# Patient Record
Sex: MALE | Race: WHITE | NOT HISPANIC OR LATINO
[De-identification: names, ages, dates, MRNs, and addresses within clinical notes are randomized per-mention and may not be internally consistent; named-entity substitution may affect disease eponyms.]

---

## 2017-01-23 ENCOUNTER — NON-APPOINTMENT (OUTPATIENT)
Age: 72
End: 2017-01-23

## 2017-01-23 ENCOUNTER — APPOINTMENT (OUTPATIENT)
Dept: ELECTROPHYSIOLOGY | Facility: CLINIC | Age: 72
End: 2017-01-23

## 2017-01-23 VITALS
SYSTOLIC BLOOD PRESSURE: 133 MMHG | WEIGHT: 172 LBS | HEART RATE: 63 BPM | BODY MASS INDEX: 27 KG/M2 | HEIGHT: 67 IN | DIASTOLIC BLOOD PRESSURE: 78 MMHG

## 2017-02-09 ENCOUNTER — RX RENEWAL (OUTPATIENT)
Age: 72
End: 2017-02-09

## 2017-02-27 ENCOUNTER — APPOINTMENT (OUTPATIENT)
Dept: ELECTROPHYSIOLOGY | Facility: CLINIC | Age: 72
End: 2017-02-27

## 2017-03-09 ENCOUNTER — APPOINTMENT (OUTPATIENT)
Dept: NEUROLOGY | Facility: CLINIC | Age: 72
End: 2017-03-09

## 2017-03-09 VITALS
OXYGEN SATURATION: 96 % | WEIGHT: 204 LBS | SYSTOLIC BLOOD PRESSURE: 132 MMHG | HEART RATE: 67 BPM | BODY MASS INDEX: 32.02 KG/M2 | DIASTOLIC BLOOD PRESSURE: 80 MMHG | HEIGHT: 67 IN

## 2017-03-22 ENCOUNTER — MEDICATION RENEWAL (OUTPATIENT)
Age: 72
End: 2017-03-22

## 2017-03-30 ENCOUNTER — APPOINTMENT (OUTPATIENT)
Dept: ELECTROPHYSIOLOGY | Facility: CLINIC | Age: 72
End: 2017-03-30

## 2017-04-04 ENCOUNTER — MEDICATION RENEWAL (OUTPATIENT)
Age: 72
End: 2017-04-04

## 2017-04-04 ENCOUNTER — APPOINTMENT (OUTPATIENT)
Dept: NEUROLOGY | Facility: CLINIC | Age: 72
End: 2017-04-04

## 2017-04-05 ENCOUNTER — RESULT CHARGE (OUTPATIENT)
Age: 72
End: 2017-04-05

## 2017-04-06 ENCOUNTER — APPOINTMENT (OUTPATIENT)
Dept: NEUROLOGY | Facility: CLINIC | Age: 72
End: 2017-04-06

## 2017-04-07 ENCOUNTER — APPOINTMENT (OUTPATIENT)
Dept: CARDIOLOGY | Facility: CLINIC | Age: 72
End: 2017-04-07

## 2017-04-07 ENCOUNTER — NON-APPOINTMENT (OUTPATIENT)
Age: 72
End: 2017-04-07

## 2017-04-07 VITALS
BODY MASS INDEX: 32.33 KG/M2 | DIASTOLIC BLOOD PRESSURE: 85 MMHG | OXYGEN SATURATION: 98 % | SYSTOLIC BLOOD PRESSURE: 165 MMHG | HEIGHT: 67 IN | HEART RATE: 53 BPM | WEIGHT: 206 LBS

## 2017-04-07 VITALS — SYSTOLIC BLOOD PRESSURE: 142 MMHG | DIASTOLIC BLOOD PRESSURE: 86 MMHG

## 2017-04-11 RX ORDER — ASPIRIN 325 MG/1
325 TABLET, FILM COATED ORAL DAILY
Qty: 90 | Refills: 3 | Status: DISCONTINUED | COMMUNITY
Start: 2017-04-10 | End: 2017-04-11

## 2017-04-24 ENCOUNTER — APPOINTMENT (OUTPATIENT)
Dept: CARDIOLOGY | Facility: CLINIC | Age: 72
End: 2017-04-24

## 2017-04-27 ENCOUNTER — APPOINTMENT (OUTPATIENT)
Dept: ELECTROPHYSIOLOGY | Facility: CLINIC | Age: 72
End: 2017-04-27

## 2017-05-31 ENCOUNTER — APPOINTMENT (OUTPATIENT)
Dept: CARDIOLOGY | Facility: CLINIC | Age: 72
End: 2017-05-31

## 2017-06-05 ENCOUNTER — APPOINTMENT (OUTPATIENT)
Dept: CARDIOLOGY | Facility: CLINIC | Age: 72
End: 2017-06-05

## 2017-06-14 ENCOUNTER — APPOINTMENT (OUTPATIENT)
Dept: CARDIOLOGY | Facility: CLINIC | Age: 72
End: 2017-06-14

## 2017-06-14 ENCOUNTER — NON-APPOINTMENT (OUTPATIENT)
Age: 72
End: 2017-06-14

## 2017-06-14 VITALS
HEART RATE: 58 BPM | HEIGHT: 67 IN | WEIGHT: 200 LBS | OXYGEN SATURATION: 94 % | DIASTOLIC BLOOD PRESSURE: 78 MMHG | SYSTOLIC BLOOD PRESSURE: 143 MMHG | BODY MASS INDEX: 31.39 KG/M2

## 2017-06-15 ENCOUNTER — APPOINTMENT (OUTPATIENT)
Dept: RADIOLOGY | Facility: CLINIC | Age: 72
End: 2017-06-15

## 2017-06-15 ENCOUNTER — OUTPATIENT (OUTPATIENT)
Dept: OUTPATIENT SERVICES | Facility: HOSPITAL | Age: 72
LOS: 1 days | End: 2017-06-15
Payer: MEDICARE

## 2017-06-15 DIAGNOSIS — Z98.89 OTHER SPECIFIED POSTPROCEDURAL STATES: Chronic | ICD-10-CM

## 2017-06-15 DIAGNOSIS — Z00.8 ENCOUNTER FOR OTHER GENERAL EXAMINATION: ICD-10-CM

## 2017-06-15 PROCEDURE — 71020: CPT | Mod: 26

## 2017-06-15 PROCEDURE — 71046 X-RAY EXAM CHEST 2 VIEWS: CPT

## 2017-07-06 DIAGNOSIS — Z00.00 ENCOUNTER FOR GENERAL ADULT MEDICAL EXAMINATION W/OUT ABNORMAL FINDINGS: ICD-10-CM

## 2017-07-20 ENCOUNTER — APPOINTMENT (OUTPATIENT)
Dept: NEUROLOGY | Facility: CLINIC | Age: 72
End: 2017-07-20

## 2017-07-20 VITALS
HEART RATE: 57 BPM | WEIGHT: 200 LBS | SYSTOLIC BLOOD PRESSURE: 158 MMHG | BODY MASS INDEX: 31.39 KG/M2 | HEIGHT: 67 IN | OXYGEN SATURATION: 96 % | DIASTOLIC BLOOD PRESSURE: 91 MMHG

## 2017-07-20 DIAGNOSIS — R25.1 TREMOR, UNSPECIFIED: ICD-10-CM

## 2017-07-20 DIAGNOSIS — R68.89 OTHER GENERAL SYMPTOMS AND SIGNS: ICD-10-CM

## 2017-08-29 ENCOUNTER — RX RENEWAL (OUTPATIENT)
Age: 72
End: 2017-08-29

## 2017-11-09 ENCOUNTER — APPOINTMENT (OUTPATIENT)
Dept: CARDIOLOGY | Facility: CLINIC | Age: 72
End: 2017-11-09
Payer: MEDICARE

## 2017-11-09 ENCOUNTER — NON-APPOINTMENT (OUTPATIENT)
Age: 72
End: 2017-11-09

## 2017-11-09 VITALS
WEIGHT: 200 LBS | SYSTOLIC BLOOD PRESSURE: 162 MMHG | OXYGEN SATURATION: 92 % | BODY MASS INDEX: 31.32 KG/M2 | DIASTOLIC BLOOD PRESSURE: 86 MMHG | HEART RATE: 60 BPM

## 2017-11-09 VITALS — DIASTOLIC BLOOD PRESSURE: 90 MMHG | SYSTOLIC BLOOD PRESSURE: 148 MMHG

## 2017-11-09 VITALS — HEIGHT: 67 IN

## 2017-11-09 DIAGNOSIS — I63.9 CEREBRAL INFARCTION, UNSPECIFIED: ICD-10-CM

## 2017-11-09 PROCEDURE — 99215 OFFICE O/P EST HI 40 MIN: CPT

## 2017-11-09 PROCEDURE — 93000 ELECTROCARDIOGRAM COMPLETE: CPT

## 2017-11-10 ENCOUNTER — LABORATORY RESULT (OUTPATIENT)
Age: 72
End: 2017-11-10

## 2017-11-13 LAB
25(OH)D3 SERPL-MCNC: 27.9 NG/ML
ALBUMIN SERPL ELPH-MCNC: 4.2 G/DL
ALP BLD-CCNC: 52 U/L
ALT SERPL-CCNC: 24 U/L
ANION GAP SERPL CALC-SCNC: 11 MMOL/L
AST SERPL-CCNC: 15 U/L
BASOPHILS # BLD AUTO: 0.03 K/UL
BASOPHILS NFR BLD AUTO: 0.5 %
BILIRUB SERPL-MCNC: 0.2 MG/DL
BUN SERPL-MCNC: 29 MG/DL
CALCIUM SERPL-MCNC: 10.1 MG/DL
CHLORIDE SERPL-SCNC: 96 MMOL/L
CHOLEST SERPL-MCNC: 161 MG/DL
CHOLEST/HDLC SERPL: 2.6 RATIO
CO2 SERPL-SCNC: 25 MMOL/L
CREAT SERPL-MCNC: 1.6 MG/DL
EOSINOPHIL # BLD AUTO: 0.19 K/UL
EOSINOPHIL NFR BLD AUTO: 3.4 %
FOLATE SERPL-MCNC: 10.1 NG/ML
GLUCOSE SERPL-MCNC: 222 MG/DL
HBA1C MFR BLD HPLC: 8.7 %
HCT VFR BLD CALC: 39.5 %
HDLC SERPL-MCNC: 62 MG/DL
HGB BLD-MCNC: 12.8 G/DL
IMM GRANULOCYTES NFR BLD AUTO: 0 %
LDLC SERPL CALC-MCNC: 69 MG/DL
LYMPHOCYTES # BLD AUTO: 1.54 K/UL
LYMPHOCYTES NFR BLD AUTO: 27.5 %
MAN DIFF?: NORMAL
MCHC RBC-ENTMCNC: 30.3 PG
MCHC RBC-ENTMCNC: 32.4 GM/DL
MCV RBC AUTO: 93.4 FL
MONOCYTES # BLD AUTO: 0.51 K/UL
MONOCYTES NFR BLD AUTO: 9.1 %
NEUTROPHILS # BLD AUTO: 3.33 K/UL
NEUTROPHILS NFR BLD AUTO: 59.5 %
PLATELET # BLD AUTO: 152 K/UL
POTASSIUM SERPL-SCNC: 4.3 MMOL/L
PROT SERPL-MCNC: 7.4 G/DL
RBC # BLD: 4.23 M/UL
RBC # FLD: 13.2 %
SODIUM SERPL-SCNC: 132 MMOL/L
TRIGL SERPL-MCNC: 150 MG/DL
TSH SERPL-ACNC: 5.63 UIU/ML
VIT B12 SERPL-MCNC: 270 PG/ML
WBC # FLD AUTO: 5.6 K/UL

## 2017-11-27 ENCOUNTER — APPOINTMENT (OUTPATIENT)
Dept: CARDIOLOGY | Facility: CLINIC | Age: 72
End: 2017-11-27
Payer: MEDICARE

## 2017-11-27 PROCEDURE — 78452 HT MUSCLE IMAGE SPECT MULT: CPT

## 2017-11-27 PROCEDURE — 93015 CV STRESS TEST SUPVJ I&R: CPT

## 2017-11-27 PROCEDURE — A9500: CPT

## 2018-01-16 ENCOUNTER — RX RENEWAL (OUTPATIENT)
Age: 73
End: 2018-01-16

## 2018-03-09 ENCOUNTER — APPOINTMENT (OUTPATIENT)
Dept: CARDIOLOGY | Facility: CLINIC | Age: 73
End: 2018-03-09
Payer: MEDICARE

## 2018-03-09 ENCOUNTER — NON-APPOINTMENT (OUTPATIENT)
Age: 73
End: 2018-03-09

## 2018-03-09 VITALS — DIASTOLIC BLOOD PRESSURE: 70 MMHG | SYSTOLIC BLOOD PRESSURE: 130 MMHG

## 2018-03-09 VITALS
HEART RATE: 53 BPM | BODY MASS INDEX: 30.13 KG/M2 | OXYGEN SATURATION: 96 % | SYSTOLIC BLOOD PRESSURE: 150 MMHG | HEIGHT: 67 IN | WEIGHT: 192 LBS | DIASTOLIC BLOOD PRESSURE: 80 MMHG

## 2018-03-09 PROCEDURE — 93000 ELECTROCARDIOGRAM COMPLETE: CPT

## 2018-03-09 PROCEDURE — 99214 OFFICE O/P EST MOD 30 MIN: CPT

## 2018-06-21 ENCOUNTER — APPOINTMENT (OUTPATIENT)
Dept: CARDIOLOGY | Facility: CLINIC | Age: 73
End: 2018-06-21
Payer: MEDICARE

## 2018-06-21 ENCOUNTER — NON-APPOINTMENT (OUTPATIENT)
Age: 73
End: 2018-06-21

## 2018-06-21 VITALS
DIASTOLIC BLOOD PRESSURE: 83 MMHG | HEIGHT: 67 IN | WEIGHT: 192 LBS | HEART RATE: 54 BPM | OXYGEN SATURATION: 95 % | SYSTOLIC BLOOD PRESSURE: 144 MMHG | BODY MASS INDEX: 30.13 KG/M2

## 2018-06-21 DIAGNOSIS — R00.1 BRADYCARDIA, UNSPECIFIED: ICD-10-CM

## 2018-06-21 PROCEDURE — 93000 ELECTROCARDIOGRAM COMPLETE: CPT

## 2018-06-21 PROCEDURE — 99214 OFFICE O/P EST MOD 30 MIN: CPT

## 2018-06-21 RX ORDER — ALBUTEROL SULFATE 2.5 MG/3ML
(2.5 MG/3ML) SOLUTION RESPIRATORY (INHALATION)
Qty: 180 | Refills: 0 | Status: COMPLETED | COMMUNITY
Start: 2017-06-14

## 2018-06-21 RX ORDER — PREDNISONE 10 MG/1
10 TABLET ORAL
Qty: 30 | Refills: 0 | Status: COMPLETED | COMMUNITY
Start: 2017-06-14

## 2018-06-21 RX ORDER — AMOXICILLIN AND CLAVULANATE POTASSIUM 875; 125 MG/1; MG/1
875-125 TABLET, COATED ORAL
Qty: 20 | Refills: 0 | Status: COMPLETED | COMMUNITY
Start: 2018-03-18

## 2018-07-12 ENCOUNTER — RECORD ABSTRACTING (OUTPATIENT)
Age: 73
End: 2018-07-12

## 2018-07-12 DIAGNOSIS — E11.9 TYPE 2 DIABETES MELLITUS W/OUT COMPLICATIONS: ICD-10-CM

## 2018-07-25 ENCOUNTER — APPOINTMENT (OUTPATIENT)
Dept: PULMONOLOGY | Facility: CLINIC | Age: 73
End: 2018-07-25
Payer: MEDICARE

## 2018-07-25 VITALS
BODY MASS INDEX: 30.45 KG/M2 | SYSTOLIC BLOOD PRESSURE: 140 MMHG | OXYGEN SATURATION: 94 % | HEART RATE: 55 BPM | DIASTOLIC BLOOD PRESSURE: 80 MMHG | WEIGHT: 194 LBS | HEIGHT: 67 IN

## 2018-07-25 PROCEDURE — 71046 X-RAY EXAM CHEST 2 VIEWS: CPT

## 2018-07-25 PROCEDURE — 99213 OFFICE O/P EST LOW 20 MIN: CPT | Mod: 25

## 2018-07-25 PROCEDURE — 94060 EVALUATION OF WHEEZING: CPT

## 2018-09-06 ENCOUNTER — RX RENEWAL (OUTPATIENT)
Age: 73
End: 2018-09-06

## 2018-10-01 ENCOUNTER — APPOINTMENT (OUTPATIENT)
Dept: CARDIOLOGY | Facility: CLINIC | Age: 73
End: 2018-10-01
Payer: MEDICARE

## 2018-10-01 ENCOUNTER — NON-APPOINTMENT (OUTPATIENT)
Age: 73
End: 2018-10-01

## 2018-10-01 VITALS
WEIGHT: 196 LBS | DIASTOLIC BLOOD PRESSURE: 79 MMHG | OXYGEN SATURATION: 95 % | HEIGHT: 67 IN | SYSTOLIC BLOOD PRESSURE: 149 MMHG | HEART RATE: 60 BPM | BODY MASS INDEX: 30.76 KG/M2

## 2018-10-01 VITALS — SYSTOLIC BLOOD PRESSURE: 136 MMHG | DIASTOLIC BLOOD PRESSURE: 78 MMHG

## 2018-10-01 PROCEDURE — 93000 ELECTROCARDIOGRAM COMPLETE: CPT

## 2018-10-01 PROCEDURE — 99214 OFFICE O/P EST MOD 30 MIN: CPT

## 2018-10-24 ENCOUNTER — APPOINTMENT (OUTPATIENT)
Dept: PULMONOLOGY | Facility: CLINIC | Age: 73
End: 2018-10-24
Payer: MEDICARE

## 2018-10-24 VITALS
RESPIRATION RATE: 14 BRPM | HEART RATE: 73 BPM | SYSTOLIC BLOOD PRESSURE: 147 MMHG | DIASTOLIC BLOOD PRESSURE: 81 MMHG | OXYGEN SATURATION: 92 % | HEIGHT: 67 IN | WEIGHT: 195 LBS | BODY MASS INDEX: 30.61 KG/M2

## 2018-10-24 PROCEDURE — G0009: CPT

## 2018-10-24 PROCEDURE — 99213 OFFICE O/P EST LOW 20 MIN: CPT | Mod: 25

## 2018-10-24 PROCEDURE — 94060 EVALUATION OF WHEEZING: CPT

## 2018-10-24 PROCEDURE — 90670 PCV13 VACCINE IM: CPT

## 2018-12-11 ENCOUNTER — APPOINTMENT (OUTPATIENT)
Dept: CARDIOLOGY | Facility: CLINIC | Age: 73
End: 2018-12-11
Payer: MEDICARE

## 2018-12-11 ENCOUNTER — NON-APPOINTMENT (OUTPATIENT)
Age: 73
End: 2018-12-11

## 2018-12-11 VITALS
DIASTOLIC BLOOD PRESSURE: 74 MMHG | OXYGEN SATURATION: 95 % | HEART RATE: 51 BPM | SYSTOLIC BLOOD PRESSURE: 137 MMHG | HEIGHT: 67 IN | BODY MASS INDEX: 31.71 KG/M2 | WEIGHT: 202 LBS

## 2018-12-11 VITALS — SYSTOLIC BLOOD PRESSURE: 118 MMHG | DIASTOLIC BLOOD PRESSURE: 70 MMHG

## 2018-12-11 PROCEDURE — 93000 ELECTROCARDIOGRAM COMPLETE: CPT

## 2018-12-11 PROCEDURE — 99214 OFFICE O/P EST MOD 30 MIN: CPT

## 2018-12-11 NOTE — HISTORY OF PRESENT ILLNESS
[FreeTextEntry1] : 73-year-old man with a history of COPD, forgetfulness/dementia, hypertension who  presented to [facility] with a subacute  stroke. He was given TPA with resolution of his symptoms. He did not suffer any hemorrhagic conversion. He was noted during his hospital stay that he had a baseline sinus bradycardia.\par He had an ILR placed that showed PAF. Started on Eliquis. [facility] \par He has underlying sleep apnea on CPAP. \par  \par He is now here for  followup visit.  \par Since his last followup,  he has been feeling relatively well. He states that randomly he felt  a  chest tightness yesterday was mild and lasted for a minutes and was self limiting. He did put up lots of Mark lights. \par \par  his dyspnea on exertion is stable if not marginally better. He is sedentary as baseline. His cough has essentially resolved. \par He denies any  chest pain, PND, orthopnea, lower extremity edema, palpitations, near syncope, syncope.  He denies any blurry vision, headaches.\par No reported melena, hematochezia or hematemesis. He is compliant with his medications.

## 2018-12-11 NOTE — DISCUSSION/SUMMARY
[FreeTextEntry1] : 73 year old man with a history as listed presents for a followup. \par Kike is doing well.  Clinically he is euvolemic on exam. His chest pain is nonanginal and likely muscular. His EKG did not reveal any significant ischemic changes. His nuclear stress in 11/2017 was not revealing for ischemia. He will get a 2d echo to assess for any  new structural heart disease, changes in valvular and ventricular function. \par His blood pressure is   better controlled. He will continue  Norvasc 10mg Qday. He will continue Olmesartan 40mg Qday. \par I will continue his ILR interrogations to every 3 months. They do not want it explanted and want to have it monitored. \par He will continue with Zocor 40mg HS. \par Given his PAF and CHADS-VaSc= 4+, he will continue with Eliquis 5mg q12. \par  exercise and diet counseling was performed in order to reduce his future cardiovascular risk. I  He will followup with me in 3 months  time.

## 2018-12-11 NOTE — PHYSICAL EXAM
[Well Groomed] : well groomed [General Appearance - In No Acute Distress] : no acute distress [Normal Conjunctiva] : the conjunctiva exhibited no abnormalities [Eyelids - No Xanthelasma] : the eyelids demonstrated no xanthelasmas [Normal Oral Mucosa] : normal oral mucosa [No Oral Pallor] : no oral pallor [No Oral Cyanosis] : no oral cyanosis [Normal Jugular Venous A Waves Present] : normal jugular venous A waves present [Normal Jugular Venous V Waves Present] : normal jugular venous V waves present [No Jugular Venous Hernandez A Waves] : no jugular venous hernandez A waves [Abdomen Soft] : soft [Abdomen Tenderness] : non-tender [Abdomen Mass (___ Cm)] : no abdominal mass palpated [Abnormal Walk] : normal gait [Gait - Sufficient For Exercise Testing] : the gait was sufficient for exercise testing [Nail Clubbing] : no clubbing of the fingernails [Cyanosis, Localized] : no localized cyanosis [Petechial Hemorrhages (___cm)] : no petechial hemorrhages [Skin Color & Pigmentation] : normal skin color and pigmentation [] : no rash [No Venous Stasis] : no venous stasis [Skin Lesions] : no skin lesions [No Skin Ulcers] : no skin ulcer [No Xanthoma] : no  xanthoma was observed [Oriented To Time, Place, And Person] : oriented to person, place, and time [Affect] : the affect was normal [Mood] : the mood was normal [No Anxiety] : not feeling anxious [Normal Rhythm/Effort] : normal respiratory rhythm and effort [Diffuse Wheezing] : diffuse wheezing was heard [Normal Rate] : normal [Rhythm Regular] : regular [Normal S1] : normal S1 [Normal S2] : normal S2 [No Gallop] : no gallop heard [I] : a grade 1 [2+] : left 2+ [No Pitting Edema] : no pitting edema present [Right Carotid Bruit] : no bruit heard over the right carotid [Left Carotid Bruit] : no bruit heard over the left carotid [Bruit] : no bruit heard

## 2018-12-11 NOTE — REVIEW OF SYSTEMS
[see HPI] : see HPI [Dyspnea on exertion] : dyspnea during exertion [Negative] : Heme/Lymph [Shortness Of Breath] : no shortness of breath [Chest  Pressure] : no chest pressure [Chest Pain] : no chest pain [Lower Ext Edema] : no extremity edema [Palpitations] : no palpitations

## 2019-01-17 ENCOUNTER — MEDICATION RENEWAL (OUTPATIENT)
Age: 74
End: 2019-01-17

## 2019-01-30 ENCOUNTER — APPOINTMENT (OUTPATIENT)
Dept: PULMONOLOGY | Facility: CLINIC | Age: 74
End: 2019-01-30
Payer: MEDICARE

## 2019-01-30 VITALS — DIASTOLIC BLOOD PRESSURE: 80 MMHG | OXYGEN SATURATION: 94 % | HEART RATE: 65 BPM | SYSTOLIC BLOOD PRESSURE: 151 MMHG

## 2019-01-30 DIAGNOSIS — J44.1 CHRONIC OBSTRUCTIVE PULMONARY DISEASE WITH (ACUTE) EXACERBATION: ICD-10-CM

## 2019-01-30 PROCEDURE — 94640 AIRWAY INHALATION TREATMENT: CPT | Mod: 59

## 2019-01-30 PROCEDURE — 99214 OFFICE O/P EST MOD 30 MIN: CPT | Mod: 25

## 2019-01-30 PROCEDURE — 94060 EVALUATION OF WHEEZING: CPT

## 2019-01-30 RX ORDER — IPRATROPIUM BROMIDE AND ALBUTEROL SULFATE 2.5; .5 MG/3ML; MG/3ML
0.5-2.5 (3) SOLUTION RESPIRATORY (INHALATION) 4 TIMES DAILY
Qty: 2 | Refills: 0 | Status: ACTIVE | COMMUNITY
Start: 2019-01-30 | End: 1900-01-01

## 2019-01-30 RX ORDER — NEBULIZER ACCESSORIES
KIT MISCELLANEOUS
Qty: 1 | Refills: 0 | Status: ACTIVE | COMMUNITY
Start: 2019-01-30 | End: 1900-01-01

## 2019-01-30 NOTE — ASSESSMENT
[FreeTextEntry1] : Stable from sleep apnea perspective.\par Appears to have exacerbation of COPD. Will treat with steroids nebulizer therapy and antibiotics followup in 2 weeks

## 2019-01-30 NOTE — PROCEDURE
[FreeTextEntry1] : Spirometry demonstrates severe obstruction with interval decline positive bronchodilator response\par \par Compliance Summary\par 12/31/2018 - 1/29/2019 (30 days)\par Days with Device Usage 30 days\par Days without Device Usage 0 days\par Percent Days with Device Usage 100.0%\par Cumulative Usage 10 days 15 hrs. 53 mins. 36 secs.\par Maximum Usage (1 Day) 12 hrs. 10 mins. 5 secs.\par Average Usage (All Days) 8 hrs. 31 mins. 47 secs.\par Average Usage (Days Used) 8 hrs. 31 mins. 47 secs.\par Minimum Usage (1 Day) 3 hrs. 3 mins. 5 secs.\par Percent of Days with Usage >= 4 Hours 93.3%\par Percent of Days with Usage < 4 Hours 6.7%\par Date Range\par Total Blower Time 10 days 17 hrs. 11 mins. 42 secs.\par CPAP Summary\par Average Time in Large Leak Per Day 14 secs.\par Average AHI 4.8\par CPAP 7.0 cmH2O\par Printed By:

## 2019-01-30 NOTE — HISTORY OF PRESENT ILLNESS
[Worsened] : have worsened [Difficulty Breathing During Exertion] : worsened dyspnea on exertion [Feelings Of Weakness On Exertion] : worsened exercise intolerance [Cough] : worsened coughing [Wheezing] : worsened wheezing [FreeTextEntry1] : Notes worsening shortness of breath and respiratory symptoms. Was exposed to ill family members. Has been using inhalers regularly. Reports ongoing CPAP compliance.

## 2019-01-30 NOTE — PHYSICAL EXAM
[General Appearance - Well Developed] : well developed [Normal Appearance] : normal appearance [Well Groomed] : well groomed [General Appearance - Well Nourished] : well nourished [No Deformities] : no deformities [General Appearance - In No Acute Distress] : no acute distress [Normal Conjunctiva] : the conjunctiva exhibited no abnormalities [Eyelids - No Xanthelasma] : the eyelids demonstrated no xanthelasmas [Normal Oropharynx] : normal oropharynx [Neck Appearance] : the appearance of the neck was normal [Neck Cervical Mass (___cm)] : no neck mass was observed [Jugular Venous Distention Increased] : there was no jugular-venous distention [Thyroid Diffuse Enlargement] : the thyroid was not enlarged [Thyroid Nodule] : there were no palpable thyroid nodules [Heart Rate And Rhythm] : heart rate and rhythm were normal [Heart Sounds] : normal S1 and S2 [Murmurs] : no murmurs present [Auscultation Breath Sounds / Voice Sounds] : lungs were clear to auscultation bilaterally [FreeTextEntry1] : Reduced air entry bilaterally. Marked expiratory wheezing [Abdomen Soft] : soft [Abdomen Tenderness] : non-tender [Abdomen Mass (___ Cm)] : no abdominal mass palpated [Abnormal Walk] : normal gait [Gait - Sufficient For Exercise Testing] : the gait was sufficient for exercise testing [Nail Clubbing] : no clubbing of the fingernails [Cyanosis, Localized] : no localized cyanosis [Petechial Hemorrhages (___cm)] : no petechial hemorrhages [Skin Color & Pigmentation] : normal skin color and pigmentation [] : no rash [No Venous Stasis] : no venous stasis [Skin Lesions] : no skin lesions [No Skin Ulcers] : no skin ulcer [No Xanthoma] : no  xanthoma was observed [Deep Tendon Reflexes (DTR)] : deep tendon reflexes were 2+ and symmetric [Sensation] : the sensory exam was normal to light touch and pinprick [No Focal Deficits] : no focal deficits

## 2019-01-30 NOTE — REVIEW OF SYSTEMS
[As Noted in HPI] : as noted in HPI [Difficulty Initiating Sleep] : no difficulty falling asleep [Difficulty Maintaining Sleep] : no difficulty maintaining sleep [Dysesthesias] : no dysesthesias [Paresthesias] : no paresthesias [Negative] : Pulmonary Hypertension

## 2019-02-13 ENCOUNTER — APPOINTMENT (OUTPATIENT)
Dept: PULMONOLOGY | Facility: CLINIC | Age: 74
End: 2019-02-13
Payer: MEDICARE

## 2019-02-13 VITALS
OXYGEN SATURATION: 95 % | HEIGHT: 67 IN | WEIGHT: 203 LBS | HEART RATE: 60 BPM | DIASTOLIC BLOOD PRESSURE: 75 MMHG | BODY MASS INDEX: 31.86 KG/M2 | SYSTOLIC BLOOD PRESSURE: 160 MMHG

## 2019-02-13 PROCEDURE — 99213 OFFICE O/P EST LOW 20 MIN: CPT | Mod: 25

## 2019-02-13 PROCEDURE — 94010 BREATHING CAPACITY TEST: CPT

## 2019-02-13 RX ORDER — CEFUROXIME AXETIL 500 MG/1
500 TABLET ORAL
Qty: 14 | Refills: 0 | Status: DISCONTINUED | COMMUNITY
Start: 2019-01-30 | End: 2019-02-13

## 2019-02-13 NOTE — HISTORY OF PRESENT ILLNESS
[FreeTextEntry1] :  History of COPD. 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 Chronic dyspnea. No recent change. History of sleep apnea Reports ongoing CPAP compliance. \par \par Had issues with hyperglycemia during treatment for last COPD exacerbation.

## 2019-02-13 NOTE — ASSESSMENT
[FreeTextEntry1] : Patient is currently on treatment with PAP. Data download confirms excellent compliance. Patient continues to use treatment and is benefiting from it.\par COPD stable\par Suggested endocrinology evaluation so patient can learn how to use insulin in the case of exacerbations requiring steroids which will undoubtedly occur again

## 2019-02-13 NOTE — PROCEDURE
[FreeTextEntry1] : Spirometry demonstrates severe obstruction modest interval improvement from prior\par Compliance with CPAP verified by download

## 2019-02-13 NOTE — PHYSICAL EXAM
[General Appearance - Well Developed] : well developed [Normal Appearance] : normal appearance [Well Groomed] : well groomed [General Appearance - Well Nourished] : well nourished [No Deformities] : no deformities [General Appearance - In No Acute Distress] : no acute distress [Normal Conjunctiva] : the conjunctiva exhibited no abnormalities [Eyelids - No Xanthelasma] : the eyelids demonstrated no xanthelasmas [Normal Oropharynx] : normal oropharynx [Neck Appearance] : the appearance of the neck was normal [Neck Cervical Mass (___cm)] : no neck mass was observed [Jugular Venous Distention Increased] : there was no jugular-venous distention [Thyroid Diffuse Enlargement] : the thyroid was not enlarged [Thyroid Nodule] : there were no palpable thyroid nodules [Heart Rate And Rhythm] : heart rate and rhythm were normal [Heart Sounds] : normal S1 and S2 [Murmurs] : no murmurs present [Auscultation Breath Sounds / Voice Sounds] : lungs were clear to auscultation bilaterally [Abdomen Soft] : soft [Abdomen Tenderness] : non-tender [Abdomen Mass (___ Cm)] : no abdominal mass palpated [Abnormal Walk] : normal gait [Gait - Sufficient For Exercise Testing] : the gait was sufficient for exercise testing [Nail Clubbing] : no clubbing of the fingernails [Cyanosis, Localized] : no localized cyanosis [Petechial Hemorrhages (___cm)] : no petechial hemorrhages [Skin Color & Pigmentation] : normal skin color and pigmentation [] : no rash [No Venous Stasis] : no venous stasis [Skin Lesions] : no skin lesions [No Skin Ulcers] : no skin ulcer [No Xanthoma] : no  xanthoma was observed [Deep Tendon Reflexes (DTR)] : deep tendon reflexes were 2+ and symmetric [Sensation] : the sensory exam was normal to light touch and pinprick [No Focal Deficits] : no focal deficits [FreeTextEntry1] : Reduced air entry bilaterally.

## 2019-02-13 NOTE — REVIEW OF SYSTEMS
[As Noted in HPI] : as noted in HPI [Negative] : Pulmonary Hypertension [Difficulty Initiating Sleep] : no difficulty falling asleep [Difficulty Maintaining Sleep] : no difficulty maintaining sleep [Dysesthesias] : no dysesthesias [Paresthesias] : no paresthesias

## 2019-03-07 ENCOUNTER — APPOINTMENT (OUTPATIENT)
Dept: CARDIOLOGY | Facility: CLINIC | Age: 74
End: 2019-03-07
Payer: MEDICARE

## 2019-03-07 ENCOUNTER — NON-APPOINTMENT (OUTPATIENT)
Age: 74
End: 2019-03-07

## 2019-03-07 VITALS — SYSTOLIC BLOOD PRESSURE: 130 MMHG | DIASTOLIC BLOOD PRESSURE: 70 MMHG

## 2019-03-07 VITALS
BODY MASS INDEX: 32.42 KG/M2 | OXYGEN SATURATION: 97 % | SYSTOLIC BLOOD PRESSURE: 135 MMHG | DIASTOLIC BLOOD PRESSURE: 77 MMHG | WEIGHT: 207 LBS | HEART RATE: 57 BPM

## 2019-03-07 PROCEDURE — 99214 OFFICE O/P EST MOD 30 MIN: CPT

## 2019-03-07 PROCEDURE — 93000 ELECTROCARDIOGRAM COMPLETE: CPT

## 2019-03-07 NOTE — HISTORY OF PRESENT ILLNESS
[FreeTextEntry1] : 73-year-old man with a history of COPD, forgetfulness/dementia, hypertension who  presented to  Hospital with a subacute  stroke. He was given TPA with resolution of his symptoms. He did not suffer any hemorrhagic conversion. He was noted during his hospital stay that he had a baseline sinus bradycardia.\par He had an ILR placed that showed PAF. Started on Eliquis. [facility] \par He has underlying sleep apnea on CPAP. \par  \par He is now here for  followup visit.  \par Since his last followup,  he has been feeling relatively well. He never got his echo.   his dyspnea on exertion is stable if not marginally better. He is sedentary as baseline. His cough has essentially resolved. \par He denies any  chest pain, PND, orthopnea, lower extremity edema, palpitations, near syncope, syncope.  He denies any blurry vision, headaches. His memory is still impaired. \par No reported melena, hematochezia or hematemesis. He is compliant with his medications.

## 2019-03-07 NOTE — DISCUSSION/SUMMARY
[FreeTextEntry1] : 73 year old man with a history as listed presents for a followup. \par Kike is doing well.  Clinically he is euvolemic on exam. He is euvolemic on exam. His EKG did not reveal any significant ischemic changes. His nuclear stress in 11/2017 was not revealing for ischemia. \par He will get a 2d echo to assess for any  new structural heart disease, changes in valvular and ventricular function. \par His blood pressure is   better controlled. He will continue  Norvasc 10mg Qday. He will continue Olmesartan 40mg Qday. \par His ILR has ran out of battery. He does not want it explanted. \par He will continue with Zocor 40mg HS. \par Given his PAF and CHADS-VaSc= 4+, he will continue with Eliquis 5mg q12. \par HE will continue to see pulmonary for his asthma. He is much better controlled. \par  exercise and diet counseling was performed in order to reduce his future cardiovascular risk. I  He will followup with me in 3 months  time.

## 2019-03-07 NOTE — PHYSICAL EXAM
[Well Groomed] : well groomed [General Appearance - In No Acute Distress] : no acute distress [Normal Conjunctiva] : the conjunctiva exhibited no abnormalities [Eyelids - No Xanthelasma] : the eyelids demonstrated no xanthelasmas [Normal Oral Mucosa] : normal oral mucosa [No Oral Pallor] : no oral pallor [No Oral Cyanosis] : no oral cyanosis [Normal Jugular Venous A Waves Present] : normal jugular venous A waves present [Normal Jugular Venous V Waves Present] : normal jugular venous V waves present [No Jugular Venous Hernandez A Waves] : no jugular venous hernandez A waves [Abdomen Soft] : soft [Abdomen Tenderness] : non-tender [Abdomen Mass (___ Cm)] : no abdominal mass palpated [Abnormal Walk] : normal gait [Gait - Sufficient For Exercise Testing] : the gait was sufficient for exercise testing [Nail Clubbing] : no clubbing of the fingernails [Cyanosis, Localized] : no localized cyanosis [Petechial Hemorrhages (___cm)] : no petechial hemorrhages [Skin Color & Pigmentation] : normal skin color and pigmentation [] : no rash [No Venous Stasis] : no venous stasis [Skin Lesions] : no skin lesions [No Skin Ulcers] : no skin ulcer [No Xanthoma] : no  xanthoma was observed [Oriented To Time, Place, And Person] : oriented to person, place, and time [Affect] : the affect was normal [Mood] : the mood was normal [No Anxiety] : not feeling anxious [Normal Rhythm/Effort] : normal respiratory rhythm and effort [Normal Rate] : normal [Rhythm Regular] : regular [Normal S1] : normal S1 [Normal S2] : normal S2 [No Gallop] : no gallop heard [I] : a grade 1 [2+] : left 2+ [No Pitting Edema] : no pitting edema present [Decreased Breath Sounds] : breath sounds were decreased diffusely [Right Carotid Bruit] : no bruit heard over the right carotid [Left Carotid Bruit] : no bruit heard over the left carotid [Bruit] : no bruit heard

## 2019-03-19 ENCOUNTER — APPOINTMENT (OUTPATIENT)
Dept: CARDIOLOGY | Facility: CLINIC | Age: 74
End: 2019-03-19
Payer: MEDICARE

## 2019-03-19 PROCEDURE — 93306 TTE W/DOPPLER COMPLETE: CPT

## 2019-04-07 ENCOUNTER — INPATIENT (INPATIENT)
Facility: HOSPITAL | Age: 74
LOS: 4 days | Discharge: HOME HEALTH | End: 2019-04-12
Attending: INTERNAL MEDICINE | Admitting: INTERNAL MEDICINE
Payer: MEDICARE

## 2019-04-07 VITALS
RESPIRATION RATE: 16 BRPM | WEIGHT: 199.96 LBS | TEMPERATURE: 101 F | HEIGHT: 69 IN | SYSTOLIC BLOOD PRESSURE: 144 MMHG | DIASTOLIC BLOOD PRESSURE: 75 MMHG | OXYGEN SATURATION: 100 % | HEART RATE: 86 BPM

## 2019-04-07 DIAGNOSIS — Z98.89 OTHER SPECIFIED POSTPROCEDURAL STATES: Chronic | ICD-10-CM

## 2019-04-07 LAB
ALBUMIN SERPL ELPH-MCNC: 3.7 G/DL — SIGNIFICANT CHANGE UP (ref 3.3–5)
ALP SERPL-CCNC: 50 U/L — SIGNIFICANT CHANGE UP (ref 40–120)
ALT FLD-CCNC: 22 U/L — SIGNIFICANT CHANGE UP (ref 12–78)
ANION GAP SERPL CALC-SCNC: 8 MMOL/L — SIGNIFICANT CHANGE UP (ref 5–17)
APTT BLD: 32.9 SEC — SIGNIFICANT CHANGE UP (ref 27.5–36.3)
AST SERPL-CCNC: 9 U/L — LOW (ref 15–37)
BASE EXCESS BLDA CALC-SCNC: -5.7 MMOL/L — LOW (ref -2–2)
BASE EXCESS BLDV CALC-SCNC: -0.8 MMOL/L — SIGNIFICANT CHANGE UP (ref -2–2)
BASOPHILS # BLD AUTO: 0 K/UL — SIGNIFICANT CHANGE UP (ref 0–0.2)
BASOPHILS NFR BLD AUTO: 0 % — SIGNIFICANT CHANGE UP (ref 0–2)
BILIRUB SERPL-MCNC: 0.6 MG/DL — SIGNIFICANT CHANGE UP (ref 0.2–1.2)
BUN SERPL-MCNC: 17 MG/DL — SIGNIFICANT CHANGE UP (ref 7–23)
CALCIUM SERPL-MCNC: 8.4 MG/DL — LOW (ref 8.5–10.1)
CHLORIDE SERPL-SCNC: 108 MMOL/L — SIGNIFICANT CHANGE UP (ref 96–108)
CO2 SERPL-SCNC: 24 MMOL/L — SIGNIFICANT CHANGE UP (ref 22–31)
CREAT SERPL-MCNC: 1.51 MG/DL — HIGH (ref 0.5–1.3)
EOSINOPHIL # BLD AUTO: 0 K/UL — SIGNIFICANT CHANGE UP (ref 0–0.5)
EOSINOPHIL NFR BLD AUTO: 0 % — SIGNIFICANT CHANGE UP (ref 0–6)
GAS PNL BLDA: SIGNIFICANT CHANGE UP
GLUCOSE BLDC GLUCOMTR-MCNC: 389 MG/DL — HIGH (ref 70–99)
GLUCOSE SERPL-MCNC: 275 MG/DL — HIGH (ref 70–99)
HCO3 BLDA-SCNC: 18 MMOL/L — LOW (ref 21–29)
HCO3 BLDV-SCNC: 26 MMOL/L — SIGNIFICANT CHANGE UP (ref 21–29)
HCT VFR BLD CALC: 37 % — LOW (ref 39–50)
HGB BLD-MCNC: 12.1 G/DL — LOW (ref 13–17)
INR BLD: 1.57 RATIO — HIGH (ref 0.88–1.16)
LACTATE SERPL-SCNC: 1.8 MMOL/L — SIGNIFICANT CHANGE UP (ref 0.7–2)
LYMPHOCYTES # BLD AUTO: 0.38 K/UL — LOW (ref 1–3.3)
LYMPHOCYTES # BLD AUTO: 6 % — LOW (ref 13–44)
MAGNESIUM SERPL-MCNC: 1.1 MG/DL — LOW (ref 1.6–2.6)
MANUAL SMEAR VERIFICATION: SIGNIFICANT CHANGE UP
MCHC RBC-ENTMCNC: 30.6 PG — SIGNIFICANT CHANGE UP (ref 27–34)
MCHC RBC-ENTMCNC: 32.7 GM/DL — SIGNIFICANT CHANGE UP (ref 32–36)
MCV RBC AUTO: 93.4 FL — SIGNIFICANT CHANGE UP (ref 80–100)
MONOCYTES # BLD AUTO: 0.25 K/UL — SIGNIFICANT CHANGE UP (ref 0–0.9)
MONOCYTES NFR BLD AUTO: 4 % — SIGNIFICANT CHANGE UP (ref 2–14)
NEUTROPHILS # BLD AUTO: 5.57 K/UL — SIGNIFICANT CHANGE UP (ref 1.8–7.4)
NEUTROPHILS NFR BLD AUTO: 79 % — HIGH (ref 43–77)
NEUTS BAND # BLD: 10 % — HIGH (ref 0–8)
NRBC # BLD: 0 /100 — SIGNIFICANT CHANGE UP (ref 0–0)
NRBC # BLD: SIGNIFICANT CHANGE UP /100 WBCS (ref 0–0)
PCO2 BLDA: 33 MMHG — SIGNIFICANT CHANGE UP (ref 32–46)
PCO2 BLDV: 53 MMHG — HIGH (ref 35–50)
PH BLDA: 7.36 — SIGNIFICANT CHANGE UP (ref 7.35–7.45)
PH BLDV: 7.3 — LOW (ref 7.35–7.45)
PLAT MORPH BLD: NORMAL — SIGNIFICANT CHANGE UP
PLATELET # BLD AUTO: 148 K/UL — LOW (ref 150–400)
PO2 BLDA: 80 MMHG — SIGNIFICANT CHANGE UP (ref 74–108)
PO2 BLDV: 54 MMHG — HIGH (ref 25–45)
POTASSIUM SERPL-MCNC: 4 MMOL/L — SIGNIFICANT CHANGE UP (ref 3.5–5.3)
POTASSIUM SERPL-SCNC: 4 MMOL/L — SIGNIFICANT CHANGE UP (ref 3.5–5.3)
PROT SERPL-MCNC: 6.9 GM/DL — SIGNIFICANT CHANGE UP (ref 6–8.3)
PROTHROM AB SERPL-ACNC: 17.7 SEC — HIGH (ref 10–12.9)
RAPID RVP RESULT: SIGNIFICANT CHANGE UP
RBC # BLD: 3.96 M/UL — LOW (ref 4.2–5.8)
RBC # FLD: 13.3 % — SIGNIFICANT CHANGE UP (ref 10.3–14.5)
RBC BLD AUTO: NORMAL — SIGNIFICANT CHANGE UP
SAO2 % BLDA: 95 % — SIGNIFICANT CHANGE UP (ref 92–96)
SAO2 % BLDV: 83 % — SIGNIFICANT CHANGE UP (ref 67–88)
SODIUM SERPL-SCNC: 140 MMOL/L — SIGNIFICANT CHANGE UP (ref 135–145)
TROPONIN I SERPL-MCNC: <0.015 NG/ML — SIGNIFICANT CHANGE UP (ref 0.01–0.04)
VARIANT LYMPHS # BLD: 1 % — SIGNIFICANT CHANGE UP (ref 0–6)
WBC # BLD: 6.26 K/UL — SIGNIFICANT CHANGE UP (ref 3.8–10.5)
WBC # FLD AUTO: 6.26 K/UL — SIGNIFICANT CHANGE UP (ref 3.8–10.5)

## 2019-04-07 PROCEDURE — 99285 EMERGENCY DEPT VISIT HI MDM: CPT

## 2019-04-07 PROCEDURE — 71045 X-RAY EXAM CHEST 1 VIEW: CPT | Mod: 26

## 2019-04-07 PROCEDURE — 93010 ELECTROCARDIOGRAM REPORT: CPT

## 2019-04-07 RX ORDER — INSULIN LISPRO 100/ML
VIAL (ML) SUBCUTANEOUS AT BEDTIME
Qty: 0 | Refills: 0 | Status: DISCONTINUED | OUTPATIENT
Start: 2019-04-07 | End: 2019-04-12

## 2019-04-07 RX ORDER — AMLODIPINE BESYLATE 2.5 MG/1
10 TABLET ORAL DAILY
Qty: 0 | Refills: 0 | Status: DISCONTINUED | OUTPATIENT
Start: 2019-04-07 | End: 2019-04-12

## 2019-04-07 RX ORDER — IPRATROPIUM/ALBUTEROL SULFATE 18-103MCG
3 AEROSOL WITH ADAPTER (GRAM) INHALATION EVERY 6 HOURS
Qty: 0 | Refills: 0 | Status: DISCONTINUED | OUTPATIENT
Start: 2019-04-07 | End: 2019-04-12

## 2019-04-07 RX ORDER — GLUCAGON INJECTION, SOLUTION 0.5 MG/.1ML
1 INJECTION, SOLUTION SUBCUTANEOUS ONCE
Qty: 0 | Refills: 0 | Status: DISCONTINUED | OUTPATIENT
Start: 2019-04-07 | End: 2019-04-12

## 2019-04-07 RX ORDER — CEFTRIAXONE 500 MG/1
1000 INJECTION, POWDER, FOR SOLUTION INTRAMUSCULAR; INTRAVENOUS ONCE
Qty: 0 | Refills: 0 | Status: COMPLETED | OUTPATIENT
Start: 2019-04-07 | End: 2019-04-07

## 2019-04-07 RX ORDER — SODIUM CHLORIDE 9 MG/ML
3 INJECTION INTRAMUSCULAR; INTRAVENOUS; SUBCUTANEOUS ONCE
Qty: 0 | Refills: 0 | Status: COMPLETED | OUTPATIENT
Start: 2019-04-07 | End: 2019-04-07

## 2019-04-07 RX ORDER — ASPIRIN/CALCIUM CARB/MAGNESIUM 324 MG
81 TABLET ORAL DAILY
Qty: 0 | Refills: 0 | Status: DISCONTINUED | OUTPATIENT
Start: 2019-04-07 | End: 2019-04-12

## 2019-04-07 RX ORDER — AZITHROMYCIN 500 MG/1
500 TABLET, FILM COATED ORAL ONCE
Qty: 0 | Refills: 0 | Status: COMPLETED | OUTPATIENT
Start: 2019-04-07 | End: 2019-04-07

## 2019-04-07 RX ORDER — ESCITALOPRAM OXALATE 10 MG/1
1 TABLET, FILM COATED ORAL
Qty: 0 | Refills: 0 | COMMUNITY

## 2019-04-07 RX ORDER — DEXTROSE 50 % IN WATER 50 %
12.5 SYRINGE (ML) INTRAVENOUS ONCE
Qty: 0 | Refills: 0 | Status: DISCONTINUED | OUTPATIENT
Start: 2019-04-07 | End: 2019-04-12

## 2019-04-07 RX ORDER — ALBUTEROL 90 UG/1
2.5 AEROSOL, METERED ORAL ONCE
Qty: 0 | Refills: 0 | Status: COMPLETED | OUTPATIENT
Start: 2019-04-07 | End: 2019-04-07

## 2019-04-07 RX ORDER — DEXTROSE 50 % IN WATER 50 %
25 SYRINGE (ML) INTRAVENOUS ONCE
Qty: 0 | Refills: 0 | Status: DISCONTINUED | OUTPATIENT
Start: 2019-04-07 | End: 2019-04-12

## 2019-04-07 RX ORDER — SODIUM CHLORIDE 9 MG/ML
1000 INJECTION, SOLUTION INTRAVENOUS
Qty: 0 | Refills: 0 | Status: DISCONTINUED | OUTPATIENT
Start: 2019-04-07 | End: 2019-04-12

## 2019-04-07 RX ORDER — ACETAMINOPHEN 500 MG
1000 TABLET ORAL ONCE
Qty: 0 | Refills: 0 | Status: COMPLETED | OUTPATIENT
Start: 2019-04-07 | End: 2019-04-07

## 2019-04-07 RX ORDER — SODIUM CHLORIDE 9 MG/ML
2250 INJECTION INTRAMUSCULAR; INTRAVENOUS; SUBCUTANEOUS ONCE
Qty: 0 | Refills: 0 | Status: COMPLETED | OUTPATIENT
Start: 2019-04-07 | End: 2019-04-07

## 2019-04-07 RX ORDER — DONEPEZIL HYDROCHLORIDE 10 MG/1
10 TABLET, FILM COATED ORAL AT BEDTIME
Qty: 0 | Refills: 0 | Status: DISCONTINUED | OUTPATIENT
Start: 2019-04-07 | End: 2019-04-12

## 2019-04-07 RX ORDER — AZITHROMYCIN 500 MG/1
500 TABLET, FILM COATED ORAL EVERY 24 HOURS
Qty: 0 | Refills: 0 | Status: DISCONTINUED | OUTPATIENT
Start: 2019-04-07 | End: 2019-04-09

## 2019-04-07 RX ORDER — CEFTRIAXONE 500 MG/1
1000 INJECTION, POWDER, FOR SOLUTION INTRAMUSCULAR; INTRAVENOUS EVERY 24 HOURS
Qty: 0 | Refills: 0 | Status: DISCONTINUED | OUTPATIENT
Start: 2019-04-07 | End: 2019-04-09

## 2019-04-07 RX ORDER — APIXABAN 2.5 MG/1
5 TABLET, FILM COATED ORAL EVERY 12 HOURS
Qty: 0 | Refills: 0 | Status: DISCONTINUED | OUTPATIENT
Start: 2019-04-07 | End: 2019-04-12

## 2019-04-07 RX ORDER — DEXTROSE 50 % IN WATER 50 %
15 SYRINGE (ML) INTRAVENOUS ONCE
Qty: 0 | Refills: 0 | Status: DISCONTINUED | OUTPATIENT
Start: 2019-04-07 | End: 2019-04-12

## 2019-04-07 RX ORDER — IPRATROPIUM/ALBUTEROL SULFATE 18-103MCG
3 AEROSOL WITH ADAPTER (GRAM) INHALATION
Qty: 0 | Refills: 0 | Status: COMPLETED | OUTPATIENT
Start: 2019-04-07 | End: 2019-04-07

## 2019-04-07 RX ORDER — SODIUM CHLORIDE 9 MG/ML
1000 INJECTION INTRAMUSCULAR; INTRAVENOUS; SUBCUTANEOUS
Qty: 0 | Refills: 0 | Status: COMPLETED | OUTPATIENT
Start: 2019-04-07 | End: 2019-04-08

## 2019-04-07 RX ORDER — BUDESONIDE AND FORMOTEROL FUMARATE DIHYDRATE 160; 4.5 UG/1; UG/1
2 AEROSOL RESPIRATORY (INHALATION)
Qty: 0 | Refills: 0 | Status: DISCONTINUED | OUTPATIENT
Start: 2019-04-07 | End: 2019-04-12

## 2019-04-07 RX ORDER — ESCITALOPRAM OXALATE 10 MG/1
10 TABLET, FILM COATED ORAL DAILY
Qty: 0 | Refills: 0 | Status: DISCONTINUED | OUTPATIENT
Start: 2019-04-07 | End: 2019-04-08

## 2019-04-07 RX ORDER — INSULIN LISPRO 100/ML
VIAL (ML) SUBCUTANEOUS
Qty: 0 | Refills: 0 | Status: DISCONTINUED | OUTPATIENT
Start: 2019-04-07 | End: 2019-04-12

## 2019-04-07 RX ADMIN — Medication 125 MILLIGRAM(S): at 15:50

## 2019-04-07 RX ADMIN — Medication 1000 MILLIGRAM(S): at 15:51

## 2019-04-07 RX ADMIN — Medication 3 MILLILITER(S): at 15:52

## 2019-04-07 RX ADMIN — SODIUM CHLORIDE 100 MILLILITER(S): 9 INJECTION INTRAMUSCULAR; INTRAVENOUS; SUBCUTANEOUS at 23:55

## 2019-04-07 RX ADMIN — Medication 3 MILLILITER(S): at 16:03

## 2019-04-07 RX ADMIN — DONEPEZIL HYDROCHLORIDE 10 MILLIGRAM(S): 10 TABLET, FILM COATED ORAL at 23:55

## 2019-04-07 RX ADMIN — AZITHROMYCIN 255 MILLIGRAM(S): 500 TABLET, FILM COATED ORAL at 15:51

## 2019-04-07 RX ADMIN — SODIUM CHLORIDE 1125 MILLILITER(S): 9 INJECTION INTRAMUSCULAR; INTRAVENOUS; SUBCUTANEOUS at 15:30

## 2019-04-07 RX ADMIN — AZITHROMYCIN 500 MILLIGRAM(S): 500 TABLET, FILM COATED ORAL at 17:00

## 2019-04-07 RX ADMIN — APIXABAN 5 MILLIGRAM(S): 2.5 TABLET, FILM COATED ORAL at 23:55

## 2019-04-07 RX ADMIN — Medication 81 MILLIGRAM(S): at 23:55

## 2019-04-07 RX ADMIN — ALBUTEROL 2.5 MILLIGRAM(S): 90 AEROSOL, METERED ORAL at 18:09

## 2019-04-07 RX ADMIN — Medication 3: at 22:43

## 2019-04-07 RX ADMIN — SODIUM CHLORIDE 3 MILLILITER(S): 9 INJECTION INTRAMUSCULAR; INTRAVENOUS; SUBCUTANEOUS at 18:05

## 2019-04-07 RX ADMIN — CEFTRIAXONE 1000 MILLIGRAM(S): 500 INJECTION, POWDER, FOR SOLUTION INTRAMUSCULAR; INTRAVENOUS at 15:50

## 2019-04-07 RX ADMIN — Medication 3 MILLILITER(S): at 16:18

## 2019-04-07 NOTE — H&P ADULT - ASSESSMENT
73 y.o. male with PMH COPD, HTN, HLD, DM2, CVA 4/2015, dementia, on CPAP presents with CP/SOB. Pt in usual state of health until today. Pt is poor historian and history obtained from spouse at bedside. Pt woke up with HA and back pain, improved with Tylenol. Around 2PM, pt c/o CP and SOB. CP is sharp, nonradiating, constant, left sided. Reports reproducible. Currently, still on going and reproducible. No rash. No sore throat, abd pain, dysuria. Reports chronic cough. Reports sick contact with father-in-law (hospitalized with PNA) and grand children with cold symptoms.    #fever and bandemia concerning for viral infection +/-PNA  -admit to tele  -pulse ox monitorinig and supplemental oxygen  -cont ceftriaxone and azithro  -check RVP  -f/u cultures  -iv fluids    #SOB due to COPD exacerbation  -received nebs, steroids, antibiotics in ED  -cont solumedrol BID  -prn nebs  -pt uses CPAP at night    #HTN, HLD  -hold ARBs due to SARAY    #SARAY  -iv hydration and monitor renal function  -baseline Cr ~1.1    #DM2  -fingerstick monitoring and ISS  -check A1C    #hx CVA, dementia  -no residual  -supportive care  -cont ASA. On Eliquis (CrCl 50s)    #DVT ppx  -on Eliquis    #advanced care planning  -discussed with pt's wife at bedside who reports she is the decision maker  -reports they haven't talked about code status and pt is forgetful with dementia. Reports that if anything happens, the do everything  -pt is FULL code  -time: 16 min    IMPROVE VTE Individual Risk Assessment    RISK                                                                Points    [  ] Previous VTE                                                  3    [  ] Thrombophilia                                               2    [  ] Lower limb paralysis                                      2        (unable to hold up >15 seconds)      [  ] Current Cancer                                              2         (within 6 months)    [  ] Immobilization > 24 hrs                                1    [  ] ICU/CCU stay > 24 hours                              1    [ x ] Age > 60                                                      1    IMPROVE VTE Score ____1_____    IMPROVE Score 0-1: Low Risk, No VTE prophylaxis required for most patients, encourage ambulation.   IMPROVE Score 2-3: At risk, pharmacologic VTE prophylaxis is indicated for most patients (in the absence of a contraindication)  IMPROVE Score > or = 4: High Risk, pharmacologic VTE prophylaxis is indicated for most patients (in the absence of a contraindication) 73 y.o. male with PMH COPD, HTN, HLD, DM2, CVA 4/2015, dementia, on CPAP presents with CP/SOB. Pt in usual state of health until today. Pt is poor historian and history obtained from spouse at bedside. Pt woke up with HA and back pain, improved with Tylenol. Around 2PM, pt c/o CP and SOB. CP is sharp, nonradiating, constant, left sided. Reports reproducible. Currently, still on going and reproducible. No rash. No sore throat, abd pain, dysuria. Reports chronic cough. Reports sick contact with father-in-law (hospitalized with PNA) and grand children with cold symptoms.    #fever and bandemia concerning for viral infection +/-PNA  -admit to tele  -pulse ox monitorinig and supplemental oxygen  -cont ceftriaxone and azithro  -check RVP  -f/u cultures  -iv fluids    #SOB due to COPD exacerbation  -received nebs, steroids, antibiotics in ED  -cont solumedrol BID  -prn nebs  -pt uses CPAP at night    #HTN, HLD  -hold ARBs due to SARAY    #SARAY  -iv hydration and monitor renal function  -baseline Cr ~1.1    #DM2  -fingerstick monitoring and ISS  -check A1C    #hx CVA, dementia  -no residual  -supportive care  -cont ASA. On Eliquis (CrCl 50s)    #DVT ppx  -on Eliquis    #advanced care planning  -discussed with pt's wife at bedside who reports she is the decision maker  -reports they haven't talked about code status and pt is forgetful with dementia. Reports that if anything happens, the do everything  -pt is FULL code  -time: 16 min    #med rec not complete. Discussed with wife who provided only what she remembers. States that she will bring in the list in AM  -med rec to be completed in AM once family brings in list    IMPROVE VTE Individual Risk Assessment    RISK                                                                Points    [  ] Previous VTE                                                  3    [  ] Thrombophilia                                               2    [  ] Lower limb paralysis                                      2        (unable to hold up >15 seconds)      [  ] Current Cancer                                              2         (within 6 months)    [  ] Immobilization > 24 hrs                                1    [  ] ICU/CCU stay > 24 hours                              1    [ x ] Age > 60                                                      1    IMPROVE VTE Score ____1_____    IMPROVE Score 0-1: Low Risk, No VTE prophylaxis required for most patients, encourage ambulation.   IMPROVE Score 2-3: At risk, pharmacologic VTE prophylaxis is indicated for most patients (in the absence of a contraindication)  IMPROVE Score > or = 4: High Risk, pharmacologic VTE prophylaxis is indicated for most patients (in the absence of a contraindication)

## 2019-04-07 NOTE — ED PROVIDER NOTE - CLINICAL SUMMARY MEDICAL DECISION MAKING FREE TEXT BOX
Pt with COPD exacerbation likely PNA.  RVP negative.  Blood gas improved following duonebs.  Admit to medicine service for further evaluation and management.

## 2019-04-07 NOTE — H&P ADULT - HISTORY OF PRESENT ILLNESS
73 y.o. male with PMH COPD, HTN, HLD, DM2, CVA 2015, dementia, on CPAP presents with CP/SOB. Pt in usual state of health until today. Pt is poor historian and history obtained from spouse at bedside. Pt woke up with HA and back pain, improved with Tylenol. Around 2PM, pt c/o CP and SOB. CP is sharp, nonradiating, constant, left sided. Reports reproducible. Currently, still on going and reproducible. No rash. No sore throat, abd pain, dysuria. Reports chronic cough. Reports sick contact with father-in-law (hospitalized with PNA) and grand children with cold symptoms.    PMH: as above  PSH: hand surgery, loop recorder (reports battery , but they opted not to remove it)  Social Hx: former smoker  Family Hx: Father-CABG, heart disease; Mother-DM, lived to age 93  ROS: poor historian due to dementia 73 y.o. male with PMH COPD, HTN, HLD, DM2, CVA 2015, dementia, on CPAP presents with CP/SOB. Pt in usual state of health until today. Pt is poor historian and history obtained from spouse at bedside. Pt woke up with HA and back pain, improved with Tylenol. Around 2PM, pt c/o CP and SOB. CP is sharp, nonradiating, constant, left sided. Reports reproducible. Currently, still on going and reproducible. No rash. No sore throat, abd pain, dysuria. Reports chronic cough. Reports sick contact with father-in-law (hospitalized with PNA) and grand children with cold symptoms.    PMH: as above  PSH: hand surgery, loop recorder (reports battery , but they opted not to remove it)  Social Hx: former smoker quit 11 yr ago, hx EtOH use, quit 11 yr ago  Family Hx: Father-CABG, heart disease; Mother-DM, lived to age 93  ROS: poor historian due to dementia

## 2019-04-07 NOTE — ED PROVIDER NOTE - OBJECTIVE STATEMENT
72 y/o m with PMHx of CVA, dementia, HLD, COPD, HTN, DM presenting to the ED BIBA c/o CP, SOB today. +fever/chills. Pt's wife states pt woke up with HA this morning, CP came on suddenly later in the day. +sick contacts of father-in-law with PNA, and grandchildren with colds. [facility] cardio Dr. Caro, pulmo: Dr. Arango.

## 2019-04-07 NOTE — ED ADULT TRIAGE NOTE - CHIEF COMPLAINT QUOTE
Pt BIBEMS for c/o chest pain, per EMS patient is in a 1st degree AV Block, administered NTG and ASA PTA. PT found to have temp of 101.5 on arrival

## 2019-04-07 NOTE — ED PROVIDER NOTE - PMH
COPD (chronic obstructive pulmonary disease)    CVA (cerebral vascular accident)    Dementia    Diabetes mellitus    HLD (hyperlipidemia)    Hypertension

## 2019-04-07 NOTE — H&P ADULT - NSHPPHYSICALEXAM_GEN_ALL_CORE
Vital Signs Last 24 Hrs  T(C): 36.6 (07 Apr 2019 18:31), Max: 38.6 (07 Apr 2019 15:17)  T(F): 97.8 (07 Apr 2019 18:31), Max: 101.5 (07 Apr 2019 15:17)  HR: 88 (07 Apr 2019 18:31) (78 - 88)  BP: 127/72 (07 Apr 2019 18:31) (120/72 - 144/75)  BP(mean): --  RR: 20 (07 Apr 2019 18:31) (16 - 20)  SpO2: 98% (07 Apr 2019 18:31) (98% - 100%)    GEN: appears comfortable  Neuro: AAOx3, nonfocal  HEENT: NC/AT, EOMI  Neck: no thyroidmegaly, no JVD  Cardiovascular: S1S2 present, regular rhythm, no murmur  Respiratory: breath sounds normal bilaterally, no wheezing, no rales, no rhonchi  Gastrointestinal: bowel sounds normal, soft, no abdominal tenderness  Musculoskeletal: no muscle tenderness  Extremities: No edema  Skin: No rash Vital Signs Last 24 Hrs  T(C): 36.6 (07 Apr 2019 18:31), Max: 38.6 (07 Apr 2019 15:17)  T(F): 97.8 (07 Apr 2019 18:31), Max: 101.5 (07 Apr 2019 15:17)  HR: 88 (07 Apr 2019 18:31) (78 - 88)  BP: 127/72 (07 Apr 2019 18:31) (120/72 - 144/75)  BP(mean): --  RR: 20 (07 Apr 2019 18:31) (16 - 20)  SpO2: 98% (07 Apr 2019 18:31) (98% - 100%)    GEN: appears comfortable  Neuro: alert, conversant, forgetful, grossly nonfocal  HEENT: NC/AT, EOMI  Neck: no thyroidmegaly, no JVD  Cardiovascular: S1S2 present, regular rhythm, no murmur, left chest wall tender on palpation, no rash, loop recorder device present  Respiratory: breath sounds normal bilaterally, no wheezing, no rales, +b/l rhonchi  Gastrointestinal: bowel sounds normal, soft, no abdominal tenderness  Musculoskeletal: no muscle tenderness  Extremities: No edema  Skin: No rash

## 2019-04-07 NOTE — ED ADULT NURSE NOTE - OBJECTIVE STATEMENT
Pt is a 73y male, A &o x 3, VSS, presents to ED w/ chest pain, shortness of breath today, states father-in-law has PNA, + fever, denies nausea, vomiting or diarrhea. Pt placed on cardiac monitor, EKG done, pt in no apparent distress, bed rails up, will monitor.

## 2019-04-07 NOTE — ED PROVIDER NOTE - PHYSICAL EXAMINATION
GEN: AOX3, NAD, +Febrile. HEENT: Throat clear. Head NC/AT. NECK: Supple, No JVD. FROM. C-spine non-tender. CV:S1S2, RRR, LUNGS: +Congestion, +Scattered wheezing throughout. Mild tachypnea. ABD: Soft, NT/ND, no rebound, no guarding. No CVAT. EXT: No e/c/c. 2+ distal pulses. SKIN: No rashes. NEURO: No focal deficits. CN II-XII intact. FROM. 5/5 motor and sensory. ORI Moffett

## 2019-04-08 LAB
ANION GAP SERPL CALC-SCNC: 11 MMOL/L — SIGNIFICANT CHANGE UP (ref 5–17)
BASOPHILS # BLD AUTO: 0 K/UL — SIGNIFICANT CHANGE UP (ref 0–0.2)
BASOPHILS NFR BLD AUTO: 0 % — SIGNIFICANT CHANGE UP (ref 0–2)
BUN SERPL-MCNC: 19 MG/DL — SIGNIFICANT CHANGE UP (ref 7–23)
CALCIUM SERPL-MCNC: 8 MG/DL — LOW (ref 8.5–10.1)
CHLORIDE SERPL-SCNC: 107 MMOL/L — SIGNIFICANT CHANGE UP (ref 96–108)
CO2 SERPL-SCNC: 21 MMOL/L — LOW (ref 22–31)
CREAT SERPL-MCNC: 1.45 MG/DL — HIGH (ref 0.5–1.3)
EOSINOPHIL # BLD AUTO: 0 K/UL — SIGNIFICANT CHANGE UP (ref 0–0.5)
EOSINOPHIL NFR BLD AUTO: 0 % — SIGNIFICANT CHANGE UP (ref 0–6)
GLUCOSE BLDC GLUCOMTR-MCNC: 257 MG/DL — HIGH (ref 70–99)
GLUCOSE BLDC GLUCOMTR-MCNC: 310 MG/DL — HIGH (ref 70–99)
GLUCOSE BLDC GLUCOMTR-MCNC: 312 MG/DL — HIGH (ref 70–99)
GLUCOSE BLDC GLUCOMTR-MCNC: 319 MG/DL — HIGH (ref 70–99)
GLUCOSE BLDC GLUCOMTR-MCNC: 369 MG/DL — HIGH (ref 70–99)
GLUCOSE SERPL-MCNC: 360 MG/DL — HIGH (ref 70–99)
HBA1C BLD-MCNC: 9.1 % — HIGH (ref 4–5.6)
HCT VFR BLD CALC: 33 % — LOW (ref 39–50)
HCV AB S/CO SERPL IA: 0.33 S/CO — SIGNIFICANT CHANGE UP (ref 0–0.99)
HCV AB SERPL-IMP: SIGNIFICANT CHANGE UP
HGB BLD-MCNC: 10.7 G/DL — LOW (ref 13–17)
LYMPHOCYTES # BLD AUTO: 0.62 K/UL — LOW (ref 1–3.3)
LYMPHOCYTES # BLD AUTO: 10 % — LOW (ref 13–44)
MANUAL SMEAR VERIFICATION: SIGNIFICANT CHANGE UP
MCHC RBC-ENTMCNC: 30.1 PG — SIGNIFICANT CHANGE UP (ref 27–34)
MCHC RBC-ENTMCNC: 32.4 GM/DL — SIGNIFICANT CHANGE UP (ref 32–36)
MCV RBC AUTO: 93 FL — SIGNIFICANT CHANGE UP (ref 80–100)
MONOCYTES # BLD AUTO: 0.18 K/UL — SIGNIFICANT CHANGE UP (ref 0–0.9)
MONOCYTES NFR BLD AUTO: 3 % — SIGNIFICANT CHANGE UP (ref 2–14)
MYELOCYTES NFR BLD: 3 % — HIGH (ref 0–0)
NEUTROPHILS # BLD AUTO: 5.17 K/UL — SIGNIFICANT CHANGE UP (ref 1.8–7.4)
NEUTROPHILS NFR BLD AUTO: 79 % — HIGH (ref 43–77)
NEUTS BAND # BLD: 5 % — SIGNIFICANT CHANGE UP (ref 0–8)
NRBC # BLD: 0 /100 — SIGNIFICANT CHANGE UP (ref 0–0)
NRBC # BLD: SIGNIFICANT CHANGE UP /100 WBCS (ref 0–0)
PLAT MORPH BLD: NORMAL — SIGNIFICANT CHANGE UP
PLATELET # BLD AUTO: 128 K/UL — LOW (ref 150–400)
POTASSIUM SERPL-MCNC: 4 MMOL/L — SIGNIFICANT CHANGE UP (ref 3.5–5.3)
POTASSIUM SERPL-SCNC: 4 MMOL/L — SIGNIFICANT CHANGE UP (ref 3.5–5.3)
RBC # BLD: 3.55 M/UL — LOW (ref 4.2–5.8)
RBC # FLD: 13.4 % — SIGNIFICANT CHANGE UP (ref 10.3–14.5)
RBC BLD AUTO: SIGNIFICANT CHANGE UP
SODIUM SERPL-SCNC: 139 MMOL/L — SIGNIFICANT CHANGE UP (ref 135–145)
WBC # BLD: 6.15 K/UL — SIGNIFICANT CHANGE UP (ref 3.8–10.5)
WBC # FLD AUTO: 6.15 K/UL — SIGNIFICANT CHANGE UP (ref 3.8–10.5)

## 2019-04-08 RX ORDER — TIOTROPIUM BROMIDE 18 UG/1
1 CAPSULE ORAL; RESPIRATORY (INHALATION) DAILY
Qty: 0 | Refills: 0 | Status: DISCONTINUED | OUTPATIENT
Start: 2019-04-08 | End: 2019-04-12

## 2019-04-08 RX ORDER — GLIMEPIRIDE 1 MG
0 TABLET ORAL
Qty: 0 | Refills: 0 | COMMUNITY

## 2019-04-08 RX ORDER — INSULIN GLARGINE 100 [IU]/ML
10 INJECTION, SOLUTION SUBCUTANEOUS AT BEDTIME
Qty: 0 | Refills: 0 | Status: DISCONTINUED | OUTPATIENT
Start: 2019-04-08 | End: 2019-04-10

## 2019-04-08 RX ORDER — ESCITALOPRAM OXALATE 10 MG/1
0 TABLET, FILM COATED ORAL
Qty: 0 | Refills: 0 | COMMUNITY

## 2019-04-08 RX ORDER — SIMVASTATIN 20 MG/1
40 TABLET, FILM COATED ORAL AT BEDTIME
Qty: 0 | Refills: 0 | Status: DISCONTINUED | OUTPATIENT
Start: 2019-04-08 | End: 2019-04-12

## 2019-04-08 RX ORDER — ESCITALOPRAM OXALATE 10 MG/1
10 TABLET, FILM COATED ORAL DAILY
Qty: 0 | Refills: 0 | Status: DISCONTINUED | OUTPATIENT
Start: 2019-04-08 | End: 2019-04-12

## 2019-04-08 RX ADMIN — AMLODIPINE BESYLATE 10 MILLIGRAM(S): 2.5 TABLET ORAL at 05:32

## 2019-04-08 RX ADMIN — Medication 4: at 08:52

## 2019-04-08 RX ADMIN — Medication 40 MILLIGRAM(S): at 05:32

## 2019-04-08 RX ADMIN — DONEPEZIL HYDROCHLORIDE 10 MILLIGRAM(S): 10 TABLET, FILM COATED ORAL at 21:13

## 2019-04-08 RX ADMIN — Medication 1: at 21:14

## 2019-04-08 RX ADMIN — ESCITALOPRAM OXALATE 10 MILLIGRAM(S): 10 TABLET, FILM COATED ORAL at 12:14

## 2019-04-08 RX ADMIN — APIXABAN 5 MILLIGRAM(S): 2.5 TABLET, FILM COATED ORAL at 09:04

## 2019-04-08 RX ADMIN — CEFTRIAXONE 1000 MILLIGRAM(S): 500 INJECTION, POWDER, FOR SOLUTION INTRAMUSCULAR; INTRAVENOUS at 12:14

## 2019-04-08 RX ADMIN — Medication 81 MILLIGRAM(S): at 12:14

## 2019-04-08 RX ADMIN — SODIUM CHLORIDE 100 MILLILITER(S): 9 INJECTION INTRAMUSCULAR; INTRAVENOUS; SUBCUTANEOUS at 12:13

## 2019-04-08 RX ADMIN — BUDESONIDE AND FORMOTEROL FUMARATE DIHYDRATE 2 PUFF(S): 160; 4.5 AEROSOL RESPIRATORY (INHALATION) at 20:41

## 2019-04-08 RX ADMIN — AZITHROMYCIN 255 MILLIGRAM(S): 500 TABLET, FILM COATED ORAL at 12:27

## 2019-04-08 RX ADMIN — APIXABAN 5 MILLIGRAM(S): 2.5 TABLET, FILM COATED ORAL at 21:13

## 2019-04-08 RX ADMIN — Medication 40 MILLIGRAM(S): at 17:41

## 2019-04-08 RX ADMIN — Medication 4: at 12:14

## 2019-04-08 RX ADMIN — SIMVASTATIN 40 MILLIGRAM(S): 20 TABLET, FILM COATED ORAL at 21:13

## 2019-04-08 RX ADMIN — BUDESONIDE AND FORMOTEROL FUMARATE DIHYDRATE 2 PUFF(S): 160; 4.5 AEROSOL RESPIRATORY (INHALATION) at 09:19

## 2019-04-08 RX ADMIN — Medication 4: at 17:44

## 2019-04-08 RX ADMIN — Medication 3 MILLILITER(S): at 20:40

## 2019-04-08 RX ADMIN — INSULIN GLARGINE 10 UNIT(S): 100 INJECTION, SOLUTION SUBCUTANEOUS at 21:13

## 2019-04-08 NOTE — PHYSICAL THERAPY INITIAL EVALUATION ADULT - DIAGNOSIS, PT EVAL
chest pain,possible PNA RLL.acute exacerbation COPD chest pain ,possible PNA RLL.acute exacerbation COPD

## 2019-04-08 NOTE — PHYSICAL THERAPY INITIAL EVALUATION ADULT - LEVEL OF INDEPENDENCE: SIT/SUPINE, REHAB EVAL
out of bed to chair at bedside with nasal O2 3L/min,IV L AC and tray table in place about to eat lunch meal,wife in attendance

## 2019-04-08 NOTE — PHYSICAL THERAPY INITIAL EVALUATION ADULT - GAIT DEVIATIONS NOTED, PT EVAL
pt is curious about other patients peering into rooms as he walks ,states he wants to spread his good cheer to everyone he meets

## 2019-04-08 NOTE — CHART NOTE - NSCHARTNOTEFT_GEN_A_CORE
Pt c/o SOB after walking to the bathroom.      Will see patient Pt c/o SOB after walking to the bathroom, as per RN. Pt was seen and examined. Denied shortness of breath when seen but states he was short of breath previously.  Pt saturating at 94% on NC.       Vital Signs Last 24 Hrs  T(C): 36.7 (08 Apr 2019 10:33), Max: 37 (07 Apr 2019 22:00)  T(F): 98.1 (08 Apr 2019 10:33), Max: 98.6 (07 Apr 2019 22:00)  HR: 65 (08 Apr 2019 20:53) (62 - 66)  BP: 123/66 (08 Apr 2019 10:33) (117/55 - 135/56)  BP(mean): --  RR: 18 (08 Apr 2019 10:33) (17 - 18)  SpO2: 95% (08 Apr 2019 10:33) (95% - 98%)      Physical Exam  General- NAD  CV-+S1/S2  Lungs- inspiratory wheezing b/l, no rales  Abdomen- ND, NT  Pulses- 2+ b/l    A/P-73 y.o. male with PMH COPD, HTN, HLD, DM2, CVA 4/2015, dementia, on CPAP presents with CP/SOB    #SOB due to COPD exacerbation with chronic hypercapnic, hypoxic respiratory failure    -s/p duoneb and symbicort treatment  -Continue Solumedrol  -Monitor on tele/pulse ox overnight  -[facility] PRN     Case d/w RN Pt c/o SOB after walking to the bathroom, as per RN. Pt was seen and examined. Denied shortness of breath when seen but states he was short of breath previously.  Pt saturating at 94% on NC.   Pt seen and evaluated at bedside, sleeping comfortably.     Vital Signs Last 24 Hrs  T(C): 36.7 (08 Apr 2019 10:33), Max: 37 (07 Apr 2019 22:00)  T(F): 98.1 (08 Apr 2019 10:33), Max: 98.6 (07 Apr 2019 22:00)  HR: 65 (08 Apr 2019 20:53) (62 - 66)  BP: 123/66 (08 Apr 2019 10:33) (117/55 - 135/56)  BP(mean): --  RR: 18 (08 Apr 2019 10:33) (17 - 18)  SpO2: 95% (08 Apr 2019 10:33) (95% - 98%)      Physical Exam  General- NAD, well appearing  CV-+S1/S2  Lungs- inspiratory wheezing b/l, no rales  Abdomen- ND, NT  Pulses- 2+ b/l    A/P-73 y.o. male with PMH COPD, HTN, HLD, DM2, CVA 4/2015, dementia, on CPAP presents with CP/SOB    #SOB due to COPD exacerbation with chronic hypercapnic, hypoxic respiratory failure  - not in distress, not short of breath, comfortable  -s/p duoneb and symbicort treatment  -Continue Solumedrol  -Monitor on tele/pulse ox overnight  -Duonebs PRN     Case d/w JULIANA

## 2019-04-08 NOTE — PROGRESS NOTE ADULT - SUBJECTIVE AND OBJECTIVE BOX
HOSPITAL COURSE AND ASSESSMENT  73 y.o. male with PMH COPD, HTN, HLD, DM2, CVA 4/2015, dementia, on CPAP presents with CP/SOB. Pt in usual state of health until today. Pt is poor historian and history obtained from spouse at bedside. Pt woke up with HA and back pain, improved with Tylenol. Around 2PM, pt c/o CP and SOB. CP is sharp, nonradiating, constant, left sided. Reports reproducible. Currently, still on going and reproducible. No rash. No sore throat, abd pain, dysuria. Reports chronic cough. Reports sick contact with father-in-law (hospitalized with PNA) and grand children with cold symptoms.      #fever and bandemia concerning for viral infection +/-PNA  -tele without event, stable for downgrade  -supplemental oxygen, possibly has at home. will need to investigate  -ceftriaxone and azithro  - RVP negative  -f/u cultures  -iv fluids  -CT chest    #SOB due to COPD exacerbation with chronic hypercapnic, hypoxic respiratory failure  -received nebs, steroids, antibiotics in ED  -solumedrol to taper  -prn nebs  -pt uses CPAP at night    #HTN, HLD  -hold ARBs due to SARAY    #SARAY  -iv hydration and monitor renal function  -baseline Cr ~1.1    #DM2  -fingerstick monitoring and ISS  -check A1C    #CVA, dementia  -no residual  -supportive care  -cont ASA. On Eliquis (CrCl 50s)    #DVT ppx  -on Eliquis    #advanced care planning  -discussed with pt's wife at bedside who reports she is the decision maker  -reports they haven't talked about code status and pt is forgetful with dementia. Reports that if anything happens, the do everything  -pt is FULL code      -med rec to be completed in AM once family brings in list      ----------------------------------------------------------------------------------------------  CHIEF COMPLAINT:  dyspnea    SUBJECTIVE:     tolerating PO. comfortable in bed. confused. asked what time he has to be back here tomorrow for his test.    REVIEW OF SYSTEMS:  ltd due to mental status    Vital Signs Last 24 Hrs  T(C): 36.7 (08 Apr 2019 10:33), Max: 37 (07 Apr 2019 21:17)  T(F): 98.1 (08 Apr 2019 10:33), Max: 98.6 (07 Apr 2019 21:17)  HR: 62 (08 Apr 2019 10:33) (62 - 88)  BP: 123/66 (08 Apr 2019 10:33) (117/55 - 135/56)  BP(mean): --  RR: 18 (08 Apr 2019 10:33) (15 - 20)  SpO2: 95% (08 Apr 2019 10:33) (94% - 98%)  CAPILLARY BLOOD GLUCOSE      POCT Blood Glucose.: 369 mg/dL (08 Apr 2019 16:42)  POCT Blood Glucose.: 319 mg/dL (08 Apr 2019 12:07)  POCT Blood Glucose.: 310 mg/dL (08 Apr 2019 07:57)  POCT Blood Glucose.: 389 mg/dL (07 Apr 2019 22:09)      PHYSICAL EXAM:  Constitutional: NAD, NCAT  HEENT: EOMI, Normal Hearing, MMM, fair dentition  Neck: trachea midline, No JVD  Respiratory: Breath sounds are clear, unlabored respiration  Cardiovascular: S1 and S2, regular rate   Gastrointestinal: Bowel Sounds present, soft, nontender, nondistended  Extremities: No peripheral edema  Vascular: 2+ radial pulse  Neurological: awake, alert, follows commands, sensation grossly intact  Psych: appropriate affect, poor  insight  Musculoskeletal: moves all extremities  Skin: No rashes    ALLERGIES  No Known Allergies      MEDICATIONS  (STANDING):  amLODIPine   Tablet 10 milliGRAM(s) Oral daily  apixaban 5 milliGRAM(s) Oral every 12 hours  aspirin  chewable 81 milliGRAM(s) Oral daily  azithromycin  IVPB 500 milliGRAM(s) IV Intermittent every 24 hours  buDESOnide  80 MICROgram(s)/formoterol 4.5 MICROgram(s) Inhaler 2 Puff(s) Inhalation two times a day  cefTRIAXone Injectable. 1000 milliGRAM(s) IV Push every 24 hours  dextrose 5%. 1000 milliLiter(s) (50 mL/Hr) IV Continuous <Continuous>  dextrose 50% Injectable 12.5 Gram(s) IV Push once  dextrose 50% Injectable 25 Gram(s) IV Push once  dextrose 50% Injectable 25 Gram(s) IV Push once  donepezil 10 milliGRAM(s) Oral at bedtime  escitalopram 10 milliGRAM(s) Oral daily  insulin glargine Injectable (LANTUS) 10 Unit(s) SubCutaneous at bedtime  insulin lispro (HumaLOG) corrective regimen sliding scale   SubCutaneous three times a day before meals  insulin lispro (HumaLOG) corrective regimen sliding scale   SubCutaneous at bedtime  methylPREDNISolone sodium succinate Injectable 40 milliGRAM(s) IV Push two times a day  simvastatin 40 milliGRAM(s) Oral at bedtime  tiotropium 18 MICROgram(s) Capsule 1 Capsule(s) Inhalation daily      LABS: All Labs Reviewed:                        10.7   6.15  )-----------( 128      ( 08 Apr 2019 05:23 )             33.0     04-08    139  |  107  |  19  ----------------------------<  360<H>  4.0   |  21<L>  |  1.45<H>    Ca    8.0<L>      08 Apr 2019 05:23  Mg     1.1     04-07    TPro  6.9  /  Alb  3.7  /  TBili  0.6  /  DBili  x   /  AST  9<L>  /  ALT  22  /  AlkPhos  50  04-07    PT/INR - ( 07 Apr 2019 15:29 )   PT: 17.7 sec;   INR: 1.57 ratio         PTT - ( 07 Apr 2019 15:29 )  PTT:32.9 sec  CARDIAC MARKERS ( 07 Apr 2019 15:29 )  <0.015 ng/mL / x     / x     / x     / x          Blood Culture:

## 2019-04-08 NOTE — PHYSICAL THERAPY INITIAL EVALUATION ADULT - CRITERIA FOR SKILLED THERAPEUTIC INTERVENTIONS
impairments found/risk reduction/prevention/anticipated discharge recommendation/therapy frequency/rehab potential/functional limitations in following categories/predicted duration of therapy intervention/anticipated equipment needs at discharge

## 2019-04-09 LAB
GLUCOSE BLDC GLUCOMTR-MCNC: 281 MG/DL — HIGH (ref 70–99)
GLUCOSE BLDC GLUCOMTR-MCNC: 317 MG/DL — HIGH (ref 70–99)
GLUCOSE BLDC GLUCOMTR-MCNC: 320 MG/DL — HIGH (ref 70–99)
GLUCOSE BLDC GLUCOMTR-MCNC: 395 MG/DL — HIGH (ref 70–99)
GLUCOSE BLDC GLUCOMTR-MCNC: 432 MG/DL — HIGH (ref 70–99)

## 2019-04-09 PROCEDURE — 71250 CT THORAX DX C-: CPT | Mod: 26

## 2019-04-09 RX ORDER — INSULIN GLARGINE 100 [IU]/ML
10 INJECTION, SOLUTION SUBCUTANEOUS AT BEDTIME
Qty: 0 | Refills: 0 | Status: DISCONTINUED | OUTPATIENT
Start: 2019-04-09 | End: 2019-04-10

## 2019-04-09 RX ORDER — CEFUROXIME AXETIL 250 MG
500 TABLET ORAL EVERY 12 HOURS
Qty: 0 | Refills: 0 | Status: DISCONTINUED | OUTPATIENT
Start: 2019-04-09 | End: 2019-04-12

## 2019-04-09 RX ORDER — AZITHROMYCIN 500 MG/1
250 TABLET, FILM COATED ORAL ONCE
Qty: 0 | Refills: 0 | Status: COMPLETED | OUTPATIENT
Start: 2019-04-09 | End: 2019-04-09

## 2019-04-09 RX ORDER — INSULIN LISPRO 100/ML
10 VIAL (ML) SUBCUTANEOUS ONCE
Qty: 0 | Refills: 0 | Status: COMPLETED | OUTPATIENT
Start: 2019-04-09 | End: 2019-04-09

## 2019-04-09 RX ADMIN — ESCITALOPRAM OXALATE 10 MILLIGRAM(S): 10 TABLET, FILM COATED ORAL at 11:12

## 2019-04-09 RX ADMIN — AMLODIPINE BESYLATE 10 MILLIGRAM(S): 2.5 TABLET ORAL at 05:44

## 2019-04-09 RX ADMIN — Medication 10 UNIT(S): at 17:17

## 2019-04-09 RX ADMIN — Medication 3 MILLILITER(S): at 17:56

## 2019-04-09 RX ADMIN — SIMVASTATIN 40 MILLIGRAM(S): 20 TABLET, FILM COATED ORAL at 22:28

## 2019-04-09 RX ADMIN — Medication 5: at 11:12

## 2019-04-09 RX ADMIN — TIOTROPIUM BROMIDE 1 CAPSULE(S): 18 CAPSULE ORAL; RESPIRATORY (INHALATION) at 08:40

## 2019-04-09 RX ADMIN — Medication 81 MILLIGRAM(S): at 11:12

## 2019-04-09 RX ADMIN — Medication 3: at 07:43

## 2019-04-09 RX ADMIN — BUDESONIDE AND FORMOTEROL FUMARATE DIHYDRATE 2 PUFF(S): 160; 4.5 AEROSOL RESPIRATORY (INHALATION) at 08:34

## 2019-04-09 RX ADMIN — Medication 500 MILLIGRAM(S): at 17:18

## 2019-04-09 RX ADMIN — Medication 40 MILLIGRAM(S): at 05:44

## 2019-04-09 RX ADMIN — APIXABAN 5 MILLIGRAM(S): 2.5 TABLET, FILM COATED ORAL at 17:17

## 2019-04-09 RX ADMIN — INSULIN GLARGINE 10 UNIT(S): 100 INJECTION, SOLUTION SUBCUTANEOUS at 23:14

## 2019-04-09 RX ADMIN — INSULIN GLARGINE 10 UNIT(S): 100 INJECTION, SOLUTION SUBCUTANEOUS at 22:27

## 2019-04-09 RX ADMIN — Medication 2: at 22:50

## 2019-04-09 RX ADMIN — DONEPEZIL HYDROCHLORIDE 10 MILLIGRAM(S): 10 TABLET, FILM COATED ORAL at 22:27

## 2019-04-09 RX ADMIN — APIXABAN 5 MILLIGRAM(S): 2.5 TABLET, FILM COATED ORAL at 07:44

## 2019-04-09 RX ADMIN — BUDESONIDE AND FORMOTEROL FUMARATE DIHYDRATE 2 PUFF(S): 160; 4.5 AEROSOL RESPIRATORY (INHALATION) at 21:04

## 2019-04-09 RX ADMIN — AZITHROMYCIN 250 MILLIGRAM(S): 500 TABLET, FILM COATED ORAL at 17:17

## 2019-04-09 NOTE — PROGRESS NOTE ADULT - SUBJECTIVE AND OBJECTIVE BOX
HOSPITAL COURSE AND ASSESSMENT  73 y.o. male with PMH COPD, HTN, HLD, DM2, CVA 4/2015, dementia, on CPAP presents with CP/SOB. Pt in usual state of health until today. Pt is poor historian and history obtained from spouse at bedside. Pt woke up with HA and back pain, improved with Tylenol. Around 2PM, pt c/o CP and SOB. CP is sharp, non radiating, constant, left sided. Reports reproducible. Currently, still on going and reproducible. No rash. No sore throat, abd pain, dysuria. Reports chronic cough. Reports sick contact with father-in-law (hospitalized with PNA) and grand children with cold symptoms.    Pt was admitted to telemetry for pulse ox monitoring. He was noted to have RLL pneumonia on imaging. He was transferred to the medical floor and was tested for home oxygen. Course also complicated by delusions and delirium.    ANticipate discharge in 1-2 days after home oxygen evaluation.       #CAP with sepsis (fever, bandemia) POA   - RVP negative  -CT chest with RLL pneumonia  -ceftriaxone and azithro, deescalated to oral medication  -supplemental oxygen, wean as able    *Metabolic Encephalopathy superimposed on baseline dementia  -delusions common  -possible vascular dementia from CVA, mother with Alzheimer's  -followed by Dr Fuentes as outpatient  -may benefit from day program on discharge    #AECOPD exacerbation with acute on chronic hypercapnic, hypoxic respiratory failure  -received nebs, steroids, antibiotics in ED  -solumedrol to taper  -prn nebs  -pt uses CPAP at night    #DM2 with vascular complication  -A1C 9  -SSI, additional lantus given hyperglycemia      #CVA  -no residual  -supportive care  -cont ASA. On Eliquis (CrCl 50s)    #DVT ppx  -on Eliquis    #advanced care planning  -discussed with pt's wife at bedside who reports she is the decision maker  -reports they haven't talked about code status and pt is forgetful with dementia. Reports that if anything happens, then do everything  -pt is FULL code      ----------------------------------------------------------------------------------------------  CHIEF COMPLAINT:  dyspnea    SUBJECTIVE:     repeated story of cousin going to the [place] today  son at bedside and reports mother is busy with her father's healthcare. patient forgetful and getting worse    REVIEW OF SYSTEMS:  ltd due to mental status    Vital Signs Last 24 Hrs  T(C): 36.4 (09 Apr 2019 11:05), Max: 37.4 (08 Apr 2019 22:46)  T(F): 97.6 (09 Apr 2019 11:05), Max: 99.4 (08 Apr 2019 22:46)  HR: 74 (09 Apr 2019 11:05) (64 - 90)  BP: 131/63 (09 Apr 2019 11:05) (126/63 - 151/65)  BP(mean): --  RR: 18 (09 Apr 2019 11:05) (18 - 18)  SpO2: 90% (09 Apr 2019 11:05) (87% - 97%)  CAPILLARY BLOOD GLUCOSE      POCT Blood Glucose.: 395 mg/dL (09 Apr 2019 11:10)  POCT Blood Glucose.: 281 mg/dL (09 Apr 2019 07:39)  POCT Blood Glucose.: 257 mg/dL (08 Apr 2019 21:10)  POCT Blood Glucose.: 312 mg/dL (08 Apr 2019 17:44)      PHYSICAL EXAM:  Constitutional: NAD, NCAT  HEENT: EOMI, Normal Hearing, MMM, fair dentition  Neck: trachea midline, No JVD  Respiratory: Breath sounds are clear, unlabored respiration  Cardiovascular: S1 and S2, regular rate   Gastrointestinal: Bowel Sounds present, soft, nontender, nondistended  Extremities: No peripheral edema  Vascular: 2+ radial pulse  Neurological: awake, alert, follows commands, sensation grossly intact  Psych: appropriate affect, decreased insight  Musculoskeletal: moves all extremities  Skin: No rashes    ALLERGIES  No Known Allergies      MEDICATIONS  (STANDING):  amLODIPine   Tablet 10 milliGRAM(s) Oral daily  apixaban 5 milliGRAM(s) Oral every 12 hours  aspirin  chewable 81 milliGRAM(s) Oral daily  azithromycin   Tablet 250 milliGRAM(s) Oral once  buDESOnide  80 MICROgram(s)/formoterol 4.5 MICROgram(s) Inhaler 2 Puff(s) Inhalation two times a day  cefuroxime   Tablet 500 milliGRAM(s) Oral every 12 hours  dextrose 5%. 1000 milliLiter(s) (50 mL/Hr) IV Continuous <Continuous>  dextrose 50% Injectable 12.5 Gram(s) IV Push once  dextrose 50% Injectable 25 Gram(s) IV Push once  dextrose 50% Injectable 25 Gram(s) IV Push once  donepezil 10 milliGRAM(s) Oral at bedtime  escitalopram 10 milliGRAM(s) Oral daily  insulin glargine Injectable (LANTUS) 10 Unit(s) SubCutaneous at bedtime  insulin lispro (HumaLOG) corrective regimen sliding scale   SubCutaneous three times a day before meals  insulin lispro (HumaLOG) corrective regimen sliding scale   SubCutaneous at bedtime  methylPREDNISolone sodium succinate Injectable 40 milliGRAM(s) IV Push daily  simvastatin 40 milliGRAM(s) Oral at bedtime  tiotropium 18 MICROgram(s) Capsule 1 Capsule(s) Inhalation daily      LABS: All Labs Reviewed:                        10.7   6.15  )-----------( 128      ( 08 Apr 2019 05:23 )             33.0     04-08    139  |  107  |  19  ----------------------------<  360<H>  4.0   |  21<L>  |  1.45<H>    Ca    8.0<L>      08 Apr 2019 05:23            Blood Culture: 04-07 @ 15:29  Organism --  Gram Stain Blood -- Gram Stain --  Specimen Source .Blood None  Culture-Blood --

## 2019-04-09 NOTE — PHARMACOTHERAPY INTERVENTION NOTE - COMMENTS
Obtained medication history for admitted patient
Recommended restarting home regimen
Recommended dc-ing ceftraixone and azithromycin

## 2019-04-10 ENCOUNTER — APPOINTMENT (OUTPATIENT)
Dept: PULMONOLOGY | Facility: CLINIC | Age: 74
End: 2019-04-10

## 2019-04-10 LAB
ANION GAP SERPL CALC-SCNC: 8 MMOL/L — SIGNIFICANT CHANGE UP (ref 5–17)
BUN SERPL-MCNC: 28 MG/DL — HIGH (ref 7–23)
CALCIUM SERPL-MCNC: 7.9 MG/DL — LOW (ref 8.5–10.1)
CHLORIDE SERPL-SCNC: 107 MMOL/L — SIGNIFICANT CHANGE UP (ref 96–108)
CO2 SERPL-SCNC: 26 MMOL/L — SIGNIFICANT CHANGE UP (ref 22–31)
CREAT SERPL-MCNC: 1.29 MG/DL — SIGNIFICANT CHANGE UP (ref 0.5–1.3)
FOLATE SERPL-MCNC: 6.4 NG/ML — SIGNIFICANT CHANGE UP
GLUCOSE BLDC GLUCOMTR-MCNC: 234 MG/DL — HIGH (ref 70–99)
GLUCOSE BLDC GLUCOMTR-MCNC: 302 MG/DL — HIGH (ref 70–99)
GLUCOSE BLDC GLUCOMTR-MCNC: 401 MG/DL — HIGH (ref 70–99)
GLUCOSE BLDC GLUCOMTR-MCNC: 443 MG/DL — HIGH (ref 70–99)
GLUCOSE SERPL-MCNC: 237 MG/DL — HIGH (ref 70–99)
HCT VFR BLD CALC: 29.9 % — LOW (ref 39–50)
HGB BLD-MCNC: 10 G/DL — LOW (ref 13–17)
MCHC RBC-ENTMCNC: 30.9 PG — SIGNIFICANT CHANGE UP (ref 27–34)
MCHC RBC-ENTMCNC: 33.4 GM/DL — SIGNIFICANT CHANGE UP (ref 32–36)
MCV RBC AUTO: 92.3 FL — SIGNIFICANT CHANGE UP (ref 80–100)
NRBC # BLD: 0 /100 WBCS — SIGNIFICANT CHANGE UP (ref 0–0)
PLATELET # BLD AUTO: 164 K/UL — SIGNIFICANT CHANGE UP (ref 150–400)
POTASSIUM SERPL-MCNC: 4 MMOL/L — SIGNIFICANT CHANGE UP (ref 3.5–5.3)
POTASSIUM SERPL-SCNC: 4 MMOL/L — SIGNIFICANT CHANGE UP (ref 3.5–5.3)
RBC # BLD: 3.24 M/UL — LOW (ref 4.2–5.8)
RBC # FLD: 13.7 % — SIGNIFICANT CHANGE UP (ref 10.3–14.5)
SODIUM SERPL-SCNC: 141 MMOL/L — SIGNIFICANT CHANGE UP (ref 135–145)
VIT B12 SERPL-MCNC: 604 PG/ML — SIGNIFICANT CHANGE UP (ref 232–1245)
WBC # BLD: 10.69 K/UL — HIGH (ref 3.8–10.5)
WBC # FLD AUTO: 10.69 K/UL — HIGH (ref 3.8–10.5)

## 2019-04-10 RX ORDER — INSULIN GLARGINE 100 [IU]/ML
25 INJECTION, SOLUTION SUBCUTANEOUS AT BEDTIME
Qty: 0 | Refills: 0 | Status: DISCONTINUED | OUTPATIENT
Start: 2019-04-10 | End: 2019-04-12

## 2019-04-10 RX ORDER — HUMAN INSULIN 100 [IU]/ML
14 INJECTION, SUSPENSION SUBCUTANEOUS ONCE
Qty: 0 | Refills: 0 | Status: COMPLETED | OUTPATIENT
Start: 2019-04-10 | End: 2019-04-10

## 2019-04-10 RX ADMIN — BUDESONIDE AND FORMOTEROL FUMARATE DIHYDRATE 2 PUFF(S): 160; 4.5 AEROSOL RESPIRATORY (INHALATION) at 21:23

## 2019-04-10 RX ADMIN — Medication 2: at 22:11

## 2019-04-10 RX ADMIN — DONEPEZIL HYDROCHLORIDE 10 MILLIGRAM(S): 10 TABLET, FILM COATED ORAL at 22:10

## 2019-04-10 RX ADMIN — Medication 81 MILLIGRAM(S): at 11:32

## 2019-04-10 RX ADMIN — ESCITALOPRAM OXALATE 10 MILLIGRAM(S): 10 TABLET, FILM COATED ORAL at 11:32

## 2019-04-10 RX ADMIN — Medication 40 MILLIGRAM(S): at 05:23

## 2019-04-10 RX ADMIN — Medication 6: at 11:33

## 2019-04-10 RX ADMIN — HUMAN INSULIN 14 UNIT(S): 100 INJECTION, SUSPENSION SUBCUTANEOUS at 13:17

## 2019-04-10 RX ADMIN — Medication 2: at 08:01

## 2019-04-10 RX ADMIN — TIOTROPIUM BROMIDE 1 CAPSULE(S): 18 CAPSULE ORAL; RESPIRATORY (INHALATION) at 07:41

## 2019-04-10 RX ADMIN — Medication 500 MILLIGRAM(S): at 17:00

## 2019-04-10 RX ADMIN — Medication 500 MILLIGRAM(S): at 05:22

## 2019-04-10 RX ADMIN — APIXABAN 5 MILLIGRAM(S): 2.5 TABLET, FILM COATED ORAL at 05:22

## 2019-04-10 RX ADMIN — BUDESONIDE AND FORMOTEROL FUMARATE DIHYDRATE 2 PUFF(S): 160; 4.5 AEROSOL RESPIRATORY (INHALATION) at 07:41

## 2019-04-10 RX ADMIN — Medication 6: at 17:01

## 2019-04-10 RX ADMIN — SIMVASTATIN 40 MILLIGRAM(S): 20 TABLET, FILM COATED ORAL at 22:10

## 2019-04-10 RX ADMIN — INSULIN GLARGINE 25 UNIT(S): 100 INJECTION, SOLUTION SUBCUTANEOUS at 22:10

## 2019-04-10 RX ADMIN — Medication 3 MILLILITER(S): at 11:41

## 2019-04-10 RX ADMIN — APIXABAN 5 MILLIGRAM(S): 2.5 TABLET, FILM COATED ORAL at 17:01

## 2019-04-10 RX ADMIN — AMLODIPINE BESYLATE 10 MILLIGRAM(S): 2.5 TABLET ORAL at 05:22

## 2019-04-10 NOTE — PROGRESS NOTE ADULT - SUBJECTIVE AND OBJECTIVE BOX
HOSPITAL COURSE AND ASSESSMENT  73 y.o. male with PMH COPD, HTN, HLD, DM2, CVA 4/2015, dementia, on CPAP presents with CP/SOB. Pt in usual state of health until today. Pt is poor historian and history obtained from spouse at bedside. Pt woke up with HA and back pain, improved with Tylenol. Around 2PM, pt c/o CP and SOB. CP is sharp, non radiating, constant, left sided. Reports reproducible. Currently, still on going and reproducible. No rash. No sore throat, abd pain, dysuria. Reports chronic cough. Reports sick contact with father-in-law (hospitalized with PNA) and grand children with cold symptoms.    Pt was admitted to telemetry for pulse ox monitoring. He was noted to have RLL pneumonia on imaging. He was transferred to the medical floor and was tested for home oxygen. Course also complicated by delusions and delirium.    Anticipate discharge in 1-2 days, would benefit from increased supervision at home  O2 at rest 88%      #CAP with sepsis (fever, bandemia) POA   - RVP negative  -CT chest with RLL pneumonia  -ceftriaxone and azithro, deescalated to oral medication day 4/5  -supplemental oxygen, unable to wean    *Metabolic Encephalopathy superimposed on baseline dementia  -delusions common  -possible vascular dementia from CVA, mother with Alzheimer's  -followed by Dr Fuentes as outpatient  -may benefit from day program on discharge vs PAUL    #AECOPD exacerbation with acute on chronic hypercapnic, hypoxic respiratory failure  -received nebs, steroids, antibiotics in ED  -solumedrol to taper  -prn nebs  -pt uses CPAP at night    #DM2 with vascular complication  -A1C 9  -SSI, additional lantus/NPH given hyperglycemia    *SARAY on CKD III  -Cr 1.6 on arrival, now 1.29      #CVA  -no residual  -supportive care  -cont ASA. On Eliquis (CrCl 50s)    #DVT ppx  -on Eliquis    #advanced care planning  -per pt's wife who reports she is the decision maker  -reports they haven't talked about code status and pt is forgetful with dementia. Reports that if anything happens, then do everything  -pt is FULL code        ----------------------------------------------------------------------------------------------  CHIEF COMPLAINT:    SUBJECTIVE:     tolerating PO. OOB.    REVIEW OF SYSTEMS:  All other review of systems is negative unless indicated above    Vital Signs Last 24 Hrs  T(C): 36.8 (10 Apr 2019 10:36), Max: 37.3 (09 Apr 2019 23:17)  T(F): 98.2 (10 Apr 2019 10:36), Max: 99.2 (09 Apr 2019 23:17)  HR: 73 (10 Apr 2019 10:36) (60 - 73)  BP: 128/61 (10 Apr 2019 10:36) (118/52 - 134/66)  BP(mean): --  RR: 18 (10 Apr 2019 10:36) (17 - 18)  SpO2: 93% (10 Apr 2019 10:53) (87% - 95%)  CAPILLARY BLOOD GLUCOSE      POCT Blood Glucose.: 443 mg/dL (10 Apr 2019 11:24)  POCT Blood Glucose.: 234 mg/dL (10 Apr 2019 07:32)  POCT Blood Glucose.: 320 mg/dL (09 Apr 2019 22:48)  POCT Blood Glucose.: 317 mg/dL (09 Apr 2019 21:40)  POCT Blood Glucose.: 432 mg/dL (09 Apr 2019 17:07)      PHYSICAL EXAM:  Constitutional: NAD, NCAT  HEENT: EOMI, Normal Hearing, MMM, fair dentition  Neck: trachea midline, No JVD  Respiratory: Breath sounds are clear, unlabored respiration  Cardiovascular: S1 and S2, regular rate   Gastrointestinal: Bowel Sounds present, soft, nontender, nondistended  Extremities: No peripheral edema  Vascular: 2+ radial pulse  Neurological: awake, alert, follows commands, sensation grossly intact  Psych: appropriate affect,  insight  Musculoskeletal: moves all extremities  Skin: No rashes    ALLERGIES  No Known Allergies      MEDICATIONS  (STANDING):  amLODIPine   Tablet 10 milliGRAM(s) Oral daily  apixaban 5 milliGRAM(s) Oral every 12 hours  aspirin  chewable 81 milliGRAM(s) Oral daily  buDESOnide  80 MICROgram(s)/formoterol 4.5 MICROgram(s) Inhaler 2 Puff(s) Inhalation two times a day  cefuroxime   Tablet 500 milliGRAM(s) Oral every 12 hours  dextrose 5%. 1000 milliLiter(s) (50 mL/Hr) IV Continuous <Continuous>  dextrose 50% Injectable 12.5 Gram(s) IV Push once  dextrose 50% Injectable 25 Gram(s) IV Push once  dextrose 50% Injectable 25 Gram(s) IV Push once  donepezil 10 milliGRAM(s) Oral at bedtime  escitalopram 10 milliGRAM(s) Oral daily  insulin glargine Injectable (LANTUS) 25 Unit(s) SubCutaneous at bedtime  insulin lispro (HumaLOG) corrective regimen sliding scale   SubCutaneous three times a day before meals  insulin lispro (HumaLOG) corrective regimen sliding scale   SubCutaneous at bedtime  methylPREDNISolone sodium succinate Injectable 40 milliGRAM(s) IV Push daily  simvastatin 40 milliGRAM(s) Oral at bedtime  tiotropium 18 MICROgram(s) Capsule 1 Capsule(s) Inhalation daily      LABS: All Labs Reviewed:                        10.0   10.69 )-----------( 164      ( 10 Apr 2019 07:33 )             29.9     04-10    141  |  107  |  28<H>  ----------------------------<  237<H>  4.0   |  26  |  1.29    Ca    7.9<L>      10 Apr 2019 07:33            Blood Culture: 04-07 @ 15:29  Organism --  Gram Stain Blood -- Gram Stain --  Specimen Source .Blood None  Culture-Blood --

## 2019-04-11 ENCOUNTER — TRANSCRIPTION ENCOUNTER (OUTPATIENT)
Age: 74
End: 2019-04-11

## 2019-04-11 LAB
ANION GAP SERPL CALC-SCNC: 5 MMOL/L — SIGNIFICANT CHANGE UP (ref 5–17)
BUN SERPL-MCNC: 27 MG/DL — HIGH (ref 7–23)
CALCIUM SERPL-MCNC: 7.9 MG/DL — LOW (ref 8.5–10.1)
CHLORIDE SERPL-SCNC: 106 MMOL/L — SIGNIFICANT CHANGE UP (ref 96–108)
CO2 SERPL-SCNC: 29 MMOL/L — SIGNIFICANT CHANGE UP (ref 22–31)
CREAT SERPL-MCNC: 1.22 MG/DL — SIGNIFICANT CHANGE UP (ref 0.5–1.3)
GLUCOSE BLDC GLUCOMTR-MCNC: 180 MG/DL — HIGH (ref 70–99)
GLUCOSE BLDC GLUCOMTR-MCNC: 229 MG/DL — HIGH (ref 70–99)
GLUCOSE BLDC GLUCOMTR-MCNC: 272 MG/DL — HIGH (ref 70–99)
GLUCOSE BLDC GLUCOMTR-MCNC: 309 MG/DL — HIGH (ref 70–99)
GLUCOSE SERPL-MCNC: 165 MG/DL — HIGH (ref 70–99)
POTASSIUM SERPL-MCNC: 3.7 MMOL/L — SIGNIFICANT CHANGE UP (ref 3.5–5.3)
POTASSIUM SERPL-SCNC: 3.7 MMOL/L — SIGNIFICANT CHANGE UP (ref 3.5–5.3)
SODIUM SERPL-SCNC: 140 MMOL/L — SIGNIFICANT CHANGE UP (ref 135–145)

## 2019-04-11 RX ORDER — CEFUROXIME AXETIL 250 MG
1 TABLET ORAL
Qty: 4 | Refills: 0
Start: 2019-04-11 | End: 2019-04-12

## 2019-04-11 RX ADMIN — Medication 1: at 23:07

## 2019-04-11 RX ADMIN — SIMVASTATIN 40 MILLIGRAM(S): 20 TABLET, FILM COATED ORAL at 23:03

## 2019-04-11 RX ADMIN — AMLODIPINE BESYLATE 10 MILLIGRAM(S): 2.5 TABLET ORAL at 06:28

## 2019-04-11 RX ADMIN — Medication 500 MILLIGRAM(S): at 16:56

## 2019-04-11 RX ADMIN — ESCITALOPRAM OXALATE 10 MILLIGRAM(S): 10 TABLET, FILM COATED ORAL at 11:23

## 2019-04-11 RX ADMIN — DONEPEZIL HYDROCHLORIDE 10 MILLIGRAM(S): 10 TABLET, FILM COATED ORAL at 23:03

## 2019-04-11 RX ADMIN — Medication 1: at 08:43

## 2019-04-11 RX ADMIN — Medication 4: at 16:56

## 2019-04-11 RX ADMIN — Medication 500 MILLIGRAM(S): at 06:28

## 2019-04-11 RX ADMIN — Medication 2: at 11:23

## 2019-04-11 RX ADMIN — APIXABAN 5 MILLIGRAM(S): 2.5 TABLET, FILM COATED ORAL at 16:56

## 2019-04-11 RX ADMIN — BUDESONIDE AND FORMOTEROL FUMARATE DIHYDRATE 2 PUFF(S): 160; 4.5 AEROSOL RESPIRATORY (INHALATION) at 20:20

## 2019-04-11 RX ADMIN — BUDESONIDE AND FORMOTEROL FUMARATE DIHYDRATE 2 PUFF(S): 160; 4.5 AEROSOL RESPIRATORY (INHALATION) at 08:21

## 2019-04-11 RX ADMIN — TIOTROPIUM BROMIDE 1 CAPSULE(S): 18 CAPSULE ORAL; RESPIRATORY (INHALATION) at 08:27

## 2019-04-11 RX ADMIN — INSULIN GLARGINE 25 UNIT(S): 100 INJECTION, SOLUTION SUBCUTANEOUS at 23:07

## 2019-04-11 RX ADMIN — Medication 81 MILLIGRAM(S): at 11:24

## 2019-04-11 RX ADMIN — Medication 40 MILLIGRAM(S): at 06:28

## 2019-04-11 RX ADMIN — APIXABAN 5 MILLIGRAM(S): 2.5 TABLET, FILM COATED ORAL at 06:28

## 2019-04-11 NOTE — DISCHARGE NOTE PROVIDER - NSDCCPCAREPLAN_GEN_ALL_CORE_FT
PRINCIPAL DISCHARGE DIAGNOSIS  Diagnosis: Pneumonia  Assessment and Plan of Treatment:       SECONDARY DISCHARGE DIAGNOSES  Diagnosis: COPD exacerbation  Assessment and Plan of Treatment:

## 2019-04-11 NOTE — DISCHARGE NOTE NURSING/CASE MANAGEMENT/SOCIAL WORK - NSDCDPATPORTLINK_GEN_ALL_CORE
You can access the [facility] Patient Portal, offered by [facility], by registering with the following website: [email or web address]

## 2019-04-11 NOTE — DISCHARGE NOTE PROVIDER - NSDCFUADDINST_GEN_ALL_CORE_FT
PCP- follow up in 1 week. Bring these papers with you to that appointment so your PCP can request records from your hospital stay.    Pulmonary follow up 1-2 weeks.

## 2019-04-11 NOTE — DISCHARGE NOTE NURSING/CASE MANAGEMENT/SOCIAL WORK - NSDCFUADDAPPT_GEN_ALL_CORE_FT
Pt and wife were given an adaptor for his CPAP machine and shown how to use it by Jing KHANNA, they were able to do a return demo and a teach back.  They were reminded to call [facility] when they get home to schedule the delivery of the concentrator and to have home instruction. Pt and wife were given an adaptor for his CPAP machine and shown how to use it by Jing KHANNA, they were able to do a return demo and a teach back.  They were reminded to call [facility] when they get home to schedule the delivery of the concentrator and to have home instruction.  Wife will schedule follow up appiontment with PCP

## 2019-04-11 NOTE — DISCHARGE NOTE PROVIDER - NSDCPNSUBOBJ_GEN_ALL_CORE
total time, including coordination of care: 38 minutes, including coordination with case management and patient education    GEN: NAD    EYES: eomi    CV: no edema    RESP: unlabored

## 2019-04-11 NOTE — CHART NOTE - NSCHARTNOTEFT_GEN_A_CORE
Due to diagnosis of COPD, TAMMY, OHS, patient requires home oxygen. The hypoxic symptoms are deriving from COPD. Acute respiratory illness is now resolved with medical therapy to optimize respiratory status. The patient's COPD is now in a chronic stable state. O2 at rest on room air  is   88%. O2  on  2LNC O2 is   94%. The oxygen plan has been discussed with the patient and the patient/wife is agreeable to home oxygen use.

## 2019-04-11 NOTE — DISCHARGE NOTE PROVIDER - HOSPITAL COURSE
73 y.o. male with PMH COPD, HTN, HLD, DM2, CVA 4/2015, dementia, on CPAP presents with CP/SOB. Pt in usual state of health until today. Pt is poor historian and history obtained from spouse at bedside. Pt woke up with HA and back pain, improved with Tylenol. Around 2PM, pt c/o CP and SOB. CP is sharp, non radiating, constant, left sided. Reports reproducible. Currently, still on going and reproducible. No rash. No sore throat, abd pain, dysuria. Reports chronic cough. Reports sick contact with father-in-law (hospitalized with PNA) and grand children with cold symptoms.        Pt was admitted to telemetry for pulse ox monitoring. He was noted to have RLL pneumonia on imaging. He was transferred to the medical floor and was tested for home oxygen. Course also complicated by delusions and delirium.        would benefit from increased supervision at home    O2 at rest 88%            #CAP with sepsis (fever, bandemia) POA     - RVP negative    -CT chest with RLL pneumonia    -ceftriaxone and azithro, deescalated to oral medication day 5/7 complete in house    -supplemental oxygen, unable to wean        *Metabolic Encephalopathy superimposed on baseline dementia    -delusions common    -possible vascular dementia from CVA, mother with Alzheimer's    -followed by Dr Fuentes as outpatient    -may benefit from day program on discharge         #AECOPD exacerbation with acute on chronic hypercapnic, hypoxic respiratory failure    -received nebs, steroids, antibiotics in ED    -solumedrol to taper    -prn nebs    -pt uses CPAP at night        #DM2 with vascular complication    -A1C 9    -SSI, additional lantus/NPH given hyperglycemia        *SARAY on CKD III    -Cr 1.6 on arrival, now 1.29            #CVA    -no residual    -supportive care    -cont ASA. On Eliquis (CrCl 50s)        #DVT ppx    -on Eliquis        #advanced care planning    -per pt's wife who reports she is the decision maker    -reports they haven't talked about code status and pt is forgetful with dementia. Reports that if anything happens, then do everything    -pt is FULL code

## 2019-04-12 VITALS
TEMPERATURE: 98 F | DIASTOLIC BLOOD PRESSURE: 73 MMHG | OXYGEN SATURATION: 95 % | RESPIRATION RATE: 19 BRPM | HEART RATE: 55 BPM | SYSTOLIC BLOOD PRESSURE: 131 MMHG

## 2019-04-12 LAB
CULTURE RESULTS: SIGNIFICANT CHANGE UP
CULTURE RESULTS: SIGNIFICANT CHANGE UP
GLUCOSE BLDC GLUCOMTR-MCNC: 143 MG/DL — HIGH (ref 70–99)
GLUCOSE BLDC GLUCOMTR-MCNC: 263 MG/DL — HIGH (ref 70–99)
SPECIMEN SOURCE: SIGNIFICANT CHANGE UP
SPECIMEN SOURCE: SIGNIFICANT CHANGE UP

## 2019-04-12 RX ADMIN — AMLODIPINE BESYLATE 10 MILLIGRAM(S): 2.5 TABLET ORAL at 06:58

## 2019-04-12 RX ADMIN — Medication 81 MILLIGRAM(S): at 12:05

## 2019-04-12 RX ADMIN — Medication 500 MILLIGRAM(S): at 06:58

## 2019-04-12 RX ADMIN — BUDESONIDE AND FORMOTEROL FUMARATE DIHYDRATE 2 PUFF(S): 160; 4.5 AEROSOL RESPIRATORY (INHALATION) at 07:40

## 2019-04-12 RX ADMIN — TIOTROPIUM BROMIDE 1 CAPSULE(S): 18 CAPSULE ORAL; RESPIRATORY (INHALATION) at 07:40

## 2019-04-12 RX ADMIN — Medication 40 MILLIGRAM(S): at 06:58

## 2019-04-12 RX ADMIN — APIXABAN 5 MILLIGRAM(S): 2.5 TABLET, FILM COATED ORAL at 06:58

## 2019-04-12 RX ADMIN — ESCITALOPRAM OXALATE 10 MILLIGRAM(S): 10 TABLET, FILM COATED ORAL at 12:02

## 2019-04-12 RX ADMIN — Medication 3: at 12:02

## 2019-04-12 NOTE — PROGRESS NOTE ADULT - SUBJECTIVE AND OBJECTIVE BOX
discharge held yesterday for O2 bleed in with CPAP (mask needed to be arranged and [facility])    total time, including coordination of care: 38 minutes, including coordination with case management and patient education  GEN: NAD  EYES: eomi  CV: no edema  RESP: unlabored

## 2019-04-15 RX ORDER — ALBUTEROL 90 MCG
90 AEROSOL (GRAM) INHALATION
Refills: 0 | Status: DISCONTINUED | COMMUNITY
End: 2019-04-15

## 2019-04-15 RX ORDER — PREDNISONE 10 MG/1
10 TABLET ORAL
Qty: 30 | Refills: 0 | Status: DISCONTINUED | COMMUNITY
Start: 2019-01-30 | End: 2019-04-15

## 2019-04-15 RX ORDER — PIOGLITAZONE HYDROCHLORIDE 30 MG/1
30 TABLET ORAL
Refills: 0 | Status: ACTIVE | COMMUNITY

## 2019-04-15 RX ORDER — APIXABAN 5 MG/1
5 TABLET, FILM COATED ORAL
Qty: 180 | Refills: 3 | Status: ACTIVE | COMMUNITY
Start: 2019-04-15

## 2019-04-15 RX ORDER — ASPIRIN 81 MG/1
81 TABLET ORAL DAILY
Qty: 90 | Refills: 3 | Status: ACTIVE | COMMUNITY
Start: 2017-04-11

## 2019-04-15 RX ORDER — ESCITALOPRAM OXALATE 10 MG/1
10 TABLET, FILM COATED ORAL DAILY
Refills: 0 | Status: ACTIVE | COMMUNITY
Start: 2019-04-15

## 2019-04-15 RX ORDER — TIOTROPIUM BROMIDE INHALATION SPRAY 3.12 UG/1
2.5 SPRAY, METERED RESPIRATORY (INHALATION)
Refills: 0 | Status: ACTIVE | COMMUNITY

## 2019-04-15 RX ORDER — IPRATROPIUM BROMIDE 21 UG/1
0.03 SPRAY NASAL 3 TIMES DAILY
Qty: 3 | Refills: 1 | Status: DISCONTINUED | COMMUNITY
Start: 2018-03-09 | End: 2019-04-15

## 2019-04-16 DIAGNOSIS — N18.3 CHRONIC KIDNEY DISEASE, STAGE 3 (MODERATE): ICD-10-CM

## 2019-04-16 DIAGNOSIS — A41.9 SEPSIS, UNSPECIFIED ORGANISM: ICD-10-CM

## 2019-04-16 DIAGNOSIS — E11.22 TYPE 2 DIABETES MELLITUS WITH DIABETIC CHRONIC KIDNEY DISEASE: ICD-10-CM

## 2019-04-16 DIAGNOSIS — E78.5 HYPERLIPIDEMIA, UNSPECIFIED: ICD-10-CM

## 2019-04-16 DIAGNOSIS — J18.9 PNEUMONIA, UNSPECIFIED ORGANISM: ICD-10-CM

## 2019-04-16 DIAGNOSIS — J96.22 ACUTE AND CHRONIC RESPIRATORY FAILURE WITH HYPERCAPNIA: ICD-10-CM

## 2019-04-16 DIAGNOSIS — J44.0 CHRONIC OBSTRUCTIVE PULMONARY DISEASE WITH (ACUTE) LOWER RESPIRATORY INFECTION: ICD-10-CM

## 2019-04-16 DIAGNOSIS — Z86.73 PERSONAL HISTORY OF TRANSIENT ISCHEMIC ATTACK (TIA), AND CEREBRAL INFARCTION WITHOUT RESIDUAL DEFICITS: ICD-10-CM

## 2019-04-16 DIAGNOSIS — G47.33 OBSTRUCTIVE SLEEP APNEA (ADULT) (PEDIATRIC): ICD-10-CM

## 2019-04-16 DIAGNOSIS — Z79.01 LONG TERM (CURRENT) USE OF ANTICOAGULANTS: ICD-10-CM

## 2019-04-16 DIAGNOSIS — E11.65 TYPE 2 DIABETES MELLITUS WITH HYPERGLYCEMIA: ICD-10-CM

## 2019-04-16 DIAGNOSIS — Z87.891 PERSONAL HISTORY OF NICOTINE DEPENDENCE: ICD-10-CM

## 2019-04-16 DIAGNOSIS — I12.9 HYPERTENSIVE CHRONIC KIDNEY DISEASE WITH STAGE 1 THROUGH STAGE 4 CHRONIC KIDNEY DISEASE, OR UNSPECIFIED CHRONIC KIDNEY DISEASE: ICD-10-CM

## 2019-04-16 DIAGNOSIS — F01.50 VASCULAR DEMENTIA WITHOUT BEHAVIORAL DISTURBANCE: ICD-10-CM

## 2019-04-16 DIAGNOSIS — Z79.84 LONG TERM (CURRENT) USE OF ORAL HYPOGLYCEMIC DRUGS: ICD-10-CM

## 2019-04-16 DIAGNOSIS — J96.21 ACUTE AND CHRONIC RESPIRATORY FAILURE WITH HYPOXIA: ICD-10-CM

## 2019-04-16 DIAGNOSIS — E11.51 TYPE 2 DIABETES MELLITUS WITH DIABETIC PERIPHERAL ANGIOPATHY WITHOUT GANGRENE: ICD-10-CM

## 2019-04-16 DIAGNOSIS — N17.9 ACUTE KIDNEY FAILURE, UNSPECIFIED: ICD-10-CM

## 2019-04-16 DIAGNOSIS — G93.41 METABOLIC ENCEPHALOPATHY: ICD-10-CM

## 2019-04-16 DIAGNOSIS — J44.1 CHRONIC OBSTRUCTIVE PULMONARY DISEASE WITH (ACUTE) EXACERBATION: ICD-10-CM

## 2019-04-17 ENCOUNTER — APPOINTMENT (OUTPATIENT)
Dept: PULMONOLOGY | Facility: CLINIC | Age: 74
End: 2019-04-17
Payer: MEDICARE

## 2019-04-17 VITALS
DIASTOLIC BLOOD PRESSURE: 67 MMHG | OXYGEN SATURATION: 95 % | BODY MASS INDEX: 31.55 KG/M2 | HEIGHT: 67 IN | HEART RATE: 75 BPM | SYSTOLIC BLOOD PRESSURE: 131 MMHG | WEIGHT: 201 LBS

## 2019-04-17 PROCEDURE — 71046 X-RAY EXAM CHEST 2 VIEWS: CPT

## 2019-04-17 PROCEDURE — 99214 OFFICE O/P EST MOD 30 MIN: CPT | Mod: 25

## 2019-04-17 RX ORDER — CEFUROXIME AXETIL 500 MG/1
500 TABLET ORAL
Refills: 0 | Status: DISCONTINUED | COMMUNITY
Start: 2019-04-15 | End: 2019-04-17

## 2019-04-17 NOTE — PROCEDURE
[FreeTextEntry1] : Chest x-ray reveals right lower lobe infiltrate. In comparison the hospital chest x-ray the infiltrate has regressed medially and progressed laterally.

## 2019-04-17 NOTE — PHYSICAL EXAM
[General Appearance - Well Developed] : well developed [Normal Appearance] : normal appearance [Well Groomed] : well groomed [No Deformities] : no deformities [General Appearance - Well Nourished] : well nourished [Normal Conjunctiva] : the conjunctiva exhibited no abnormalities [General Appearance - In No Acute Distress] : no acute distress [Normal Oropharynx] : normal oropharynx [Eyelids - No Xanthelasma] : the eyelids demonstrated no xanthelasmas [Neck Appearance] : the appearance of the neck was normal [Neck Cervical Mass (___cm)] : no neck mass was observed [Jugular Venous Distention Increased] : there was no jugular-venous distention [Thyroid Nodule] : there were no palpable thyroid nodules [Thyroid Diffuse Enlargement] : the thyroid was not enlarged [Heart Sounds] : normal S1 and S2 [Heart Rate And Rhythm] : heart rate and rhythm were normal [Murmurs] : no murmurs present [Auscultation Breath Sounds / Voice Sounds] : lungs were clear to auscultation bilaterally [FreeTextEntry1] : Reduced air entry bilaterally.  [Abdomen Tenderness] : non-tender [Abdomen Soft] : soft [Abdomen Mass (___ Cm)] : no abdominal mass palpated [Abnormal Walk] : normal gait [Cyanosis, Localized] : no localized cyanosis [Gait - Sufficient For Exercise Testing] : the gait was sufficient for exercise testing [Nail Clubbing] : no clubbing of the fingernails [Skin Color & Pigmentation] : normal skin color and pigmentation [Petechial Hemorrhages (___cm)] : no petechial hemorrhages [No Venous Stasis] : no venous stasis [] : no rash [Skin Lesions] : no skin lesions [No Skin Ulcers] : no skin ulcer [No Xanthoma] : no  xanthoma was observed [Deep Tendon Reflexes (DTR)] : deep tendon reflexes were 2+ and symmetric [Sensation] : the sensory exam was normal to light touch and pinprick [No Focal Deficits] : no focal deficits

## 2019-04-17 NOTE — REASON FOR VISIT
[Follow-Up - From Hospitalization] : a hospitalization follow-up [COPD] : COPD [FreeTextEntry2] : Pneumonia

## 2019-04-17 NOTE — ASSESSMENT
[FreeTextEntry1] : COPD. Right lower lobe pneumonia in process of resolution. May discontinue oxygen. Followup chest x-ray one month. Reviewed immunization history, Prevnar already administered, will be due for Pneumovax later this year 12 months after Prevnar

## 2019-04-17 NOTE — HISTORY OF PRESENT ILLNESS
[FreeTextEntry1] : s/p hospitalization for pneumonia\par in Hosp 4 days\par discharged on oxygen\par Developed illness rather abruptly. Clinically improved. Was discharged on oxygen. Was treated with steroids in hospital. Has completed course of antibiotics\par \par

## 2019-05-06 ENCOUNTER — OTHER (OUTPATIENT)
Age: 74
End: 2019-05-06

## 2019-05-07 ENCOUNTER — OTHER (OUTPATIENT)
Age: 74
End: 2019-05-07

## 2019-05-22 ENCOUNTER — APPOINTMENT (OUTPATIENT)
Dept: PULMONOLOGY | Facility: CLINIC | Age: 74
End: 2019-05-22
Payer: MEDICARE

## 2019-05-22 VITALS
SYSTOLIC BLOOD PRESSURE: 145 MMHG | DIASTOLIC BLOOD PRESSURE: 74 MMHG | HEART RATE: 74 BPM | OXYGEN SATURATION: 95 % | HEIGHT: 67 IN | WEIGHT: 202 LBS | BODY MASS INDEX: 31.71 KG/M2

## 2019-05-22 DIAGNOSIS — J30.9 ALLERGIC RHINITIS, UNSPECIFIED: ICD-10-CM

## 2019-05-22 DIAGNOSIS — J18.9 PNEUMONIA, UNSPECIFIED ORGANISM: ICD-10-CM

## 2019-05-22 PROCEDURE — 99214 OFFICE O/P EST MOD 30 MIN: CPT | Mod: 25

## 2019-05-22 PROCEDURE — 94010 BREATHING CAPACITY TEST: CPT

## 2019-05-22 PROCEDURE — 71046 X-RAY EXAM CHEST 2 VIEWS: CPT

## 2019-05-22 RX ORDER — APIXABAN 5 MG/1
5 TABLET, FILM COATED ORAL
Qty: 180 | Refills: 3 | Status: DISCONTINUED | COMMUNITY
Start: 2017-03-15 | End: 2019-05-22

## 2019-05-22 NOTE — PROCEDURE
[FreeTextEntry1] : Compliance Summary\par 4/22/2019 - 5/21/2019 (30 days)\par Days with Device Usage 30 days\par Days without Device Usage 0 days\par Percent Days with Device Usage 100.0%\par Cumulative Usage 12 days 19 hrs. 47 mins. 19 secs.\par Maximum Usage (1 Day) 14 hrs. 40 mins. 23 secs.\par Average Usage (All Days) 10 hrs. 15 mins. 34 secs.\par Average Usage (Days Used) 10 hrs. 15 mins. 34 secs.\par Minimum Usage (1 Day) 8 hrs. 12 mins. 36 secs.\par Percent of Days with Usage >= 4 Hours 100.0%\par Percent of Days with Usage < 4 Hours 0.0%\par Date Range\par Total Blower Time 12 days 23 hrs. 55 mins. 26 secs.\par CPAP Summary\par Average Time in Large Leak Per Day 8 mins. 6 secs.\par \par \par Average AHI 6.5\par CPAP 7.0 cmH2O\par \par \par Chest x-ray demonstrates resolution of prior pneumonia.\par \par Spirometry demonstrates severe airway obstruction with interval improvement.\par

## 2019-05-22 NOTE — PHYSICAL EXAM
[General Appearance - Well Developed] : well developed [Normal Appearance] : normal appearance [Well Groomed] : well groomed [General Appearance - Well Nourished] : well nourished [No Deformities] : no deformities [General Appearance - In No Acute Distress] : no acute distress [Normal Conjunctiva] : the conjunctiva exhibited no abnormalities [Eyelids - No Xanthelasma] : the eyelids demonstrated no xanthelasmas [Normal Oropharynx] : normal oropharynx [Neck Appearance] : the appearance of the neck was normal [Neck Cervical Mass (___cm)] : no neck mass was observed [Jugular Venous Distention Increased] : there was no jugular-venous distention [Thyroid Diffuse Enlargement] : the thyroid was not enlarged [Thyroid Nodule] : there were no palpable thyroid nodules [Heart Rate And Rhythm] : heart rate and rhythm were normal [Heart Sounds] : normal S1 and S2 [Murmurs] : no murmurs present [Auscultation Breath Sounds / Voice Sounds] : lungs were clear to auscultation bilaterally [FreeTextEntry1] : Reduced air entry bilaterally.  [Abdomen Soft] : soft [Abdomen Tenderness] : non-tender [Abdomen Mass (___ Cm)] : no abdominal mass palpated [Abnormal Walk] : normal gait [Gait - Sufficient For Exercise Testing] : the gait was sufficient for exercise testing [Nail Clubbing] : no clubbing of the fingernails [Cyanosis, Localized] : no localized cyanosis [Petechial Hemorrhages (___cm)] : no petechial hemorrhages [Skin Color & Pigmentation] : normal skin color and pigmentation [] : no rash [No Venous Stasis] : no venous stasis [Skin Lesions] : no skin lesions [No Skin Ulcers] : no skin ulcer [No Xanthoma] : no  xanthoma was observed [Deep Tendon Reflexes (DTR)] : deep tendon reflexes were 2+ and symmetric [Sensation] : the sensory exam was normal to light touch and pinprick [No Focal Deficits] : no focal deficits

## 2019-05-22 NOTE — HISTORY OF PRESENT ILLNESS
[FreeTextEntry1] : Followup for COPD recent pneumonia and sleep apnea.\par Recovering from pneumonia and COPD. Reports nasal congestion which he attributes to allergies. Needs followup chest x-ray.\par Using CPAP nightly. Currently on CPAP 7. AHI 6.5 ( AHI on sleep 2016 was 66.4)

## 2019-05-22 NOTE — REASON FOR VISIT
[Follow-Up] : a follow-up visit [COPD] : COPD [Sleep Apnea] : sleep apnea [FreeTextEntry2] : pneumonia

## 2019-05-22 NOTE — ASSESSMENT
[FreeTextEntry1] : Pneumonia has resolved.\par Add nasal steroids for allergy symptoms\par Continue CPAP

## 2019-06-10 ENCOUNTER — NON-APPOINTMENT (OUTPATIENT)
Age: 74
End: 2019-06-10

## 2019-06-10 ENCOUNTER — APPOINTMENT (OUTPATIENT)
Dept: CARDIOLOGY | Facility: CLINIC | Age: 74
End: 2019-06-10
Payer: MEDICARE

## 2019-06-10 VITALS
HEIGHT: 67 IN | HEART RATE: 52 BPM | OXYGEN SATURATION: 97 % | BODY MASS INDEX: 31.39 KG/M2 | SYSTOLIC BLOOD PRESSURE: 162 MMHG | DIASTOLIC BLOOD PRESSURE: 74 MMHG | WEIGHT: 200 LBS

## 2019-06-10 VITALS — DIASTOLIC BLOOD PRESSURE: 76 MMHG | SYSTOLIC BLOOD PRESSURE: 142 MMHG

## 2019-06-10 PROCEDURE — 93000 ELECTROCARDIOGRAM COMPLETE: CPT

## 2019-06-10 PROCEDURE — 99214 OFFICE O/P EST MOD 30 MIN: CPT

## 2019-06-10 RX ORDER — OLMESARTAN MEDOXOMIL 40 MG/1
40 TABLET, FILM COATED ORAL DAILY
Qty: 90 | Refills: 3 | Status: DISCONTINUED | COMMUNITY
Start: 2018-10-01 | End: 2019-06-10

## 2019-06-10 NOTE — PHYSICAL EXAM
[General Appearance - In No Acute Distress] : no acute distress [Well Groomed] : well groomed [Normal Conjunctiva] : the conjunctiva exhibited no abnormalities [Eyelids - No Xanthelasma] : the eyelids demonstrated no xanthelasmas [Normal Oral Mucosa] : normal oral mucosa [No Oral Cyanosis] : no oral cyanosis [No Oral Pallor] : no oral pallor [Normal Jugular Venous A Waves Present] : normal jugular venous A waves present [Normal Jugular Venous V Waves Present] : normal jugular venous V waves present [No Jugular Venous Hernandez A Waves] : no jugular venous hernandez A waves [Abdomen Soft] : soft [Abdomen Tenderness] : non-tender [Abdomen Mass (___ Cm)] : no abdominal mass palpated [Abnormal Walk] : normal gait [Gait - Sufficient For Exercise Testing] : the gait was sufficient for exercise testing [Nail Clubbing] : no clubbing of the fingernails [Cyanosis, Localized] : no localized cyanosis [Petechial Hemorrhages (___cm)] : no petechial hemorrhages [Skin Color & Pigmentation] : normal skin color and pigmentation [] : no rash [No Venous Stasis] : no venous stasis [Skin Lesions] : no skin lesions [No Skin Ulcers] : no skin ulcer [No Xanthoma] : no  xanthoma was observed [Oriented To Time, Place, And Person] : oriented to person, place, and time [Affect] : the affect was normal [Mood] : the mood was normal [No Anxiety] : not feeling anxious [Normal Rhythm/Effort] : normal respiratory rhythm and effort [Decreased Breath Sounds] : breath sounds were decreased diffusely [Normal Rate] : normal [Rhythm Regular] : regular [Normal S1] : normal S1 [Normal S2] : normal S2 [No Gallop] : no gallop heard [I] : a grade 1 [2+] : left 2+ [No Pitting Edema] : no pitting edema present [Right Carotid Bruit] : no bruit heard over the right carotid [Left Carotid Bruit] : no bruit heard over the left carotid [Bruit] : no bruit heard

## 2019-06-10 NOTE — DISCUSSION/SUMMARY
[FreeTextEntry1] : 73 year old man with a history as listed presents for a followup. \par Kike is doing well.  Clinically he is euvolemic on exam. He is euvolemic on exam. His EKG did not reveal any significant ischemic changes. His nuclear stress in 11/2017 was not revealing for ischemia. \par He had an echo in 3/2019 that showed normal systolic LV function without any significant other findings, including no significant valvular disease. \par His blood pressure is  uncontrolled. He will continue  Norvasc 10mg Qday. He will continue Losartan 100mg Qday. I will add chlorthalidone 25mg Qday. CMP check in 2 weeks.  I will defer AVN agents as his HR is usually in the adolph range. \par His ILR has ran out of battery. He does not want it explanted. \par He will continue with Zocor 40mg HS. Lipid check in 2 weeks. \par Given his PAF and CHADS-VaSc= 4+, he will continue with Eliquis 5mg q12. \par HE will continue to see pulmonary for his asthma. He is much better controlled. \par  exercise and diet counseling was performed in order to reduce his future cardiovascular risk. I  He will followup with me in 3 months  time.

## 2019-06-10 NOTE — REVIEW OF SYSTEMS
[see HPI] : see HPI [Negative] : Heme/Lymph [Shortness Of Breath] : no shortness of breath [Chest  Pressure] : no chest pressure [Dyspnea on exertion] : dyspnea during exertion [Chest Pain] : no chest pain [Palpitations] : no palpitations [Lower Ext Edema] : no extremity edema

## 2019-06-26 LAB
ALBUMIN SERPL ELPH-MCNC: 4.7 G/DL
ALP BLD-CCNC: 48 U/L
ALT SERPL-CCNC: 16 U/L
ANION GAP SERPL CALC-SCNC: 12 MMOL/L
AST SERPL-CCNC: 13 U/L
BILIRUB SERPL-MCNC: 0.2 MG/DL
BUN SERPL-MCNC: 29 MG/DL
CALCIUM SERPL-MCNC: 9.7 MG/DL
CHLORIDE SERPL-SCNC: 103 MMOL/L
CHOLEST SERPL-MCNC: 107 MG/DL
CHOLEST/HDLC SERPL: 2.2 RATIO
CO2 SERPL-SCNC: 24 MMOL/L
CREAT SERPL-MCNC: 1.57 MG/DL
GLUCOSE SERPL-MCNC: 197 MG/DL
HDLC SERPL-MCNC: 48 MG/DL
LDLC SERPL CALC-MCNC: 36 MG/DL
POTASSIUM SERPL-SCNC: 4.5 MMOL/L
PROT SERPL-MCNC: 6.9 G/DL
SODIUM SERPL-SCNC: 139 MMOL/L
TRIGL SERPL-MCNC: 115 MG/DL

## 2019-07-03 ENCOUNTER — APPOINTMENT (OUTPATIENT)
Dept: PULMONOLOGY | Facility: CLINIC | Age: 74
End: 2019-07-03
Payer: MEDICARE

## 2019-07-03 VITALS
HEART RATE: 58 BPM | BODY MASS INDEX: 30.77 KG/M2 | OXYGEN SATURATION: 93 % | HEIGHT: 68 IN | SYSTOLIC BLOOD PRESSURE: 148 MMHG | DIASTOLIC BLOOD PRESSURE: 80 MMHG | WEIGHT: 203 LBS

## 2019-07-03 PROCEDURE — 94010 BREATHING CAPACITY TEST: CPT

## 2019-07-03 PROCEDURE — 99214 OFFICE O/P EST MOD 30 MIN: CPT | Mod: 25

## 2019-07-03 PROCEDURE — 71046 X-RAY EXAM CHEST 2 VIEWS: CPT

## 2019-07-03 NOTE — PHYSICAL EXAM
[General Appearance - Well Developed] : well developed [Normal Appearance] : normal appearance [Well Groomed] : well groomed [General Appearance - Well Nourished] : well nourished [No Deformities] : no deformities [General Appearance - In No Acute Distress] : no acute distress [Normal Conjunctiva] : the conjunctiva exhibited no abnormalities [Eyelids - No Xanthelasma] : the eyelids demonstrated no xanthelasmas [Normal Oropharynx] : normal oropharynx [Neck Appearance] : the appearance of the neck was normal [Neck Cervical Mass (___cm)] : no neck mass was observed [Jugular Venous Distention Increased] : there was no jugular-venous distention [Thyroid Diffuse Enlargement] : the thyroid was not enlarged [Thyroid Nodule] : there were no palpable thyroid nodules [Heart Rate And Rhythm] : heart rate and rhythm were normal [Heart Sounds] : normal S1 and S2 [Murmurs] : no murmurs present [Auscultation Breath Sounds / Voice Sounds] : lungs were clear to auscultation bilaterally [FreeTextEntry1] : Reduced air entry bilaterally.  [Abdomen Soft] : soft [Abdomen Tenderness] : non-tender [Abdomen Mass (___ Cm)] : no abdominal mass palpated [Abnormal Walk] : normal gait [Gait - Sufficient For Exercise Testing] : the gait was sufficient for exercise testing [Nail Clubbing] : no clubbing of the fingernails [Cyanosis, Localized] : no localized cyanosis [Petechial Hemorrhages (___cm)] : no petechial hemorrhages [] : no rash [Skin Color & Pigmentation] : normal skin color and pigmentation [No Venous Stasis] : no venous stasis [Skin Lesions] : no skin lesions [No Skin Ulcers] : no skin ulcer [No Xanthoma] : no  xanthoma was observed [Sensation] : the sensory exam was normal to light touch and pinprick [Deep Tendon Reflexes (DTR)] : deep tendon reflexes were 2+ and symmetric [No Focal Deficits] : no focal deficits

## 2019-07-03 NOTE — ASSESSMENT
[FreeTextEntry1] : Pneumonia has resolved.\par Continue CPAP and inhalers.\par Overnight oximetry on CPAP without oxygen and discontinue oxygen if normal.

## 2019-07-03 NOTE — PROCEDURE
[FreeTextEntry1] : CPAP data download demonstrates excellent compliance and nocturnal usage\par \par Spirometry demonstrates severe obstruction without interval change\par \par \par Chest x-ray reveals resolution of right lower lobe findings. There is stable blunting of the left costophrenic angle which is visible retrospectively to 2017 without change and likely represents epicardial fat pad. Chest CT done in 2019 reveals no significant abnormality in this area.

## 2019-07-03 NOTE — HISTORY OF PRESENT ILLNESS
[FreeTextEntry1] : f/u for COPD, TAMMY\par Using CPAP/02 at night\par Pneumonia earlier this year. Still on oxygen since that time only using at night\par \par

## 2019-08-01 ENCOUNTER — RX RENEWAL (OUTPATIENT)
Age: 74
End: 2019-08-01

## 2019-08-01 RX ORDER — AMLODIPINE BESYLATE 10 MG/1
10 TABLET ORAL DAILY
Qty: 90 | Refills: 1 | Status: ACTIVE | COMMUNITY
Start: 2017-11-09 | End: 1900-01-01

## 2019-09-24 ENCOUNTER — NON-APPOINTMENT (OUTPATIENT)
Age: 74
End: 2019-09-24

## 2019-09-24 ENCOUNTER — APPOINTMENT (OUTPATIENT)
Dept: CARDIOLOGY | Facility: CLINIC | Age: 74
End: 2019-09-24
Payer: MEDICARE

## 2019-09-24 VITALS
HEIGHT: 68 IN | OXYGEN SATURATION: 92 % | WEIGHT: 206 LBS | HEART RATE: 64 BPM | SYSTOLIC BLOOD PRESSURE: 127 MMHG | BODY MASS INDEX: 31.22 KG/M2 | DIASTOLIC BLOOD PRESSURE: 71 MMHG

## 2019-09-24 PROCEDURE — 93000 ELECTROCARDIOGRAM COMPLETE: CPT

## 2019-09-24 PROCEDURE — 99214 OFFICE O/P EST MOD 30 MIN: CPT

## 2019-09-24 NOTE — HISTORY OF PRESENT ILLNESS
[FreeTextEntry1] : 73-year-old man with a history of COPD, forgetfulness/dementia, hypertension who  presented to [facility] with a subacute  stroke. He was given TPA with resolution of his symptoms. He did not suffer any hemorrhagic conversion. He was noted during his hospital stay that he had a baseline sinus bradycardia.\par He had an ILR placed that showed PAF. Started on Eliquis. [facility] \par He has underlying sleep apnea on CPAP. \par He had an echo in 3/2019 that showed normal systolic LV function without any significant other findings, including no significant valvular disease. \par \par He is now here for  followup visit.  \par Since his last followup, he was admitted to [facility] for PNA in 4/2019. He was treated with Abx and now is feeling better. \par \par He has been feeling relatively well.   his dyspnea on exertion is stable if not marginally better. He is sedentary as baseline.  \par He denies any  chest pain, PND, orthopnea, lower extremity edema, palpitations, near syncope, syncope.  He denies any blurry vision, headaches. His memory is still impaired. Per his wife he has been having more leg cramps at night. \par No reported melena, hematochezia or hematemesis. He is compliant with his medications. \par He is maintaining a higher salt diet.

## 2019-09-24 NOTE — DISCUSSION/SUMMARY
[FreeTextEntry1] : 74 year old man with a history as listed presents for a followup. \par \par Kike is doing well.  Clinically he is euvolemic on exam. He is euvolemic on exam. His EKG did not reveal any significant ischemic changes. His nuclear stress in 11/2017 was not revealing for ischemia. \par He had an echo in 3/2019 that showed normal systolic LV function without any significant other findings, including no significant valvular disease. \par \par His blood pressure is  controlled. He will continue  Norvasc 10mg Qday. He will continue Losartan 100mg Qday.  He will continue chlorthalidone 25mg Qday.  I will defer AVN agents as his HR is usually in the adolph range. \par His leg cramping may be from hypokalemia from the chlorthalidone. he will stay well hydrated. He will have his CMP checked. Also I would stop his Zocor given the cramps and that he is on Norvasc. I will try to given him lipitor after the next visit. \par \par His ILR has ran out of battery. He does not want it explanted. \par  \par Given his PAF and CHADS-VaSc= 4+, he will continue with Eliquis 5mg q12. \par \par HE will continue to see pulmonary for his COPD. He is much better controlled. \par  exercise and diet counseling was performed in order to reduce his future cardiovascular risk. I  He will followup with me in 3 months  time.

## 2019-09-24 NOTE — PHYSICAL EXAM
[General Appearance - In No Acute Distress] : no acute distress [Well Groomed] : well groomed [Normal Conjunctiva] : the conjunctiva exhibited no abnormalities [Eyelids - No Xanthelasma] : the eyelids demonstrated no xanthelasmas [Normal Oral Mucosa] : normal oral mucosa [No Oral Pallor] : no oral pallor [No Oral Cyanosis] : no oral cyanosis [Normal Jugular Venous A Waves Present] : normal jugular venous A waves present [Normal Jugular Venous V Waves Present] : normal jugular venous V waves present [Abdomen Soft] : soft [No Jugular Venous Hernandez A Waves] : no jugular venous hernandez A waves [Abdomen Tenderness] : non-tender [Abdomen Mass (___ Cm)] : no abdominal mass palpated [Abnormal Walk] : normal gait [Gait - Sufficient For Exercise Testing] : the gait was sufficient for exercise testing [Nail Clubbing] : no clubbing of the fingernails [Cyanosis, Localized] : no localized cyanosis [Petechial Hemorrhages (___cm)] : no petechial hemorrhages [Skin Color & Pigmentation] : normal skin color and pigmentation [] : no rash [No Venous Stasis] : no venous stasis [Skin Lesions] : no skin lesions [No Skin Ulcers] : no skin ulcer [No Xanthoma] : no  xanthoma was observed [Oriented To Time, Place, And Person] : oriented to person, place, and time [Affect] : the affect was normal [Mood] : the mood was normal [No Anxiety] : not feeling anxious [Normal Rhythm/Effort] : normal respiratory rhythm and effort [Decreased Breath Sounds] : breath sounds were decreased diffusely [Normal Rate] : normal [Rhythm Regular] : regular [Normal S1] : normal S1 [Normal S2] : normal S2 [No Gallop] : no gallop heard [I] : a grade 1 [2+] : left 2+ [No Pitting Edema] : no pitting edema present [Right Carotid Bruit] : no bruit heard over the right carotid [Left Carotid Bruit] : no bruit heard over the left carotid [Bruit] : no bruit heard

## 2019-09-25 ENCOUNTER — MEDICATION RENEWAL (OUTPATIENT)
Age: 74
End: 2019-09-25

## 2019-09-25 LAB
ALBUMIN SERPL ELPH-MCNC: 4.5 G/DL
ALP BLD-CCNC: 55 U/L
ALT SERPL-CCNC: 17 U/L
ANION GAP SERPL CALC-SCNC: 11 MMOL/L
AST SERPL-CCNC: 13 U/L
BILIRUB SERPL-MCNC: 0.2 MG/DL
BUN SERPL-MCNC: 33 MG/DL
CALCIUM SERPL-MCNC: 9.6 MG/DL
CHLORIDE SERPL-SCNC: 102 MMOL/L
CO2 SERPL-SCNC: 25 MMOL/L
CREAT SERPL-MCNC: 1.53 MG/DL
GLUCOSE SERPL-MCNC: 283 MG/DL
MAGNESIUM SERPL-MCNC: 1.7 MG/DL
POTASSIUM SERPL-SCNC: 4.8 MMOL/L
PROT SERPL-MCNC: 7 G/DL
SODIUM SERPL-SCNC: 138 MMOL/L

## 2019-10-02 ENCOUNTER — APPOINTMENT (OUTPATIENT)
Dept: PULMONOLOGY | Facility: CLINIC | Age: 74
End: 2019-10-02
Payer: MEDICARE

## 2019-10-02 VITALS
DIASTOLIC BLOOD PRESSURE: 80 MMHG | SYSTOLIC BLOOD PRESSURE: 138 MMHG | WEIGHT: 203 LBS | OXYGEN SATURATION: 96 % | HEIGHT: 67 IN | BODY MASS INDEX: 31.86 KG/M2 | HEART RATE: 51 BPM

## 2019-10-02 PROCEDURE — 94010 BREATHING CAPACITY TEST: CPT

## 2019-10-02 PROCEDURE — 99213 OFFICE O/P EST LOW 20 MIN: CPT | Mod: 25

## 2019-10-02 NOTE — ASSESSMENT
[FreeTextEntry1] : Patient is currently on treatment with PAP. Data download confirms excellent compliance. Patient continues to use treatment and is benefiting from it.\par COPD no change\par pneumovax next OV\par

## 2019-10-02 NOTE — HISTORY OF PRESENT ILLNESS
[FreeTextEntry1] : f/u for COPD, TAMMY\par Using CPAP/02 at night\par Replaced CPAP machine\par \par

## 2019-12-05 ENCOUNTER — APPOINTMENT (OUTPATIENT)
Dept: CARDIOLOGY | Facility: CLINIC | Age: 74
End: 2019-12-05
Payer: MEDICARE

## 2019-12-05 ENCOUNTER — NON-APPOINTMENT (OUTPATIENT)
Age: 74
End: 2019-12-05

## 2019-12-05 VITALS
WEIGHT: 200 LBS | SYSTOLIC BLOOD PRESSURE: 159 MMHG | BODY MASS INDEX: 31.39 KG/M2 | OXYGEN SATURATION: 92 % | DIASTOLIC BLOOD PRESSURE: 76 MMHG | HEART RATE: 67 BPM | HEIGHT: 67 IN

## 2019-12-05 VITALS — DIASTOLIC BLOOD PRESSURE: 64 MMHG | SYSTOLIC BLOOD PRESSURE: 120 MMHG

## 2019-12-05 PROCEDURE — 99214 OFFICE O/P EST MOD 30 MIN: CPT

## 2019-12-05 PROCEDURE — 93000 ELECTROCARDIOGRAM COMPLETE: CPT

## 2019-12-08 NOTE — DISCUSSION/SUMMARY
[FreeTextEntry1] : 74 year old man with a history as listed presents for a followup. \par \par Kike is doing well.  Clinically he is euvolemic on exam. He is euvolemic on exam. His EKG did not reveal any significant ischemic changes. His nuclear stress in 11/2017 was not revealing for ischemia. \par He had an echo in 3/2019 that showed normal systolic LV function without any significant other findings, including no significant valvular disease. \par \par His blood pressure is  controlled. He will continue  Norvasc 10mg Qday. He will continue Losartan 100mg Qday.  He will continue chlorthalidone 25mg Qday.  I will defer AVN agents as his HR is usually in the adolph range. \par  \par His leg cramping has stopped off of the Zocor. I will try to restart a statin after the next visit. \par \par Given his PAF and CHADS-VaSc= 4+, he will continue with Eliquis 5mg q12. His ILR has ran out of battery. He does not want it explanted. \par \par HE will continue to see pulmonary for his COPD. He is much better controlled. \par  exercise and diet counseling was performed in order to reduce his future cardiovascular risk. I  He will followup with me in 3 months  time.

## 2019-12-08 NOTE — PHYSICAL EXAM
[Well Groomed] : well groomed [General Appearance - In No Acute Distress] : no acute distress [Normal Conjunctiva] : the conjunctiva exhibited no abnormalities [Eyelids - No Xanthelasma] : the eyelids demonstrated no xanthelasmas [Normal Oral Mucosa] : normal oral mucosa [No Oral Pallor] : no oral pallor [No Oral Cyanosis] : no oral cyanosis [Normal Jugular Venous A Waves Present] : normal jugular venous A waves present [Normal Jugular Venous V Waves Present] : normal jugular venous V waves present [No Jugular Venous Hernandez A Waves] : no jugular venous hernandez A waves [Abdomen Soft] : soft [Abdomen Tenderness] : non-tender [Abdomen Mass (___ Cm)] : no abdominal mass palpated [Abnormal Walk] : normal gait [Gait - Sufficient For Exercise Testing] : the gait was sufficient for exercise testing [Nail Clubbing] : no clubbing of the fingernails [Cyanosis, Localized] : no localized cyanosis [Petechial Hemorrhages (___cm)] : no petechial hemorrhages [Skin Color & Pigmentation] : normal skin color and pigmentation [] : no rash [No Venous Stasis] : no venous stasis [Skin Lesions] : no skin lesions [No Skin Ulcers] : no skin ulcer [No Xanthoma] : no  xanthoma was observed [Oriented To Time, Place, And Person] : oriented to person, place, and time [Mood] : the mood was normal [Affect] : the affect was normal [No Anxiety] : not feeling anxious [Normal Rhythm/Effort] : normal respiratory rhythm and effort [Decreased Breath Sounds] : breath sounds were decreased diffusely [Normal Rate] : normal [Rhythm Regular] : regular [Normal S2] : normal S2 [Normal S1] : normal S1 [No Gallop] : no gallop heard [I] : a grade 1 [2+] : Carotid: right 2+ [No Pitting Edema] : no pitting edema present [Right Carotid Bruit] : no bruit heard over the right carotid [Left Carotid Bruit] : no bruit heard over the left carotid [Bruit] : no bruit heard

## 2019-12-08 NOTE — HISTORY OF PRESENT ILLNESS
[FreeTextEntry1] : 74-year-old man with a history of COPD, forgetfulness/dementia, hypertension who  presented to  Hospital with a subacute  stroke. He was given TPA with resolution of his symptoms. He did not suffer any hemorrhagic conversion. He was noted during his hospital stay that he had a baseline sinus bradycardia.\par He had an ILR placed that showed PAF. Started on Eliquis. [facility] \par He has underlying sleep apnea on CPAP. \par He had an echo in 3/2019 that showed normal systolic LV function without any significant other findings, including no significant valvular disease. \par \par He is now here for  followup visit.  \par Since his last followup. he has  been relatively doing well. He has been recently complaining of URI. He had gone to a [facility] and got an Abx. \par \par Otherwise prior to this his symptoms have not changed. He is sedentary as baseline.  \par He denies any  chest pain, PND, orthopnea, lower extremity edema, palpitations, near syncope, syncope.  He denies any blurry vision, headaches. His memory is still impaired. \par No reported melena, hematochezia or hematemesis. He is compliant with his medications. \par

## 2019-12-10 ENCOUNTER — RX RENEWAL (OUTPATIENT)
Age: 74
End: 2019-12-10

## 2020-01-08 ENCOUNTER — APPOINTMENT (OUTPATIENT)
Dept: PULMONOLOGY | Facility: CLINIC | Age: 75
End: 2020-01-08
Payer: MEDICARE

## 2020-01-08 VITALS
HEIGHT: 67 IN | DIASTOLIC BLOOD PRESSURE: 78 MMHG | BODY MASS INDEX: 31.39 KG/M2 | OXYGEN SATURATION: 94 % | SYSTOLIC BLOOD PRESSURE: 131 MMHG | WEIGHT: 200 LBS | HEART RATE: 69 BPM

## 2020-01-08 DIAGNOSIS — G47.33 OBSTRUCTIVE SLEEP APNEA (ADULT) (PEDIATRIC): ICD-10-CM

## 2020-01-08 DIAGNOSIS — Z23 ENCOUNTER FOR IMMUNIZATION: ICD-10-CM

## 2020-01-08 PROCEDURE — 94060 EVALUATION OF WHEEZING: CPT

## 2020-01-08 PROCEDURE — 99214 OFFICE O/P EST MOD 30 MIN: CPT | Mod: 25

## 2020-01-09 NOTE — ASSESSMENT
[FreeTextEntry1] : Patient is currently on treatment with PAP. Data download confirms excellent compliance. Patient continues to use treatment and is benefiting from it.\par COPD no change\par  \par Obtain overnight oximetry on CPAP

## 2020-01-09 NOTE — PHYSICAL EXAM
[General Appearance - Well Developed] : well developed [Well Groomed] : well groomed [Normal Appearance] : normal appearance [No Deformities] : no deformities [General Appearance - Well Nourished] : well nourished [General Appearance - In No Acute Distress] : no acute distress [Eyelids - No Xanthelasma] : the eyelids demonstrated no xanthelasmas [Normal Conjunctiva] : the conjunctiva exhibited no abnormalities [Normal Oropharynx] : normal oropharynx [Neck Appearance] : the appearance of the neck was normal [Neck Cervical Mass (___cm)] : no neck mass was observed [Jugular Venous Distention Increased] : there was no jugular-venous distention [Thyroid Diffuse Enlargement] : the thyroid was not enlarged [Heart Rate And Rhythm] : heart rate and rhythm were normal [Thyroid Nodule] : there were no palpable thyroid nodules [Murmurs] : no murmurs present [Heart Sounds] : normal S1 and S2 [Auscultation Breath Sounds / Voice Sounds] : lungs were clear to auscultation bilaterally [Abdomen Soft] : soft [Abdomen Tenderness] : non-tender [Abdomen Mass (___ Cm)] : no abdominal mass palpated [Abnormal Walk] : normal gait [Gait - Sufficient For Exercise Testing] : the gait was sufficient for exercise testing [Cyanosis, Localized] : no localized cyanosis [Nail Clubbing] : no clubbing of the fingernails [Skin Color & Pigmentation] : normal skin color and pigmentation [Petechial Hemorrhages (___cm)] : no petechial hemorrhages [] : no rash [No Venous Stasis] : no venous stasis [No Xanthoma] : no  xanthoma was observed [No Skin Ulcers] : no skin ulcer [Skin Lesions] : no skin lesions [Sensation] : the sensory exam was normal to light touch and pinprick [Deep Tendon Reflexes (DTR)] : deep tendon reflexes were 2+ and symmetric [No Focal Deficits] : no focal deficits [FreeTextEntry1] : Reduced air entry bilaterally.

## 2020-03-09 ENCOUNTER — NON-APPOINTMENT (OUTPATIENT)
Age: 75
End: 2020-03-09

## 2020-03-09 ENCOUNTER — APPOINTMENT (OUTPATIENT)
Dept: CARDIOLOGY | Facility: CLINIC | Age: 75
End: 2020-03-09
Payer: MEDICARE

## 2020-03-09 VITALS
HEIGHT: 67 IN | WEIGHT: 202 LBS | BODY MASS INDEX: 31.71 KG/M2 | OXYGEN SATURATION: 96 % | HEART RATE: 54 BPM | SYSTOLIC BLOOD PRESSURE: 151 MMHG | DIASTOLIC BLOOD PRESSURE: 82 MMHG

## 2020-03-09 VITALS — SYSTOLIC BLOOD PRESSURE: 132 MMHG | DIASTOLIC BLOOD PRESSURE: 80 MMHG

## 2020-03-09 PROCEDURE — 99214 OFFICE O/P EST MOD 30 MIN: CPT

## 2020-03-09 PROCEDURE — 93000 ELECTROCARDIOGRAM COMPLETE: CPT

## 2020-03-09 RX ORDER — ROSUVASTATIN CALCIUM 10 MG/1
10 TABLET, FILM COATED ORAL DAILY
Qty: 30 | Refills: 5 | Status: ACTIVE | COMMUNITY
Start: 2020-03-09

## 2020-03-09 NOTE — DISCUSSION/SUMMARY
[FreeTextEntry1] : 74 year old man with a history as listed presents for a followup. \par \par Kike is doing well.  Clinically he is euvolemic on exam. He is euvolemic on exam. His EKG did not reveal any significant ischemic changes. His nuclear stress in 11/2017 was not revealing for ischemia. \par He had an echo in 3/2019 that showed normal systolic LV function without any significant other findings, including no significant valvular disease. \par \par His blood pressure is  controlled. He will continue  Norvasc 10mg Qday. He will continue Losartan 100mg Qday.  He will continue chlorthalidone 25mg Qday.  I will defer AVN agents as his HR is usually in the adolph range. \par  \par he is on Crestor 10mg HS but is having more muscular spasms. He will stop the Crestor and start Coq10 x3 weeks and then resume. \par \par Given his PAF and CHADS-VaSc= 4+, he will continue with Eliquis 5mg q12. His ILR has ran out of battery. He does not want it explanted. \par \par HE will continue to see pulmonary for his COPD. He is much better controlled. \par  exercise and diet counseling was performed in order to reduce his future cardiovascular risk. I  He will followup with me in 3 months  time.

## 2020-03-09 NOTE — PHYSICAL EXAM
[Well Groomed] : well groomed [General Appearance - In No Acute Distress] : no acute distress [Normal Conjunctiva] : the conjunctiva exhibited no abnormalities [Eyelids - No Xanthelasma] : the eyelids demonstrated no xanthelasmas [Normal Oral Mucosa] : normal oral mucosa [No Oral Pallor] : no oral pallor [No Oral Cyanosis] : no oral cyanosis [Normal Jugular Venous A Waves Present] : normal jugular venous A waves present [Normal Jugular Venous V Waves Present] : normal jugular venous V waves present [No Jugular Venous Hernandez A Waves] : no jugular venous hernandez A waves [Abdomen Soft] : soft [Abdomen Tenderness] : non-tender [Abdomen Mass (___ Cm)] : no abdominal mass palpated [Abnormal Walk] : normal gait [Gait - Sufficient For Exercise Testing] : the gait was sufficient for exercise testing [Nail Clubbing] : no clubbing of the fingernails [Cyanosis, Localized] : no localized cyanosis [Petechial Hemorrhages (___cm)] : no petechial hemorrhages [Skin Color & Pigmentation] : normal skin color and pigmentation [] : no rash [No Venous Stasis] : no venous stasis [Skin Lesions] : no skin lesions [No Skin Ulcers] : no skin ulcer [No Xanthoma] : no  xanthoma was observed [Oriented To Time, Place, And Person] : oriented to person, place, and time [Affect] : the affect was normal [Mood] : the mood was normal [No Anxiety] : not feeling anxious [Normal Rhythm/Effort] : normal respiratory rhythm and effort [Scattered Wheezes] : scattered wheezing was heard [Decreased Breath Sounds] : breath sounds were decreased diffusely [Normal Rate] : normal [Rhythm Regular] : regular [Normal S1] : normal S1 [Normal S2] : normal S2 [No Gallop] : no gallop heard [I] : a grade 1 [2+] : left 2+ [Right Carotid Bruit] : no bruit heard over the right carotid [Left Carotid Bruit] : no bruit heard over the left carotid [Bruit] : no bruit heard [No Pitting Edema] : no pitting edema present

## 2020-03-09 NOTE — HISTORY OF PRESENT ILLNESS
[FreeTextEntry1] : 74-year-old man with a history of COPD, forgetfulness/dementia, hypertension who  presented to  Hospital with a subacute  stroke. He was given TPA with resolution of his symptoms. He did not suffer any hemorrhagic conversion. He was noted during his hospital stay that he had a baseline sinus bradycardia.\par He had an ILR placed that showed PAF. Started on Eliquis. [facility] \par He has underlying sleep apnea on CPAP. \par He had an echo in 3/2019 that showed normal systolic LV function without any significant other findings, including no significant valvular disease. \par \par He is now here for  followup visit.  \par Since his last followup, his spasm had stopped off the Zocor. He was started on Crestor by his internist and now is getting more intermittent back spasm. \par \par Otherwise prior to this his symptoms have not changed. He is sedentary as baseline.  \par He denies any  chest pain, PND, orthopnea, lower extremity edema, palpitations, near syncope, syncope.  He denies any blurry vision, headaches. His memory is still impaired. \par No reported melena, hematochezia or hematemesis. He is compliant with his medications. \par

## 2020-04-22 ENCOUNTER — APPOINTMENT (OUTPATIENT)
Dept: PULMONOLOGY | Facility: CLINIC | Age: 75
End: 2020-04-22

## 2020-06-15 ENCOUNTER — APPOINTMENT (OUTPATIENT)
Dept: CARDIOLOGY | Facility: CLINIC | Age: 75
End: 2020-06-15
Payer: MEDICARE

## 2020-06-15 ENCOUNTER — NON-APPOINTMENT (OUTPATIENT)
Age: 75
End: 2020-06-15

## 2020-06-15 VITALS — SYSTOLIC BLOOD PRESSURE: 128 MMHG | DIASTOLIC BLOOD PRESSURE: 68 MMHG

## 2020-06-15 VITALS
SYSTOLIC BLOOD PRESSURE: 142 MMHG | HEART RATE: 62 BPM | BODY MASS INDEX: 31.71 KG/M2 | HEIGHT: 67 IN | OXYGEN SATURATION: 96 % | DIASTOLIC BLOOD PRESSURE: 72 MMHG | WEIGHT: 202 LBS

## 2020-06-15 PROCEDURE — 93000 ELECTROCARDIOGRAM COMPLETE: CPT

## 2020-06-15 PROCEDURE — 99214 OFFICE O/P EST MOD 30 MIN: CPT

## 2020-06-15 NOTE — HISTORY OF PRESENT ILLNESS
[FreeTextEntry1] : 74-year-old man with a history of COPD, forgetfulness/dementia, hypertension who  presented to  Hospital with a subacute  stroke. He was given TPA with resolution of his symptoms. He did not suffer any hemorrhagic conversion. He was noted during his hospital stay that he had a baseline sinus bradycardia.\par He had an ILR placed that showed PAF. Started on Eliquis. [facility] \par He has underlying sleep apnea on CPAP. \par He had an echo in 3/2019 that showed normal systolic LV function without any significant other findings, including no significant valvular disease. \par \par He is now here for  followup visit.  \par Since his last followup,  he has been quarantined at home because of the COVID pandemic.\par He is trying to for walks daily with his dog. His dyspnea is at baseline. \par He denies any  chest pain, PND, orthopnea, lower extremity edema, palpitations, near syncope, syncope.  He denies any blurry vision, headaches. His memory is still impaired. \par No reported melena, hematochezia or hematemesis. He is compliant with his medications. \par

## 2020-06-15 NOTE — DISCUSSION/SUMMARY
[FreeTextEntry1] : 74 year old man with a history as listed presents for a followup. \par \par Kike is doing well.  Clinically he is euvolemic on exam. He is euvolemic on exam. His EKG did not reveal any significant ischemic changes. \par His nuclear stress in 11/2017 was not revealing for ischemia. He had an echo in 3/2019 that showed normal systolic LV function without any significant other findings, including no significant valvular disease. \par \par His blood pressure is controlled. He will continue  Norvasc 10mg Qday. He will continue Losartan 100mg Qday.  He will continue chlorthalidone 25mg Qday.  I will defer AVN agents as his HR is usually in the adolph range. \par  \par he is tolerating Crestor 5mg HS and Coq10 x3.\par \par Given his PAF and CHADS-VaSc= 4+, he will continue with Eliquis 5mg q12. His ILR has ran out of battery. He does not want it explanted. \par \par He will continue to see pulmonary for his COPD. He is much better controlled. \par \par Exercise and diet counseling was performed in order to reduce his future cardiovascular risk. I  He will followup with me in 3 months  time.

## 2020-06-15 NOTE — PHYSICAL EXAM
[Well Groomed] : well groomed [General Appearance - In No Acute Distress] : no acute distress [Normal Conjunctiva] : the conjunctiva exhibited no abnormalities [Eyelids - No Xanthelasma] : the eyelids demonstrated no xanthelasmas [Normal Oral Mucosa] : normal oral mucosa [No Oral Pallor] : no oral pallor [No Oral Cyanosis] : no oral cyanosis [Normal Jugular Venous A Waves Present] : normal jugular venous A waves present [Normal Jugular Venous V Waves Present] : normal jugular venous V waves present [No Jugular Venous Hernandez A Waves] : no jugular venous hernandez A waves [Abdomen Soft] : soft [Abdomen Tenderness] : non-tender [Abnormal Walk] : normal gait [Abdomen Mass (___ Cm)] : no abdominal mass palpated [Gait - Sufficient For Exercise Testing] : the gait was sufficient for exercise testing [Nail Clubbing] : no clubbing of the fingernails [Cyanosis, Localized] : no localized cyanosis [Petechial Hemorrhages (___cm)] : no petechial hemorrhages [No Venous Stasis] : no venous stasis [] : no rash [Skin Color & Pigmentation] : normal skin color and pigmentation [Skin Lesions] : no skin lesions [No Skin Ulcers] : no skin ulcer [No Xanthoma] : no  xanthoma was observed [Oriented To Time, Place, And Person] : oriented to person, place, and time [Mood] : the mood was normal [Affect] : the affect was normal [No Anxiety] : not feeling anxious [Normal Rhythm/Effort] : normal respiratory rhythm and effort [Scattered Wheezes] : scattered wheezing was heard [Decreased Breath Sounds] : breath sounds were decreased diffusely [Rhythm Regular] : regular [Normal Rate] : normal [Normal S2] : normal S2 [Normal S1] : normal S1 [No Gallop] : no gallop heard [I] : a grade 1 [2+] : left 2+ [No Pitting Edema] : no pitting edema present [Right Carotid Bruit] : no bruit heard over the right carotid [Left Carotid Bruit] : no bruit heard over the left carotid [Bruit] : no bruit heard

## 2020-08-05 NOTE — CHART NOTE - NSCHARTNOTESELECT_GEN_ALL_CORE
Attempted to call patient. No voice mail setup at this time.
Left message for patient to return call
Left message for patient to return call
Event Note
Event Note

## 2020-09-17 ENCOUNTER — NON-APPOINTMENT (OUTPATIENT)
Age: 75
End: 2020-09-17

## 2020-09-17 ENCOUNTER — APPOINTMENT (OUTPATIENT)
Dept: CARDIOLOGY | Facility: CLINIC | Age: 75
End: 2020-09-17
Payer: MEDICARE

## 2020-09-17 VITALS
SYSTOLIC BLOOD PRESSURE: 153 MMHG | OXYGEN SATURATION: 94 % | DIASTOLIC BLOOD PRESSURE: 75 MMHG | WEIGHT: 203 LBS | HEART RATE: 59 BPM | BODY MASS INDEX: 31.86 KG/M2 | HEIGHT: 67 IN

## 2020-09-17 VITALS — DIASTOLIC BLOOD PRESSURE: 60 MMHG | SYSTOLIC BLOOD PRESSURE: 138 MMHG

## 2020-09-17 PROCEDURE — 93000 ELECTROCARDIOGRAM COMPLETE: CPT

## 2020-09-17 PROCEDURE — 99214 OFFICE O/P EST MOD 30 MIN: CPT

## 2020-09-17 NOTE — DISCUSSION/SUMMARY
[FreeTextEntry1] : 74 year old man with a history as listed presents for a followup. \par \par Kike is doing well.  Clinically he is euvolemic on exam. He is euvolemic on exam. His EKG did not reveal any significant ischemic changes. \par His nuclear stress in 11/2017 was not revealing for ischemia. He had an echo in 3/2019 that showed normal systolic LV function without any significant other findings, including no significant valvular disease. \par \par His blood pressure is controlled. He will continue  Norvasc 10mg Qday. He will continue Losartan 100mg Qday.  He will continue chlorthalidone 25mg Qday.  I will defer AVN agents as his HR is usually in the adolph range. \par  \par he is tolerating Crestor 5mg HS and Coq10. \par \par Given his PAF and CHADS-VaSc= 4+, he will continue with Eliquis 5mg q12. His ILR has ran out of battery. He does not want it explanted. \par \par He will continue to see pulmonary for his COPD. He is much better controlled. \par \par Exercise and diet counseling was performed in order to reduce his future cardiovascular risk. I  He will followup with me in 3 months  time.

## 2020-09-17 NOTE — REVIEW OF SYSTEMS
[see HPI] : see HPI [Shortness Of Breath] : no shortness of breath [Dyspnea on exertion] : dyspnea during exertion [Chest  Pressure] : no chest pressure [Chest Pain] : no chest pain [Lower Ext Edema] : no extremity edema [Palpitations] : no palpitations [Negative] : Heme/Lymph

## 2020-09-25 LAB
25(OH)D3 SERPL-MCNC: 37.9 NG/ML
ALBUMIN SERPL ELPH-MCNC: 4.5 G/DL
ALP BLD-CCNC: 73 U/L
ALT SERPL-CCNC: 16 U/L
ANION GAP SERPL CALC-SCNC: 13 MMOL/L
AST SERPL-CCNC: 16 U/L
BASOPHILS # BLD AUTO: 0.05 K/UL
BASOPHILS NFR BLD AUTO: 0.8 %
BILIRUB SERPL-MCNC: 0.2 MG/DL
BUN SERPL-MCNC: 32 MG/DL
CALCIUM SERPL-MCNC: 9.6 MG/DL
CHLORIDE SERPL-SCNC: 100 MMOL/L
CHOLEST SERPL-MCNC: 101 MG/DL
CHOLEST/HDLC SERPL: 1.9 RATIO
CO2 SERPL-SCNC: 24 MMOL/L
CREAT SERPL-MCNC: 1.71 MG/DL
EOSINOPHIL # BLD AUTO: 0.33 K/UL
EOSINOPHIL NFR BLD AUTO: 5.4 %
ESTIMATED AVERAGE GLUCOSE: 160 MG/DL
FOLATE SERPL-MCNC: 11.6 NG/ML
GLUCOSE SERPL-MCNC: 201 MG/DL
HBA1C MFR BLD HPLC: 7.2 %
HCT VFR BLD CALC: 35.4 %
HDLC SERPL-MCNC: 54 MG/DL
HGB BLD-MCNC: 11.3 G/DL
IMM GRANULOCYTES NFR BLD AUTO: 0.3 %
LDLC SERPL CALC-MCNC: 32 MG/DL
LYMPHOCYTES # BLD AUTO: 1.02 K/UL
LYMPHOCYTES NFR BLD AUTO: 16.6 %
MAGNESIUM SERPL-MCNC: 2 MG/DL
MAN DIFF?: NORMAL
MCHC RBC-ENTMCNC: 30.9 PG
MCHC RBC-ENTMCNC: 31.9 GM/DL
MCV RBC AUTO: 96.7 FL
MONOCYTES # BLD AUTO: 0.64 K/UL
MONOCYTES NFR BLD AUTO: 10.4 %
NEUTROPHILS # BLD AUTO: 4.08 K/UL
NEUTROPHILS NFR BLD AUTO: 66.5 %
PLATELET # BLD AUTO: 196 K/UL
POTASSIUM SERPL-SCNC: 4.8 MMOL/L
PROT SERPL-MCNC: 6.8 G/DL
PSA SERPL-MCNC: 0.6 NG/ML
RBC # BLD: 3.66 M/UL
RBC # FLD: 13.2 %
SODIUM SERPL-SCNC: 138 MMOL/L
TRIGL SERPL-MCNC: 75 MG/DL
TSH SERPL-ACNC: 3.16 UIU/ML
VIT B12 SERPL-MCNC: 1369 PG/ML
WBC # FLD AUTO: 6.14 K/UL

## 2020-09-29 DIAGNOSIS — R89.9 UNSPECIFIED ABNORMAL FINDING IN SPECIMENS FROM OTHER ORGANS, SYSTEMS AND TISSUES: ICD-10-CM

## 2020-11-04 ENCOUNTER — APPOINTMENT (OUTPATIENT)
Dept: PULMONOLOGY | Facility: CLINIC | Age: 75
End: 2020-11-04
Payer: MEDICARE

## 2020-11-04 VITALS
HEART RATE: 77 BPM | HEIGHT: 67 IN | DIASTOLIC BLOOD PRESSURE: 88 MMHG | OXYGEN SATURATION: 99 % | RESPIRATION RATE: 14 BRPM | SYSTOLIC BLOOD PRESSURE: 137 MMHG | BODY MASS INDEX: 31.86 KG/M2 | WEIGHT: 203 LBS

## 2020-11-04 DIAGNOSIS — Z14.8 GENETIC CARRIER OF OTHER DISEASE: ICD-10-CM

## 2020-11-04 DIAGNOSIS — J44.9 CHRONIC OBSTRUCTIVE PULMONARY DISEASE, UNSPECIFIED: ICD-10-CM

## 2020-11-04 PROCEDURE — 99214 OFFICE O/P EST MOD 30 MIN: CPT

## 2020-11-04 NOTE — HISTORY OF PRESENT ILLNESS
[Former] : former [Never] : never [YearQuit] : 2005 [FreeTextEntry1] : f/u for COPD, TAMMY\par Using CPAP/02 at night\par \par \par

## 2020-11-04 NOTE — REVIEW OF SYSTEMS
[Dyspnea] : dyspnea [As Noted in HPI] : as noted in HPI [Difficulty Initiating Sleep] : no difficulty falling asleep [Difficulty Maintaining Sleep] : no difficulty maintaining sleep [Dysesthesias] : no dysesthesias [Paresthesias] : no paresthesias [Negative] : Pulmonary Hypertension

## 2020-11-04 NOTE — PHYSICAL EXAM
[No Acute Distress] : no acute distress [No Neck Mass] : no neck mass [Normal Rate/Rhythm] : normal rate/rhythm [Normal S1, S2] : normal s1, s2 [No Murmurs] : no murmurs [No Resp Distress] : no resp distress [Clear to Auscultation Bilaterally] : clear to auscultation bilaterally [No Abnormalities] : no abnormalities [Benign] : benign [Normal Gait] : normal gait [No Clubbing] : no clubbing [No Cyanosis] : no cyanosis [No Edema] : no edema [FROM] : FROM [Normal Color/ Pigmentation] : normal color/ pigmentation [Oriented x3] : oriented x3 [Normal Affect] : normal affect [General Appearance - Well Developed] : well developed [Normal Appearance] : normal appearance [Well Groomed] : well groomed [General Appearance - Well Nourished] : well nourished [No Deformities] : no deformities [General Appearance - In No Acute Distress] : no acute distress [Normal Conjunctiva] : the conjunctiva exhibited no abnormalities [Eyelids - No Xanthelasma] : the eyelids demonstrated no xanthelasmas [Normal Oropharynx] : normal oropharynx [Neck Appearance] : the appearance of the neck was normal [Neck Cervical Mass (___cm)] : no neck mass was observed [Jugular Venous Distention Increased] : there was no jugular-venous distention [Thyroid Diffuse Enlargement] : the thyroid was not enlarged [Thyroid Nodule] : there were no palpable thyroid nodules [Heart Rate And Rhythm] : heart rate and rhythm were normal [Heart Sounds] : normal S1 and S2 [Murmurs] : no murmurs present [Auscultation Breath Sounds / Voice Sounds] : lungs were clear to auscultation bilaterally [FreeTextEntry1] : Reduced air entry bilaterally.  [Abdomen Soft] : soft [Abdomen Tenderness] : non-tender [Abdomen Mass (___ Cm)] : no abdominal mass palpated [Abnormal Walk] : normal gait [Gait - Sufficient For Exercise Testing] : the gait was sufficient for exercise testing [Nail Clubbing] : no clubbing of the fingernails [Cyanosis, Localized] : no localized cyanosis [Petechial Hemorrhages (___cm)] : no petechial hemorrhages [Skin Color & Pigmentation] : normal skin color and pigmentation [] : no rash [No Venous Stasis] : no venous stasis [Skin Lesions] : no skin lesions [No Skin Ulcers] : no skin ulcer [No Xanthoma] : no  xanthoma was observed [Deep Tendon Reflexes (DTR)] : deep tendon reflexes were 2+ and symmetric [Sensation] : the sensory exam was normal to light touch and pinprick [No Focal Deficits] : no focal deficits

## 2020-11-04 NOTE — ASSESSMENT
[FreeTextEntry1] : Patient is currently on treatment with PAP. Data download confirms excellent compliance. Patient continues to use treatment and is benefiting from it.\par COPD no change renew medication\par  \par

## 2020-11-05 LAB
ANION GAP SERPL CALC-SCNC: 12 MMOL/L
BUN SERPL-MCNC: 33 MG/DL
CALCIUM SERPL-MCNC: 9.8 MG/DL
CHLORIDE SERPL-SCNC: 102 MMOL/L
CO2 SERPL-SCNC: 26 MMOL/L
CREAT SERPL-MCNC: 1.53 MG/DL
GLUCOSE SERPL-MCNC: 273 MG/DL
POTASSIUM SERPL-SCNC: 4.5 MMOL/L
SODIUM SERPL-SCNC: 140 MMOL/L

## 2020-12-30 ENCOUNTER — NON-APPOINTMENT (OUTPATIENT)
Age: 75
End: 2020-12-30

## 2020-12-30 ENCOUNTER — APPOINTMENT (OUTPATIENT)
Dept: CARDIOLOGY | Facility: CLINIC | Age: 75
End: 2020-12-30
Payer: MEDICARE

## 2020-12-30 VITALS
SYSTOLIC BLOOD PRESSURE: 139 MMHG | BODY MASS INDEX: 31.71 KG/M2 | HEIGHT: 67 IN | DIASTOLIC BLOOD PRESSURE: 74 MMHG | WEIGHT: 202 LBS | HEART RATE: 62 BPM | OXYGEN SATURATION: 95 %

## 2020-12-30 PROCEDURE — 93000 ELECTROCARDIOGRAM COMPLETE: CPT

## 2020-12-30 PROCEDURE — 99214 OFFICE O/P EST MOD 30 MIN: CPT

## 2020-12-30 NOTE — PHYSICAL EXAM
[Well Groomed] : well groomed [General Appearance - In No Acute Distress] : no acute distress [Normal Conjunctiva] : the conjunctiva exhibited no abnormalities [Eyelids - No Xanthelasma] : the eyelids demonstrated no xanthelasmas [Normal Oral Mucosa] : normal oral mucosa [No Oral Pallor] : no oral pallor [No Oral Cyanosis] : no oral cyanosis [Normal Jugular Venous A Waves Present] : normal jugular venous A waves present [Normal Jugular Venous V Waves Present] : normal jugular venous V waves present [No Jugular Venous Hernandez A Waves] : no jugular venous hernandez A waves [Abdomen Soft] : soft [Abdomen Tenderness] : non-tender [Abdomen Mass (___ Cm)] : no abdominal mass palpated [Abnormal Walk] : normal gait [Gait - Sufficient For Exercise Testing] : the gait was sufficient for exercise testing [Nail Clubbing] : no clubbing of the fingernails [Cyanosis, Localized] : no localized cyanosis [Petechial Hemorrhages (___cm)] : no petechial hemorrhages [Skin Color & Pigmentation] : normal skin color and pigmentation [] : no rash [No Venous Stasis] : no venous stasis [Skin Lesions] : no skin lesions [No Skin Ulcers] : no skin ulcer [No Xanthoma] : no  xanthoma was observed [Oriented To Time, Place, And Person] : oriented to person, place, and time [Affect] : the affect was normal [Mood] : the mood was normal [No Anxiety] : not feeling anxious [Normal Rhythm/Effort] : normal respiratory rhythm and effort [Scattered Wheezes] : scattered wheezing was heard [Decreased Breath Sounds] : breath sounds were decreased diffusely [Normal Rate] : normal [Rhythm Regular] : regular [Normal S1] : normal S1 [Normal S2] : normal S2 [No Gallop] : no gallop heard [I] : a grade 1 [2+] : left 2+ [No Pitting Edema] : no pitting edema present [Right Carotid Bruit] : no bruit heard over the right carotid [Left Carotid Bruit] : no bruit heard over the left carotid [Bruit] : no bruit heard

## 2020-12-30 NOTE — DISCUSSION/SUMMARY
[FreeTextEntry1] : 74 year old man with a history as listed presents for a followup. \par \par Kike is doing well.  Clinically he is euvolemic on exam. He is euvolemic on exam. His EKG did not reveal any significant ischemic changes. \par \par His nuclear stress in 11/2017 was not revealing for ischemia. He had an echo in 3/2019 that showed normal systolic LV function without any significant other findings, including no significant valvular disease. \par \par His blood pressure is controlled. He will continue  Norvasc 10mg Qday. He will continue Losartan 100mg Qday.  He will continue chlorthalidone 25mg Qday.  I will defer AVN agents as his HR is usually in the adolph range. \par  \par he is tolerating Crestor 5mg HS and Coq10. \par \par Given his PAF and CHADS-VaSc= 4+, he will continue with Eliquis 5mg q12. His ILR has ran out of battery. He does not want it explanted. \par \par He will continue to see pulmonary for his COPD. He is much better controlled. \par \par Exercise and diet counseling was performed in order to reduce his future cardiovascular risk. I  He will followup with me in 3-4 months  time.

## 2020-12-30 NOTE — HISTORY OF PRESENT ILLNESS
[FreeTextEntry1] : 75-year-old man with a history of COPD, forgetfulness/dementia, hypertension who  presented to  Hospital with a subacute  stroke. He was given TPA with resolution of his symptoms. He did not suffer any hemorrhagic conversion. He was noted during his hospital stay that he had a baseline sinus bradycardia.\par He had an ILR placed that showed PAF. Started on Eliquis. [facility] \par He has underlying sleep apnea on CPAP. \par He had an echo in 3/2019 that showed normal systolic LV function without any significant other findings, including no significant valvular disease. \par \par He is now here for  followup visit.  \par Since his last followup,  he has been quarantined at home because of the COVID pandemic.Per his wife his dementia has been worsening. \par He is trying to for walks daily with his dog. His dyspnea is at baseline. \par He denies any  chest pain, PND, orthopnea, lower extremity edema, palpitations, near syncope, syncope.  He denies any blurry vision, headaches. His memory is still impaired. \par No reported melena, hematochezia or hematemesis. He is compliant with his medications. \par

## 2021-02-23 ENCOUNTER — EMERGENCY (EMERGENCY)
Facility: HOSPITAL | Age: 76
LOS: 1 days | Discharge: HOME | End: 2021-02-23
Attending: EMERGENCY MEDICINE | Admitting: EMERGENCY MEDICINE
Payer: MEDICARE

## 2021-02-23 VITALS
HEART RATE: 47 BPM | DIASTOLIC BLOOD PRESSURE: 55 MMHG | WEIGHT: 203.05 LBS | SYSTOLIC BLOOD PRESSURE: 115 MMHG | HEIGHT: 67 IN | TEMPERATURE: 97 F | OXYGEN SATURATION: 97 % | RESPIRATION RATE: 18 BRPM

## 2021-02-23 VITALS
RESPIRATION RATE: 18 BRPM | HEART RATE: 56 BPM | OXYGEN SATURATION: 96 % | DIASTOLIC BLOOD PRESSURE: 64 MMHG | TEMPERATURE: 98 F | SYSTOLIC BLOOD PRESSURE: 126 MMHG

## 2021-02-23 DIAGNOSIS — Z98.89 OTHER SPECIFIED POSTPROCEDURAL STATES: Chronic | ICD-10-CM

## 2021-02-23 LAB
ALBUMIN SERPL ELPH-MCNC: 3.7 G/DL — SIGNIFICANT CHANGE UP (ref 3.3–5)
ALP SERPL-CCNC: 58 U/L — SIGNIFICANT CHANGE UP (ref 40–120)
ALT FLD-CCNC: 16 U/L — SIGNIFICANT CHANGE UP (ref 12–78)
ANION GAP SERPL CALC-SCNC: 7 MMOL/L — SIGNIFICANT CHANGE UP (ref 5–17)
APTT BLD: 38.3 SEC — HIGH (ref 27.5–35.5)
AST SERPL-CCNC: 3 U/L — LOW (ref 15–37)
BASOPHILS # BLD AUTO: 0.04 K/UL — SIGNIFICANT CHANGE UP (ref 0–0.2)
BASOPHILS NFR BLD AUTO: 0.5 % — SIGNIFICANT CHANGE UP (ref 0–2)
BILIRUB SERPL-MCNC: 0.3 MG/DL — SIGNIFICANT CHANGE UP (ref 0.2–1.2)
BUN SERPL-MCNC: 40 MG/DL — HIGH (ref 7–23)
CALCIUM SERPL-MCNC: 8.7 MG/DL — SIGNIFICANT CHANGE UP (ref 8.5–10.1)
CHLORIDE SERPL-SCNC: 105 MMOL/L — SIGNIFICANT CHANGE UP (ref 96–108)
CO2 SERPL-SCNC: 27 MMOL/L — SIGNIFICANT CHANGE UP (ref 22–31)
CREAT SERPL-MCNC: 1.8 MG/DL — HIGH (ref 0.5–1.3)
EOSINOPHIL # BLD AUTO: 0.19 K/UL — SIGNIFICANT CHANGE UP (ref 0–0.5)
EOSINOPHIL NFR BLD AUTO: 2.6 % — SIGNIFICANT CHANGE UP (ref 0–6)
GLUCOSE SERPL-MCNC: 73 MG/DL — SIGNIFICANT CHANGE UP (ref 70–99)
HCT VFR BLD CALC: 33.7 % — LOW (ref 39–50)
HGB BLD-MCNC: 11 G/DL — LOW (ref 13–17)
IMM GRANULOCYTES NFR BLD AUTO: 0.3 % — SIGNIFICANT CHANGE UP (ref 0–1.5)
INR BLD: 1.23 RATIO — HIGH (ref 0.88–1.16)
LIDOCAIN IGE QN: 137 U/L — SIGNIFICANT CHANGE UP (ref 73–393)
LYMPHOCYTES # BLD AUTO: 1.2 K/UL — SIGNIFICANT CHANGE UP (ref 1–3.3)
LYMPHOCYTES # BLD AUTO: 16.2 % — SIGNIFICANT CHANGE UP (ref 13–44)
MCHC RBC-ENTMCNC: 29.9 PG — SIGNIFICANT CHANGE UP (ref 27–34)
MCHC RBC-ENTMCNC: 32.6 GM/DL — SIGNIFICANT CHANGE UP (ref 32–36)
MCV RBC AUTO: 91.6 FL — SIGNIFICANT CHANGE UP (ref 80–100)
MONOCYTES # BLD AUTO: 0.69 K/UL — SIGNIFICANT CHANGE UP (ref 0–0.9)
MONOCYTES NFR BLD AUTO: 9.3 % — SIGNIFICANT CHANGE UP (ref 2–14)
NEUTROPHILS # BLD AUTO: 5.29 K/UL — SIGNIFICANT CHANGE UP (ref 1.8–7.4)
NEUTROPHILS NFR BLD AUTO: 71.1 % — SIGNIFICANT CHANGE UP (ref 43–77)
NRBC # BLD: 0 /100 WBCS — SIGNIFICANT CHANGE UP (ref 0–0)
NT-PROBNP SERPL-SCNC: 54 PG/ML — SIGNIFICANT CHANGE UP (ref 0–125)
PLATELET # BLD AUTO: 177 K/UL — SIGNIFICANT CHANGE UP (ref 150–400)
POTASSIUM SERPL-MCNC: 4.1 MMOL/L — SIGNIFICANT CHANGE UP (ref 3.5–5.3)
POTASSIUM SERPL-SCNC: 4.1 MMOL/L — SIGNIFICANT CHANGE UP (ref 3.5–5.3)
PROT SERPL-MCNC: 6.8 G/DL — SIGNIFICANT CHANGE UP (ref 6–8.3)
PROTHROM AB SERPL-ACNC: 14.2 SEC — HIGH (ref 10.6–13.6)
RBC # BLD: 3.68 M/UL — LOW (ref 4.2–5.8)
RBC # FLD: 13.4 % — SIGNIFICANT CHANGE UP (ref 10.3–14.5)
SARS-COV-2 RNA SPEC QL NAA+PROBE: SIGNIFICANT CHANGE UP
SODIUM SERPL-SCNC: 139 MMOL/L — SIGNIFICANT CHANGE UP (ref 135–145)
TROPONIN I SERPL-MCNC: <.015 NG/ML — SIGNIFICANT CHANGE UP (ref 0.01–0.04)
TROPONIN I SERPL-MCNC: <.015 NG/ML — SIGNIFICANT CHANGE UP (ref 0.01–0.04)
WBC # BLD: 7.43 K/UL — SIGNIFICANT CHANGE UP (ref 3.8–10.5)
WBC # FLD AUTO: 7.43 K/UL — SIGNIFICANT CHANGE UP (ref 3.8–10.5)

## 2021-02-23 PROCEDURE — 84484 ASSAY OF TROPONIN QUANT: CPT

## 2021-02-23 PROCEDURE — U0005: CPT

## 2021-02-23 PROCEDURE — 85610 PROTHROMBIN TIME: CPT

## 2021-02-23 PROCEDURE — 99285 EMERGENCY DEPT VISIT HI MDM: CPT

## 2021-02-23 PROCEDURE — 85025 COMPLETE CBC W/AUTO DIFF WBC: CPT

## 2021-02-23 PROCEDURE — 83690 ASSAY OF LIPASE: CPT

## 2021-02-23 PROCEDURE — 83880 ASSAY OF NATRIURETIC PEPTIDE: CPT

## 2021-02-23 PROCEDURE — 80053 COMPREHEN METABOLIC PANEL: CPT

## 2021-02-23 PROCEDURE — 93005 ELECTROCARDIOGRAM TRACING: CPT

## 2021-02-23 PROCEDURE — 85730 THROMBOPLASTIN TIME PARTIAL: CPT

## 2021-02-23 PROCEDURE — 71045 X-RAY EXAM CHEST 1 VIEW: CPT | Mod: 26

## 2021-02-23 PROCEDURE — 71045 X-RAY EXAM CHEST 1 VIEW: CPT

## 2021-02-23 PROCEDURE — 93010 ELECTROCARDIOGRAM REPORT: CPT

## 2021-02-23 PROCEDURE — 99285 EMERGENCY DEPT VISIT HI MDM: CPT | Mod: 25

## 2021-02-23 PROCEDURE — U0003: CPT

## 2021-02-23 PROCEDURE — 94660 CPAP INITIATION&MGMT: CPT

## 2021-02-23 PROCEDURE — 36415 COLL VENOUS BLD VENIPUNCTURE: CPT

## 2021-02-23 RX ORDER — ASPIRIN/CALCIUM CARB/MAGNESIUM 324 MG
325 TABLET ORAL ONCE
Refills: 0 | Status: COMPLETED | OUTPATIENT
Start: 2021-02-23 | End: 2021-02-23

## 2021-02-23 RX ADMIN — Medication 325 MILLIGRAM(S): at 05:09

## 2021-02-23 NOTE — ED PROVIDER NOTE - PHYSICAL EXAMINATION
Gen: Alert, NAD  Head/eyes: NC/AT, PERRL  ENT: airway patent  Neck: supple, no tenderness/meningismus/JVD, Trachea midline  Pulm/lung: Bilateral clear BS, normal resp effort, no wheeze/stridor/retractions  CV/heart: RRR, no M/R/G, +2 dist pulses (radial, pedal DP/PT, popliteal)  GI/Abd: soft, NT/ND, +BS, no guarding/rebound tenderness  Musculoskeletal: no edema/erythema/cyanosis, FROM in all extremities, no C/T/L spine ttp  Skin: no rash, no vesicles, no petechaie, no ecchymosis, no swelling  Neuro: AAOx2, CN 2-12 intact, normal sensation, 5/5 motor strength in all extremities, normal gait, no dysmetria

## 2021-02-23 NOTE — ED PROVIDER NOTE - OBJECTIVE STATEMENT
74 yo male hx of COPD, CVA, dementia, DM, HLD, HTN BIB wife (Saundra) c/o right arm pain x 1 week, evaluated at urgent care last week, was told he was ok, but now here worried about having a heart attack, admits to some mild chest pain and shortness of breath, nonradiating, no medications taken for this.  No fever/chills.      cards [facility] Gordo [facility]

## 2021-02-23 NOTE — ED PROVIDER NOTE - CARE PROVIDER_API CALL
Brian Caro)  Internal Medicine  [address] [place] [id number]  Phone: [phone]  Fax: [phone]  Established Patient  Follow Up Time: 1-3 Days

## 2021-02-23 NOTE — ED ADULT NURSE NOTE - CHPI ED NUR SYMPTOMS POS
PATIENT REVIEW OF SYSTEMS       General ROS:  1.   Constitutional No   2.   Eyes No   3.   Cardiac No   4.   Respiratory No   5.   Gastrointestinal No   6.   Genitourinary No   7.   Musculoskeletal No   8.   Endocrine No   9.   Hematologic/Lymphatic No   10. Integument No   11. Neurological No   12. Allergy/Immunologic No   13. Psychiatric No          PAIN

## 2021-02-23 NOTE — CONSULT NOTE ADULT - SUBJECTIVE AND OBJECTIVE BOX
Patient is a 75y old  Male who presents with a chief complaint of right arm pain/chest pain    HPI:  74 yo male with PMH of COPD, TAMMY on CPAP, CVA in 2015, paroxysmal AFib on Eliquis dementia, DM, HTN, HLD presents to the ED with right hand pain for the past week. Patient is a poor historian at baseline. Spoke to wife Saundra ([phone]) who states that he had a swollen right hand for the past week and was seen at Urgent Care and diagnosed with arthritis. Over the past few days patient had been complaining of more pain and last night he told his wife the pain was excruciating so she brought him to the ED. Wife also notes that his blood glucose has been elevated over the past week, the highest being in the 360s. Patient took extras of all his medications two days ago and noted that he felt lightheaded. On interview of the patient, he states he has also been having left side chest pain with some shortness of breath. Per wife, he had an ILR placed that bothers him from time to, especially when he is feeling anxious. Patient also has a history of TAMMY on CPAP and wife states he can feel short of breath if he has not used his CPAP.     Patient follows with Dr. Caro for cardiology. He was last seen in the office on 12/2020 which was a normal visit. His last stress test was 11/2017 and was negative for ischemia. His last ECHO was in 3/2019 and showed normal systolic function with EF of 70% and no significant valvular abnormalities. He had an ILR placed in 2016 that showed paroxysmal AFib.    PAST MEDICAL & SURGICAL HISTORY:  CVA (cerebral vascular accident)  Dementia  HLD (hyperlipidemia)  COPD (chronic obstructive pulmonary disease)  Hypertension  Diabetes mellitus  H/O hand surgery      ECHO FINDINGS:  Normal systolic function, EF 70%  Calcified trileaflet aortic valve    MEDICATIONS  (STANDING):    MEDICATIONS  (PRN):      FAMILY HISTORY:  Family history of CABG (Father)        ROS:  Constitutional: denies fever, chills  HEENT: denies blurry vision, difficulty hearing  Respiratory: admits SOB. Denies WOO, cough  Cardiovascular: admits CP and palpitations. Denies orthopnea, PND, LE edema  Gastrointestinal: denies nausea, vomiting, abdominal pain  Genitourinary: denies urinary changes  Skin: Denies rashes, itching  Neurologic: denies headache, weakness, dizziness  Hematology/Oncology: denies bleeding, easy bruising  Musculoskeletal: admits right hand pain  ROS negative except as noted above      SOCIAL HISTORY:  No tobacco, Alcohol or Drug use    Vital Signs Last 24 Hrs  T(C): 36.6 (23 Feb 2021 06:18), Max: 36.6 (23 Feb 2021 06:18)  T(F): 97.9 (23 Feb 2021 06:18), Max: 97.9 (23 Feb 2021 06:18)  HR: 51 (23 Feb 2021 07:18) (45 - 51)  BP: 168/75 (23 Feb 2021 07:18) (115/55 - 168/75)  BP(mean): --  RR: 18 (23 Feb 2021 06:18) (18 - 18)  SpO2: 100% (23 Feb 2021 07:18) (97% - 100%)    Physical Exam:  General: Well developed, well nourished, NAD  HEENT: NCAT, PERRLA, EOMI bl, moist mucous membranes   Neck: Supple, nontender, no mass  Neurology: A&Ox2 person and place, nonfocal, sensation intact   Respiratory: CTA B/L, No W/R/R  CV: RRR, +S1/S2, soft systolic murmur at RUSB  Abdominal: Soft, NT, ND +BSx4, no palpable masses  Extremities: No C/C/E, + peripheral pulses  MSK: Normal ROM, no joint erythema or warmth, no joint swelling   Heme: No obvious ecchymosis or petechiae   Skin: warm, dry, normal color      ECG: Sinus bradycardia with 1st degree AV block, rate 45    I&O's Detail      LABS:                        11.0   7.43  )-----------( 177      ( 23 Feb 2021 04:57 )             33.7     02-23    139  |  105  |  40<H>  ----------------------------<  73  4.1   |  27  |  1.80<H>    Ca    8.7      23 Feb 2021 04:57    TPro  6.8  /  Alb  3.7  /  TBili  0.3  /  DBili  x   /  AST  3<L>  /  ALT  16  /  AlkPhos  58  02-23    CARDIAC MARKERS ( 23 Feb 2021 04:57 )  <.015 ng/mL / x     / x     / x     / x          PT/INR - ( 23 Feb 2021 04:57 )   PT: 14.2 sec;   INR: 1.23 ratio         PTT - ( 23 Feb 2021 04:57 )  PTT:38.3 sec    I&O's Summary    BNPSerum Pro-Brain Natriuretic Peptide: 54 pg/mL (02-23 @ 04:57)      RADIOLOGY & ADDITIONAL STUDIES:

## 2021-02-23 NOTE — CONSULT NOTE ADULT - ASSESSMENT
76 yo male with PMH of COPD, TAMMY on CPAP, CVA in 2015, paroxysmal AFib on Eliquis dementia, DM, HTN, HLD presents to the ED with right hand pain for the past week. Cardiology consulted to evaluate chest pain.     - Doubt ACS at this time pain likely musculoskeletal 2/2 to long-standing discomfort from ILR placement  - No clear evidence of acute ischemia, trops negative x1. F/u second set  - EKG shows sinus bradycardia rate 45 with 1st degree AV block, similar when compared to EKG from Dr. Caro's office in 12/2020  - Previous ECHO in 3/2019 showed normal systolic function (EF 70%)   - BP elevated, likely in the setting of missed morning doses of hypertension medications and pain from right hand  - Patient with SARAY on known CKD (baseline Cr 1.5), likely in the setting of taking duplicates of his losartan and chlorthalidone     74 yo male with PMH of COPD, TAMMY on CPAP, CVA in 2015, paroxysmal AFib on Eliquis dementia, DM, HTN, HLD presents to the ED with right hand pain for the past week. Cardiology consulted to evaluate chest pain.     - Doubt ACS at this time pain likely musculoskeletal 2/2 to long-standing discomfort from ILR placement  - No clear evidence of acute ischemia, trops negative x1. F/u second set  - EKG shows sinus bradycardia rate 45 with 1st degree AV block, similar when compared to EKG from Dr. Caro's office in 12/2020  - Previous ECHO in 3/2019 showed normal systolic function (EF 70%)   - BP elevated, likely in the setting of missed morning doses of hypertension medications and pain from right hand  - Patient with SARAY on known CKD (baseline Cr 1.5), likely in the setting of taking duplicates of his losartan and chlorthalidone  - Patient can be discharged home if second troponin negative. Recommend to f/u with Dr. Caro outpatient      74 yo male with PMH of COPD, TAMMY on CPAP, CVA in 2015, paroxysmal AFib on Eliquis dementia, DM, HTN, HLD presents to the ED with right hand pain for the past week. Cardiology consulted to evaluate chest pain.     - Doubt ACS at this time pain likely musculoskeletal  - No clear evidence of acute ischemia, trops negative x1. F/u second set  - EKG shows sinus bradycardia rate 45 with 1st degree AV block, similar when compared to EKG from Dr. Caro's office in 12/2020  - Previous ECHO in 3/2019 showed normal systolic function (EF 70%)   - BP elevated, likely in the setting of missed morning doses of hypertension medications and pain from right hand  - Patient with SARAY on known CKD (baseline Cr 1.5), likely in the setting of taking duplicates of his losartan and chlorthalidone  - Patient can be discharged home if second troponin negative. Recommend to f/u with Dr. Caro outpatient

## 2021-02-23 NOTE — ED ADULT NURSE NOTE - OBJECTIVE STATEMENT
Pt comes from triage. Pt reports right hand pain radiating up to elbow since last week. Pt breathing fast, placed on telemetry monitor. Pt able to move right arm and hand.

## 2021-02-23 NOTE — ED ADULT NURSE REASSESSMENT NOTE - NS ED NURSE REASSESS COMMENT FT1
Pt reporting leg cramps, MD Talavera aware. Pt provided with hot packs.
Pt is resting comfortably Cardiology is at bedside with pt.

## 2021-02-23 NOTE — ED PROVIDER NOTE - CARE PLAN
Principal Discharge DX:	Atypical chest pain  Secondary Diagnosis:	Diabetes mellitus  Secondary Diagnosis:	Hypertension  Secondary Diagnosis:	COPD (chronic obstructive pulmonary disease)

## 2021-02-23 NOTE — ED ADULT NURSE NOTE - NSIMPLEMENTINTERV_GEN_ALL_ED
Implemented All Universal Safety Interventions:  [place] to call system. Call bell, personal items and telephone within reach. Instruct patient to call for assistance. Room bathroom lighting operational. Non-slip footwear when patient is off stretcher. Physically safe environment: no spills, clutter or unnecessary equipment. Stretcher in lowest position, wheels locked, appropriate side rails in place.

## 2021-02-23 NOTE — ED PROVIDER NOTE - NSFOLLOWUPINSTRUCTIONS_ED_ALL_ED_FT
Chest Pain    Chest pain can be caused by many different conditions which may or may not be dangerous. Causes include heartburn, lung infections, heart attack, blood clot in lungs, skin infections, strain or damage to muscle, cartilage, or bones, etc. In addition to a history and physical examination, an electrocardiogram (ECG) or other lab tests may have been performed to determine the cause of your chest pain. Follow up with your primary care provider or with a cardiologist as instructed.     SEEK IMMEDIATE MEDICAL CARE IF YOU HAVE ANY OF THE FOLLOWING SYMPTOMS: worsening chest pain, coughing up blood, unexplained back/neck/jaw pain, severe abdominal pain, dizziness or lightheadedness, fainting, shortness of breath, sweaty or clammy skin, vomiting, or racing heart beat. These symptoms may represent a serious problem that is an emergency. Do not wait to see if the symptoms will go away. Get medical help right away. Call 911 and do not drive yourself to the hospital.     Please return to the Emergency Department immediately for any problems or concerns.   Follow up with Dr Caro this week.

## 2021-02-25 ENCOUNTER — NON-APPOINTMENT (OUTPATIENT)
Age: 76
End: 2021-02-25

## 2021-02-25 ENCOUNTER — APPOINTMENT (OUTPATIENT)
Dept: CARDIOLOGY | Facility: CLINIC | Age: 76
End: 2021-02-25
Payer: MEDICARE

## 2021-02-25 VITALS
WEIGHT: 198 LBS | SYSTOLIC BLOOD PRESSURE: 146 MMHG | BODY MASS INDEX: 31.08 KG/M2 | HEART RATE: 69 BPM | OXYGEN SATURATION: 93 % | DIASTOLIC BLOOD PRESSURE: 76 MMHG | HEIGHT: 67 IN

## 2021-02-25 PROCEDURE — 93000 ELECTROCARDIOGRAM COMPLETE: CPT

## 2021-02-25 PROCEDURE — 99214 OFFICE O/P EST MOD 30 MIN: CPT

## 2021-02-25 NOTE — DISCUSSION/SUMMARY
[FreeTextEntry1] : 75 year old man with a history as listed presents for a followup. \par \par Kike recently had been taking double his medications. This likely caused his SARAY and is resulting in him being lightheaded. He will hold the Chlorthalidone for 3 days and drink more water.  He will continue  Norvasc 10mg Qday. He will continue Losartan 100mg Qday.    I will defer AVN agents as his HR is usually in the adolph range. \par \par His EKG did not reveal any significant ischemic changes. His nuclear stress in 11/2017 was not revealing for ischemia. He had an echo in 3/2019 that showed normal systolic LV function without any significant other findings, including no significant valvular disease. His right arm pain is likely related to cervical neck disease. \par  \par he is tolerating Crestor 5mg HS and Coq10. \par \par Given his PAF and CHADS-VaSc= 4+, he will continue with Eliquis 5mg q12. His ILR has ran out of battery. He does not want it explanted. \par \par He will continue to see pulmonary for his COPD. He is much better controlled. \par \par Exercise and diet counseling was performed in order to reduce his future cardiovascular risk. I  He will followup with me in 3-4 months  time.

## 2021-02-25 NOTE — HISTORY OF PRESENT ILLNESS
[FreeTextEntry1] : 75-year-old man with a history of COPD, forgetfulness/dementia, hypertension who  presented to [facility] with a subacute  stroke. He was given TPA with resolution of his symptoms. He did not suffer any hemorrhagic conversion. He was noted during his hospital stay that he had a baseline sinus bradycardia.\par He had an ILR placed that showed PAF. Started on Eliquis. [facility] \par He has underlying sleep apnea on CPAP. \par He had an echo in 3/2019 that showed normal systolic LV function without any significant other findings, including no significant valvular disease. \par \par He is now here for  followup visit.  \par Since his last visit he was complaining of right arm pain that was severe pain. He went to the ED. He also was taking double ll of his medications. he was found to be in [place]. My partner Dr. Casas saw him in ED and states that there was urgent cardiac issue. \par \par Sicne his ED visit, he is still complaining of the arm pain. He has had bouts of lightheadedness when standing per his wife. \par He denies any  chest pain, PND, orthopnea, lower extremity edema, palpitations, near syncope, syncope.  He denies any blurry vision, headaches. His memory is still impaired. \par No reported melena, hematochezia or hematemesis.  \par

## 2021-04-12 ENCOUNTER — APPOINTMENT (OUTPATIENT)
Dept: CARDIOLOGY | Facility: CLINIC | Age: 76
End: 2021-04-12
Payer: MEDICARE

## 2021-04-12 ENCOUNTER — NON-APPOINTMENT (OUTPATIENT)
Age: 76
End: 2021-04-12

## 2021-04-12 VITALS
DIASTOLIC BLOOD PRESSURE: 69 MMHG | HEIGHT: 67 IN | WEIGHT: 202 LBS | SYSTOLIC BLOOD PRESSURE: 134 MMHG | BODY MASS INDEX: 31.71 KG/M2 | HEART RATE: 59 BPM | OXYGEN SATURATION: 93 %

## 2021-04-12 PROCEDURE — 93000 ELECTROCARDIOGRAM COMPLETE: CPT

## 2021-04-12 PROCEDURE — 99215 OFFICE O/P EST HI 40 MIN: CPT

## 2021-04-12 NOTE — DISCUSSION/SUMMARY
[FreeTextEntry1] : 75 year old man with a history as listed presents for a followup. \par \par Kike is now getting more anxious. He is concerned about feeling that he is having a heart attack. He will undergo a pharmacological nuclear stress test to assess for underlying obstructive CAD. He will get a 2d echo to assess for any  new structural heart disease, changes in valvular and ventricular function. \par \par His blood pressure and heart rate are controlled. He will continue  Norvasc 10mg Qday. He will continue Losartan 100mg Qday.    I will defer AVN agents as his HR is usually in the adolph range. \par  \par he is tolerating Crestor 5mg HS and Coq10. \par \par Given his PAF and CHADS-VaSc= 4+, he will continue with Eliquis 5mg q12. His ILR has ran out of battery. He does not want it explanted. \par \par He will continue to see pulmonary for his COPD. He is much better controlled. \par \par Exercise and diet counseling was performed in order to reduce his future cardiovascular risk. I  He will followup with me in 3-4 months  time.

## 2021-04-12 NOTE — HISTORY OF PRESENT ILLNESS
[FreeTextEntry1] : 75-year-old man with a history of COPD, forgetfulness/dementia, hypertension who  presented to  Hospital with a subacute  stroke. He was given TPA with resolution of his symptoms. He did not suffer any hemorrhagic conversion. He was noted during his hospital stay that he had a baseline sinus bradycardia.\par He had an ILR placed that showed PAF. Started on Eliquis. [facility] \par He has underlying sleep apnea on CPAP. \par He had an echo in 3/2019 that showed normal systolic LV function without any significant other findings, including no significant valvular disease. \par \par He is now here for  followup visit.  \par Since his last visit he has been having more panic attacks.  His wife states that he feels the ILR and thinks that he is having a heart attack. He was started on Buspar.\par His dyspnea has been stable. He denies any  chest pain, PND, orthopnea, lower extremity edema, palpitations, near syncope, syncope.  He denies any blurry vision, headaches. His memory is still impaired. \par No reported melena, hematochezia or hematemesis.  \par

## 2021-04-27 ENCOUNTER — APPOINTMENT (OUTPATIENT)
Dept: CARDIOLOGY | Facility: CLINIC | Age: 76
End: 2021-04-27
Payer: MEDICARE

## 2021-04-27 PROCEDURE — 93015 CV STRESS TEST SUPVJ I&R: CPT

## 2021-04-27 PROCEDURE — A9500: CPT

## 2021-04-27 PROCEDURE — 78452 HT MUSCLE IMAGE SPECT MULT: CPT

## 2021-04-27 PROCEDURE — 93306 TTE W/DOPPLER COMPLETE: CPT

## 2021-07-07 ENCOUNTER — APPOINTMENT (OUTPATIENT)
Dept: CARDIOLOGY | Facility: CLINIC | Age: 76
End: 2021-07-07
Payer: MEDICARE

## 2021-07-07 ENCOUNTER — NON-APPOINTMENT (OUTPATIENT)
Age: 76
End: 2021-07-07

## 2021-07-07 VITALS
OXYGEN SATURATION: 94 % | HEIGHT: 67 IN | WEIGHT: 198 LBS | HEART RATE: 62 BPM | SYSTOLIC BLOOD PRESSURE: 147 MMHG | BODY MASS INDEX: 31.08 KG/M2 | DIASTOLIC BLOOD PRESSURE: 76 MMHG

## 2021-07-07 PROCEDURE — 93000 ELECTROCARDIOGRAM COMPLETE: CPT

## 2021-07-07 PROCEDURE — 99214 OFFICE O/P EST MOD 30 MIN: CPT

## 2021-07-07 RX ORDER — LOSARTAN POTASSIUM 100 MG/1
100 TABLET, FILM COATED ORAL DAILY
Qty: 90 | Refills: 1 | Status: ACTIVE | COMMUNITY
Start: 2019-05-06 | End: 1900-01-01

## 2021-07-07 NOTE — DISCUSSION/SUMMARY
[FreeTextEntry1] : 75 year old man with a history as listed presents for a followup. \par \par Kike is doing well overall. His EKG did not reveal any significant ischemic changes. Nuclear and echo were essentially unremarkable. His blood pressure is mildly elevated given that he has been without Losartan.  He will continue  Norvasc 10mg Qday. He will continue Losartan 100mg Qday.    I will defer AVN agents as his HR is usually in the adolph range. \par  \par he is tolerating Crestor 5mg HS and Coq10. \par \par Given his PAF and CHADS-VaSc= 4+, he will continue with Eliquis 5mg q12. His ILR has ran out of battery. He does not want it explanted. \par \par He will continue to see pulmonary for his COPD. He is much better controlled. \par \par Exercise and diet counseling was performed in order to reduce his future cardiovascular risk. I  He will followup with me in 3-4 months  time.

## 2021-07-07 NOTE — CARDIOLOGY SUMMARY
[de-identified] : 7/7/21 Sinus  Bradycardia  -First degree A-V block \par   [de-identified] : 4/2021 pharm SPET normal  [de-identified] : 4/2021   normal systolic LV function without any significant other findings, including no significant valvular disease.  [___] : [unfilled] [No Ischemia] : no Ischemia [None] : normal LV function

## 2021-07-07 NOTE — HISTORY OF PRESENT ILLNESS
[FreeTextEntry1] : 75-year-old man with a history of COPD, forgetfulness/dementia, hypertension who  presented to [facility] with a subacute  stroke. He was given TPA with resolution of his symptoms. He did not suffer any hemorrhagic conversion. He was noted during his hospital stay that he had a baseline sinus bradycardia.\par He had an ILR placed that showed PAF. Started on Eliquis. [facility] \par He has underlying sleep apnea on CPAP. \par He had an echo in 3/2019 that showed normal systolic LV function without any significant other findings, including no significant valvular disease. \par \par He is now here for  followup visit.  \par Since his last visit he has been feeling well. He has been having more right ankle swelling without any pain. Lessens with elevation. \par His dyspnea has been stable. He denies any  chest pain, PND, orthopnea, lower extremity edema, palpitations, near syncope, syncope.  He denies any blurry vision, headaches. His memory is still impaired. \par No reported melena, hematochezia or hematemesis.  \par

## 2021-09-09 NOTE — CONSULT NOTE ADULT - ATTENDING COMMENTS
Seen/examined. agree with above.  atypical chest pain, with normal evaluation  mild rick likely from doubling medications  outpt follow up with Dr. Caro
I will START or STAY ON the medications listed below when I get home from the hospital:    FLUoxetine 10 mg oral capsule  -- 1 cap(s) by mouth once a day  -- Indication: For Depression    QUEtiapine 50 mg oral tablet  -- 1 tab(s) by mouth once a day (at bedtime)  -- Indication: For Depression    senna oral tablet  -- 2 tab(s) by mouth once a day (at bedtime)  -- Indication: For COnstipation    melatonin 3 mg oral tablet  -- 1 tab(s) by mouth once a day (at bedtime)  -- Indication: For insomnia

## 2021-10-04 ENCOUNTER — NON-APPOINTMENT (OUTPATIENT)
Age: 76
End: 2021-10-04

## 2021-10-04 ENCOUNTER — APPOINTMENT (OUTPATIENT)
Dept: CARDIOLOGY | Facility: CLINIC | Age: 76
End: 2021-10-04
Payer: MEDICARE

## 2021-10-04 VITALS
HEIGHT: 67 IN | RESPIRATION RATE: 14 BRPM | HEART RATE: 65 BPM | SYSTOLIC BLOOD PRESSURE: 131 MMHG | OXYGEN SATURATION: 95 % | DIASTOLIC BLOOD PRESSURE: 72 MMHG | BODY MASS INDEX: 31.39 KG/M2 | WEIGHT: 200 LBS

## 2021-10-04 VITALS — SYSTOLIC BLOOD PRESSURE: 116 MMHG | DIASTOLIC BLOOD PRESSURE: 60 MMHG

## 2021-10-04 PROCEDURE — 93000 ELECTROCARDIOGRAM COMPLETE: CPT

## 2021-10-04 PROCEDURE — 99214 OFFICE O/P EST MOD 30 MIN: CPT

## 2021-10-04 RX ORDER — FLUTICASONE PROPIONATE 50 UG/1
50 SPRAY, METERED NASAL
Qty: 1 | Refills: 3 | Status: ACTIVE | COMMUNITY
Start: 2019-05-22 | End: 1900-01-01

## 2021-10-04 NOTE — DISCUSSION/SUMMARY
[FreeTextEntry1] : 76 year old man with a history as listed presents for a followup. \par \par Kike is doing well overall. His EKG did not reveal any significant ischemic changes. Nuclear and echo were essentially unremarkable. His blood pressure is controlled.  He will continue  Norvasc 10mg Qday. He will continue Losartan 100mg Qday.    I will defer AVN agents as his HR is usually in the adolph range. \par  \par he is tolerating Crestor 5mg HS and Coq10. \par \par Given his PAF and CHADS-VaSc= 4+, he will continue with Eliquis 5mg q12. His ILR has ran out of battery. He does not want it explanted. \par \par He will continue to see pulmonary for his COPD. He is much better controlled. he will take albuterol PRN wheeze\par \par Exercise and diet counseling was performed in order to reduce his future cardiovascular risk. I  He will followup with me in 6 months  time.

## 2021-10-04 NOTE — CARDIOLOGY SUMMARY
[de-identified] : 10/4/21 Sinus  Bradycardia  -First degree A-V block \par  -Nonspecific ST depression   +   Negative T-waves. \par   [de-identified] : 4/2021 pharm SPET normal  [de-identified] : 4/2021   normal systolic LV function without any significant other findings, including no significant valvular disease.

## 2021-10-04 NOTE — HISTORY OF PRESENT ILLNESS
[FreeTextEntry1] : 75-year-old man with a history of COPD, forgetfulness/dementia, hypertension who  presented to  Hospital with a subacute  stroke. He was given TPA with resolution of his symptoms. He did not suffer any hemorrhagic conversion. He was noted during his hospital stay that he had a baseline sinus bradycardia.\par He had an ILR placed that showed PAF. Started on Eliquis. [facility] \par He has underlying sleep apnea on CPAP. \par He had an echo in 3/2019 that showed normal systolic LV function without any significant other findings, including no significant valvular disease. \par \par He is now here for  followup visit.  \par Since his last visit he has been feeling well. He is spending time outside in the yard with his dog. His dyspnea has been stable. He denies any  chest pain, PND, orthopnea, lower extremity edema, palpitations, near syncope, syncope.  He denies any blurry vision, headaches. His memory is still impaired and worsened.  \par No reported melena, hematochezia or hematemesis.  \par

## 2021-11-18 NOTE — HISTORY OF PRESENT ILLNESS
[FreeTextEntry1] : 73-year-old man with a history of COPD, forgetfulness/dementia, hypertension who  presented to [facility] with a subacute  stroke. He was given TPA with resolution of his symptoms. He did not suffer any hemorrhagic conversion. He was noted during his hospital stay that he had a baseline sinus bradycardia.\par He had an ILR placed that showed PAF. Started on Eliquis. [facility] \par He has underlying sleep apnea on CPAP. \par He had an echo in 3/2019 that showed normal systolic LV function without any significant other findings, including no significant valvular disease. \par \par He is now here for  followup visit.  \par Since his last followup, he was admitted to [facility] for PNA in 4/2019. He was treated with Abx and now is feeling better. \par \par He has been feeling relatively well.   his dyspnea on exertion is stable if not marginally better. He is sedentary as baseline.  \par He denies any  chest pain, PND, orthopnea, lower extremity edema, palpitations, near syncope, syncope.  He denies any blurry vision, headaches. His memory is still impaired. \par No reported melena, hematochezia or hematemesis. He is compliant with his medications. \par He is maintaining a higher salt diet. 
sore throat related to GERD

## 2021-12-10 NOTE — PATIENT PROFILE ADULT - NSSTREETDRUGUSE_GEN_A_NUR
face down during waking hours,  sleep on right side on 2-3 pillows.  [place] office tomorrow. never used

## 2022-01-01 NOTE — ED PROVIDER NOTE - PATIENT PORTAL LINK FT
[place] screen received and placed in the clinical staff work bin.    You can access the FollowMyHealth Patient Portal offered by [facility] by registering at the following website: [email or web address]. By joining [employer]’s FollowMyHealth portal, you will also be able to view your health information using other applications (apps) compatible with our system.

## 2022-04-07 ENCOUNTER — APPOINTMENT (OUTPATIENT)
Dept: CARDIOLOGY | Facility: CLINIC | Age: 77
End: 2022-04-07
Payer: MEDICARE

## 2022-04-07 ENCOUNTER — NON-APPOINTMENT (OUTPATIENT)
Age: 77
End: 2022-04-07

## 2022-04-07 VITALS
HEIGHT: 67 IN | HEART RATE: 63 BPM | BODY MASS INDEX: 31.39 KG/M2 | WEIGHT: 200 LBS | SYSTOLIC BLOOD PRESSURE: 137 MMHG | DIASTOLIC BLOOD PRESSURE: 72 MMHG | OXYGEN SATURATION: 94 %

## 2022-04-07 PROCEDURE — 93000 ELECTROCARDIOGRAM COMPLETE: CPT

## 2022-04-07 PROCEDURE — 99214 OFFICE O/P EST MOD 30 MIN: CPT

## 2022-04-07 NOTE — PHYSICAL EXAM
[Well Groomed] : well groomed [General Appearance - In No Acute Distress] : no acute distress [Normal Conjunctiva] : the conjunctiva exhibited no abnormalities [Eyelids - No Xanthelasma] : the eyelids demonstrated no xanthelasmas [Normal Oral Mucosa] : normal oral mucosa [No Oral Pallor] : no oral pallor [No Oral Cyanosis] : no oral cyanosis [Normal Jugular Venous A Waves Present] : normal jugular venous A waves present [Normal Jugular Venous V Waves Present] : normal jugular venous V waves present [No Jugular Venous Hernandez A Waves] : no jugular venous hernandez A waves [Abdomen Soft] : soft [Abdomen Tenderness] : non-tender [Abdomen Mass (___ Cm)] : no abdominal mass palpated [Abnormal Walk] : normal gait [Gait - Sufficient For Exercise Testing] : the gait was sufficient for exercise testing [Nail Clubbing] : no clubbing of the fingernails [Cyanosis, Localized] : no localized cyanosis [Petechial Hemorrhages (___cm)] : no petechial hemorrhages [Skin Color & Pigmentation] : normal skin color and pigmentation [] : no rash [No Venous Stasis] : no venous stasis [Skin Lesions] : no skin lesions [No Skin Ulcers] : no skin ulcer [No Xanthoma] : no  xanthoma was observed [Oriented To Time, Place, And Person] : oriented to person, place, and time [Affect] : the affect was normal [Mood] : the mood was normal [No Anxiety] : not feeling anxious [Normal Rhythm/Effort] : normal respiratory rhythm and effort [Decreased Breath Sounds] : breath sounds were decreased diffusely [Normal Rate] : normal [Rhythm Regular] : regular [Normal S1] : normal S1 [Normal S2] : normal S2 [No Gallop] : no gallop heard [I] : a grade 1 [2+] : left 2+ [Right Carotid Bruit] : no bruit heard over the right carotid [Left Carotid Bruit] : no bruit heard over the left carotid [Bruit] : no bruit heard [No Pitting Edema] : no pitting edema present

## 2022-04-07 NOTE — CARDIOLOGY SUMMARY
[de-identified] : 4/7/22 Sinus  Bradycardia  -First degree A-V block \par  -Nonspecific ST depression   +   Negative T-waves. \par   [de-identified] : 4/2021 pharm SPET normal  [de-identified] : 4/2021   normal systolic LV function without any significant other findings, including no significant valvular disease.

## 2022-04-07 NOTE — DISCUSSION/SUMMARY
[FreeTextEntry1] : 76 year old man with a history as listed presents for a followup. \par \par Kike is doing well overall. His EKG did not reveal any significant ischemic changes. Nuclear and echo were essentially unremarkable. His blood pressure is controlled.  He will continue  Norvasc 10mg Qday. He will continue Losartan 100mg Qday.  He will continue on the Chlorthalidone 12.5mg qday.     I will defer AVN agents as his HR is usually in the adolph range. \par  \par I suggest possibly starting on Fariga or Jardiance and then stopping the Chlorthalidone. \par \par he is tolerating Crestor 5mg HS and Coq10. \par \par Given his PAF and CHADS-VaSc= 4+, he will continue with Eliquis 5mg q12. His ILR has ran out of battery. He does not want it explanted. \par \par He will continue to see pulmonary for his COPD. He is much better controlled. he will take albuterol PRN wheeze\par \par Exercise and diet counseling was performed in order to reduce his future cardiovascular risk. I  He will followup with me in 6 months  time.

## 2022-04-07 NOTE — HISTORY OF PRESENT ILLNESS
[FreeTextEntry1] : 76-year-old man with a history of COPD, forgetfulness/dementia, hypertension who  presented to [facility] with a subacute  stroke. He was given TPA with resolution of his symptoms. He did not suffer any hemorrhagic conversion. He was noted during his hospital stay that he had a baseline sinus bradycardia.\par He had an ILR placed that showed PAF. Started on Eliquis. [facility] \par He has underlying sleep apnea on CPAP. \par He had an echo in 3/2019 that showed normal systolic LV function without any significant other findings, including no significant valvular disease. \par \par He is now here for  followup visit.  \par Since his last visit he has been feeling well. His chlorthalidone was halved because of urinary frequency. Though his last Aic was reportedly 9. \par He has been still walking with dog daily.  His dyspnea has been stable. He denies any  chest pain, PND, orthopnea, lower extremity edema, palpitations, near syncope, syncope.  He denies any blurry vision, headaches. His memory is still impaired and worsened.  \par No reported melena, hematochezia or hematemesis.  \par

## 2022-10-05 ENCOUNTER — APPOINTMENT (OUTPATIENT)
Dept: CARDIOLOGY | Facility: CLINIC | Age: 77
End: 2022-10-05

## 2022-10-05 ENCOUNTER — NON-APPOINTMENT (OUTPATIENT)
Age: 77
End: 2022-10-05

## 2022-10-05 VITALS
WEIGHT: 200 LBS | DIASTOLIC BLOOD PRESSURE: 81 MMHG | HEART RATE: 61 BPM | HEIGHT: 67 IN | SYSTOLIC BLOOD PRESSURE: 145 MMHG | BODY MASS INDEX: 31.39 KG/M2 | OXYGEN SATURATION: 96 %

## 2022-10-05 VITALS — SYSTOLIC BLOOD PRESSURE: 132 MMHG | DIASTOLIC BLOOD PRESSURE: 80 MMHG

## 2022-10-05 PROCEDURE — 99214 OFFICE O/P EST MOD 30 MIN: CPT

## 2022-10-05 PROCEDURE — 93000 ELECTROCARDIOGRAM COMPLETE: CPT

## 2022-10-05 NOTE — PHYSICAL EXAM
[Well Groomed] : well groomed [General Appearance - In No Acute Distress] : no acute distress [Normal Conjunctiva] : the conjunctiva exhibited no abnormalities [Eyelids - No Xanthelasma] : the eyelids demonstrated no xanthelasmas [Normal Oral Mucosa] : normal oral mucosa [No Oral Pallor] : no oral pallor [No Oral Cyanosis] : no oral cyanosis [Normal Jugular Venous A Waves Present] : normal jugular venous A waves present [Normal Jugular Venous V Waves Present] : normal jugular venous V waves present [No Jugular Venous Hernandez A Waves] : no jugular venous hernandez A waves [Abdomen Soft] : soft [Abdomen Tenderness] : non-tender [Abdomen Mass (___ Cm)] : no abdominal mass palpated [Abnormal Walk] : normal gait [Gait - Sufficient For Exercise Testing] : the gait was sufficient for exercise testing [Nail Clubbing] : no clubbing of the fingernails [Cyanosis, Localized] : no localized cyanosis [Petechial Hemorrhages (___cm)] : no petechial hemorrhages [Skin Color & Pigmentation] : normal skin color and pigmentation [] : no rash [No Venous Stasis] : no venous stasis [Skin Lesions] : no skin lesions [No Skin Ulcers] : no skin ulcer [No Xanthoma] : no  xanthoma was observed [Oriented To Time, Place, And Person] : oriented to person, place, and time [Affect] : the affect was normal [Mood] : the mood was normal [No Anxiety] : not feeling anxious [Normal Rhythm/Effort] : normal respiratory rhythm and effort [Decreased Breath Sounds] : breath sounds were decreased diffusely [Normal Rate] : normal [Rhythm Regular] : regular [Normal S1] : normal S1 [Normal S2] : normal S2 [No Gallop] : no gallop heard [I] : a grade 1 [2+] : left 2+ [Right Carotid Bruit] : no bruit heard over the right carotid [Left Carotid Bruit] : no bruit heard over the left carotid [Bruit] : no bruit heard [___ +] : [unfilled]U+ pitting edema to the right ankle

## 2022-10-05 NOTE — HISTORY OF PRESENT ILLNESS
[FreeTextEntry1] : 77-year-old man with a history of COPD, forgetfulness/dementia, hypertension who  presented to [facility] with a subacute  stroke. He was given TPA with resolution of his symptoms. He did not suffer any hemorrhagic conversion. He was noted during his hospital stay that he had a baseline sinus bradycardia.\par He had an ILR placed that showed PAF. Started on Eliquis. [facility] \par He has underlying sleep apnea on CPAP. \par He had an echo in 3/2019 that showed normal systolic LV function without any significant other findings, including no significant valvular disease. \par \par He is now here for  followup visit ROS is limited by his memory issues which seems to have worsened.   \par Since his last visit he has been more issues walking. No falls.  His dyspnea has been stable. Though has been more wheezy recently with change of weather. He denies any  chest pain, PND, orthopnea, palpitations, near syncope, syncope.  He denies any blurry vision, headaches. He has right ankle edema for the last week. Unclear if improves with elevation. No pain noted. \par No reported melena, hematochezia or hematemesis.  \par

## 2022-10-05 NOTE — DISCUSSION/SUMMARY
[FreeTextEntry1] : 76 year old man with a history as listed presents for a followup. \par \par Kike is doing well overall. His EKG did not reveal any significant ischemic changes. Nuclear and echo were essentially unremarkable.\par He has more lower extremity edema that is likely from being more sedentary. Given that he has been compliant with AC I doubt a DVT. Will try elevation and compression socks. It may be from Norvasc but will continue the medication for now. \par \par  His blood pressure is controlled.  He will continue  Norvasc 10mg Qday. He will continue Losartan 100mg Qday.  He will continue on the Chlorthalidone 12.5mg qday.     I will defer AVN agents as his HR is usually in the adolph range. \par  \par I suggest possibly starting on Fariga or Jardiance and then stopping the Chlorthalidone. \par \par he is tolerating Crestor 5mg HS and Coq10. \par \par Given his PAF and CHADS-VaSc= 4+, he will continue with Eliquis 5mg q12. His ILR has ran out of battery. He does not want it explanted. \par \par He will continue to see pulmonary for his COPD. He is much better controlled. he will take albuterol PRN wheeze\par \par At your convenience, please fax me his latest lab results including lipid profile. \par Exercise and diet counseling was performed in order to reduce his future cardiovascular risk. I  He will followup with me in 6 months  time.   [EKG obtained to assist in diagnosis and management of assessed problem(s)] : EKG obtained to assist in diagnosis and management of assessed problem(s)

## 2022-10-05 NOTE — CARDIOLOGY SUMMARY
[de-identified] : 4/7/22 Sinus  Bradycardia  -First degree A-V block \par  -Nonspecific ST depression   +   Negative T-waves. \par   [de-identified] : 4/2021 pharm SPET normal  [de-identified] : 4/2021   normal systolic LV function without any significant other findings, including no significant valvular disease.

## 2022-10-23 ENCOUNTER — RX RENEWAL (OUTPATIENT)
Age: 77
End: 2022-10-23

## 2022-10-26 NOTE — ED ADULT NURSE NOTE - CHIEF COMPLAINT QUOTE
Pt BIBEMS for c/o chest pain, per EMS patient is in a 1st degree AV Block, administered NTG and ASA PTA. PT found to have temp of 101.5 on arrival done

## 2023-03-28 NOTE — PHYSICAL THERAPY INITIAL EVALUATION ADULT - PATIENT PROFILE REVIEW, REHAB EVAL
yes/RVP negative,CXR ?RLL density RVP negative, CXR ?RLL density/yes Patient/Caregiver provided printed discharge information.

## 2023-03-30 DIAGNOSIS — Z12.5 ENCOUNTER FOR SCREENING FOR MALIGNANT NEOPLASM OF PROSTATE: ICD-10-CM

## 2023-03-30 DIAGNOSIS — E78.5 HYPERLIPIDEMIA, UNSPECIFIED: ICD-10-CM

## 2023-03-30 DIAGNOSIS — E03.9 HYPOTHYROIDISM, UNSPECIFIED: ICD-10-CM

## 2023-03-30 DIAGNOSIS — E79.0 HYPERURICEMIA W/OUT SIGNS OF INFLAMMATORY ARTHRITIS AND TOPHACEOUS DISEASE: ICD-10-CM

## 2023-04-05 ENCOUNTER — NON-APPOINTMENT (OUTPATIENT)
Age: 78
End: 2023-04-05

## 2023-04-05 ENCOUNTER — APPOINTMENT (OUTPATIENT)
Dept: CARDIOLOGY | Facility: CLINIC | Age: 78
End: 2023-04-05
Payer: MEDICARE

## 2023-04-05 VITALS
BODY MASS INDEX: 30.13 KG/M2 | HEART RATE: 59 BPM | HEIGHT: 67 IN | SYSTOLIC BLOOD PRESSURE: 130 MMHG | OXYGEN SATURATION: 93 % | WEIGHT: 192 LBS | DIASTOLIC BLOOD PRESSURE: 77 MMHG

## 2023-04-05 PROCEDURE — 93000 ELECTROCARDIOGRAM COMPLETE: CPT

## 2023-04-05 PROCEDURE — 99214 OFFICE O/P EST MOD 30 MIN: CPT

## 2023-04-05 NOTE — CARDIOLOGY SUMMARY
[de-identified] : Sinus  Bradycardia  -First degree A-V block \par  -Nonspecific ST depression   +   Negative T-waves. \par   [de-identified] : 4/2021 pharm SPET normal  [de-identified] : 4/2021   normal systolic LV function without any significant other findings, including no significant valvular disease.

## 2023-04-05 NOTE — HISTORY OF PRESENT ILLNESS
[FreeTextEntry1] : 77-year-old man with a history of COPD, forgetfulness/dementia, hypertension who  presented to [facility] with a subacute  stroke. He was given TPA with resolution of his symptoms. He did not suffer any hemorrhagic conversion. He was noted during his hospital stay that he had a baseline sinus bradycardia.\par He had an ILR placed that showed PAF. Started on Eliquis. [facility] \par He has underlying sleep apnea on CPAP. \par He had an echo in 3/2019 that showed normal systolic LV function without any significant other findings, including no significant valvular disease. \par \par He is now here for  followup visit ROS is limited by his memory issues which seems to have worsened.   \par Since his last visit he is still very sedentary. His wife notes WOO but relatively unchanged.  He denies any  chest pain, PND, orthopnea, palpitations, near syncope, syncope, lower extremity edema. .  He denies any blurry vision, headaches.  \par No reported melena, hematochezia or hematemesis. \par

## 2023-04-05 NOTE — DISCUSSION/SUMMARY
[FreeTextEntry1] : 76 year old man with a history as listed presents for a followup. \par \par Kike is doing well overall. His EKG did not reveal any significant ischemic changes. Nuclear and echo were essentially unremarkable.\par \par  His blood pressure is controlled.  He will continue  Norvasc 10mg Qday. He will continue Losartan 100mg Qday.  He will continue on the Chlorthalidone 12.5mg qday.     I will defer AVN agents as his HR is usually in the adolph range. \par  \par he is tolerating Crestor 5mg HS and Coq10. \par \par Given his PAF and CHADS-VaSc= 4+, he will continue with Eliquis 5mg q12. His ILR has ran out of battery. He does not want it explanted. \par \par He will continue to see pulmonary for his COPD. He is much better controlled. he will take albuterol PRN wheeze\par \par At your convenience, please fax me his latest lab results including lipid profile. \par Exercise and diet counseling was performed in order to reduce his future cardiovascular risk. I  He will followup with me in 6 months  time.   [EKG obtained to assist in diagnosis and management of assessed problem(s)] : EKG obtained to assist in diagnosis and management of assessed problem(s)

## 2023-04-06 LAB
ALBUMIN SERPL ELPH-MCNC: 4.4 G/DL
ALP BLD-CCNC: 76 U/L
ALT SERPL-CCNC: 16 U/L
ANION GAP SERPL CALC-SCNC: 11 MMOL/L
AST SERPL-CCNC: 15 U/L
BASOPHILS # BLD AUTO: 0.04 K/UL
BASOPHILS NFR BLD AUTO: 0.7 %
BILIRUB SERPL-MCNC: 0.3 MG/DL
BUN SERPL-MCNC: 39 MG/DL
CALCIUM SERPL-MCNC: 9.8 MG/DL
CHLORIDE SERPL-SCNC: 102 MMOL/L
CHOLEST SERPL-MCNC: 108 MG/DL
CO2 SERPL-SCNC: 26 MMOL/L
CREAT SERPL-MCNC: 2.16 MG/DL
EGFR: 31 ML/MIN/1.73M2
EOSINOPHIL # BLD AUTO: 0.24 K/UL
EOSINOPHIL NFR BLD AUTO: 4.2 %
ESTIMATED AVERAGE GLUCOSE: 252 MG/DL
FOLATE SERPL-MCNC: 13.1 NG/ML
GLUCOSE SERPL-MCNC: 249 MG/DL
HBA1C MFR BLD HPLC: 10.4 %
HCT VFR BLD CALC: 36.8 %
HDLC SERPL-MCNC: 55 MG/DL
HGB BLD-MCNC: 11.8 G/DL
IMM GRANULOCYTES NFR BLD AUTO: 0.2 %
LDLC SERPL CALC-MCNC: 30 MG/DL
LYMPHOCYTES # BLD AUTO: 1.22 K/UL
LYMPHOCYTES NFR BLD AUTO: 21.6 %
MAN DIFF?: NORMAL
MCHC RBC-ENTMCNC: 30.8 PG
MCHC RBC-ENTMCNC: 32.1 GM/DL
MCV RBC AUTO: 96.1 FL
MONOCYTES # BLD AUTO: 0.62 K/UL
MONOCYTES NFR BLD AUTO: 11 %
NEUTROPHILS # BLD AUTO: 3.53 K/UL
NEUTROPHILS NFR BLD AUTO: 62.3 %
NONHDLC SERPL-MCNC: 53 MG/DL
PLATELET # BLD AUTO: 160 K/UL
POTASSIUM SERPL-SCNC: 4.9 MMOL/L
PROT SERPL-MCNC: 6.9 G/DL
PSA SERPL-MCNC: 0.64 NG/ML
RBC # BLD: 3.83 M/UL
RBC # FLD: 13.7 %
SODIUM SERPL-SCNC: 139 MMOL/L
TRIGL SERPL-MCNC: 113 MG/DL
TSH SERPL-ACNC: 4.45 UIU/ML
URATE SERPL-MCNC: 8.2 MG/DL
VIT B12 SERPL-MCNC: 855 PG/ML
WBC # FLD AUTO: 5.66 K/UL

## 2023-04-23 LAB
ALBUMIN SERPL ELPH-MCNC: 4.3 G/DL
ALP BLD-CCNC: 74 U/L
ALT SERPL-CCNC: 9 U/L
ANION GAP SERPL CALC-SCNC: 12 MMOL/L
AST SERPL-CCNC: 15 U/L
BILIRUB SERPL-MCNC: 0.2 MG/DL
BUN SERPL-MCNC: 35 MG/DL
CALCIUM SERPL-MCNC: 9.8 MG/DL
CHLORIDE SERPL-SCNC: 104 MMOL/L
CO2 SERPL-SCNC: 24 MMOL/L
CREAT SERPL-MCNC: 1.74 MG/DL
EGFR: 40 ML/MIN/1.73M2
GLUCOSE SERPL-MCNC: 136 MG/DL
POTASSIUM SERPL-SCNC: 4.7 MMOL/L
PROT SERPL-MCNC: 7.3 G/DL
SODIUM SERPL-SCNC: 141 MMOL/L

## 2023-04-26 RX ORDER — CHLORTHALIDONE 25 MG/1
25 TABLET ORAL DAILY
Qty: 90 | Refills: 3 | Status: DISCONTINUED | COMMUNITY
Start: 2019-06-10 | End: 2023-04-26

## 2023-10-04 ENCOUNTER — APPOINTMENT (OUTPATIENT)
Dept: CARDIOLOGY | Facility: CLINIC | Age: 78
End: 2023-10-04
Payer: MEDICARE

## 2023-10-04 VITALS — DIASTOLIC BLOOD PRESSURE: 70 MMHG | SYSTOLIC BLOOD PRESSURE: 118 MMHG

## 2023-10-04 VITALS
HEART RATE: 62 BPM | HEIGHT: 67 IN | OXYGEN SATURATION: 95 % | DIASTOLIC BLOOD PRESSURE: 73 MMHG | BODY MASS INDEX: 30.45 KG/M2 | SYSTOLIC BLOOD PRESSURE: 147 MMHG | WEIGHT: 194 LBS

## 2023-10-04 PROCEDURE — 93000 ELECTROCARDIOGRAM COMPLETE: CPT

## 2023-10-04 PROCEDURE — 99214 OFFICE O/P EST MOD 30 MIN: CPT

## 2024-04-08 ENCOUNTER — APPOINTMENT (OUTPATIENT)
Dept: CARDIOLOGY | Facility: CLINIC | Age: 79
End: 2024-04-08
Payer: MEDICARE

## 2024-04-08 VITALS
HEART RATE: 52 BPM | WEIGHT: 183 LBS | BODY MASS INDEX: 28.72 KG/M2 | DIASTOLIC BLOOD PRESSURE: 79 MMHG | OXYGEN SATURATION: 99 % | HEIGHT: 67 IN | SYSTOLIC BLOOD PRESSURE: 164 MMHG

## 2024-04-08 DIAGNOSIS — R06.09 OTHER FORMS OF DYSPNEA: ICD-10-CM

## 2024-04-08 DIAGNOSIS — I48.0 PAROXYSMAL ATRIAL FIBRILLATION: ICD-10-CM

## 2024-04-08 DIAGNOSIS — I10 ESSENTIAL (PRIMARY) HYPERTENSION: ICD-10-CM

## 2024-04-08 PROCEDURE — 93000 ELECTROCARDIOGRAM COMPLETE: CPT

## 2024-04-08 PROCEDURE — G2211 COMPLEX E/M VISIT ADD ON: CPT

## 2024-04-08 PROCEDURE — 99214 OFFICE O/P EST MOD 30 MIN: CPT

## 2024-04-08 NOTE — PHYSICAL EXAM
[Well Groomed] : well groomed [General Appearance - In No Acute Distress] : no acute distress [Normal Conjunctiva] : the conjunctiva exhibited no abnormalities [Eyelids - No Xanthelasma] : the eyelids demonstrated no xanthelasmas [Normal Oral Mucosa] : normal oral mucosa [No Oral Pallor] : no oral pallor [No Oral Cyanosis] : no oral cyanosis [Normal Jugular Venous A Waves Present] : normal jugular venous A waves present [Normal Jugular Venous V Waves Present] : normal jugular venous V waves present [No Jugular Venous Hernandez A Waves] : no jugular venous hernandez A waves [Abdomen Soft] : soft [Abdomen Tenderness] : non-tender [Abdomen Mass (___ Cm)] : no abdominal mass palpated [Abnormal Walk] : normal gait [Gait - Sufficient For Exercise Testing] : the gait was sufficient for exercise testing [Nail Clubbing] : no clubbing of the fingernails [Cyanosis, Localized] : no localized cyanosis [Petechial Hemorrhages (___cm)] : no petechial hemorrhages [Skin Color & Pigmentation] : normal skin color and pigmentation [] : no rash [No Venous Stasis] : no venous stasis [Skin Lesions] : no skin lesions [No Skin Ulcers] : no skin ulcer [No Xanthoma] : no  xanthoma was observed [Oriented To Time, Place, And Person] : oriented to person, place, and time [Affect] : the affect was normal [Mood] : the mood was normal [No Anxiety] : not feeling anxious [Normal Rhythm/Effort] : normal respiratory rhythm and effort [Scattered Wheezes] : scattered wheezing was heard [Decreased Breath Sounds] : breath sounds were decreased diffusely [Normal Rate] : normal [Rhythm Regular] : regular [Normal S1] : normal S1 [Normal S2] : normal S2 [No Gallop] : no gallop heard [I] : a grade 1 [2+] : left 2+ [___ +] : [unfilled]U+ pitting edema to the right ankle [Right Carotid Bruit] : no bruit heard over the right carotid [Left Carotid Bruit] : no bruit heard over the left carotid [Bruit] : no bruit heard

## 2024-04-08 NOTE — CARDIOLOGY SUMMARY
[de-identified] : Sinus Rhythm  -Left atrial enlargement. nonspecific T wave changes.   [de-identified] : 4/2021 pharm SPET normal  [de-identified] : 4/2021   normal systolic LV function without any significant other findings, including no significant valvular disease.

## 2024-04-08 NOTE — DISCUSSION/SUMMARY
[FreeTextEntry1] : 78 year old man with a history as listed presents for a followup.   Kike is doing well overall. His EKG did not reveal any significant ischemic changes. Nuclear and echo were essentially unremarkable.   His blood pressure is controlled.  He will continue  Norvasc 10mg Qday. He will continue Losartan 100mg Qday.  He will continue on the Chlorthalidone 12.5mg qday.     I will defer AVN agents as his HR is usually in the adolph range.    he is tolerating Crestor 5mg HS and Coq10.   Given his PAF and CHADS-VaSc= 4+, he will continue with Eliquis 5mg q12. His ILR has ran out of battery. He does not want it explanted.   He will continue to see pulmonary for his COPD. He is much better controlled. he will take albuterol PRN wheeze  DM control. [facility]  [facility] I have told them to send me his labs once he gets it next week.  Exercise and diet counseling was performed in order to reduce his future cardiovascular risk. I  He will followup with me in 6 months  time.   [EKG obtained to assist in diagnosis and management of assessed problem(s)] : EKG obtained to assist in diagnosis and management of assessed problem(s)

## 2024-04-08 NOTE — HISTORY OF PRESENT ILLNESS
[FreeTextEntry1] : 78-year-old man with a history of COPD, forgetfulness/dementia, hypertension who  presented to  Hospital with a subacute  stroke. He was given TPA with resolution of his symptoms. He did not suffer any hemorrhagic conversion. He was noted during his hospital stay that he had a baseline sinus bradycardia. He had an ILR placed that showed PAF. Started on Eliquis. [facility]  He has underlying sleep apnea on CPAP.  He had an echo in 3/2019 that showed normal systolic LV function without any significant other findings, including no significant valvular disease.   He is now here for  followup visit ROS is limited by his memory issues which seems to have worsened.    Since his last visit he is still very sedentary. His wife notes WOO but relatively unchanged. He has rhinorrhea.  He denies any  chest pain, PND, orthopnea, palpitations, near syncope, syncope, lower extremity edema. .  He denies any blurry vision, headaches.  No reported melena, hematochezia or hematemesis.  [facility] [place] [facility] Recovered well.  He has been sleeping more. He has been eating well. His blood sugars have been more labile.  Medication reconciliation performed. He is compliant with his medications.

## 2024-04-12 ENCOUNTER — INPATIENT (INPATIENT)
Facility: HOSPITAL | Age: 79
LOS: 9 days | Discharge: NURSING, REHAB OR LONG-TERM CARE | DRG: 948 | End: 2024-04-22
Attending: FAMILY MEDICINE
Payer: MEDICARE

## 2024-04-12 VITALS
DIASTOLIC BLOOD PRESSURE: 58 MMHG | HEIGHT: 67 IN | WEIGHT: 186.07 LBS | RESPIRATION RATE: 16 BRPM | SYSTOLIC BLOOD PRESSURE: 130 MMHG | HEART RATE: 53 BPM | OXYGEN SATURATION: 95 %

## 2024-04-12 DIAGNOSIS — Z98.89 OTHER SPECIFIED POSTPROCEDURAL STATES: Chronic | ICD-10-CM

## 2024-04-12 LAB
ALBUMIN SERPL ELPH-MCNC: 2.8 G/DL — LOW (ref 3.3–5)
ALP SERPL-CCNC: 67 U/L — SIGNIFICANT CHANGE UP (ref 40–120)
ALT FLD-CCNC: 12 U/L — SIGNIFICANT CHANGE UP (ref 12–78)
ANION GAP SERPL CALC-SCNC: 5 MMOL/L — SIGNIFICANT CHANGE UP (ref 5–17)
APTT BLD: 41.2 SEC — HIGH (ref 24.5–35.6)
AST SERPL-CCNC: 9 U/L — LOW (ref 15–37)
BASOPHILS # BLD AUTO: 0.03 K/UL — SIGNIFICANT CHANGE UP (ref 0–0.2)
BASOPHILS NFR BLD AUTO: 0.4 % — SIGNIFICANT CHANGE UP (ref 0–2)
BILIRUB SERPL-MCNC: 0.2 MG/DL — SIGNIFICANT CHANGE UP (ref 0.2–1.2)
BLD GP AB SCN SERPL QL: SIGNIFICANT CHANGE UP
BUN SERPL-MCNC: 45 MG/DL — HIGH (ref 7–23)
CALCIUM SERPL-MCNC: 9.2 MG/DL — SIGNIFICANT CHANGE UP (ref 8.5–10.1)
CHLORIDE SERPL-SCNC: 107 MMOL/L — SIGNIFICANT CHANGE UP (ref 96–108)
CO2 SERPL-SCNC: 32 MMOL/L — HIGH (ref 22–31)
CREAT SERPL-MCNC: 2.4 MG/DL — HIGH (ref 0.5–1.3)
EGFR: 27 ML/MIN/1.73M2 — LOW
EOSINOPHIL # BLD AUTO: 0.28 K/UL — SIGNIFICANT CHANGE UP (ref 0–0.5)
EOSINOPHIL NFR BLD AUTO: 4.1 % — SIGNIFICANT CHANGE UP (ref 0–6)
GLUCOSE SERPL-MCNC: 157 MG/DL — HIGH (ref 70–99)
HCT VFR BLD CALC: 33.1 % — LOW (ref 39–50)
HGB BLD-MCNC: 10.3 G/DL — LOW (ref 13–17)
IMM GRANULOCYTES NFR BLD AUTO: 0.3 % — SIGNIFICANT CHANGE UP (ref 0–0.9)
INR BLD: 1.26 RATIO — HIGH (ref 0.85–1.18)
LACTATE SERPL-SCNC: 2.3 MMOL/L — HIGH (ref 0.7–2)
LYMPHOCYTES # BLD AUTO: 1.68 K/UL — SIGNIFICANT CHANGE UP (ref 1–3.3)
LYMPHOCYTES # BLD AUTO: 24.6 % — SIGNIFICANT CHANGE UP (ref 13–44)
MCHC RBC-ENTMCNC: 30.1 PG — SIGNIFICANT CHANGE UP (ref 27–34)
MCHC RBC-ENTMCNC: 31.1 GM/DL — LOW (ref 32–36)
MCV RBC AUTO: 96.8 FL — SIGNIFICANT CHANGE UP (ref 80–100)
MONOCYTES # BLD AUTO: 0.71 K/UL — SIGNIFICANT CHANGE UP (ref 0–0.9)
MONOCYTES NFR BLD AUTO: 10.4 % — SIGNIFICANT CHANGE UP (ref 2–14)
NEUTROPHILS # BLD AUTO: 4.1 K/UL — SIGNIFICANT CHANGE UP (ref 1.8–7.4)
NEUTROPHILS NFR BLD AUTO: 60.2 % — SIGNIFICANT CHANGE UP (ref 43–77)
NRBC # BLD: 0 /100 WBCS — SIGNIFICANT CHANGE UP (ref 0–0)
PLATELET # BLD AUTO: 180 K/UL — SIGNIFICANT CHANGE UP (ref 150–400)
POTASSIUM SERPL-MCNC: 4.1 MMOL/L — SIGNIFICANT CHANGE UP (ref 3.5–5.3)
POTASSIUM SERPL-SCNC: 4.1 MMOL/L — SIGNIFICANT CHANGE UP (ref 3.5–5.3)
PROT SERPL-MCNC: 6.4 G/DL — SIGNIFICANT CHANGE UP (ref 6–8.3)
PROTHROM AB SERPL-ACNC: 14.6 SEC — HIGH (ref 9.5–13)
RBC # BLD: 3.42 M/UL — LOW (ref 4.2–5.8)
RBC # FLD: 13.9 % — SIGNIFICANT CHANGE UP (ref 10.3–14.5)
SODIUM SERPL-SCNC: 144 MMOL/L — SIGNIFICANT CHANGE UP (ref 135–145)
TROPONIN I, HIGH SENSITIVITY RESULT: 8 NG/L — SIGNIFICANT CHANGE UP
WBC # BLD: 6.82 K/UL — SIGNIFICANT CHANGE UP (ref 3.8–10.5)
WBC # FLD AUTO: 6.82 K/UL — SIGNIFICANT CHANGE UP (ref 3.8–10.5)

## 2024-04-12 PROCEDURE — 70496 CT ANGIOGRAPHY HEAD: CPT | Mod: 26,MC

## 2024-04-12 PROCEDURE — 99285 EMERGENCY DEPT VISIT HI MDM: CPT

## 2024-04-12 PROCEDURE — 70498 CT ANGIOGRAPHY NECK: CPT | Mod: 26,MC

## 2024-04-12 PROCEDURE — 99223 1ST HOSP IP/OBS HIGH 75: CPT

## 2024-04-12 PROCEDURE — 0042T: CPT

## 2024-04-12 PROCEDURE — 70450 CT HEAD/BRAIN W/O DYE: CPT | Mod: 26,59,MC

## 2024-04-12 RX ORDER — CEFTRIAXONE 500 MG/1
1000 INJECTION, POWDER, FOR SOLUTION INTRAMUSCULAR; INTRAVENOUS ONCE
Refills: 0 | Status: COMPLETED | OUTPATIENT
Start: 2024-04-12 | End: 2024-04-12

## 2024-04-12 RX ORDER — SODIUM CHLORIDE 9 MG/ML
1000 INJECTION INTRAMUSCULAR; INTRAVENOUS; SUBCUTANEOUS ONCE
Refills: 0 | Status: COMPLETED | OUTPATIENT
Start: 2024-04-12 | End: 2024-04-12

## 2024-04-12 NOTE — ED PROVIDER NOTE - NEUROLOGICAL, MLM
Alert, no focal deficits, no motor or sensory deficits noted with pt poor compliance, not following commands.

## 2024-04-12 NOTE — ED PROVIDER NOTE - OBJECTIVE STATEMENT
78-year-old male with a history of COPD, CVA 2015, dementia, diabetes, hyperlipidemia, hypertension presents with brought in by EMS for sudden onset of altered mental status which started around 9:30 PM.  Patient questionably slurring slightly.  Patient was with the wife at the time of the onset.  Patient seems to be confused.  The symptoms are similar to symptoms he had when he had a stroke in 2015.  No recent fall or trauma.  No cough/URI.  No recent COVID exposure or infection.  No recent complaints of pain.  Patient is unable to give history, no other history available. 78-year-old male with a history of COPD, CVA 2015, dementia, diabetes, hyperlipidemia, hypertension presents with brought in by EMS for sudden onset of altered mental status which started around 9:30 PM.  Patient questionably slurring slightly.  Patient was with the wife at the time of the onset.  Patient seems to be confused.  The symptoms are similar to symptoms he had when he had a stroke in 2015.  No recent fall or trauma.  No cough/URI.  No recent COVID exposure or infection.  No recent complaints of pain.  Patient is unable to give history, no other history available.  The patient's wife states that the patient normally would follow commands better.  But the patient would not be able to answer questions correctly.

## 2024-04-12 NOTE — ED PROVIDER NOTE - DIFFERENTIAL DIAGNOSIS
Differential Diagnosis Rule out acute CVA, infection, electrolyte abnormality, leukocytosis, other acute pathology

## 2024-04-12 NOTE — ED PROVIDER NOTE - CONSTITUTIONAL, MLM
normal... Well appearing, awake, alert, Confused, not following commands appropriately, and in no apparent distress.

## 2024-04-12 NOTE — ED ADULT NURSE NOTE - NSFALLHARMRISKINTERV_ED_ALL_ED
Assistance OOB with selected safe patient handling equipment if applicable/Assistance with ambulation/Communicate risk of Fall with Harm to all staff, patient, and family/Monitor gait and stability/Monitor for mental status changes and reorient to person, place, and time, as needed/Move patient closer to nursing station/within visual sight of ED staff/Provide patient with walking aids/Provide visual cue: red socks, yellow wristband, yellow gown, etc/Reinforce activity limits and safety measures with patient and family/Toileting schedule using arm’s reach rule for commode and bathroom/Use of alarms - bed, stretcher, chair and/or video monitoring/Bed in lowest position, wheels locked, appropriate side rails in place/Call bell, personal items and telephone in reach/Instruct patient to call for assistance before getting out of bed/chair/stretcher/Non-slip footwear applied when patient is off stretcher/[place] to call system/Physically safe environment - no spills, clutter or unnecessary equipment/Purposeful Proactive Rounding/Room/bathroom lighting operational, light cord in reach

## 2024-04-12 NOTE — ED PROVIDER NOTE - PROGRESS NOTE DETAILS
After further clarification the patient does take Eliquis.  He took the last dose at 2 PM.  Discussed with telestroke, they will call him and do a video chat and will review the CTA images to determine if the patient is a candidate for tenecteplase The patient was evaluated by Dr. Bonilla, telestroke and after evaluation, no TNK.  Especially given that the patient's elevated coags in the setting of Eliquis.  No need for transfer, as the patient does not have an LVO.  Can admit at [facility]. Discussed with Dr. Yefri Naqvi, will see patient to admit.

## 2024-04-12 NOTE — CHART NOTE - NSCHARTNOTEFT_GEN_A_CORE
HISTORY OF PRESENT ILLNESS:    78M w/ PMH of ischemic stroke in 2015, COPD, Dementia, DM, HLD, HTN, presented w/ acute change in mental status. Patient was in usual state of health when he suddenly became less responsive at 2130. At [facility], patient mildly conversational but now speaking very little.     NIHSS: 11  LKN: 2130 4/12  Pre-stroke MRS:   CTH: no acute pathology; Chronic R. MCA infarctions.   CTA h/n: neg  CTP: neg    HOME MEDICATIONS:  Home Medications:  aspirin 81 mg oral tablet, chewable: 1 tab(s) orally once a day (08 Apr 2019 14:32)  donepezil 10 mg oral tablet: 1 tab(s) orally once a day (at bedtime) (08 Apr 2019 14:32)  Eliquis 5 mg oral tablet: 1 tab(s) orally 2 times a day (08 Apr 2019 14:32)  escitalopram 20 mg oral tablet: 1 tab(s) orally once a day (08 Apr 2019 14:32)  metFORMIN 500 mg oral tablet: 2 tab(s) orally 2 times a day (08 Apr 2019 14:32)  Norvasc 10 mg oral tablet: 1 tab(s) orally once a day (08 Apr 2019 14:32)  olmesartan 40 mg oral tablet: 1 tab(s) orally once a day (08 Apr 2019 14:32)  pioglitazone 30 mg oral tablet: 1 tab(s) orally once a day (08 Apr 2019 14:32)  sAXagliptin 5 mg oral tablet: 1 tab(s) orally once a day (08 Apr 2019 14:32)  simvastatin 80 mg oral tablet: 0.5 tab(s) orally once a day (at bedtime) (08 Apr 2019 14:32)  Spiriva Respimat 1.25 mcg/inh inhalation aerosol: 2 puff(s) inhaled once a day (08 Apr 2019 14:32)  Symbicort 80 mcg-4.5 mcg/inh inhalation aerosol: 2 puff(s) inhaled 2 times a day (08 Apr 2019 14:32)      SMOKING/ETOH/RECREATIONAL DRUGS:    VITALS/DATA/ORDERS: [x] Reviewed    Labs:  CAPILLARY BLOOD GLUCOSE      POCT Blood Glucose.: 180 mg/dL (12 Apr 2024 22:38)    Platelet Count - Automated: 180 K/uL (04-12-24 @ 23:00)    PT/INR - ( 12 Apr 2024 23:00 )   PT: 14.6 sec;   INR: 1.26 ratio         PTT - ( 12 Apr 2024 23:00 )  PTT:41.2 sec  CT Head:     EXAMINATION: Assisted by (OSH staff)  NIHSS:  1A: Level of consciousness       0= Alert; keenly responsive    1B: Ask month and age         +2= 0 questions right       1C: "Blink eyes" and "Squeeze Hands"         +1= Performs 1 task     2: Horizontal EOMs       0= Normal         3: Visual fields       0= No visual loss     4: Facial palsy (use grimace if obtunded)       0= Normal symmetry       5A: Left arm motor drift (count out loud and use fingers to show count)            +1= Drift but doesn't hit bed        5B: Right arm motor drift       0= No drift x 10 seconds           6A: Left leg motor drift         +2= Drift, hits bed          6B: Right leg motor drift             +2= Drift, hits bed         7: Limb ataxia (FNF/heel-shin)       0= No ataxia        8: Sensation       0= Normal, no sensory loss       9: Language/aphasia- describe the scene (on gretchen); name the items; read the sentences (on gretchen)         +3= Mute/global aphasia: no usable speech/auditory comprehension       10: Dysarthria- read the words       0= Normal     11: Extinction/inattention       0= No abnormality      IV Alteplase CONTRAINDICATIONS  [] None    ABSOLUTE EXCLUSION CRITERIA:  [] Patient outside of the appropriate time window for IV alteplase  [] Evidence of intracranial hemorrhage on pretreatment CT scan (CT must be read by an attending radiologist or attending neurologist)  [] Head CT findings suggesting an established acute cerebral infarction as evidenced by leeann hypodensity, regardless of size.  [] Clinical presentation consistent with subarachnoid hemorrhage, even with normal CT scan  [] Active internal bleeding  [x] Known bleeding diathesis (including but not limited to platelet count < 100,000, use of oral anticoagulants with INR>1.7, use of full dose low molecular       weight heparin within the last 24 hours, use of unfractionated heparin AND a prolonged PTT (> 40sec), use of direct thrombin inhibitor (e.g. dabigatran) or oral direct Factor Xa inhibitor (e.g. rivaroxiban, apixaban) within 48 hours)  [] Systolic pressure >= 185 mmHg or diastolic pressure 110 mmHg on repeated measurements at the time treatment is to begin  [] Care team unable to determine eligibility for IV alteplase  [] Patient, family, or surrogate declines and understands risks and benefits of treatment.  [] For wake-up Protocol patients: DW-MRI lesions larger than one-third of the MCA territory    RELATIVE EXCLUSION CRITERIA    [] Isolated neurological deficits (except for aphasia or hemianopsia)  [] stroke severity too mild (non-disabling)  [] Seizure at onset with post-ictal residual neurological impairment  [] Gastrointestinal, genitourinary or respiratory hemorrhage within 21 days   [] Major surgery within 14 days   [] Blood glucose level < 50 or > 400 mG/dL  [] CPR with chest compressions or minor surgery (including liver and kidney biopsy, thoracocentesis, lumbar puncture) within 10 days   [] Arterial puncture at non-compressible site within 7 days   [] Evidence of acute trauma (fracture)   [] Diabetic hemorrhagic retinopathy or ophthalmic bleeding  [] Pregnancy or peripartum; no nursing post- treatment  [] Post-myocardial infarction pericarditis  [] Peritoneal dialysis or hemodialysis or sever hepatic disease   [] Known bacterial endocarditis   [] Life expectancy less than 6 months or sever co-morbid illness   [] Known cerebral vascular malformation, untreated intracranial aneurysm or brain tumor (may consider IV alteplase in patients with CNS lesions that have a very low likelihood of hemorrhage, such as small un-ruptured aneurysms or benign tumors with low vascularity.  [] Social/Mosque reason  [] Other ____________________      ASSESSMENT:     78M w/ PMH of ischemic stroke in 2015, COPD, Dementia, DM, HLD, HTN, presented w/ acute change in mental status. Patient was in usual state of health when he suddenly became less responsive at 2130. At [facility], patient mildly conversational but now speaking very little.     NIHSS: 11  LKN: 2130 4/12  Pre-stroke MRS:   CTH: no acute pathology; Chronic R. MCA infarctions.   CTA h/n: neg  CTP: neg    No TNK given abnormal coags with last dose of eliquis at 2pm   No MT as no LVO    Defer rest of management to the neurology team at [facility]. Differential includes focal seizure, ischemic stroke, CNS infection, system infection, among others.     Consultation provided via live, two-way video streaming. Consultation provided in patient's preferred language.

## 2024-04-12 NOTE — ED ADULT NURSE NOTE - FINAL NURSING ELECTRONIC SIGNATURE
--------------  CONTINUED STAY REVIEW    Payor: SHENA MEYERS  Subscriber #:  [id number]  Authorization Number: [id number]          7/25 HOSP    Blood pressure 123/81, pulse 90, temperature 97.7 °F (36.5 °C), temperature source Oral, resp.  rate 16, height 5' better     - pulm on consult   - Pt is s/p bronch 7/24  - f/u pathology  - f/u cultures - growing Strep intermedius  - CXR today about the same, maybe slightly improved  - potential ID consult, defer to pulm  - off IVF  - cont zosyn              7/26 PULM 13-Apr-2024 03:47

## 2024-04-12 NOTE — ED PROVIDER NOTE - NSICDXPASTMEDICALHX_GEN_ALL_CORE_FT
PAST MEDICAL HISTORY:  COPD (chronic obstructive pulmonary disease)     CVA (cerebral vascular accident)     Dementia     Diabetes mellitus     HLD (hyperlipidemia)     Hypertension

## 2024-04-13 DIAGNOSIS — F32.9 MAJOR DEPRESSIVE DISORDER, SINGLE EPISODE, UNSPECIFIED: ICD-10-CM

## 2024-04-13 DIAGNOSIS — R41.82 ALTERED MENTAL STATUS, UNSPECIFIED: ICD-10-CM

## 2024-04-13 DIAGNOSIS — E11.9 TYPE 2 DIABETES MELLITUS WITHOUT COMPLICATIONS: ICD-10-CM

## 2024-04-13 DIAGNOSIS — I10 ESSENTIAL (PRIMARY) HYPERTENSION: ICD-10-CM

## 2024-04-13 DIAGNOSIS — I25.10 ATHEROSCLEROTIC HEART DISEASE OF NATIVE CORONARY ARTERY WITHOUT ANGINA PECTORIS: ICD-10-CM

## 2024-04-13 DIAGNOSIS — F03.90 UNSPECIFIED DEMENTIA WITHOUT BEHAVIORAL DISTURBANCE: ICD-10-CM

## 2024-04-13 DIAGNOSIS — N17.9 ACUTE KIDNEY FAILURE, UNSPECIFIED: ICD-10-CM

## 2024-04-13 DIAGNOSIS — Z29.9 ENCOUNTER FOR PROPHYLACTIC MEASURES, UNSPECIFIED: ICD-10-CM

## 2024-04-13 DIAGNOSIS — I63.9 CEREBRAL INFARCTION, UNSPECIFIED: ICD-10-CM

## 2024-04-13 LAB
A1C WITH ESTIMATED AVERAGE GLUCOSE RESULT: 8.7 % — HIGH (ref 4–5.6)
ALBUMIN SERPL ELPH-MCNC: 2.9 G/DL — LOW (ref 3.3–5)
ALP SERPL-CCNC: 70 U/L — SIGNIFICANT CHANGE UP (ref 40–120)
ALT FLD-CCNC: 12 U/L — SIGNIFICANT CHANGE UP (ref 12–78)
ANION GAP SERPL CALC-SCNC: 3 MMOL/L — LOW (ref 5–17)
APPEARANCE UR: CLEAR — SIGNIFICANT CHANGE UP
APTT BLD: 34.6 SEC — SIGNIFICANT CHANGE UP (ref 24.5–35.6)
APTT BLD: 81.2 SEC — HIGH (ref 24.5–35.6)
APTT BLD: > 200 SEC (ref 24.5–35.6)
AST SERPL-CCNC: 11 U/L — LOW (ref 15–37)
BACTERIA # UR AUTO: ABNORMAL /HPF
BASE EXCESS BLDA CALC-SCNC: 2.7 MMOL/L — SIGNIFICANT CHANGE UP (ref -2–3)
BASOPHILS # BLD AUTO: 0.03 K/UL — SIGNIFICANT CHANGE UP (ref 0–0.2)
BASOPHILS NFR BLD AUTO: 0.5 % — SIGNIFICANT CHANGE UP (ref 0–2)
BILIRUB SERPL-MCNC: 0.1 MG/DL — LOW (ref 0.2–1.2)
BILIRUB UR-MCNC: NEGATIVE — SIGNIFICANT CHANGE UP
BLOOD GAS COMMENTS ARTERIAL: SIGNIFICANT CHANGE UP
BUN SERPL-MCNC: 44 MG/DL — HIGH (ref 7–23)
C3 SERPL-MCNC: 112 MG/DL — SIGNIFICANT CHANGE UP (ref 81–157)
C4 SERPL-MCNC: 22 MG/DL — SIGNIFICANT CHANGE UP (ref 13–39)
CALCIUM SERPL-MCNC: 7.7 MG/DL — LOW (ref 8.4–10.5)
CALCIUM SERPL-MCNC: 8.1 MG/DL — LOW (ref 8.5–10.1)
CHLORIDE SERPL-SCNC: 115 MMOL/L — HIGH (ref 96–108)
CHOLEST SERPL-MCNC: 86 MG/DL — SIGNIFICANT CHANGE UP
CO2 SERPL-SCNC: 28 MMOL/L — SIGNIFICANT CHANGE UP (ref 22–31)
COLOR SPEC: ABNORMAL
CREAT SERPL-MCNC: 2.8 MG/DL — HIGH (ref 0.5–1.3)
DIFF PNL FLD: ABNORMAL
EGFR: 23 ML/MIN/1.73M2 — LOW
EOSINOPHIL # BLD AUTO: 0.25 K/UL — SIGNIFICANT CHANGE UP (ref 0–0.5)
EOSINOPHIL NFR BLD AUTO: 4 % — SIGNIFICANT CHANGE UP (ref 0–6)
EPI CELLS # UR: PRESENT
ESTIMATED AVERAGE GLUCOSE: 203 MG/DL — HIGH (ref 68–114)
FERRITIN SERPL-MCNC: 51 NG/ML — SIGNIFICANT CHANGE UP (ref 30–400)
GAS PNL BLDA: SIGNIFICANT CHANGE UP
GLUCOSE SERPL-MCNC: 93 MG/DL — SIGNIFICANT CHANGE UP (ref 70–99)
GLUCOSE UR QL: NEGATIVE MG/DL — SIGNIFICANT CHANGE UP
HCO3 BLDA-SCNC: 27 MMOL/L — SIGNIFICANT CHANGE UP (ref 21–28)
HCT VFR BLD CALC: 33.2 % — LOW (ref 39–50)
HCT VFR BLD CALC: 34.4 % — LOW (ref 39–50)
HCT VFR BLD CALC: 35.1 % — LOW (ref 39–50)
HDLC SERPL-MCNC: 47 MG/DL — SIGNIFICANT CHANGE UP
HGB BLD-MCNC: 10.2 G/DL — LOW (ref 13–17)
HGB BLD-MCNC: 10.8 G/DL — LOW (ref 13–17)
HGB BLD-MCNC: 11.1 G/DL — LOW (ref 13–17)
IMM GRANULOCYTES NFR BLD AUTO: 0.3 % — SIGNIFICANT CHANGE UP (ref 0–0.9)
INR BLD: 1.11 RATIO — SIGNIFICANT CHANGE UP (ref 0.85–1.18)
IRON SATN MFR SERPL: 23 % — SIGNIFICANT CHANGE UP (ref 16–55)
IRON SATN MFR SERPL: 60 UG/DL — SIGNIFICANT CHANGE UP (ref 45–165)
KAPPA LC SER QL IFE: 5.21 MG/DL — HIGH (ref 0.33–1.94)
KAPPA/LAMBDA FREE LIGHT CHAIN RATIO, SERUM: 2.41 RATIO — HIGH (ref 0.26–1.65)
KETONES UR-MCNC: NEGATIVE MG/DL — SIGNIFICANT CHANGE UP
LACTATE SERPL-SCNC: 0.7 MMOL/L — SIGNIFICANT CHANGE UP (ref 0.7–2)
LACTATE SERPL-SCNC: 1.6 MMOL/L — SIGNIFICANT CHANGE UP (ref 0.7–2)
LAMBDA LC SER QL IFE: 2.16 MG/DL — SIGNIFICANT CHANGE UP (ref 0.57–2.63)
LEUKOCYTE ESTERASE UR-ACNC: NEGATIVE — SIGNIFICANT CHANGE UP
LIPID PNL WITH DIRECT LDL SERPL: 28 MG/DL — SIGNIFICANT CHANGE UP
LYMPHOCYTES # BLD AUTO: 1.56 K/UL — SIGNIFICANT CHANGE UP (ref 1–3.3)
LYMPHOCYTES # BLD AUTO: 24.9 % — SIGNIFICANT CHANGE UP (ref 13–44)
MCHC RBC-ENTMCNC: 30 PG — SIGNIFICANT CHANGE UP (ref 27–34)
MCHC RBC-ENTMCNC: 30.1 PG — SIGNIFICANT CHANGE UP (ref 27–34)
MCHC RBC-ENTMCNC: 30.3 PG — SIGNIFICANT CHANGE UP (ref 27–34)
MCHC RBC-ENTMCNC: 30.7 GM/DL — LOW (ref 32–36)
MCHC RBC-ENTMCNC: 31.4 GM/DL — LOW (ref 32–36)
MCHC RBC-ENTMCNC: 31.6 GM/DL — LOW (ref 32–36)
MCV RBC AUTO: 95.1 FL — SIGNIFICANT CHANGE UP (ref 80–100)
MCV RBC AUTO: 96.6 FL — SIGNIFICANT CHANGE UP (ref 80–100)
MCV RBC AUTO: 97.6 FL — SIGNIFICANT CHANGE UP (ref 80–100)
MONOCYTES # BLD AUTO: 0.55 K/UL — SIGNIFICANT CHANGE UP (ref 0–0.9)
MONOCYTES NFR BLD AUTO: 8.8 % — SIGNIFICANT CHANGE UP (ref 2–14)
NEUTROPHILS # BLD AUTO: 3.85 K/UL — SIGNIFICANT CHANGE UP (ref 1.8–7.4)
NEUTROPHILS NFR BLD AUTO: 61.5 % — SIGNIFICANT CHANGE UP (ref 43–77)
NITRITE UR-MCNC: NEGATIVE — SIGNIFICANT CHANGE UP
NON HDL CHOLESTEROL: 39 MG/DL — SIGNIFICANT CHANGE UP
NRBC # BLD: 0 /100 WBCS — SIGNIFICANT CHANGE UP (ref 0–0)
PCO2 BLDA: 43 MMHG — SIGNIFICANT CHANGE UP (ref 35–48)
PH BLDA: 7.41 — SIGNIFICANT CHANGE UP (ref 7.35–7.45)
PH UR: 5 — SIGNIFICANT CHANGE UP (ref 5–8)
PHOSPHATE SERPL-MCNC: 2.9 MG/DL — SIGNIFICANT CHANGE UP (ref 2.5–4.5)
PLATELET # BLD AUTO: 182 K/UL — SIGNIFICANT CHANGE UP (ref 150–400)
PLATELET # BLD AUTO: 187 K/UL — SIGNIFICANT CHANGE UP (ref 150–400)
PLATELET # BLD AUTO: 193 K/UL — SIGNIFICANT CHANGE UP (ref 150–400)
PO2 BLDA: 67 MMHG — LOW (ref 83–108)
POTASSIUM SERPL-MCNC: 4.6 MMOL/L — SIGNIFICANT CHANGE UP (ref 3.5–5.3)
POTASSIUM SERPL-SCNC: 4.6 MMOL/L — SIGNIFICANT CHANGE UP (ref 3.5–5.3)
PROT SERPL-MCNC: 6.4 G/DL — SIGNIFICANT CHANGE UP (ref 6–8.3)
PROT UR-MCNC: 100 MG/DL
PROTHROM AB SERPL-ACNC: 12.9 SEC — SIGNIFICANT CHANGE UP (ref 9.5–13)
PTH-INTACT FLD-MCNC: 23 PG/ML — SIGNIFICANT CHANGE UP (ref 15–65)
RBC # BLD: 3.4 M/UL — LOW (ref 4.2–5.8)
RBC # BLD: 3.56 M/UL — LOW (ref 4.2–5.8)
RBC # BLD: 3.69 M/UL — LOW (ref 4.2–5.8)
RBC # FLD: 13.8 % — SIGNIFICANT CHANGE UP (ref 10.3–14.5)
RBC # FLD: 14.1 % — SIGNIFICANT CHANGE UP (ref 10.3–14.5)
RBC # FLD: 14.1 % — SIGNIFICANT CHANGE UP (ref 10.3–14.5)
RBC CASTS # UR COMP ASSIST: 32 /HPF — HIGH (ref 0–4)
SAO2 % BLDA: 94.7 % — SIGNIFICANT CHANGE UP (ref 94–98)
SODIUM SERPL-SCNC: 146 MMOL/L — HIGH (ref 135–145)
SP GR SPEC: 1.02 — SIGNIFICANT CHANGE UP (ref 1–1.03)
TIBC SERPL-MCNC: 257 UG/DL — SIGNIFICANT CHANGE UP (ref 220–430)
TRIGL SERPL-MCNC: 41 MG/DL — SIGNIFICANT CHANGE UP
UIBC SERPL-MCNC: 197 UG/DL — SIGNIFICANT CHANGE UP (ref 110–370)
UROBILINOGEN FLD QL: 0.2 MG/DL — SIGNIFICANT CHANGE UP (ref 0.2–1)
WBC # BLD: 6 K/UL — SIGNIFICANT CHANGE UP (ref 3.8–10.5)
WBC # BLD: 6.26 K/UL — SIGNIFICANT CHANGE UP (ref 3.8–10.5)
WBC # BLD: 6.32 K/UL — SIGNIFICANT CHANGE UP (ref 3.8–10.5)
WBC # FLD AUTO: 6 K/UL — SIGNIFICANT CHANGE UP (ref 3.8–10.5)
WBC # FLD AUTO: 6.26 K/UL — SIGNIFICANT CHANGE UP (ref 3.8–10.5)
WBC # FLD AUTO: 6.32 K/UL — SIGNIFICANT CHANGE UP (ref 3.8–10.5)
WBC UR QL: 3 /HPF — SIGNIFICANT CHANGE UP (ref 0–5)

## 2024-04-13 PROCEDURE — 93010 ELECTROCARDIOGRAM REPORT: CPT

## 2024-04-13 PROCEDURE — 71045 X-RAY EXAM CHEST 1 VIEW: CPT | Mod: 26

## 2024-04-13 PROCEDURE — 70551 MRI BRAIN STEM W/O DYE: CPT | Mod: 26

## 2024-04-13 PROCEDURE — 99232 SBSQ HOSP IP/OBS MODERATE 35: CPT

## 2024-04-13 RX ORDER — INSULIN LISPRO 100/ML
VIAL (ML) SUBCUTANEOUS AT BEDTIME
Refills: 0 | Status: DISCONTINUED | OUTPATIENT
Start: 2024-04-13 | End: 2024-04-17

## 2024-04-13 RX ORDER — HEPARIN SODIUM 5000 [USP'U]/ML
3000 INJECTION INTRAVENOUS; SUBCUTANEOUS EVERY 6 HOURS
Refills: 0 | Status: DISCONTINUED | OUTPATIENT
Start: 2024-04-13 | End: 2024-04-15

## 2024-04-13 RX ORDER — OLMESARTAN MEDOXOMIL 5 MG/1
1 TABLET, FILM COATED ORAL
Qty: 0 | Refills: 0 | DISCHARGE

## 2024-04-13 RX ORDER — PIOGLITAZONE HYDROCHLORIDE 15 MG/1
1 TABLET ORAL
Qty: 0 | Refills: 0 | DISCHARGE

## 2024-04-13 RX ORDER — LOSARTAN POTASSIUM 100 MG/1
1 TABLET, FILM COATED ORAL
Refills: 0 | DISCHARGE

## 2024-04-13 RX ORDER — INSULIN LISPRO 100/ML
VIAL (ML) SUBCUTANEOUS EVERY 6 HOURS
Refills: 0 | Status: DISCONTINUED | OUTPATIENT
Start: 2024-04-13 | End: 2024-04-17

## 2024-04-13 RX ORDER — HEPARIN SODIUM 5000 [USP'U]/ML
6500 INJECTION INTRAVENOUS; SUBCUTANEOUS EVERY 6 HOURS
Refills: 0 | Status: DISCONTINUED | OUTPATIENT
Start: 2024-04-13 | End: 2024-04-15

## 2024-04-13 RX ORDER — GLIMEPIRIDE 1 MG
1 TABLET ORAL
Refills: 0 | DISCHARGE

## 2024-04-13 RX ORDER — OLANZAPINE 15 MG/1
5 TABLET, FILM COATED ORAL ONCE
Refills: 0 | Status: DISCONTINUED | OUTPATIENT
Start: 2024-04-13 | End: 2024-04-13

## 2024-04-13 RX ORDER — ALOGLIPTIN 12.5 MG/1
1 TABLET, FILM COATED ORAL
Refills: 0 | DISCHARGE

## 2024-04-13 RX ORDER — ESCITALOPRAM OXALATE 10 MG/1
1 TABLET, FILM COATED ORAL
Qty: 0 | Refills: 0 | DISCHARGE

## 2024-04-13 RX ORDER — AMLODIPINE BESYLATE 2.5 MG/1
1 TABLET ORAL
Refills: 0 | DISCHARGE

## 2024-04-13 RX ORDER — DEXTROSE 10 % IN WATER 10 %
125 INTRAVENOUS SOLUTION INTRAVENOUS ONCE
Refills: 0 | Status: DISCONTINUED | OUTPATIENT
Start: 2024-04-13 | End: 2024-04-17

## 2024-04-13 RX ORDER — SODIUM CHLORIDE 9 MG/ML
1000 INJECTION, SOLUTION INTRAVENOUS
Refills: 0 | Status: DISCONTINUED | OUTPATIENT
Start: 2024-04-13 | End: 2024-04-13

## 2024-04-13 RX ORDER — GLUCAGON INJECTION, SOLUTION 0.5 MG/.1ML
1 INJECTION, SOLUTION SUBCUTANEOUS ONCE
Refills: 0 | Status: DISCONTINUED | OUTPATIENT
Start: 2024-04-13 | End: 2024-04-17

## 2024-04-13 RX ORDER — DEXTROSE 50 % IN WATER 50 %
25 SYRINGE (ML) INTRAVENOUS ONCE
Refills: 0 | Status: DISCONTINUED | OUTPATIENT
Start: 2024-04-13 | End: 2024-04-17

## 2024-04-13 RX ORDER — BUDESONIDE AND FORMOTEROL FUMARATE DIHYDRATE 160; 4.5 UG/1; UG/1
2 AEROSOL RESPIRATORY (INHALATION)
Qty: 0 | Refills: 0 | DISCHARGE

## 2024-04-13 RX ORDER — HEPARIN SODIUM 5000 [USP'U]/ML
INJECTION INTRAVENOUS; SUBCUTANEOUS
Qty: 25000 | Refills: 0 | Status: DISCONTINUED | OUTPATIENT
Start: 2024-04-13 | End: 2024-04-13

## 2024-04-13 RX ORDER — SODIUM CHLORIDE 9 MG/ML
1000 INJECTION, SOLUTION INTRAVENOUS
Refills: 0 | Status: DISCONTINUED | OUTPATIENT
Start: 2024-04-13 | End: 2024-04-17

## 2024-04-13 RX ORDER — DEXTROSE 50 % IN WATER 50 %
12.5 SYRINGE (ML) INTRAVENOUS ONCE
Refills: 0 | Status: DISCONTINUED | OUTPATIENT
Start: 2024-04-13 | End: 2024-04-17

## 2024-04-13 RX ORDER — SODIUM CHLORIDE 9 MG/ML
1000 INJECTION, SOLUTION INTRAVENOUS
Refills: 0 | Status: DISCONTINUED | OUTPATIENT
Start: 2024-04-13 | End: 2024-04-14

## 2024-04-13 RX ORDER — HEPARIN SODIUM 5000 [USP'U]/ML
INJECTION INTRAVENOUS; SUBCUTANEOUS
Qty: 25000 | Refills: 0 | Status: DISCONTINUED | OUTPATIENT
Start: 2024-04-13 | End: 2024-04-15

## 2024-04-13 RX ORDER — DEXTROSE 50 % IN WATER 50 %
15 SYRINGE (ML) INTRAVENOUS ONCE
Refills: 0 | Status: DISCONTINUED | OUTPATIENT
Start: 2024-04-13 | End: 2024-04-17

## 2024-04-13 RX ORDER — HEPARIN SODIUM 5000 [USP'U]/ML
6500 INJECTION INTRAVENOUS; SUBCUTANEOUS ONCE
Refills: 0 | Status: COMPLETED | OUTPATIENT
Start: 2024-04-13 | End: 2024-04-13

## 2024-04-13 RX ORDER — ASPIRIN/CALCIUM CARB/MAGNESIUM 324 MG
1 TABLET ORAL
Qty: 0 | Refills: 0 | DISCHARGE

## 2024-04-13 RX ORDER — ROSUVASTATIN CALCIUM 5 MG/1
1 TABLET ORAL
Refills: 0 | DISCHARGE

## 2024-04-13 RX ORDER — OLANZAPINE 15 MG/1
2.5 TABLET, FILM COATED ORAL ONCE
Refills: 0 | Status: COMPLETED | OUTPATIENT
Start: 2024-04-13 | End: 2024-04-13

## 2024-04-13 RX ORDER — SAXAGLIPTIN 5 MG/1
1 TABLET, FILM COATED ORAL
Qty: 0 | Refills: 0 | DISCHARGE

## 2024-04-13 RX ORDER — ONDANSETRON 8 MG/1
4 TABLET, FILM COATED ORAL EVERY 8 HOURS
Refills: 0 | Status: DISCONTINUED | OUTPATIENT
Start: 2024-04-13 | End: 2024-04-22

## 2024-04-13 RX ORDER — APIXABAN 2.5 MG/1
1 TABLET, FILM COATED ORAL
Qty: 0 | Refills: 0 | DISCHARGE

## 2024-04-13 RX ORDER — SIMVASTATIN 20 MG/1
0.5 TABLET, FILM COATED ORAL
Qty: 0 | Refills: 0 | DISCHARGE

## 2024-04-13 RX ORDER — TIOTROPIUM BROMIDE 18 UG/1
2 CAPSULE ORAL; RESPIRATORY (INHALATION)
Qty: 0 | Refills: 0 | DISCHARGE

## 2024-04-13 RX ORDER — HYDROCORTISONE 1 %
1 OINTMENT (GRAM) TOPICAL DAILY
Refills: 0 | Status: DISCONTINUED | OUTPATIENT
Start: 2024-04-13 | End: 2024-04-14

## 2024-04-13 RX ORDER — METFORMIN HYDROCHLORIDE 850 MG/1
2 TABLET ORAL
Qty: 0 | Refills: 0 | DISCHARGE

## 2024-04-13 RX ADMIN — HEPARIN SODIUM 1900 UNIT(S)/HR: 5000 INJECTION INTRAVENOUS; SUBCUTANEOUS at 20:36

## 2024-04-13 RX ADMIN — HEPARIN SODIUM 6500 UNIT(S): 5000 INJECTION INTRAVENOUS; SUBCUTANEOUS at 03:12

## 2024-04-13 RX ADMIN — SODIUM CHLORIDE 55 MILLILITER(S): 9 INJECTION, SOLUTION INTRAVENOUS at 03:30

## 2024-04-13 RX ADMIN — HEPARIN SODIUM 1500 UNIT(S)/HR: 5000 INJECTION INTRAVENOUS; SUBCUTANEOUS at 19:25

## 2024-04-13 RX ADMIN — CEFTRIAXONE 100 MILLIGRAM(S): 500 INJECTION, POWDER, FOR SOLUTION INTRAMUSCULAR; INTRAVENOUS at 01:35

## 2024-04-13 RX ADMIN — SODIUM CHLORIDE 1000 MILLILITER(S): 9 INJECTION INTRAMUSCULAR; INTRAVENOUS; SUBCUTANEOUS at 01:35

## 2024-04-13 RX ADMIN — Medication 10 MILLIGRAM(S): at 21:48

## 2024-04-13 RX ADMIN — Medication 1 SUPPOSITORY(S): at 22:28

## 2024-04-13 RX ADMIN — SODIUM CHLORIDE 60 MILLILITER(S): 9 INJECTION, SOLUTION INTRAVENOUS at 08:34

## 2024-04-13 RX ADMIN — HEPARIN SODIUM 1500 UNIT(S)/HR: 5000 INJECTION INTRAVENOUS; SUBCUTANEOUS at 03:12

## 2024-04-13 RX ADMIN — OLANZAPINE 2.5 MILLIGRAM(S): 15 TABLET, FILM COATED ORAL at 20:10

## 2024-04-13 RX ADMIN — HEPARIN SODIUM 1500 UNIT(S)/HR: 5000 INJECTION INTRAVENOUS; SUBCUTANEOUS at 12:54

## 2024-04-13 NOTE — H&P ADULT - PROBLEM SELECTOR PLAN 5
Chronic   - EKbpm, QTc 458, Sinus adolph w/ 1st degree AV block with PACs  - Trop 8.0   - On home ASA and statin  - Hold oral meds pending speech and swallow eval  - F/u AM lipid panel

## 2024-04-13 NOTE — H&P ADULT - PROBLEM SELECTOR PLAN 1
Hx of previous CVA in 2015, on Eliquis. NIHSS 11 in ED   - CT HEAD: No large territory acute infarct, intracranial hemorrhage, or mass effect.   - CT PERFUSION: No core infarct or acute ischemic penumbra.  - CTA BRAIN: No large vessel occlusion, significant stenosis, aneurysm or vascular malformation.  - CTA NECK: Vasculature of the neck is patent without significant stenosis or dissection.  - F/u MRI head   - F/u TTE w/ bubble study   - Monitor on tele   - Neuro checks q4hrs   - Start Heparin gtt as patient unable to be on oral AC due to failed dysphagia screen   - Restart oral ASA and statin pending speech and swallow recs   - Hold BP meds to allow for permissive HTN  - F/u AM lipid panel and A1c   - F/u speech and swallow consult  - PT/OT consulted  - Neuro consulted, f/u recs

## 2024-04-13 NOTE — PHYSICAL THERAPY INITIAL EVALUATION ADULT - LIGHT TOUCH SENSATION, LUE, REHAB EVAL
patient repeating "yes" for all questions, impaired command following, unable to assess LT sensation

## 2024-04-13 NOTE — CONSULT NOTE ADULT - SUBJECTIVE AND OBJECTIVE BOX
Patient is a 78y old  Male who presents with a chief complaint of CVA (2024 00:56)       HPI:  Patient is a 79yo M with a PMH of COPD, HTN, HLD, DM2, CVA (2015), dementia, TAMMY on CPAP who presents to the ED with AMS. Patient was altered on admission so history was obtained from wife. Per wife, around 9:30pm, patient became very confused and was just repeating the word "yes." Wife notes that patient was also slurring his speech. These symptoms were similar to his last CVA in . NIHSS in ED was 11. Telestoke was consulted and rec against TNK as patient last took his home Eliquis at 2:00pm.     ED course:  Vitals: /58, HR 53, RR 16 SpO2 95% on RA  Labs: H/H 10.3/33.1, BUN/Cr 45/2.4, eGFR 27, Lactate 2.3  UA: Orange, protein 100, large blood, rbc 32, bacteria occasional  Given: IV Rocephin x1, 1L NaCl bolus x1    Imaging  CT HEAD: No large territory acute infarct, intracranial hemorrhage, or mass effect.   CT PERFUSION: No core infarct or acute ischemic penumbra.  CT ANGIOGRAPHY BRAIN: No large vessel occlusion, significant stenosis, aneurysm or vascular malformation.  CT ANGIOGRAPHY NECK: Vasculature of the neck is patent without significant stenosis or dissection.   (2024 00:56)    Renal consulted for SAARY. Chart reviewed. Patient is confused.        PAST MEDICAL & SURGICAL HISTORY:  Diabetes mellitus      Hypertension      COPD (chronic obstructive pulmonary disease)      HLD (hyperlipidemia)      Dementia      CVA (cerebral vascular accident)      H/O hand surgery           FAMILY HISTORY:  Family history of CABG (Father)    NC    Social History:Non smoker    MEDICATIONS  (STANDING):  dextrose 10% Bolus 125 milliLiter(s) IV Bolus once  dextrose 5% + sodium chloride 0.9%. 1000 milliLiter(s) (55 mL/Hr) IV Continuous <Continuous>  dextrose 5%. 1000 milliLiter(s) (50 mL/Hr) IV Continuous <Continuous>  dextrose 5%. 1000 milliLiter(s) (100 mL/Hr) IV Continuous <Continuous>  dextrose 50% Injectable 12.5 Gram(s) IV Push once  dextrose 50% Injectable 25 Gram(s) IV Push once  glucagon  Injectable 1 milliGRAM(s) IntraMuscular once  insulin lispro (ADMELOG) corrective regimen sliding scale   SubCutaneous at bedtime  insulin lispro (ADMELOG) corrective regimen sliding scale   SubCutaneous every 6 hours    MEDICATIONS  (PRN):  dextrose Oral Gel 15 Gram(s) Oral once PRN Blood Glucose LESS THAN 70 milliGRAM(s)/deciliter  heparin   Injectable 3000 Unit(s) IV Push every 6 hours PRN For aPTT between 40 - 57  heparin   Injectable 6500 Unit(s) IV Push every 6 hours PRN For aPTT less than 40  ondansetron Injectable 4 milliGRAM(s) IV Push every 8 hours PRN Nausea and/or Vomiting   Meds reviewed    Allergies    No Known Allergies    Intolerances         REVIEW OF SYSTEMS:  Confused      Vital Signs Last 24 Hrs  T(C): 36.3 (2024 04:43), Max: 36.8 (2024 03:24)  T(F): 97.3 (2024 04:43), Max: 98.2 (2024 03:24)  HR: 52 (2024 04:43) (50 - 53)  BP: 111/52 (2024 04:43) (111/52 - 137/60)  BP(mean): --  RR: 18 (2024 04:43) (16 - 18)  SpO2: 95% (2024 04:43) (95% - 95%)    Parameters below as of 2024 04:43  Patient On (Oxygen Delivery Method): nasal cannula  O2 Flow (L/min): 2    Daily Height in cm: 170.18 (2024 22:40)    Daily Weight in k.3 (2024 04:43)    PHYSICAL EXAM:    GENERAL: NAD  HEAD:  Atraumatic, Normocephalic  NERVOUS SYSTEM:  Awake and Alert, confused  CHEST/LUNG: Clear to auscultation bilaterally  HEART: Regular rate and rhythm; No murmurs, rubs, or gallops  ABDOMEN: Soft, Nontender, Nondistended; Bowel sounds present  EXTREMITIES:  No Edema        LABS:                        10.8   6.26  )-----------( 187      ( 2024 06:00 )             34.4     04-13    146<H>  |  115<H>  |  44<H>  ----------------------------<  93  4.6   |  28  |  2.80<H>    Ca    8.1<L>      2024 06:00    TPro  6.4  /  Alb  2.9<L>  /  TBili  0.1<L>  /  DBili  x   /  AST  11<L>  /  ALT  12  /  AlkPhos  70  04-13    PT/INR - ( 2024 06:00 )   PT: 12.9 sec;   INR: 1.11 ratio         PTT - ( 2024 06:00 )  PTT:> 200 sec  Urinalysis Basic - ( 2024 06:00 )    Color: x / Appearance: x / SG: x / pH: x  Gluc: 93 mg/dL / Ketone: x  / Bili: x / Urobili: x   Blood: x / Protein: x / Nitrite: x   Leuk Esterase: x / RBC: x / WBC x   Sq Epi: x / Non Sq Epi: x / Bacteria: x              RADIOLOGY & ADDITIONAL TESTS:

## 2024-04-13 NOTE — H&P ADULT - PROBLEM SELECTOR PLAN 4
Chronic.   - /58 on admission   - Hold all antihypertensives to allow for permissive HTN for 24hrs   - Give IV BP meds should SBP >220  - Monitor routine hemodynamics

## 2024-04-13 NOTE — H&P ADULT - NSHPSOCIALHISTORY_GEN_ALL_CORE
Lives: at home with wife  Alcohol Use: former drinker, quit 15yrs ago   Tobacco Use: former smoker, quit 15yrs ago

## 2024-04-13 NOTE — CHART NOTE - NSCHARTNOTEFT_GEN_A_CORE
Called by RN, pt with aPTT value >200, but states lab allison aPTT around 2-3 hours early, was supposed to be scheduled for aPTT 8:43AM draw. Hold heparin for now until aPTT is drawn at appropriate time. Primary team to address, to consider restarting after next aPTT is resulted. RN notified, to call for any changes.

## 2024-04-13 NOTE — H&P ADULT - PROBLEM SELECTOR PLAN 2
SARAY on admission. Appears to have CKD per chart review.   - BUN/Cr 45/2.4  - eGFR 27  - Lactate 2.3   - Start IVF D5 + NS at 60cc/hr --> switch to NS if glucose >180  - F/u repeat lactate   - Monitor daily CMP  - Avoid nephrotoxic meds   - Nephro consulted, f/u recs

## 2024-04-13 NOTE — H&P ADULT - ASSESSMENT
Patient is a 79yo M with a PMH of COPD, HTN, HLD, DM2, CVA (4/2015), dementia, TAMMY on CPAP admitted fro CVA workup

## 2024-04-13 NOTE — H&P ADULT - ATTENDING COMMENTS
79 y/o male with PMH of HTN/HLD/Previous stroke in 2015, dementia who presented to ED to be evaluated for AMS. wife noted pt was lethargic around 9:30 am but at around 2:30 am pt found to have acute dysarthria,not able to foloow instruction. although was able to ambulate w/o assicatance and had normal BM.   wife mentions pt takes eliquis for stroke purpose and may have told in past about Afib but not sure of it was ever confirmed.      CVA:  NIHSS 11 in ED  CTA head/neck w/o LVO. per tele stroke pt do not need any urgent intervention  SLP eval prior to starting diet  speel therapy  PT/OT  -neurology consult   -MRI brain in AM  -not tpa candidate given kim INR with use of eliquis 5 mg at 2 PM home   -failed bedside eval. start pt on heparin ggt for now  -holding off asa/statin for now  -F/u lipid panel/A1C  -monitor on tele   -daily NIHSS  -neuro check q4h      DM:  -Pt most recent BG 89. Wife mentions that pt get hypoglycemic at night  -will place of D5NS for now    SARAY on CKD:  -Hold of ace/arb  -maintain MAP>65  -Nephrology consult 77 y/o male with PMH of HTN/HLD/Previous stroke in 2015, dementia who presented to ED to be evaluated for AMS. wife noted pt was lethargic around 9:30 am but at around 2:30 am pt found to have acute dysarthria, not able to follow instruction. although was able to ambulate w/o assistance and had normal BM.   wife mentions pt takes Eliquis for stroke purpose and may have told in past about Afib but not sure of it was ever confirmed.      CVA:  NIHSS 11 in ED  CTA head/neck w/o LVO. per tele stroke pt do not need any urgent intervention  SLP eval prior to starting diet  speech therapy  PT/OT  -neurology consult   -MRI brain in AM  -not tpa candidate given kim INR with use of Eliquis 5 mg at 2 PM home   -failed bedside eval. start pt on heparin ggt for now  -holding off asa/statin for now  -F/u lipid panel/A1C  -monitor on tele   -daily NIHSS  -neuro check q4h  -seizure precautions       DM:  -Pt most recent BG 89. Wife mentions that pt get hypoglycemic at night  -will place of D5NS for now    SARAY on CKD:  -Hold of ace/arb  -maintain MAP>65  -Nephrology consult

## 2024-04-13 NOTE — PHYSICAL THERAPY INITIAL EVALUATION ADULT - LEVEL OF CONSCIOUSNESS, REHAB EVAL
expressive aphasia, repeating his name and  the word, "yes" in response to all questions/alert/confused

## 2024-04-13 NOTE — CONSULT NOTE ADULT - ASSESSMENT
Acute on CKD  Hypernatremia  Hypoalbuminemia  AMS  Anemia  HTN with CKD      -Unknown baseline creatinine. Will have underlying CKD due to age at minimum  -Check urine indices, UPC  -Renal sono  -Limited serologies including FLC and SPEP due to paraprotein gap  -Adjust IVF to D5+LR; okay for mild hypernatremia for now until stroke completely ruled out  -Check iron panel  -Check Phos and PTH  -BP stable thus far  -Daily chem  -No acute indication for dialysis    Thank you

## 2024-04-13 NOTE — H&P ADULT - PROBLEM SELECTOR PLAN 6
Chronic   - Continue home Donepezil pending speech and swallow   - Continue home Lexapro pending speech and swallow   - Continue home Buspar pending speech and swallow

## 2024-04-13 NOTE — PROGRESS NOTE ADULT - PROBLEM SELECTOR PLAN 3
Chronic. On Metformin, Glimepiride and Alogliptin   - A1c 8.7%  - IVF while NPO  - LDISS  - Hypoglycemia protocol  - Monitor fingersticks

## 2024-04-13 NOTE — PATIENT PROFILE ADULT - FALL HARM RISK - HARM RISK INTERVENTIONS
Assistance with ambulation/Assistance OOB with selected safe patient handling equipment/Communicate Risk of Fall with Harm to all staff/Monitor for mental status changes/Move patient closer to nurses' station/Reinforce activity limits and safety measures with patient and family/Reorient to person, place and time as needed/Tailored Fall Risk Interventions/Toileting schedule using arm’s reach rule for commode and bathroom/Use of alarms - bed, chair and/or voice tab/Visual Cue: Yellow wristband and red socks/Bed in lowest position, wheels locked, appropriate side rails in place/Call bell, personal items and telephone in reach/Instruct patient to call for assistance before getting out of bed or chair/Non-slip footwear when patient is out of bed/[place] to call system/Physically safe environment - no spills, clutter or unnecessary equipment/Purposeful Proactive Rounding/Room/bathroom lighting operational, light cord in reach

## 2024-04-13 NOTE — PROGRESS NOTE ADULT - PROBLEM SELECTOR PLAN 5
Chronic  - EKbpm, QTc 458, Sinus adolph w/ 1st degree AV block with PACs  - Trop 8.0   - On home ASA and statin  - Hold oral meds pending speech and swallow eval Chronic, follows with Dr. Caro  - EKbpm, QTc 458, Sinus adolph w/ 1st degree AV block with PACs  - Trop 8.0   - On home ASA and statin  - Hold oral meds pending speech and swallow eval

## 2024-04-13 NOTE — PROGRESS NOTE ADULT - PROBLEM SELECTOR PLAN 4
Chronic  - /58 on admission   - Hold all antihypertensives to allow for permissive HTN for 24hrs   - Give IV BP meds should SBP >220  - Monitor routine hemodynamics

## 2024-04-13 NOTE — CHART NOTE - NSCHARTNOTEFT_GEN_A_CORE
Luis Alberto Larson called overhead. Patient seen and examined at bedside. On arrival patient in bathroom s/p bowel movement, when staff attempting to help him out of the bathroom back to bed (hx dementia, fall risk) he began punching at staff. Called patient's wife Saundra who states that this is not his baseline even when he is in the hospital, however he demonstrated the same behavior with her earlier today when she was at bedside when he went to use the bathroom. Wife states behavior usually resolves after patient gets back into bed. She is agreeable to PRN medications for agitation. As patient is currently strict NPO, 2.5mg IM Zyprexa given x1. Patient escorted back to bed by staff with resolution of aggressive behavior. Per RN patient with small hard BM and scant bleeding, will order anusol cream and PRN dulcolax suppository as well to aid with constipation, which may be contributing to patient's agitation surrounding bathroom use. RN to call with any change.

## 2024-04-13 NOTE — H&P ADULT - NSHPPHYSICALEXAM_GEN_ALL_CORE
T(C): --  HR: 53 (04-12-24 @ 22:40) (53 - 53)  BP: 130/58 (04-12-24 @ 22:40) (130/58 - 130/58)  RR: 16 (04-12-24 @ 22:40) (16 - 16)  SpO2: 95% (04-12-24 @ 22:40) (95% - 95%)    GENERAL: patient appears well, no acute distress, confused   EYES: sclera clear, no exudates  ENMT: oropharynx clear without erythema, no exudates, moist mucous membranes  NECK: supple, soft, no thyromegaly noted  LUNGS: good air entry bilaterally, clear to auscultation, symmetric breath sounds, no wheezing or rhonchi appreciated  HEART: soft S1/S2, regular rate and rhythm, no murmurs noted, no lower extremity edema  GASTROINTESTINAL: abdomen is soft, nontender, nondistended, normoactive bowel sounds, no palpable masses  INTEGUMENT: good skin turgor, warm skin, appears well perfused  MUSCULOSKELETAL: mild weakness   NEUROLOGIC: awake, alert, confused, unable to follow commands appropriately

## 2024-04-13 NOTE — PROGRESS NOTE ADULT - SUBJECTIVE AND OBJECTIVE BOX
Patient is a 78y old  Male who presents with a chief complaint of CVA (13 Apr 2024 08:02)    Patient seen and examined with wife at bedside. No acute overnight events. Patient had small amount of blood oozing from penis noted while changing, s/p catheterization yesterday in ER.    ALLERGIES:  No Known Allergies    MEDICATIONS  (STANDING):  dextrose 10% Bolus 125 milliLiter(s) IV Bolus once  dextrose 5% + lactated ringers. 1000 milliLiter(s) (60 mL/Hr) IV Continuous <Continuous>  dextrose 5%. 1000 milliLiter(s) (50 mL/Hr) IV Continuous <Continuous>  dextrose 5%. 1000 milliLiter(s) (100 mL/Hr) IV Continuous <Continuous>  dextrose 50% Injectable 12.5 Gram(s) IV Push once  dextrose 50% Injectable 25 Gram(s) IV Push once  glucagon  Injectable 1 milliGRAM(s) IntraMuscular once  heparin  Infusion.  Unit(s)/Hr (15 mL/Hr) IV Continuous <Continuous>  insulin lispro (ADMELOG) corrective regimen sliding scale   SubCutaneous every 6 hours  insulin lispro (ADMELOG) corrective regimen sliding scale   SubCutaneous at bedtime    MEDICATIONS  (PRN):  dextrose Oral Gel 15 Gram(s) Oral once PRN Blood Glucose LESS THAN 70 milliGRAM(s)/deciliter  heparin   Injectable 3000 Unit(s) IV Push every 6 hours PRN For aPTT between 40 - 57  heparin   Injectable 6500 Unit(s) IV Push every 6 hours PRN For aPTT less than 40  ondansetron Injectable 4 milliGRAM(s) IV Push every 8 hours PRN Nausea and/or Vomiting    Vital Signs Last 24 Hrs  T(F): 97.3 (13 Apr 2024 04:43), Max: 98.2 (13 Apr 2024 03:24)  HR: 52 (13 Apr 2024 04:43) (50 - 53)  BP: 111/52 (13 Apr 2024 04:43) (111/52 - 137/60)  RR: 18 (13 Apr 2024 04:43) (16 - 18)  SpO2: 95% (13 Apr 2024 04:43) (95% - 95%)    I&O's Summary    BMI (kg/m2): 29.1 (04-12-24 @ 22:40)    PHYSICAL EXAM:  General: NAD, lying in bed  Eyes: Anicteric  ENT: Moist mucous membranes  Neck: Supple, No JVD  Lungs: Clear to auscultation bilaterally, normal respiratory effort  Cardio: RRR, S1/S2, No murmurs  Abdomen: Soft, Nontender, Nondistended; Bowel sounds present  Extremities: No calf tenderness, No pitting edema, no digital cyanosis  Skin: Warm, dry  Psych: Resting but easily arousable to verbal stimuli, answers questions (e.g. about eating food)    LABS:                        10.2   6.32  )-----------( 182      ( 13 Apr 2024 09:15 )             33.2       04-13    146  |  115  |  44  ----------------------------<  93  4.6   |  28  |  2.80    Ca    8.1      13 Apr 2024 06:00  Phos  2.9     04-13    TPro  6.4  /  Alb  2.9  /  TBili  0.1  /  DBili  x   /  AST  11  /  ALT  12  /  AlkPhos  70  04-13       PT/INR - ( 13 Apr 2024 06:00 )   PT: 12.9 sec;   INR: 1.11 ratio    PTT - ( 13 Apr 2024 09:15 )  PTT:81.2 sec     Lactate, Blood: 0.7 mmol/L (04-13 @ 06:00)  Lactate, Blood: 1.6 mmol/L (04-13 @ 02:17)  Lactate, Blood: 2.3 mmol/L (04-12 @ 23:00)    CARDIAC MARKERS ( 12 Apr 2024 23:00 )  x     / 8.0 ng/L / x     / x     / x          04-13 Chol 86 mg/dL LDL -- HDL 47 mg/dL Trig 41 mg/dL        POCT Blood Glucose.: 116 mg/dL (13 Apr 2024 12:15)  POCT Blood Glucose.: 90 mg/dL (13 Apr 2024 10:11)  POCT Blood Glucose.: 100 mg/dL (13 Apr 2024 07:55)  POCT Blood Glucose.: 180 mg/dL (12 Apr 2024 22:38)            RADIOLOGY & ADDITIONAL TESTS:   Personally reviewed.     Consultant(s) Notes Reviewed:  [x] YES  [ ] NO    Care Discussed with Consultants/Other Providers:

## 2024-04-13 NOTE — PHYSICAL THERAPY INITIAL EVALUATION ADULT - ADDITIONAL COMMENTS
Patient with expressive aphasia, social hx gathered from wife at bedside. Patient with hx of CVA in 2015 however no residual deficits. Lives with wife in private house, 1 TARA, bed/bath on main level. Independent with ambulation/ADLs PTA without device.

## 2024-04-13 NOTE — PROGRESS NOTE ADULT - PROBLEM SELECTOR PLAN 1
Hx of previous CVA in 2015, on Eliquis. NIHSS 11 in ED   - CT HEAD: No large territory acute infarct, intracranial hemorrhage, or mass effect.   - CT PERFUSION: No core infarct or acute ischemic penumbra.  - CTA BRAIN: No large vessel occlusion, significant stenosis, aneurysm or vascular malformation.  - CTA NECK: Vasculature of the neck is patent without significant stenosis or dissection.  - F/u MRI head   - F/u TTE w/ bubble study   - Monitor on tele   - Neuro checks q4hrs   - Continue Heparin gtt as patient unable to be on oral AC due to failed dysphagia screen   - Restart oral ASA and statin pending speech and swallow recs   - Hold BP meds to allow for permissive HTN  - Lipid panel WNL, A1c 8.7%  - F/u speech and swallow consult - called today to request eval, they will be in tomorrow   - PT/OT consulted  - Discussed with neuro Dr. Hernández - will hold ASA, statin and PO meds pending swallow eval after MRI Hx of previous CVA in 2015, on Eliquis. NIHSS 11 in ED   - CT HEAD: No large territory acute infarct, intracranial hemorrhage, or mass effect.   - CT PERFUSION: No core infarct or acute ischemic penumbra.  - CTA BRAIN: No large vessel occlusion, significant stenosis, aneurysm or vascular malformation.  - CTA NECK: Vasculature of the neck is patent without significant stenosis or dissection.  - F/u MRI head   - F/u TTE w/ bubble study   - Monitor on tele   - Neuro checks q4hrs   - Continue Heparin gtt as patient unable to be on oral AC due to failed dysphagia screen   - Restart oral ASA and statin pending speech and swallow recs   - Hold BP meds to allow for permissive HTN  - Lipid panel WNL, A1c 8.7%  - F/u speech and swallow consult - called today to request eval, they will be in tomorrow   - PT/OT consulted  - Discussed with neuro Dr. Hernández - will hold ASA, statin and PO meds pending swallow eval after MRI  - Discussed hx of ILR with cardio Dr. Casas - patient may proceed with MRI Hx of previous CVA in 2015, on Eliquis. NIHSS 11 in ED   - CT HEAD: No large territory acute infarct, intracranial hemorrhage, or mass effect.   - CT PERFUSION: No core infarct or acute ischemic penumbra.  - CTA BRAIN: No large vessel occlusion, significant stenosis, aneurysm or vascular malformation.  - CTA NECK: Vasculature of the neck is patent without significant stenosis or dissection.  - MRI HEAD: No acute intracranial pathology  - F/u TTE w/ bubble study   - Monitor on tele   - Neuro checks q4hrs   - Continue Heparin gtt as patient unable to be on oral AC due to failed dysphagia screen   - Restart oral ASA and statin pending speech and swallow recs   - Hold BP meds to allow for permissive HTN  - Lipid panel WNL, A1c 8.7%  - F/u speech and swallow consult - called today to request eval, they will be in tomorrow   - PT/OT consulted  - Discussed with neuro Dr. Hernández - will hold ASA and PO meds pending swallow eval, check ABG and EEG  - Discussed hx of ILR with cardio Dr. Casas - patient may proceed with MRI

## 2024-04-13 NOTE — H&P ADULT - PROBLEM SELECTOR PLAN 3
Chronic. On Metformin, Glimepiride and Alogliptin   - POCT glucose 89  - Start IVF D5 + NS at 60cc/hr --> switch to NS if glucose >180  - Start LDISS  - Hypoglycemia protocol  - Monitor fingersticks   - F/u AM A1c

## 2024-04-13 NOTE — PHYSICAL THERAPY INITIAL EVALUATION ADULT - PERTINENT HX OF CURRENT PROBLEM, REHAB EVAL
Patient is a 77yo M with a PMH of COPD, HTN, HLD, DM2, CVA (4/2015), dementia, TAMMY on CPAP admitted fro CVA workup.

## 2024-04-13 NOTE — H&P ADULT - HISTORY OF PRESENT ILLNESS
Patient is a 77yo M with a PMH of COPD, HTN, HLD, DM2, CVA (4/2015), dementia, TAMMY on CPAP who presents to the ED with AMS.    Denies fever, chills, chest pain, palpitations, SOB, cough, abdominal pain, nausea, vomiting, diarrhea, constipation, hematochezia, melena, urinary frequency, urgency, dysuria, hematuria, headaches, changes in vision, dizziness, numbness, tingling. No other complaints at this time.    Denies recent travel, recent antibiotic use, or sick contacts.    ED course:  Vitals: /58, HR 53, RR 16 SpO2 95% on RA  Labs: H/H 10.3/33.1, BUN/Cr 45/2.4, eGFR 27, Lactate 2.3  UA: Orange, protein 100, large blood, rbc 32, bacteria occasional  Given: IV Rocephin x1, 1L NaCl bolus x1    Imaging  CT HEAD: No large territory acute infarct, intracranial hemorrhage, or mass effect.   CT PERFUSION: No core infarct or acute ischemic penumbra.  CT ANGIOGRAPHY BRAIN: No large vessel occlusion, significant stenosis, aneurysm or vascular malformation.  CT ANGIOGRAPHY NECK: Vasculature of the neck is patent without significant stenosis or dissection.   Patient is a 79yo M with a PMH of COPD, HTN, HLD, DM2, CVA (4/2015), dementia, TAMMY on CPAP who presents to the ED with AMS. Patient was altered on admission so history was obtained from wife. Per wife, around 9:30pm, patient became very confused and was just repeating the word "yes." Wife notes that patient was also slurring his speech. These symptoms were similar to his last CVA in 2015. NIHSS in ED was 11. Telestoke was consulted and rec against TNK as patient last took his home Eliquis at 2:00pm.     ED course:  Vitals: /58, HR 53, RR 16 SpO2 95% on RA  Labs: H/H 10.3/33.1, BUN/Cr 45/2.4, eGFR 27, Lactate 2.3  UA: Orange, protein 100, large blood, rbc 32, bacteria occasional  Given: IV Rocephin x1, 1L NaCl bolus x1    Imaging  CT HEAD: No large territory acute infarct, intracranial hemorrhage, or mass effect.   CT PERFUSION: No core infarct or acute ischemic penumbra.  CT ANGIOGRAPHY BRAIN: No large vessel occlusion, significant stenosis, aneurysm or vascular malformation.  CT ANGIOGRAPHY NECK: Vasculature of the neck is patent without significant stenosis or dissection.

## 2024-04-14 LAB
A1C WITH ESTIMATED AVERAGE GLUCOSE RESULT: 8.6 % — HIGH (ref 4–5.6)
ANION GAP SERPL CALC-SCNC: 3 MMOL/L — LOW (ref 5–17)
APTT BLD: 138.1 SEC — CRITICAL HIGH (ref 24.5–35.6)
APTT BLD: 74.6 SEC — HIGH (ref 24.5–35.6)
APTT BLD: 94.7 SEC — HIGH (ref 24.5–35.6)
BUN SERPL-MCNC: 27 MG/DL — HIGH (ref 7–23)
CALCIUM SERPL-MCNC: 8.1 MG/DL — LOW (ref 8.5–10.1)
CHLORIDE SERPL-SCNC: 113 MMOL/L — HIGH (ref 96–108)
CO2 SERPL-SCNC: 31 MMOL/L — SIGNIFICANT CHANGE UP (ref 22–31)
CREAT SERPL-MCNC: 2.2 MG/DL — HIGH (ref 0.5–1.3)
CULTURE RESULTS: SIGNIFICANT CHANGE UP
EGFR: 30 ML/MIN/1.73M2 — LOW
ESTIMATED AVERAGE GLUCOSE: 200 MG/DL — HIGH (ref 68–114)
GLUCOSE SERPL-MCNC: 154 MG/DL — HIGH (ref 70–99)
HCT VFR BLD CALC: 33.3 % — LOW (ref 39–50)
HGB BLD-MCNC: 10.5 G/DL — LOW (ref 13–17)
MCHC RBC-ENTMCNC: 30.4 PG — SIGNIFICANT CHANGE UP (ref 27–34)
MCHC RBC-ENTMCNC: 31.5 GM/DL — LOW (ref 32–36)
MCV RBC AUTO: 96.5 FL — SIGNIFICANT CHANGE UP (ref 80–100)
NRBC # BLD: 0 /100 WBCS — SIGNIFICANT CHANGE UP (ref 0–0)
PLATELET # BLD AUTO: 190 K/UL — SIGNIFICANT CHANGE UP (ref 150–400)
POTASSIUM SERPL-MCNC: 3.7 MMOL/L — SIGNIFICANT CHANGE UP (ref 3.5–5.3)
POTASSIUM SERPL-SCNC: 3.7 MMOL/L — SIGNIFICANT CHANGE UP (ref 3.5–5.3)
PROT SERPL-MCNC: 5.3 G/DL — LOW (ref 6–8.3)
RBC # BLD: 3.45 M/UL — LOW (ref 4.2–5.8)
RBC # FLD: 14.2 % — SIGNIFICANT CHANGE UP (ref 10.3–14.5)
SODIUM SERPL-SCNC: 147 MMOL/L — HIGH (ref 135–145)
SPECIMEN SOURCE: SIGNIFICANT CHANGE UP
WBC # BLD: 5.19 K/UL — SIGNIFICANT CHANGE UP (ref 3.8–10.5)
WBC # FLD AUTO: 5.19 K/UL — SIGNIFICANT CHANGE UP (ref 3.8–10.5)

## 2024-04-14 PROCEDURE — 99232 SBSQ HOSP IP/OBS MODERATE 35: CPT

## 2024-04-14 PROCEDURE — 76775 US EXAM ABDO BACK WALL LIM: CPT | Mod: 26

## 2024-04-14 RX ORDER — SODIUM CHLORIDE 9 MG/ML
1000 INJECTION, SOLUTION INTRAVENOUS
Refills: 0 | Status: COMPLETED | OUTPATIENT
Start: 2024-04-14 | End: 2024-04-15

## 2024-04-14 RX ORDER — PHENYLEPHRINE-SHARK LIVER OIL-MINERAL OIL-PETROLATUM RECTAL OINTMENT
1 OINTMENT (GRAM) RECTAL
Refills: 0 | Status: DISCONTINUED | OUTPATIENT
Start: 2024-04-14 | End: 2024-04-22

## 2024-04-14 RX ADMIN — HEPARIN SODIUM 1600 UNIT(S)/HR: 5000 INJECTION INTRAVENOUS; SUBCUTANEOUS at 18:52

## 2024-04-14 RX ADMIN — Medication 1: at 12:55

## 2024-04-14 RX ADMIN — HEPARIN SODIUM 1600 UNIT(S)/HR: 5000 INJECTION INTRAVENOUS; SUBCUTANEOUS at 12:21

## 2024-04-14 RX ADMIN — PHENYLEPHRINE-SHARK LIVER OIL-MINERAL OIL-PETROLATUM RECTAL OINTMENT 1 APPLICATION(S): at 06:24

## 2024-04-14 RX ADMIN — Medication 1: at 06:46

## 2024-04-14 RX ADMIN — HEPARIN SODIUM 1600 UNIT(S)/HR: 5000 INJECTION INTRAVENOUS; SUBCUTANEOUS at 07:25

## 2024-04-14 RX ADMIN — HEPARIN SODIUM 1600 UNIT(S)/HR: 5000 INJECTION INTRAVENOUS; SUBCUTANEOUS at 19:31

## 2024-04-14 RX ADMIN — PHENYLEPHRINE-SHARK LIVER OIL-MINERAL OIL-PETROLATUM RECTAL OINTMENT 1 APPLICATION(S): at 20:08

## 2024-04-14 RX ADMIN — HEPARIN SODIUM 0 UNIT(S)/HR: 5000 INJECTION INTRAVENOUS; SUBCUTANEOUS at 04:07

## 2024-04-14 RX ADMIN — HEPARIN SODIUM 1600 UNIT(S)/HR: 5000 INJECTION INTRAVENOUS; SUBCUTANEOUS at 05:09

## 2024-04-14 RX ADMIN — Medication 1: at 00:22

## 2024-04-14 RX ADMIN — Medication 1: at 17:58

## 2024-04-14 RX ADMIN — HEPARIN SODIUM 1600 UNIT(S)/HR: 5000 INJECTION INTRAVENOUS; SUBCUTANEOUS at 16:18

## 2024-04-14 NOTE — OCCUPATIONAL THERAPY INITIAL EVALUATION ADULT - NSOTDISCHREC_GEN_A_CORE
Pt's wife in agreement with d/c plans. SW aware/Acute Inpatient Rehab Pt is very motivated to return to PLOF at independent level and can tolerate 3 hours of therapy/day. Pt's wife in agreement with d/c plans. SW aware/Acute Inpatient Rehab

## 2024-04-14 NOTE — OCCUPATIONAL THERAPY INITIAL EVALUATION ADULT - ADL RETRAINING, OT EVAL
Pt will complete UB dressing with modified independence by 2-3 sessions. Pt will complete LB dressing, with AE as needed, with supervision +set-up by 2-3 sessions.

## 2024-04-14 NOTE — CARE COORDINATION ASSESSMENT. - NSCAREPROVIDERS_GEN_ALL_CORE_FT
CARE PROVIDERS:  Accepting Physician: Yefri Naqvi  Administration: John Tafoya  Admitting: Doctor, Unknown  Attending: , William  Consultant: Rosangela Hernández  Consultant: Noe Varghese  Consultant: Irene Loza  Covering Team: Daniela Reyes  Covering Team: Rosangela Hernández  ED Attending: Clement Quigley  ED Nurse: Robe Jaramillo  Nurse: Marizol Diaz  Nurse: Gisel Gonzales  Nurse: Elizabeth Cintron  Nurse: Carmen Ricks  Occupational Therapy: Arlene Vance  Ordered: ADM, User  Outpatient Provider: Aníbal Lu  Outpatient Provider: Keyon Tapia  Outpatient Provider: Brian Caro  Override: Boy Awan  Override: Imani Faulkner  Override: Marizol Diaz  PCA/Nursing Assistant: Imani Faulkner  Primary Team: Aydee Ramos  Primary Team: Lien Delacruz  Registered Dietitian: Jordyn Henley  : Dafne Kaur

## 2024-04-14 NOTE — OCCUPATIONAL THERAPY INITIAL EVALUATION ADULT - NS ASR FOLLOW COMMAND OT EVAL
Pt with expressive aphasia. Pt's wife reports mild improvement./50% of the time/able to follow single-step instructions

## 2024-04-14 NOTE — OCCUPATIONAL THERAPY INITIAL EVALUATION ADULT - PERTINENT HX OF CURRENT PROBLEM, REHAB EVAL
As per H&P: "Patient is a 79yo M with a PMH of COPD, HTN, HLD, DM2, CVA (4/2015), dementia, TAMMY on CPAP admitted fro CVA workup."   Admit dx: AMS  MRI head" No MRI evidence of acute intracranial pathology. Cerebral volume loss and chronic ischemic changes"

## 2024-04-14 NOTE — OCCUPATIONAL THERAPY INITIAL EVALUATION ADULT - ADDITIONAL COMMENTS
Per pt's wife at bedside, pt lives in a private home with his wife with 1 TARA and no stairs to negotiate inside the home. Pt has a walk-in stall shower. PTA, pt was independent with all ADLs and ambulated without a device. Pt has a hx of dementia and PMH of CVA in 2015. Pt's wife reports pt has no residual deficits from CVA.   Pt completed side-stepping at bedside with MinAx1 + verbal cueing to maintain safety within RW and for line management. Pt impulsive and benefits from redirection to task.

## 2024-04-14 NOTE — PROGRESS NOTE ADULT - SUBJECTIVE AND OBJECTIVE BOX
Patient is a 78y old  Male who presents with a chief complaint of CVA (14 Apr 2024 14:48)    Patient seen and examined at bedside. No acute overnight events. Wife reports patient more awake and interactive today.    ALLERGIES:  No Known Allergies    MEDICATIONS  (STANDING):  dextrose 10% Bolus 125 milliLiter(s) IV Bolus once  dextrose 5% + sodium chloride 0.45%. 1000 milliLiter(s) (60 mL/Hr) IV Continuous <Continuous>  dextrose 5%. 1000 milliLiter(s) (50 mL/Hr) IV Continuous <Continuous>  dextrose 5%. 1000 milliLiter(s) (100 mL/Hr) IV Continuous <Continuous>  dextrose 50% Injectable 12.5 Gram(s) IV Push once  dextrose 50% Injectable 25 Gram(s) IV Push once  glucagon  Injectable 1 milliGRAM(s) IntraMuscular once  hemorrhoidal Ointment 1 Application(s) Rectal two times a day  heparin  Infusion.  Unit(s)/Hr (15 mL/Hr) IV Continuous <Continuous>  insulin lispro (ADMELOG) corrective regimen sliding scale   SubCutaneous at bedtime  insulin lispro (ADMELOG) corrective regimen sliding scale   SubCutaneous every 6 hours    MEDICATIONS  (PRN):  bisacodyl Suppository 10 milliGRAM(s) Rectal daily PRN Constipation  dextrose Oral Gel 15 Gram(s) Oral once PRN Blood Glucose LESS THAN 70 milliGRAM(s)/deciliter  heparin   Injectable 3000 Unit(s) IV Push every 6 hours PRN For aPTT between 40 - 57  heparin   Injectable 6500 Unit(s) IV Push every 6 hours PRN For aPTT less than 40  ondansetron Injectable 4 milliGRAM(s) IV Push every 8 hours PRN Nausea and/or Vomiting    Vital Signs Last 24 Hrs  T(F): 97.8 (14 Apr 2024 11:35), Max: 98.2 (13 Apr 2024 20:33)  HR: 53 (14 Apr 2024 11:35) (53 - 65)  BP: 168/74 (14 Apr 2024 11:35) (133/71 - 168/74)  RR: 18 (14 Apr 2024 11:35) (18 - 20)  SpO2: 95% (14 Apr 2024 11:35) (93% - 95%)    I&O's Summary    14 Apr 2024 07:01  -  14 Apr 2024 20:03  --------------------------------------------------------  IN: 836 mL / OUT: 0 mL / NET: 836 mL      BMI (kg/m2): 29.1 (04-12-24 @ 22:40)    PHYSICAL EXAM:  General: NAD, lying in bed  Eyes: Anicteric  ENT: Moist mucous membranes  Neck: Supple, No JVD  Lungs: Clear to auscultation bilaterally, normal respiratory effort  Cardio: RRR, S1/S2, No murmurs  Abdomen: Soft, Nontender, Nondistended; Bowel sounds present  Extremities: No calf tenderness, No pitting edema, no digital cyanosis  Skin: Warm, dry  Psych: Resting but easily arousable to verbal stimuli, answers questions        LABS:                        10.5   5.19  )-----------( 190      ( 14 Apr 2024 08:20 )             33.3       04-14    147  |  113  |  27  ----------------------------<  154  3.7   |  31  |  2.20    Ca    8.1      14 Apr 2024 08:20  Phos  2.9     04-13    TPro  6.4  /  Alb  2.9  /  TBili  0.1  /  DBili  x   /  AST  11  /  ALT  12  /  AlkPhos  70  04-13       PT/INR - ( 13 Apr 2024 06:00 )   PT: 12.9 sec;   INR: 1.11 ratio         PTT - ( 14 Apr 2024 18:06 )  PTT:94.7 sec   Lactate, Blood: 0.7 mmol/L (04-13 @ 06:00)  Lactate, Blood: 1.6 mmol/L (04-13 @ 02:17)  Lactate, Blood: 2.3 mmol/L (04-12 @ 23:00)    CARDIAC MARKERS ( 12 Apr 2024 23:00 )  x     / 8.0 ng/L / x     / x     / x          04-13 Chol 86 mg/dL LDL -- HDL 47 mg/dL Trig 41 mg/dL      ABG - ( 13 Apr 2024 16:15 )  pH, Arterial: 7.41  pH, Blood: x     /  pCO2: 43    /  pO2: 67    / HCO3: 27    / Base Excess: 2.7   /  SaO2: 94.7                    POCT Blood Glucose.: 188 mg/dL (14 Apr 2024 17:44)  POCT Blood Glucose.: 171 mg/dL (14 Apr 2024 12:14)  POCT Blood Glucose.: 160 mg/dL (14 Apr 2024 08:08)  POCT Blood Glucose.: 163 mg/dL (14 Apr 2024 06:16)  POCT Blood Glucose.: 170 mg/dL (14 Apr 2024 00:15)  POCT Blood Glucose.: 137 mg/dL (13 Apr 2024 21:45)      Urinalysis Basic - ( 14 Apr 2024 08:20 )    Color: x / Appearance: x / SG: x / pH: x  Gluc: 154 mg/dL / Ketone: x  / Bili: x / Urobili: x   Blood: x / Protein: x / Nitrite: x   Leuk Esterase: x / RBC: x / WBC x   Sq Epi: x / Non Sq Epi: x / Bacteria: x        Culture - Urine (collected 13 Apr 2024 00:00)  Source: Catheterized Catheterized  Final Report (14 Apr 2024 14:15):    <10,000 CFU/mL Normal Urogenital Veronica    Culture - Blood (collected 12 Apr 2024 23:00)  Source: .Blood Blood  Preliminary Report (14 Apr 2024 05:01):    No growth at 24 hours    Culture - Blood (collected 12 Apr 2024 22:50)  Source: .Blood Blood  Preliminary Report (14 Apr 2024 05:01):    No growth at 24 hours          RADIOLOGY & ADDITIONAL TESTS:   Personally reviewed.     Consultant(s) Notes Reviewed:  [x] YES  [ ] NO    Care Discussed with Consultants/Other Providers:

## 2024-04-14 NOTE — PROGRESS NOTE ADULT - SUBJECTIVE AND OBJECTIVE BOX
Neurology Follow up note    RANCHO [id number]    HPI:  Patient is a 77yo M with a PMH of COPD, HTN, HLD, DM2, CVA (4/2015), dementia, TAMMY on CPAP who presents to the ED with AMS. Patient was altered on admission so history was obtained from wife. Per wife, around 9:30pm, patient became very confused and was just repeating the word "yes." Wife notes that patient was also slurring his speech. These symptoms were similar to his last CVA in 2015. NIHSS in ED was 11. Telestoke was consulted and rec against TNK as patient last took his home Eliquis at 2:00pm.     ED course:  Vitals: /58, HR 53, RR 16 SpO2 95% on RA  Labs: H/H 10.3/33.1, BUN/Cr 45/2.4, eGFR 27, Lactate 2.3  UA: Orange, protein 100, large blood, rbc 32, bacteria occasional  Given: IV Rocephin x1, 1L NaCl bolus x1    Imaging  CT HEAD: No large territory acute infarct, intracranial hemorrhage, or mass effect.   CT PERFUSION: No core infarct or acute ischemic penumbra.  CT ANGIOGRAPHY BRAIN: No large vessel occlusion, significant stenosis, aneurysm or vascular malformation.  CT ANGIOGRAPHY NECK: Vasculature of the neck is patent without significant stenosis or dissection.   (13 Apr 2024 00:56)      Interval History -mental status better    Patient is seen, chart was reviewed and case was discussed with the treatment team.  Pt is not in any distress.   Lying on bed comfortably.   No events reported overnight.       Vital Signs Last 24 Hrs  T(C): 36.6 (14 Apr 2024 11:35), Max: 36.8 (13 Apr 2024 20:33)  T(F): 97.8 (14 Apr 2024 11:35), Max: 98.2 (13 Apr 2024 20:33)  HR: 53 (14 Apr 2024 11:35) (53 - 69)  BP: 168/74 (14 Apr 2024 11:35) (133/71 - 168/74)  BP(mean): --  RR: 18 (14 Apr 2024 11:35) (18 - 20)  SpO2: 95% (14 Apr 2024 11:35) (92% - 95%)    Parameters below as of 14 Apr 2024 11:35  Patient On (Oxygen Delivery Method): nasal cannula  O2 Flow (L/min): 2          REVIEW OF SYSTEMS:    Constitutional: No fever, weight loss or fatigue  Eyes: No eye pain, visual disturbances, or discharge  ENT:  No difficulty hearing, tinnitus, vertigo; No sinus or throat pain  Neck: No pain or stiffness  Respiratory: No  wheezing, chills or hemoptysis  Cardiovascular: No chest pain, palpitations, shortness of breath, dizziness or leg swelling  Gastrointestinal: No abdominal or epigastric pain. No nausea, vomiting or hematemesis;  Genitourinary: No dysuria, frequency, hematuria or incontinence  Neurological: No headaches, , loss of strength, numbness or tremors  Psychiatric: No depression, anxiety, mood swings or difficulty sleeping  Musculoskeletal: No joint pain or swelling; No muscle, back or extremity pain  Skin: No itching, burning, rashes or lesions   Lymph Nodes: No enlarged glands  Endocrine: No heat or cold intolerance; No hair loss,  Allergy and Immunologic: No hives or eczema    On Neurological Examination:    Mental Status - Pt is , awake, oriented X3.  Follows commands well and able to answer questions appropriately.Mood and affect  normal    Speech -  Normal.     Cranial Nerves - Pupils 3 mm equal and reactive to light, extraocular eye movements intact. Pt has no visual field deficit.  Pt has no facial asymmetry. Facial sensation is intact.Tongue - is in midline.    Muscle tone - is normal      Motor Exam - 5/5 of UE  LE 4+/5   No drift. No shaking or tremors.    Sensory Exam - P. Pt withdraws all extremities equally on stimulation. No asymmetry seen. No complaints of tingling, numbness.      coordination:    Finger to nose: normal    Heel to shin: normal    Deep tendon Reflexes - 2 plus all over.            Neck Supple -  Yes.     MEDICATIONS    bisacodyl Suppository 10 milliGRAM(s) Rectal daily PRN  dextrose 10% Bolus 125 milliLiter(s) IV Bolus once  dextrose 5% + sodium chloride 0.45%. 1000 milliLiter(s) IV Continuous <Continuous>  dextrose 5%. 1000 milliLiter(s) IV Continuous <Continuous>  dextrose 5%. 1000 milliLiter(s) IV Continuous <Continuous>  dextrose 50% Injectable 25 Gram(s) IV Push once  dextrose 50% Injectable 12.5 Gram(s) IV Push once  dextrose Oral Gel 15 Gram(s) Oral once PRN  glucagon  Injectable 1 milliGRAM(s) IntraMuscular once  hemorrhoidal Ointment 1 Application(s) Rectal two times a day  heparin   Injectable 6500 Unit(s) IV Push every 6 hours PRN  heparin   Injectable 3000 Unit(s) IV Push every 6 hours PRN  heparin  Infusion.  Unit(s)/Hr IV Continuous <Continuous>  insulin lispro (ADMELOG) corrective regimen sliding scale   SubCutaneous every 6 hours  insulin lispro (ADMELOG) corrective regimen sliding scale   SubCutaneous at bedtime  ondansetron Injectable 4 milliGRAM(s) IV Push every 8 hours PRN      Allergies    No Known Allergies    Intolerances        LABS:  CBC Full  -  ( 14 Apr 2024 08:20 )  WBC Count : 5.19 K/uL  RBC Count : 3.45 M/uL  Hemoglobin : 10.5 g/dL  Hematocrit : 33.3 %  Platelet Count - Automated : 190 K/uL  Mean Cell Volume : 96.5 fl  Mean Cell Hemoglobin : 30.4 pg  Mean Cell Hemoglobin Concentration : 31.5 gm/dL  Auto Neutrophil # : x  Auto Lymphocyte # : x  % : x    Urinalysis Basic - ( 14 Apr 2024 08:20 )    Color: x / Appearance: x / SG: x / pH: x  Gluc: 154 mg/dL / Ketone: x  / Bili: x / Urobili: x   Blood: x / Protein: x / Nitrite: x   Leuk Esterase: x / RBC: x / WBC x   Sq Epi: x / Non Sq Epi: x / Bacteria: x      04-14    147<H>  |  113<H>  |  27<H>  ----------------------------<  154<H>  3.7   |  31  |  2.20<H>    Ca    8.1<L>      14 Apr 2024 08:20  Phos  2.9     04-13    TPro  6.4  /  Alb  2.9<L>  /  TBili  0.1<L>  /  DBili  x   /  AST  11<L>  /  ALT  12  /  AlkPhos  70  04-13    Hemoglobin A1C:     Vitamin B12     RADIOLOGY    ASSESSMENT AND PLAN:      seen for ams/aphasia lasting almost 24 hr  likely seizure  brain mri- unremarkable for acute cva    continue AC  EEG   No anti-seizure med  Physical therapy evaluation.  OOB to chair/ambulation with assistance only.  Pain is accessed and addressed.  Plan of care was discussed with family(wife)tions answered.  Would continue to follow.

## 2024-04-14 NOTE — CARE COORDINATION ASSESSMENT. - OTHER PERTINENT REFERRAL INFORMATION
SW met with pt and pt spouse Saundra at bedside. Pt resides with spouse Saundra in a ranch style home; 1 TARA and 0 steps inside the home. Pt spouse Saundra reports that pt was independent with ADL's PTA. No hx of homecare. Pt was recommended for acute rehab, pt in agreement. Referral process has begun.

## 2024-04-14 NOTE — PROGRESS NOTE ADULT - SUBJECTIVE AND OBJECTIVE BOX
Patient is a 78y old  Male who presents with a chief complaint of CVA (13 Apr 2024 00:56)       HPI:  Patient is a 77yo M with a PMH of COPD, HTN, HLD, DM2, CVA (4/2015), dementia, TAMMY on CPAP who presents to the ED with AMS. Patient was altered on admission so history was obtained from wife. Per wife, around 9:30pm, patient became very confused and was just repeating the word "yes." Wife notes that patient was also slurring his speech. These symptoms were similar to his last CVA in 2015. NIHSS in ED was 11. [facility] was consulted and rec against TNK as patient last took his home Eliquis at 2:00pm.     ED course:  Vitals: /58, HR 53, RR 16 SpO2 95% on RA  Labs: H/H 10.3/33.1, BUN/Cr 45/2.4, eGFR 27, Lactate 2.3  UA: Orange, protein 100, large blood, rbc 32, bacteria occasional  Given: IV Rocephin x1, 1L NaCl bolus x1    Imaging  CT HEAD: No large territory acute infarct, intracranial hemorrhage, or mass effect.   CT PERFUSION: No core infarct or acute ischemic penumbra.  CT ANGIOGRAPHY BRAIN: No large vessel occlusion, significant stenosis, aneurysm or vascular malformation.  CT ANGIOGRAPHY NECK: Vasculature of the neck is patent without significant stenosis or dissection.   (13 Apr 2024 00:56)    Renal consulted for SARAY. Chart reviewed. Patient is confused.     No acute events noted       PAST MEDICAL & SURGICAL HISTORY:  Diabetes mellitus      Hypertension      COPD (chronic obstructive pulmonary disease)      HLD (hyperlipidemia)      Dementia      CVA (cerebral vascular accident)      H/O hand surgery           FAMILY HISTORY:  Family history of CABG (Father)    [place]    Social History:Non smoker    MEDICATIONS  (STANDING):  dextrose 10% Bolus 125 milliLiter(s) IV Bolus once  dextrose 5% + sodium chloride 0.9%. 1000 milliLiter(s) (55 mL/Hr) IV Continuous <Continuous>  dextrose 5%. 1000 milliLiter(s) (50 mL/Hr) IV Continuous <Continuous>  dextrose 5%. 1000 milliLiter(s) (100 mL/Hr) IV Continuous <Continuous>  dextrose 50% Injectable 12.5 Gram(s) IV Push once  dextrose 50% Injectable 25 Gram(s) IV Push once  glucagon  Injectable 1 milliGRAM(s) IntraMuscular once  insulin lispro (ADMELOG) corrective regimen sliding scale   SubCutaneous at bedtime  insulin lispro (ADMELOG) corrective regimen sliding scale   SubCutaneous every 6 hours    MEDICATIONS  (PRN):  dextrose Oral Gel 15 Gram(s) Oral once PRN Blood Glucose LESS THAN 70 milliGRAM(s)/deciliter  heparin   Injectable 3000 Unit(s) IV Push every 6 hours PRN For aPTT between 40 - 57  heparin   Injectable 6500 Unit(s) IV Push every 6 hours PRN For aPTT less than 40  ondansetron Injectable 4 milliGRAM(s) IV Push every 8 hours PRN Nausea and/or Vomiting   Meds reviewed    Allergies    No Known Allergies    Intolerances         REVIEW OF SYSTEMS:  Confused      Vital Signs Last 24 Hrs  T(C): 36.6 (14 Apr 2024 11:35), Max: 36.8 (13 Apr 2024 20:33)  T(F): 97.8 (14 Apr 2024 11:35), Max: 98.2 (13 Apr 2024 20:33)  HR: 53 (14 Apr 2024 11:35) (53 - 69)  BP: 168/74 (14 Apr 2024 11:35) (133/71 - 168/74)  BP(mean): --  RR: 18 (14 Apr 2024 11:35) (18 - 20)  SpO2: 95% (14 Apr 2024 11:35) (92% - 95%)    Parameters below as of 14 Apr 2024 11:35  Patient On (Oxygen Delivery Method): nasal cannula  O2 Flow (L/min): 2      PHYSICAL EXAM:    GENERAL: NAD  HEAD:  Atraumatic, Normocephalic  NERVOUS SYSTEM:  Awake and Alert, confused  CHEST/LUNG: Clear to auscultation bilaterally  HEART: Regular rate and rhythm; No murmurs, rubs, or gallops  ABDOMEN: Soft, Nontender, Nondistended; Bowel sounds present  EXTREMITIES:  No Edema        LABS:                                                 10.5   5.19  )-----------( 190      ( 14 Apr 2024 08:20 )             33.3     04-14    147<H>  |  113<H>  |  27<H>  ----------------------------<  154<H>  3.7   |  31  |  2.20<H>    Ca    8.1<L>      14 Apr 2024 08:20  Phos  2.9     04-13    TPro  6.4  /  Alb  2.9<L>  /  TBili  0.1<L>  /  DBili  x   /  AST  11<L>  /  ALT  12  /  AlkPhos  70  04-13    PT/INR - ( 13 Apr 2024 06:00 )   PT: 12.9 sec;   INR: 1.11 ratio         PTT - ( 14 Apr 2024 11:35 )  PTT:74.6 sec  Urinalysis Basic - ( 14 Apr 2024 08:20 )    Color: x / Appearance: x / SG: x / pH: x  Gluc: 154 mg/dL / Ketone: x  / Bili: x / Urobili: x   Blood: x / Protein: x / Nitrite: x   Leuk Esterase: x / RBC: x / WBC x   Sq Epi: x / Non Sq Epi: x / Bacteria: x        ABG - ( 13 Apr 2024 16:15 )  pH, Arterial: 7.41  pH, Blood: x     /  pCO2: 43    /  pO2: 67    / HCO3: 27    / Base Excess: 2.7   /  SaO2: 94.7

## 2024-04-14 NOTE — CARE COORDINATION ASSESSMENT. - NSPASTMEDSURGHISTORY_GEN_ALL_CORE_FT
PAST MEDICAL & SURGICAL HISTORY:  HLD (hyperlipidemia)      COPD (chronic obstructive pulmonary disease)      Hypertension      Diabetes mellitus      Dementia      CVA (cerebral vascular accident)      H/O hand surgery

## 2024-04-14 NOTE — OCCUPATIONAL THERAPY INITIAL EVALUATION ADULT - DIAGNOSIS, OT EVAL
Pt with impaired coordination, decreased strength in b/l LE & b/l UE, impaired cognition, and impaired balance impacting ability to complete ADLs, IADLs, functional mobility/transfers.

## 2024-04-14 NOTE — PROGRESS NOTE ADULT - PROBLEM SELECTOR PLAN 1
Hx of previous CVA in 2015, on Eliquis. NIHSS 11 in ED   - CT HEAD: No large territory acute infarct, intracranial hemorrhage, or mass effect.   - CT PERFUSION: No core infarct or acute ischemic penumbra.  - CTA BRAIN: No large vessel occlusion, significant stenosis, aneurysm or vascular malformation.  - CTA NECK: Vasculature of the neck is patent without significant stenosis or dissection.  - MRI HEAD: No acute intracranial pathology  - F/u TTE w/ bubble study   - Monitor on tele   - Neuro checks q4hrs   - Continue Heparin gtt as patient unable to be on oral AC due to failed dysphagia screen   - Restart oral ASA and statin pending speech and swallow recs   - Hold BP meds to allow for permissive HTN  - Lipid panel WNL, A1c 8.7%  - F/u speech and swallow consult - attempted to call this morning but they did not   - PT/OT consulted  - Discussed with neuro Dr. Hernández - will hold ASA and PO meds pending swallow eval, check ABG and EEG  - Discussed hx of ILR with cardio Dr. Casas - patient may proceed with MRI

## 2024-04-15 LAB
ANION GAP SERPL CALC-SCNC: 5 MMOL/L — SIGNIFICANT CHANGE UP (ref 5–17)
APTT BLD: 191.3 SEC — CRITICAL HIGH (ref 24.5–35.6)
APTT BLD: 40.7 SEC — HIGH (ref 24.5–35.6)
BUN SERPL-MCNC: 16 MG/DL — SIGNIFICANT CHANGE UP (ref 7–23)
CALCIUM SERPL-MCNC: 8.6 MG/DL — SIGNIFICANT CHANGE UP (ref 8.5–10.1)
CHLORIDE SERPL-SCNC: 111 MMOL/L — HIGH (ref 96–108)
CO2 SERPL-SCNC: 30 MMOL/L — SIGNIFICANT CHANGE UP (ref 22–31)
CREAT SERPL-MCNC: 1.8 MG/DL — HIGH (ref 0.5–1.3)
EGFR: 38 ML/MIN/1.73M2 — LOW
GBM IGG SER-ACNC: <0.2 — SIGNIFICANT CHANGE UP (ref 0–0.9)
GLUCOSE SERPL-MCNC: 168 MG/DL — HIGH (ref 70–99)
HCT VFR BLD CALC: 34.8 % — LOW (ref 39–50)
HGB BLD-MCNC: 10.8 G/DL — LOW (ref 13–17)
MCHC RBC-ENTMCNC: 30.2 PG — SIGNIFICANT CHANGE UP (ref 27–34)
MCHC RBC-ENTMCNC: 31 GM/DL — LOW (ref 32–36)
MCV RBC AUTO: 97.2 FL — SIGNIFICANT CHANGE UP (ref 80–100)
MPO AB + PR3 PNL SER: SIGNIFICANT CHANGE UP
NRBC # BLD: 0 /100 WBCS — SIGNIFICANT CHANGE UP (ref 0–0)
PLATELET # BLD AUTO: 196 K/UL — SIGNIFICANT CHANGE UP (ref 150–400)
POTASSIUM SERPL-MCNC: 3.8 MMOL/L — SIGNIFICANT CHANGE UP (ref 3.5–5.3)
POTASSIUM SERPL-SCNC: 3.8 MMOL/L — SIGNIFICANT CHANGE UP (ref 3.5–5.3)
RBC # BLD: 3.58 M/UL — LOW (ref 4.2–5.8)
RBC # FLD: 13.9 % — SIGNIFICANT CHANGE UP (ref 10.3–14.5)
SODIUM SERPL-SCNC: 146 MMOL/L — HIGH (ref 135–145)
WBC # BLD: 5.17 K/UL — SIGNIFICANT CHANGE UP (ref 3.8–10.5)
WBC # FLD AUTO: 5.17 K/UL — SIGNIFICANT CHANGE UP (ref 3.8–10.5)

## 2024-04-15 PROCEDURE — 99232 SBSQ HOSP IP/OBS MODERATE 35: CPT

## 2024-04-15 PROCEDURE — 99221 1ST HOSP IP/OBS SF/LOW 40: CPT

## 2024-04-15 RX ORDER — LANOLIN ALCOHOL/MO/W.PET/CERES
5 CREAM (GRAM) TOPICAL ONCE
Refills: 0 | Status: COMPLETED | OUTPATIENT
Start: 2024-04-15 | End: 2024-04-15

## 2024-04-15 RX ORDER — DONEPEZIL HYDROCHLORIDE 10 MG/1
10 TABLET, FILM COATED ORAL AT BEDTIME
Refills: 0 | Status: DISCONTINUED | OUTPATIENT
Start: 2024-04-15 | End: 2024-04-22

## 2024-04-15 RX ORDER — OLANZAPINE 15 MG/1
2.5 TABLET, FILM COATED ORAL ONCE
Refills: 0 | Status: DISCONTINUED | OUTPATIENT
Start: 2024-04-15 | End: 2024-04-15

## 2024-04-15 RX ORDER — AMLODIPINE BESYLATE 2.5 MG/1
10 TABLET ORAL DAILY
Refills: 0 | Status: DISCONTINUED | OUTPATIENT
Start: 2024-04-15 | End: 2024-04-15

## 2024-04-15 RX ORDER — ASPIRIN/CALCIUM CARB/MAGNESIUM 324 MG
81 TABLET ORAL DAILY
Refills: 0 | Status: DISCONTINUED | OUTPATIENT
Start: 2024-04-15 | End: 2024-04-22

## 2024-04-15 RX ORDER — HALOPERIDOL DECANOATE 100 MG/ML
2.5 INJECTION INTRAMUSCULAR ONCE
Refills: 0 | Status: COMPLETED | OUTPATIENT
Start: 2024-04-15 | End: 2024-04-15

## 2024-04-15 RX ORDER — IPRATROPIUM/ALBUTEROL SULFATE 18-103MCG
3 AEROSOL WITH ADAPTER (GRAM) INHALATION EVERY 6 HOURS
Refills: 0 | Status: DISCONTINUED | OUTPATIENT
Start: 2024-04-15 | End: 2024-04-19

## 2024-04-15 RX ORDER — OLANZAPINE 15 MG/1
2.5 TABLET, FILM COATED ORAL ONCE
Refills: 0 | Status: COMPLETED | OUTPATIENT
Start: 2024-04-15 | End: 2024-04-15

## 2024-04-15 RX ORDER — OLANZAPINE 15 MG/1
5 TABLET, FILM COATED ORAL ONCE
Refills: 0 | Status: COMPLETED | OUTPATIENT
Start: 2024-04-15 | End: 2024-04-15

## 2024-04-15 RX ORDER — APIXABAN 2.5 MG/1
5 TABLET, FILM COATED ORAL EVERY 12 HOURS
Refills: 0 | Status: DISCONTINUED | OUTPATIENT
Start: 2024-04-15 | End: 2024-04-15

## 2024-04-15 RX ORDER — ATORVASTATIN CALCIUM 80 MG/1
40 TABLET, FILM COATED ORAL AT BEDTIME
Refills: 0 | Status: DISCONTINUED | OUTPATIENT
Start: 2024-04-15 | End: 2024-04-22

## 2024-04-15 RX ORDER — ESCITALOPRAM OXALATE 10 MG/1
20 TABLET, FILM COATED ORAL DAILY
Refills: 0 | Status: DISCONTINUED | OUTPATIENT
Start: 2024-04-15 | End: 2024-04-22

## 2024-04-15 RX ORDER — APIXABAN 2.5 MG/1
5 TABLET, FILM COATED ORAL EVERY 12 HOURS
Refills: 0 | Status: DISCONTINUED | OUTPATIENT
Start: 2024-04-15 | End: 2024-04-22

## 2024-04-15 RX ORDER — SODIUM CHLORIDE 9 MG/ML
1000 INJECTION, SOLUTION INTRAVENOUS
Refills: 0 | Status: DISCONTINUED | OUTPATIENT
Start: 2024-04-15 | End: 2024-04-16

## 2024-04-15 RX ADMIN — Medication 81 MILLIGRAM(S): at 12:54

## 2024-04-15 RX ADMIN — OLANZAPINE 2.5 MILLIGRAM(S): 15 TABLET, FILM COATED ORAL at 01:58

## 2024-04-15 RX ADMIN — Medication 1: at 00:14

## 2024-04-15 RX ADMIN — OLANZAPINE 5 MILLIGRAM(S): 15 TABLET, FILM COATED ORAL at 19:14

## 2024-04-15 RX ADMIN — Medication 3 MILLILITER(S): at 16:06

## 2024-04-15 RX ADMIN — HALOPERIDOL DECANOATE 2.5 MILLIGRAM(S): 100 INJECTION INTRAMUSCULAR at 23:48

## 2024-04-15 RX ADMIN — PHENYLEPHRINE-SHARK LIVER OIL-MINERAL OIL-PETROLATUM RECTAL OINTMENT 1 APPLICATION(S): at 06:26

## 2024-04-15 RX ADMIN — DONEPEZIL HYDROCHLORIDE 10 MILLIGRAM(S): 10 TABLET, FILM COATED ORAL at 22:17

## 2024-04-15 RX ADMIN — APIXABAN 5 MILLIGRAM(S): 2.5 TABLET, FILM COATED ORAL at 18:48

## 2024-04-15 RX ADMIN — HEPARIN SODIUM 1600 UNIT(S)/HR: 5000 INJECTION INTRAVENOUS; SUBCUTANEOUS at 07:33

## 2024-04-15 RX ADMIN — ATORVASTATIN CALCIUM 40 MILLIGRAM(S): 80 TABLET, FILM COATED ORAL at 22:17

## 2024-04-15 RX ADMIN — ESCITALOPRAM OXALATE 20 MILLIGRAM(S): 10 TABLET, FILM COATED ORAL at 12:54

## 2024-04-15 RX ADMIN — HEPARIN SODIUM 3000 UNIT(S): 5000 INJECTION INTRAVENOUS; SUBCUTANEOUS at 08:13

## 2024-04-15 RX ADMIN — Medication 5 MILLIGRAM(S): at 19:14

## 2024-04-15 RX ADMIN — HEPARIN SODIUM 1800 UNIT(S)/HR: 5000 INJECTION INTRAVENOUS; SUBCUTANEOUS at 07:35

## 2024-04-15 RX ADMIN — OLANZAPINE 2.5 MILLIGRAM(S): 15 TABLET, FILM COATED ORAL at 18:31

## 2024-04-15 RX ADMIN — Medication 4: at 12:55

## 2024-04-15 RX ADMIN — PHENYLEPHRINE-SHARK LIVER OIL-MINERAL OIL-PETROLATUM RECTAL OINTMENT 1 APPLICATION(S): at 18:49

## 2024-04-15 RX ADMIN — SODIUM CHLORIDE 60 MILLILITER(S): 9 INJECTION, SOLUTION INTRAVENOUS at 14:04

## 2024-04-15 RX ADMIN — Medication 5 MILLIGRAM(S): at 13:03

## 2024-04-15 NOTE — CASE MANAGEMENT PROGRESS NOTE - NSCMPROGRESSNOTE_GEN_ALL_CORE
Discussed pt in rounds, s/p code gray this am, Zyprexa was administered. Receiving heparin drip, IVF. Discussed pt in rounds, s/p code gray this am, Zyprexa was administered. Receiving heparin drip, IVF. SW following for placement.

## 2024-04-15 NOTE — EEG REPORT - NS EEG TEXT BOX
RANCHO FRANCISCO [id number]     Study Date: 	04-15-24  Duration: 21 min    --------------------------------------------------------------------------------------------------  History:  CC/ HPI Patient is a 78y old  Male who presents with a chief complaint of CVA (15 Apr 2024 14:29)    MEDICATIONS  (STANDING):  apixaban 5 milliGRAM(s) Oral every 12 hours  aspirin enteric coated 81 milliGRAM(s) Oral daily  atorvastatin 40 milliGRAM(s) Oral at bedtime  busPIRone 5 milliGRAM(s) Oral <User Schedule>  dextrose 10% Bolus 125 milliLiter(s) IV Bolus once  dextrose 5% + sodium chloride 0.45%. 1000 milliLiter(s) (60 mL/Hr) IV Continuous <Continuous>  dextrose 5%. 1000 milliLiter(s) (50 mL/Hr) IV Continuous <Continuous>  dextrose 5%. 1000 milliLiter(s) (100 mL/Hr) IV Continuous <Continuous>  dextrose 50% Injectable 12.5 Gram(s) IV Push once  dextrose 50% Injectable 25 Gram(s) IV Push once  donepezil 10 milliGRAM(s) Oral at bedtime  escitalopram 20 milliGRAM(s) Oral daily  glucagon  Injectable 1 milliGRAM(s) IntraMuscular once  hemorrhoidal Ointment 1 Application(s) Rectal two times a day  insulin lispro (ADMELOG) corrective regimen sliding scale   SubCutaneous every 6 hours  insulin lispro (ADMELOG) corrective regimen sliding scale   SubCutaneous at bedtime    --------------------------------------------------------------------------------------------------  Study Interpretation:    [Abbreviation Key:  PDR=alpha rhythm/posterior dominant rhythm. A-P=anterior posterior.  Amplitude: ‘very low’:<20; ‘low’:20-49; ‘medium’:; ‘high’:>150uV.  Persistence for periodic/rhythmic patterns (% of epoch) ‘rare’:<1%; ‘occasional’:1-10%; ‘frequent’:10-50%; ‘abundant’:50-90%; ‘continuous’:>90%.  Persistence for sporadic discharges: ‘rare’:<1/hr; ‘occasional’:1/min-1/hr; ‘frequent’:>1/min; ‘abundant’:>1/10 sec.  RPP=rhythmic and periodic patterns; GRDA=generalized rhythmic delta activity; FIRDA=frontal intermittent GRDA; LRDA=lateralized rhythmic delta activity; TIRDA=temporal intermittent rhythmic delta activity;  LPD=PLED=lateralized periodic discharges; GPD=generalized periodic discharges; BIPDs =bilateral independent periodic discharges; Mf=multifocal; SIRPDs=stimulus induced rhythmic, periodic, or ictal appearing discharges; BIRDs=brief potentially ictal rhythmic discharges >4 Hz, lasting .5-10s; PFA (paroxysmal bursts >13 Hz or =8 Hz <10s).  Modifiers: +F=with fast component; +S=with spike component; +R=with rhythmic component.  S-B=burst suppression pattern.  Max=maximal. N1-drowsy; N2-stage II sleep; N3-slow wave sleep. SSS/BETS=small sharp spikes/benign epileptiform transients of sleep. HV=hyperventilation; PS=photic stimulation]    FINDINGS:      Background:  Continuity: continuous  Symmetry: symmetric  PDR: Absent  Reactivity: present  Voltage: normal (between 20-150uV)  Anterior Posterior Gradient: present  Other background findings: none  Breach: absent    Background Slowing:  Generalized slowing: diffuse irregular delta and theta activity.  Focal slowing: none was present.    State Changes:   -Drowsiness noted with increased slowing, attenuation of fast activity.  -N2 sleep transients were not recorded.    Sporadic Epileptiform Discharges:    None    Rhythmic and Periodic Patterns (RPPs):  None     Electrographic and Electroclinical seizures:  None    Other Clinical Events:  None    Activation Procedures:   -Hyperventilation was not performed.    -Photic stimulation was performed and did not elicit any abnormalities.       Artifacts:  Intermittent myogenic and movement artifacts were noted.    ECG:  The heart rate on single channel ECG was limited due to technicalities.     EEG Classification / Summary:  Abnormal EEG study  Moderate generalized background slowing    -----------------------------------------------------------------------------------------------------    Clinical Impression:  Moderate diffuse/multi-focal cerebral dysfunction, not specific as to etiology.  There were no epileptiform abnormalities recorded.      This is a preliminary fellow impression only pending attending review    Gunner Calvert MD - Epilepsy Fellow      RANCHO FRANCISCO [id number]     Study Date: 	04-15-24  Duration: 21 min    --------------------------------------------------------------------------------------------------  History:  CC/ HPI Patient is a 78y old  Male who presents with a chief complaint of CVA (15 Apr 2024 14:29)    MEDICATIONS  (STANDING):  apixaban 5 milliGRAM(s) Oral every 12 hours  aspirin enteric coated 81 milliGRAM(s) Oral daily  atorvastatin 40 milliGRAM(s) Oral at bedtime  busPIRone 5 milliGRAM(s) Oral <User Schedule>  dextrose 10% Bolus 125 milliLiter(s) IV Bolus once  dextrose 5% + sodium chloride 0.45%. 1000 milliLiter(s) (60 mL/Hr) IV Continuous <Continuous>  dextrose 5%. 1000 milliLiter(s) (50 mL/Hr) IV Continuous <Continuous>  dextrose 5%. 1000 milliLiter(s) (100 mL/Hr) IV Continuous <Continuous>  dextrose 50% Injectable 12.5 Gram(s) IV Push once  dextrose 50% Injectable 25 Gram(s) IV Push once  donepezil 10 milliGRAM(s) Oral at bedtime  escitalopram 20 milliGRAM(s) Oral daily  glucagon  Injectable 1 milliGRAM(s) IntraMuscular once  hemorrhoidal Ointment 1 Application(s) Rectal two times a day  insulin lispro (ADMELOG) corrective regimen sliding scale   SubCutaneous every 6 hours  insulin lispro (ADMELOG) corrective regimen sliding scale   SubCutaneous at bedtime    --------------------------------------------------------------------------------------------------  Study Interpretation:    [Abbreviation Key:  PDR=alpha rhythm/posterior dominant rhythm. A-P=anterior posterior.  Amplitude: ‘very low’:<20; ‘low’:20-49; ‘medium’:; ‘high’:>150uV.  Persistence for periodic/rhythmic patterns (% of epoch) ‘rare’:<1%; ‘occasional’:1-10%; ‘frequent’:10-50%; ‘abundant’:50-90%; ‘continuous’:>90%.  Persistence for sporadic discharges: ‘rare’:<1/hr; ‘occasional’:1/min-1/hr; ‘frequent’:>1/min; ‘abundant’:>1/10 sec.  RPP=rhythmic and periodic patterns; GRDA=generalized rhythmic delta activity; FIRDA=frontal intermittent GRDA; LRDA=lateralized rhythmic delta activity; TIRDA=temporal intermittent rhythmic delta activity;  LPD=PLED=lateralized periodic discharges; GPD=generalized periodic discharges; BIPDs =bilateral independent periodic discharges; Mf=multifocal; SIRPDs=stimulus induced rhythmic, periodic, or ictal appearing discharges; BIRDs=brief potentially ictal rhythmic discharges >4 Hz, lasting .5-10s; PFA (paroxysmal bursts >13 Hz or =8 Hz <10s).  Modifiers: +F=with fast component; +S=with spike component; +R=with rhythmic component.  S-B=burst suppression pattern.  Max=maximal. N1-drowsy; N2-stage II sleep; N3-slow wave sleep. SSS/BETS=small sharp spikes/benign epileptiform transients of sleep. HV=hyperventilation; PS=photic stimulation]    FINDINGS:      Background:  Continuity: continuous  Symmetry: symmetric  PDR: Absent  Reactivity: present  Voltage: normal (between 20-150uV)  Anterior Posterior Gradient: present  Other background findings: none  Breach: absent    Background Slowing:  Generalized slowing: diffuse irregular delta and theta activity.  Focal slowing: none was present.    State Changes:   -Drowsiness noted with increased slowing, attenuation of fast activity.  -N2 sleep transients were not recorded.    Sporadic Epileptiform Discharges:    None    Rhythmic and Periodic Patterns (RPPs):  None     Electrographic and Electroclinical seizures:  None    Other Clinical Events:  None    Activation Procedures:   -Hyperventilation was not performed.    -Photic stimulation was performed and did not elicit any abnormalities.       Artifacts:  Intermittent myogenic and movement artifacts were noted.    ECG:  The heart rate on single channel ECG was limited due to technicalities.     EEG Classification / Summary:  Abnormal EEG study  Moderate generalized background slowing    -----------------------------------------------------------------------------------------------------    Clinical Impression:  Moderate diffuse/multi-focal cerebral dysfunction, not specific as to etiology.  There were no epileptiform abnormalities recorded.        Gunner Calvert MD - Epilepsy Fellow     Mike Lopez MD  Neurology Attending Physician
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
## 2024-04-15 NOTE — PROGRESS NOTE ADULT - SUBJECTIVE AND OBJECTIVE BOX
PROGRESS NOTE:   Authored by Dr. Torrey Crews MD, Available on MS Teams    Patient is a 78y old  Male who presents with a chief complaint of CVA (16 Apr 2024 11:42)      SUBJECTIVE / OVERNIGHT EVENTS: Patient confused, answering some questions.     ADDITIONAL REVIEW OF SYSTEMS:    MEDICATIONS  (STANDING):  apixaban 5 milliGRAM(s) Oral every 12 hours  aspirin enteric coated 81 milliGRAM(s) Oral daily  atorvastatin 40 milliGRAM(s) Oral at bedtime  busPIRone 5 milliGRAM(s) Oral <User Schedule>  dextrose 10% Bolus 125 milliLiter(s) IV Bolus once  dextrose 5% + sodium chloride 0.45%. 1000 milliLiter(s) (60 mL/Hr) IV Continuous <Continuous>  dextrose 5%. 1000 milliLiter(s) (50 mL/Hr) IV Continuous <Continuous>  dextrose 5%. 1000 milliLiter(s) (100 mL/Hr) IV Continuous <Continuous>  dextrose 50% Injectable 25 Gram(s) IV Push once  dextrose 50% Injectable 12.5 Gram(s) IV Push once  donepezil 10 milliGRAM(s) Oral at bedtime  escitalopram 20 milliGRAM(s) Oral daily  glucagon  Injectable 1 milliGRAM(s) IntraMuscular once  hemorrhoidal Ointment 1 Application(s) Rectal two times a day  insulin lispro (ADMELOG) corrective regimen sliding scale   SubCutaneous every 6 hours  insulin lispro (ADMELOG) corrective regimen sliding scale   SubCutaneous at bedtime  valproate sodium   IVPB 500 milliGRAM(s) IV Intermittent two times a day    MEDICATIONS  (PRN):  albuterol/ipratropium for Nebulization 3 milliLiter(s) Nebulizer every 6 hours PRN Shortness of Breath and/or Wheezing  bisacodyl Suppository 10 milliGRAM(s) Rectal daily PRN Constipation  dextrose Oral Gel 15 Gram(s) Oral once PRN Blood Glucose LESS THAN 70 milliGRAM(s)/deciliter  ondansetron Injectable 4 milliGRAM(s) IV Push every 8 hours PRN Nausea and/or Vomiting      CAPILLARY BLOOD GLUCOSE      POCT Blood Glucose.: 209 mg/dL (16 Apr 2024 12:15)  POCT Blood Glucose.: 135 mg/dL (16 Apr 2024 06:43)  POCT Blood Glucose.: 135 mg/dL (15 Apr 2024 23:55)  POCT Blood Glucose.: 140 mg/dL (15 Apr 2024 22:42)  POCT Blood Glucose.: 148 mg/dL (15 Apr 2024 17:45)  POCT Blood Glucose.: 302 mg/dL (15 Apr 2024 12:52)    I&O's Summary    15 Apr 2024 07:01  -  16 Apr 2024 07:00  --------------------------------------------------------  IN: 884 mL / OUT: 0 mL / NET: 884 mL        PHYSICAL EXAM:  Vital Signs Last 24 Hrs  T(C): 36.8 (16 Apr 2024 04:11), Max: 36.8 (16 Apr 2024 04:11)  T(F): 98.2 (16 Apr 2024 04:11), Max: 98.2 (16 Apr 2024 04:11)  HR: 58 (16 Apr 2024 09:19) (52 - 87)  BP: 168/67 (16 Apr 2024 04:11) (123/69 - 168/67)  BP(mean): --  RR: 18 (16 Apr 2024 04:11) (18 - 18)  SpO2: 97% (16 Apr 2024 09:19) (92% - 97%)    Parameters below as of 16 Apr 2024 09:19  Patient On (Oxygen Delivery Method): nasal cannula        CONSTITUTIONAL: NAD  RESPIRATORY: Normal respiratory effort; lungs are clear to auscultation bilaterally  CARDIOVASCULAR: Regular rate and rhythm, normal S1 and S2, no murmur/rub/gallop; No lower extremity edema  ABDOMEN: Nontender to palpation, normoactive bowel sounds, no rebound/guarding  MUSCLOSKELETAL: no clubbing or cyanosis of digits; no joint swelling or tenderness to palpation  PSYCH: A+O to person, place  LABS:                        9.9    5.17  )-----------( 182      ( 16 Apr 2024 08:21 )             31.6     04-16    146<H>  |  113<H>  |  21  ----------------------------<  149<H>  3.8   |  27  |  1.60<H>    Ca    8.1<L>      16 Apr 2024 08:21  Phos  3.1     04-16  Mg     1.8     04-16      PTT - ( 15 Apr 2024 13:38 )  PTT:191.3 sec      Urinalysis Basic - ( 16 Apr 2024 08:21 )    Color: x / Appearance: x / SG: x / pH: x  Gluc: 149 mg/dL / Ketone: x  / Bili: x / Urobili: x   Blood: x / Protein: x / Nitrite: x   Leuk Esterase: x / RBC: x / WBC x   Sq Epi: x / Non Sq Epi: x / Bacteria: x

## 2024-04-15 NOTE — PROVIDER CONTACT NOTE (CRITICAL VALUE NOTIFICATION) - ASSESSMENT
Pt A&ox1-2. Pt skin warm and dry. Pt. on heprin drip. .3
no bleeding noted.
Pt is sleeping comfortably on the bed

## 2024-04-15 NOTE — PROGRESS NOTE ADULT - SUBJECTIVE AND OBJECTIVE BOX
Neurology Follow up note    RANCHO [id number]    HPI:  Patient is a 79yo M with a PMH of COPD, HTN, HLD, DM2, CVA (4/2015), dementia, TAMMY on CPAP who presents to the ED with AMS. Patient was altered on admission so history was obtained from wife. Per wife, around 9:30pm, patient became very confused and was just repeating the word "yes." Wife notes that patient was also slurring his speech. These symptoms were similar to his last CVA in 2015. NIHSS in ED was 11. Telestoke was consulted and rec against TNK as patient last took his home Eliquis at 2:00pm.     ED course:  Vitals: /58, HR 53, RR 16 SpO2 95% on RA  Labs: H/H 10.3/33.1, BUN/Cr 45/2.4, eGFR 27, Lactate 2.3  UA: Orange, protein 100, large blood, rbc 32, bacteria occasional  Given: IV Rocephin x1, 1L NaCl bolus x1    Imaging  CT HEAD: No large territory acute infarct, intracranial hemorrhage, or mass effect.   CT PERFUSION: No core infarct or acute ischemic penumbra.  CT ANGIOGRAPHY BRAIN: No large vessel occlusion, significant stenosis, aneurysm or vascular malformation.  CT ANGIOGRAPHY NECK: Vasculature of the neck is patent without significant stenosis or dissection.   (13 Apr 2024 00:56)      Interval History -mental status better    Patient is seen, chart was reviewed and case was discussed with the treatment team.  Pt is not in any distress.   Lying on bed comfortably.   No events reported overnight.       Vital Signs Last 24 Hrs  T(C): 36.3 (15 Apr 2024 11:20), Max: 36.8 (14 Apr 2024 21:20)  T(F): 97.4 (15 Apr 2024 11:20), Max: 98.2 (14 Apr 2024 21:20)  HR: 88 (15 Apr 2024 11:20) (51 - 88)  BP: 127/75 (15 Apr 2024 11:20) (127/75 - 163/79)  BP(mean): --  RR: 18 (15 Apr 2024 11:20) (18 - 20)  SpO2: 93% (15 Apr 2024 11:20) (84% - 99%)    Parameters below as of 15 Apr 2024 11:20  Patient On (Oxygen Delivery Method): nasal cannula  O2 Flow (L/min): 3.5          REVIEW OF SYSTEMS:    Constitutional: No fever, weight loss or fatigue  Eyes: No eye pain, visual disturbances, or discharge  ENT:  No difficulty hearing, tinnitus, vertigo; No sinus or throat pain  Neck: No pain or stiffness  Respiratory: No  wheezing, chills or hemoptysis  Cardiovascular: No chest pain, palpitations, shortness of breath, dizziness or leg swelling  Gastrointestinal: No abdominal or epigastric pain. No nausea, vomiting or hematemesis;  Genitourinary: No dysuria, frequency, hematuria or incontinence  Neurological: No headaches, , loss of strength, numbness or tremors  Psychiatric: No depression, anxiety, mood swings or difficulty sleeping  Musculoskeletal: No joint pain or swelling; No muscle, back or extremity pain  Skin: No itching, burning, rashes or lesions   Lymph Nodes: No enlarged glands  Endocrine: No heat or cold intolerance; No hair loss,  Allergy and Immunologic: No hives or eczema    On Neurological Examination:    Mental Status - Pt is , awake, oriented X3.  Follows commands well and able to answer questions appropriately.Mood and affect  normal    Speech -  Normal.     Cranial Nerves - Pupils 3 mm equal and reactive to light, extraocular eye movements intact. Pt has no visual field deficit.  Pt has no facial asymmetry. Facial sensation is intact.Tongue - is in midline.    Muscle tone - is normal      Motor Exam - 5/5 of UE  LE 4+/5   No drift. No shaking or tremors.    Sensory Exam - P. Pt withdraws all extremities equally on stimulation. No asymmetry seen. No complaints of tingling, numbness.      coordination:    Finger to nose: normal    Heel to shin: normal    Deep tendon Reflexes - 2 plus all over.            Neck Supple -  Yes.     MEDICATIONS  (STANDING):  aspirin enteric coated 81 milliGRAM(s) Oral daily  atorvastatin 40 milliGRAM(s) Oral at bedtime  busPIRone 5 milliGRAM(s) Oral <User Schedule>  dextrose 10% Bolus 125 milliLiter(s) IV Bolus once  dextrose 5% + sodium chloride 0.45%. 1000 milliLiter(s) (60 mL/Hr) IV Continuous <Continuous>  dextrose 5%. 1000 milliLiter(s) (50 mL/Hr) IV Continuous <Continuous>  dextrose 5%. 1000 milliLiter(s) (100 mL/Hr) IV Continuous <Continuous>  dextrose 50% Injectable 12.5 Gram(s) IV Push once  dextrose 50% Injectable 25 Gram(s) IV Push once  donepezil 10 milliGRAM(s) Oral at bedtime  escitalopram 20 milliGRAM(s) Oral daily  glucagon  Injectable 1 milliGRAM(s) IntraMuscular once  hemorrhoidal Ointment 1 Application(s) Rectal two times a day  heparin  Infusion.  Unit(s)/Hr (15 mL/Hr) IV Continuous <Continuous>  insulin lispro (ADMELOG) corrective regimen sliding scale   SubCutaneous at bedtime  insulin lispro (ADMELOG) corrective regimen sliding scale   SubCutaneous every 6 hours    MEDICATIONS  (PRN):  albuterol/ipratropium for Nebulization 3 milliLiter(s) Nebulizer every 6 hours PRN Shortness of Breath and/or Wheezing  bisacodyl Suppository 10 milliGRAM(s) Rectal daily PRN Constipation  dextrose Oral Gel 15 Gram(s) Oral once PRN Blood Glucose LESS THAN 70 milliGRAM(s)/deciliter  heparin   Injectable 3000 Unit(s) IV Push every 6 hours PRN For aPTT between 40 - 57  heparin   Injectable 6500 Unit(s) IV Push every 6 hours PRN For aPTT less than 40  ondansetron Injectable 4 milliGRAM(s) IV Push every 8 hours PRN Nausea and/or Vomiting          Allergies    No Known Allergies    Intolerances    04-15    146<H>  |  111<H>  |  16  ----------------------------<  168<H>  3.8   |  30  |  1.80<H>    Ca    8.6      15 Apr 2024 06:48          Hemoglobin A1C:     Vitamin B12     RADIOLOGY    ASSESSMENT AND PLAN:      seen for ams/aphasia lasting almost 24 hr  likely seizure  brain mri- unremarkable for acute cva    continue AC  EEG is pending  No anti-seizure med  Physical therapy evaluation.  OOB to chair/ambulation with assistance only.  Pain is accessed and addressed.  Plan of care was discussed with family(wife)tions answered.  Would continue to follow.

## 2024-04-15 NOTE — PROGRESS NOTE ADULT - PROBLEM SELECTOR PLAN 5
Chronic, follows with Dr. Caro  - EKbpm, QTc 458, Sinus adolph w/ 1st degree AV block with PACs  - Trop 8.0   - On home ASA and statin  - Hold oral meds pending speech and swallow eval

## 2024-04-15 NOTE — PROGRESS NOTE ADULT - SUBJECTIVE AND OBJECTIVE BOX
Patient is a 78y old  Male who presents with a chief complaint of CVA (13 Apr 2024 00:56)       HPI:  Patient is a 77yo M with a PMH of COPD, HTN, HLD, DM2, CVA (4/2015), dementia, TAMMY on CPAP who presents to the ED with AMS. Patient was altered on admission so history was obtained from wife. Per wife, around 9:30pm, patient became very confused and was just repeating the word "yes." Wife notes that patient was also slurring his speech. These symptoms were similar to his last CVA in 2015. NIHSS in ED was 11. Telestoke was consulted and rec against TNK as patient last took his home Eliquis at 2:00pm.     ED course:  Vitals: /58, HR 53, RR 16 SpO2 95% on RA  Labs: H/H 10.3/33.1, BUN/Cr 45/2.4, eGFR 27, Lactate 2.3  UA: Orange, protein 100, large blood, rbc 32, bacteria occasional  Given: IV Rocephin x1, 1L NaCl bolus x1    Imaging  CT HEAD: No large territory acute infarct, intracranial hemorrhage, or mass effect.   CT PERFUSION: No core infarct or acute ischemic penumbra.  CT ANGIOGRAPHY BRAIN: No large vessel occlusion, significant stenosis, aneurysm or vascular malformation.  CT ANGIOGRAPHY NECK: Vasculature of the neck is patent without significant stenosis or dissection.   (13 Apr 2024 00:56)    Renal consulted for SARAY. Chart reviewed. Patient is confused.     No acute events noted       PAST MEDICAL & SURGICAL HISTORY:  Diabetes mellitus      Hypertension      COPD (chronic obstructive pulmonary disease)      HLD (hyperlipidemia)      Dementia      CVA (cerebral vascular accident)      H/O hand surgery           FAMILY HISTORY:  Family history of CABG (Father)    [place]    Social History:Non smoker    MEDICATIONS  (STANDING):  dextrose 10% Bolus 125 milliLiter(s) IV Bolus once  dextrose 5% + sodium chloride 0.9%. 1000 milliLiter(s) (55 mL/Hr) IV Continuous <Continuous>  dextrose 5%. 1000 milliLiter(s) (50 mL/Hr) IV Continuous <Continuous>  dextrose 5%. 1000 milliLiter(s) (100 mL/Hr) IV Continuous <Continuous>  dextrose 50% Injectable 12.5 Gram(s) IV Push once  dextrose 50% Injectable 25 Gram(s) IV Push once  glucagon  Injectable 1 milliGRAM(s) IntraMuscular once  insulin lispro (ADMELOG) corrective regimen sliding scale   SubCutaneous at bedtime  insulin lispro (ADMELOG) corrective regimen sliding scale   SubCutaneous every 6 hours    MEDICATIONS  (PRN):  dextrose Oral Gel 15 Gram(s) Oral once PRN Blood Glucose LESS THAN 70 milliGRAM(s)/deciliter  heparin   Injectable 3000 Unit(s) IV Push every 6 hours PRN For aPTT between 40 - 57  heparin   Injectable 6500 Unit(s) IV Push every 6 hours PRN For aPTT less than 40  ondansetron Injectable 4 milliGRAM(s) IV Push every 8 hours PRN Nausea and/or Vomiting   Meds reviewed    Allergies    No Known Allergies    Intolerances         REVIEW OF SYSTEMS:  Confused    Vital Signs Last 24 Hrs  T(C): 36.3 (15 Apr 2024 11:20), Max: 36.8 (14 Apr 2024 21:20)  T(F): 97.4 (15 Apr 2024 11:20), Max: 98.2 (14 Apr 2024 21:20)  HR: 88 (15 Apr 2024 11:20) (51 - 88)  BP: 127/75 (15 Apr 2024 11:20) (127/75 - 163/79)  BP(mean): --  RR: 18 (15 Apr 2024 11:20) (18 - 20)  SpO2: 93% (15 Apr 2024 11:20) (84% - 99%)    Parameters below as of 15 Apr 2024 11:20  Patient On (Oxygen Delivery Method): nasal cannula  O2 Flow (L/min): 3.5      PHYSICAL EXAM:    GENERAL: NAD  HEAD:  Atraumatic, Normocephalic  NERVOUS SYSTEM:  Awake and Alert, confused  CHEST/LUNG: Clear to auscultation bilaterally  HEART: Regular rate and rhythm; No murmurs, rubs, or gallops  ABDOMEN: Soft, Nontender, Nondistended; Bowel sounds present  EXTREMITIES:  No Edema        LABS:                        10.8   5.17  )-----------( 196      ( 15 Apr 2024 06:48 )             34.8     04-15    146<H>  |  111<H>  |  16  ----------------------------<  168<H>  3.8   |  30  |  1.80<H>    Ca    8.6      15 Apr 2024 06:48      PTT - ( 15 Apr 2024 06:48 )  PTT:40.7 sec  Urinalysis Basic - ( 15 Apr 2024 06:48 )    Color: x / Appearance: x / SG: x / pH: x  Gluc: 168 mg/dL / Ketone: x  / Bili: x / Urobili: x   Blood: x / Protein: x / Nitrite: x   Leuk Esterase: x / RBC: x / WBC x   Sq Epi: x / Non Sq Epi: x / Bacteria: x        ABG - ( 13 Apr 2024 16:15 )  pH, Arterial: 7.41  pH, Blood: x     /  pCO2: 43    /  pO2: 67    / HCO3: 27    / Base Excess: 2.7   /  SaO2: 94.7

## 2024-04-15 NOTE — SWALLOW BEDSIDE ASSESSMENT ADULT - ADDITIONAL RECOMMENDATIONS
This dept to f/u as schedule permits for diet tolerance and trial of swallow therapy. MD further advised to reconsult this service should concern re: diet management and/or change in status arise.

## 2024-04-15 NOTE — CONSULT NOTE ADULT - ASSESSMENT
Physical Exam:   Vital Signs Last 24 Hrs  T(C): 36.3 (15 Apr 2024 11:20), Max: 36.8 (14 Apr 2024 21:20)  T(F): 97.4 (15 Apr 2024 11:20), Max: 98.2 (14 Apr 2024 21:20)  HR: 90 (15 Apr 2024 11:31) (51 - 90)  BP: 127/75 (15 Apr 2024 11:31) (127/75 - 163/79)  BP(mean): --  RR: 18 (15 Apr 2024 11:20) (18 - 20)  SpO2: 93% (15 Apr 2024 11:31) (84% - 99%)    Parameters below as of 15 Apr 2024 11:31  Patient On (Oxygen Delivery Method): nasal cannula  O2 Flow (L/min): 3.5    CAPILLARY BLOOD GLUCOSE      POCT Blood Glucose.: 302 mg/dL (15 Apr 2024 12:52)  POCT Blood Glucose.: 148 mg/dL (15 Apr 2024 06:18)  POCT Blood Glucose.: 152 mg/dL (15 Apr 2024 00:11)  POCT Blood Glucose.: 164 mg/dL (14 Apr 2024 21:31)  POCT Blood Glucose.: 188 mg/dL (14 Apr 2024 17:44)      Cholesterol, Serum: 113 mg/dL (05.19.21 @ 08:36)     HDL Cholesterol, Serum: 22 mg/dL (05.19.21 @ 08:36)     LDL Cholesterol Calculated: 66 mg/dL (05.19.21 @ 08:36)     DIET: CC  >50%

## 2024-04-15 NOTE — SWALLOW BEDSIDE ASSESSMENT ADULT - COMMENTS
Pt was alert, cooperative and positioned upright in bed for a clinical assessment of swallow function this AM. Per charting, pt is a "77yo M with a PMH of COPD, HTN, HLD, DM2, CVA (4/2015), dementia, TAMMY on CPAP admitted for CVA workup."    Chart review further revealed code grey called overnight - note reviewed with verbal clearance obtained from RN (Gisel) for provision of PO trials.     Recent CXR revealed "No evidence of acute cardiopulmonary disease."    At the time of today's assessment, pt is A&Ox2 though confusion noted. Pt frequently engaging in tangential speech requiring redirection from clinician. Supplemental oxygen in place via nasal cannula.

## 2024-04-15 NOTE — SWALLOW BEDSIDE ASSESSMENT ADULT - SWALLOW EVAL: RECOMMENDED DIET
minced and moist solids with thin liquids minced and moist solids with thin liquids. assist with meals as pt required redirection at times throughout assessment.

## 2024-04-15 NOTE — PROVIDER CONTACT NOTE (CRITICAL VALUE NOTIFICATION) - SITUATION
pt on heparin drip. ptt>200. but ptt 6hr post drip is not due till after 0843.
Pt. .3
Pt is on hep gtts at 19mL/hr

## 2024-04-15 NOTE — CONSULT NOTE ADULT - PROBLEM SELECTOR RECOMMENDATION 9
Type 2 A1c 8.6% adm AMS  pt started on CC diet  increase to mod ISS ACHS  will add basal insulin if BG persistently high  Recommend endocrine-Perlman onconsult  FU appt: TBA  DSC recommendations: return to home, metformin 1000mg BID, alogliptin 12.5mg daily and glimepiride 4mg BID and continuous glucose monitoring, lifestyle and diet modifications  diabetes education provided as documented above  Diabetes support info and cell # [phone] given   Goal 100-180 mg/dL; 140-180 mg/dL in critical care areas

## 2024-04-15 NOTE — PHARMACOTHERAPY INTERVENTION NOTE - COMMENTS
Patient is a 78 year old male with A fib being transitioned from a heparin drip to Eliquis. Discussed with Dr. Crews. Heparin drip was stopped earlier but Eliquis not set to start until 20:00. Clarified that when being transitioned from heparin to Eliquis, first Eliquis dose should be given within 1-2 hours of stopping drip. MD initially chose to delay Eliquis start due to elevated aPTT. Clarified that in this circumstance, heparin should be held for a maximum of 1 hour and re-started at a lower dose. MD agreed and Eliquis dose set to start at 6pm instead. Order entered.

## 2024-04-15 NOTE — CONSULT NOTE ADULT - SUBJECTIVE AND OBJECTIVE BOX
Patient is a 78y old  Male who presents with a chief complaint of CVA (15 Apr 2024 14:29)    pt A&Ox1, disoriented to place time and situation, history and information obtained from pt spouse Saundra @ bedside and chart  Type 2 DM "had for most of his adult life", no known complications but with frequent hyperglycemia and hx CVA. [facility], planned to f/u with endocrinologist in June 2024. Rx home metformin 1000mg BID, alogliptin 12.5mg daily, glimepiride 4mg BID before meals. current A1c 8.6%, wife reports usually low 8%, started using freestyle marco antonio CGM for monitoring, wife reports readings usually  with occasional spikes >250 after eating, has had 2-3 episodes hypoglycemia ~50mg/dL at night, especially on days when not eating "healthy" explained moa of CARDOZA and risk of hypoglycemia, recc taking 2mg @ dinner when not eating much, reviewed s/s of hypoglycemia and management w/ 15/15 rule, avoid overtreating. reviewed CC diet and carb identification, pt eats a lot of sweets, spouse notes that patient had cake on april 11th with subsequent sugar spikes followed by episode AMS the following night. discussed risks of persistent hyperglycemia and vascular complications. reviewed insulin for improved glycemic control. plan to f/u with endo for treatment regimen. verbal education and handouts provided.   diabetes education provided- A1c measure and BG targets  fasting, <180 2 hours postmeal. medication MOA and considerations/side effects, inhospital BGM frequency and insulin administration, s/s of hyperglycemia/hypoglycemia and management, glycemic control and preventing complications, consistent carb diet, balanced plate method, consistent meal planning. sick day management, provider f/u  Hx htn, CVA, HLD, dementia    HPI:  Patient is a 77yo M with a PMH of COPD, HTN, HLD, DM2, CVA (4/2015), dementia, TAMMY on CPAP who presents to the ED with AMS. Patient was altered on admission so history was obtained from wife. Per wife, around 9:30pm, patient became very confused and was just repeating the word "yes." Wife notes that patient was also slurring his speech. These symptoms were similar to his last CVA in 2015. NIHSS in ED was 11. Telestoke was consulted and rec against TNK as patient last took his home Eliquis at 2:00pm.     ED course:  Vitals: /58, HR 53, RR 16 SpO2 95% on RA  Labs: H/H 10.3/33.1, BUN/Cr 45/2.4, eGFR 27, Lactate 2.3  UA: Orange, protein 100, large blood, rbc 32, bacteria occasional  Given: IV Rocephin x1, 1L NaCl bolus x1    Imaging  CT HEAD: No large territory acute infarct, intracranial hemorrhage, or mass effect.   CT PERFUSION: No core infarct or acute ischemic penumbra.  CT ANGIOGRAPHY BRAIN: No large vessel occlusion, significant stenosis, aneurysm or vascular malformation.  CT ANGIOGRAPHY NECK: Vasculature of the neck is patent without significant stenosis or dissection.   (13 Apr 2024 00:56)      PAST MEDICAL & SURGICAL HISTORY:  Diabetes mellitus      Hypertension      COPD (chronic obstructive pulmonary disease)      HLD (hyperlipidemia)      Dementia      CVA (cerebral vascular accident)      H/O hand surgery    Allergies    No Known Allergies    Intolerances        MEDICATIONS  (STANDING):  apixaban 5 milliGRAM(s) Oral every 12 hours  aspirin enteric coated 81 milliGRAM(s) Oral daily  atorvastatin 40 milliGRAM(s) Oral at bedtime  busPIRone 5 milliGRAM(s) Oral <User Schedule>  dextrose 10% Bolus 125 milliLiter(s) IV Bolus once  dextrose 5% + sodium chloride 0.45%. 1000 milliLiter(s) (60 mL/Hr) IV Continuous <Continuous>  dextrose 5%. 1000 milliLiter(s) (50 mL/Hr) IV Continuous <Continuous>  dextrose 5%. 1000 milliLiter(s) (100 mL/Hr) IV Continuous <Continuous>  dextrose 50% Injectable 25 Gram(s) IV Push once  dextrose 50% Injectable 12.5 Gram(s) IV Push once  donepezil 10 milliGRAM(s) Oral at bedtime  escitalopram 20 milliGRAM(s) Oral daily  glucagon  Injectable 1 milliGRAM(s) IntraMuscular once  hemorrhoidal Ointment 1 Application(s) Rectal two times a day  insulin lispro (ADMELOG) corrective regimen sliding scale   SubCutaneous every 6 hours  insulin lispro (ADMELOG) corrective regimen sliding scale   SubCutaneous at bedtime

## 2024-04-15 NOTE — SOCIAL WORK PROGRESS NOTE - NSSWPROGRESSNOTE_GEN_ALL_CORE
Discussed today at rounds. Spoke with neurologist who confirmed patient did not have a stroke. I met with spouse at bedside. Informed her [facility] denied for acute rehab as he does not have diagnosis for acute. Informed her we could pursue the other acute facilities but she is now stating she agrees to sub-acute rehab. Provided d/c planning folder including sub-acute list. She is requesting referrals to [facility] and [facility]. ADDISON requested. Social work to follow.

## 2024-04-16 ENCOUNTER — RESULT REVIEW (OUTPATIENT)
Age: 79
End: 2024-04-16

## 2024-04-16 DIAGNOSIS — R41.82 ALTERED MENTAL STATUS, UNSPECIFIED: ICD-10-CM

## 2024-04-16 LAB
% ALBUMIN: 51.9 % — SIGNIFICANT CHANGE UP
% ALPHA 1: 4.7 % — SIGNIFICANT CHANGE UP
% ALPHA 2: 17.1 % — SIGNIFICANT CHANGE UP
% BETA: 10.3 % — SIGNIFICANT CHANGE UP
% GAMMA: 16 % — SIGNIFICANT CHANGE UP
% M SPIKE: 10.8 % — SIGNIFICANT CHANGE UP
ALBUMIN SERPL ELPH-MCNC: 2.8 G/DL — LOW (ref 3.6–5.5)
ALBUMIN/GLOB SERPL ELPH: 1.1 RATIO — SIGNIFICANT CHANGE UP
ALPHA1 GLOB SERPL ELPH-MCNC: 0.2 G/DL — SIGNIFICANT CHANGE UP (ref 0.1–0.4)
ALPHA2 GLOB SERPL ELPH-MCNC: 0.9 G/DL — SIGNIFICANT CHANGE UP (ref 0.5–1)
ANA TITR SER: NEGATIVE — SIGNIFICANT CHANGE UP
ANION GAP SERPL CALC-SCNC: 6 MMOL/L — SIGNIFICANT CHANGE UP (ref 5–17)
B-GLOBULIN SERPL ELPH-MCNC: 0.5 G/DL — SIGNIFICANT CHANGE UP (ref 0.5–1)
BUN SERPL-MCNC: 21 MG/DL — SIGNIFICANT CHANGE UP (ref 7–23)
CALCIUM SERPL-MCNC: 8.1 MG/DL — LOW (ref 8.5–10.1)
CHLORIDE SERPL-SCNC: 113 MMOL/L — HIGH (ref 96–108)
CO2 SERPL-SCNC: 27 MMOL/L — SIGNIFICANT CHANGE UP (ref 22–31)
CREAT SERPL-MCNC: 1.6 MG/DL — HIGH (ref 0.5–1.3)
EGFR: 44 ML/MIN/1.73M2 — LOW
GAMMA GLOBULIN: 0.8 G/DL — SIGNIFICANT CHANGE UP (ref 0.6–1.6)
GLUCOSE SERPL-MCNC: 149 MG/DL — HIGH (ref 70–99)
HCT VFR BLD CALC: 31.6 % — LOW (ref 39–50)
HGB BLD-MCNC: 9.9 G/DL — LOW (ref 13–17)
M-SPIKE: 0.6 G/DL — HIGH (ref 0–0)
MAGNESIUM SERPL-MCNC: 1.8 MG/DL — SIGNIFICANT CHANGE UP (ref 1.6–2.6)
MCHC RBC-ENTMCNC: 30.3 PG — SIGNIFICANT CHANGE UP (ref 27–34)
MCHC RBC-ENTMCNC: 31.3 GM/DL — LOW (ref 32–36)
MCV RBC AUTO: 96.6 FL — SIGNIFICANT CHANGE UP (ref 80–100)
NRBC # BLD: 0 /100 WBCS — SIGNIFICANT CHANGE UP (ref 0–0)
PHOSPHATE SERPL-MCNC: 3.1 MG/DL — SIGNIFICANT CHANGE UP (ref 2.5–4.5)
PLATELET # BLD AUTO: 182 K/UL — SIGNIFICANT CHANGE UP (ref 150–400)
POTASSIUM SERPL-MCNC: 3.8 MMOL/L — SIGNIFICANT CHANGE UP (ref 3.5–5.3)
POTASSIUM SERPL-SCNC: 3.8 MMOL/L — SIGNIFICANT CHANGE UP (ref 3.5–5.3)
PROT PATTERN SERPL ELPH-IMP: SIGNIFICANT CHANGE UP
PROT SERPL-MCNC: 5.3 G/DL — LOW (ref 6–8.3)
RBC # BLD: 3.27 M/UL — LOW (ref 4.2–5.8)
RBC # FLD: 13.8 % — SIGNIFICANT CHANGE UP (ref 10.3–14.5)
SODIUM SERPL-SCNC: 146 MMOL/L — HIGH (ref 135–145)
TSH SERPL-MCNC: 2.06 UIU/ML — SIGNIFICANT CHANGE UP (ref 0.36–3.74)
WBC # BLD: 5.17 K/UL — SIGNIFICANT CHANGE UP (ref 3.8–10.5)
WBC # FLD AUTO: 5.17 K/UL — SIGNIFICANT CHANGE UP (ref 3.8–10.5)

## 2024-04-16 PROCEDURE — 93306 TTE W/DOPPLER COMPLETE: CPT | Mod: 26

## 2024-04-16 PROCEDURE — 99233 SBSQ HOSP IP/OBS HIGH 50: CPT

## 2024-04-16 PROCEDURE — 99221 1ST HOSP IP/OBS SF/LOW 40: CPT

## 2024-04-16 RX ORDER — SODIUM CHLORIDE 9 MG/ML
1000 INJECTION, SOLUTION INTRAVENOUS
Refills: 0 | Status: DISCONTINUED | OUTPATIENT
Start: 2024-04-16 | End: 2024-04-18

## 2024-04-16 RX ORDER — VALPROIC ACID (AS SODIUM SALT) 250 MG/5ML
500 SOLUTION, ORAL ORAL
Refills: 0 | Status: DISCONTINUED | OUTPATIENT
Start: 2024-04-16 | End: 2024-04-17

## 2024-04-16 RX ORDER — OLANZAPINE 15 MG/1
2.5 TABLET, FILM COATED ORAL EVERY 6 HOURS
Refills: 0 | Status: DISCONTINUED | OUTPATIENT
Start: 2024-04-16 | End: 2024-04-17

## 2024-04-16 RX ORDER — SODIUM CHLORIDE 9 MG/ML
1000 INJECTION, SOLUTION INTRAVENOUS
Refills: 0 | Status: DISCONTINUED | OUTPATIENT
Start: 2024-04-16 | End: 2024-04-16

## 2024-04-16 RX ORDER — QUETIAPINE FUMARATE 200 MG/1
12.5 TABLET, FILM COATED ORAL AT BEDTIME
Refills: 0 | Status: DISCONTINUED | OUTPATIENT
Start: 2024-04-16 | End: 2024-04-17

## 2024-04-16 RX ORDER — IPRATROPIUM/ALBUTEROL SULFATE 18-103MCG
3 AEROSOL WITH ADAPTER (GRAM) INHALATION ONCE
Refills: 0 | Status: COMPLETED | OUTPATIENT
Start: 2024-04-16 | End: 2024-04-16

## 2024-04-16 RX ADMIN — Medication 2: at 23:50

## 2024-04-16 RX ADMIN — Medication 2: at 13:03

## 2024-04-16 RX ADMIN — ATORVASTATIN CALCIUM 40 MILLIGRAM(S): 80 TABLET, FILM COATED ORAL at 21:55

## 2024-04-16 RX ADMIN — QUETIAPINE FUMARATE 12.5 MILLIGRAM(S): 200 TABLET, FILM COATED ORAL at 21:55

## 2024-04-16 RX ADMIN — Medication 55 MILLIGRAM(S): at 14:20

## 2024-04-16 RX ADMIN — ESCITALOPRAM OXALATE 20 MILLIGRAM(S): 10 TABLET, FILM COATED ORAL at 18:21

## 2024-04-16 RX ADMIN — APIXABAN 5 MILLIGRAM(S): 2.5 TABLET, FILM COATED ORAL at 18:21

## 2024-04-16 RX ADMIN — Medication 3 MILLILITER(S): at 09:19

## 2024-04-16 RX ADMIN — SODIUM CHLORIDE 75 MILLILITER(S): 9 INJECTION, SOLUTION INTRAVENOUS at 18:30

## 2024-04-16 RX ADMIN — PHENYLEPHRINE-SHARK LIVER OIL-MINERAL OIL-PETROLATUM RECTAL OINTMENT 1 APPLICATION(S): at 06:41

## 2024-04-16 RX ADMIN — DONEPEZIL HYDROCHLORIDE 10 MILLIGRAM(S): 10 TABLET, FILM COATED ORAL at 21:55

## 2024-04-16 RX ADMIN — Medication 1: at 22:00

## 2024-04-16 RX ADMIN — Medication 81 MILLIGRAM(S): at 18:22

## 2024-04-16 RX ADMIN — SODIUM CHLORIDE 60 MILLILITER(S): 9 INJECTION, SOLUTION INTRAVENOUS at 00:41

## 2024-04-16 RX ADMIN — Medication 2: at 18:21

## 2024-04-16 NOTE — PROGRESS NOTE ADULT - SUBJECTIVE AND OBJECTIVE BOX
PROGRESS NOTE:   Authored by Dr. Torrey Crews MD, Available on MS Teams    Patient is a 78y old  Male who presents with a chief complaint of CVA (16 Apr 2024 11:42)      SUBJECTIVE / OVERNIGHT EVENTS: Patient agitated and having hallucinations overnight, received zyprexa and haldol. Patient sleeping this morning. Wife at bedside     ADDITIONAL REVIEW OF SYSTEMS:    MEDICATIONS  (STANDING):  apixaban 5 milliGRAM(s) Oral every 12 hours  aspirin enteric coated 81 milliGRAM(s) Oral daily  atorvastatin 40 milliGRAM(s) Oral at bedtime  busPIRone 5 milliGRAM(s) Oral <User Schedule>  dextrose 10% Bolus 125 milliLiter(s) IV Bolus once  dextrose 5% + sodium chloride 0.45%. 1000 milliLiter(s) (60 mL/Hr) IV Continuous <Continuous>  dextrose 5%. 1000 milliLiter(s) (50 mL/Hr) IV Continuous <Continuous>  dextrose 5%. 1000 milliLiter(s) (100 mL/Hr) IV Continuous <Continuous>  dextrose 50% Injectable 12.5 Gram(s) IV Push once  dextrose 50% Injectable 25 Gram(s) IV Push once  donepezil 10 milliGRAM(s) Oral at bedtime  escitalopram 20 milliGRAM(s) Oral daily  glucagon  Injectable 1 milliGRAM(s) IntraMuscular once  hemorrhoidal Ointment 1 Application(s) Rectal two times a day  insulin lispro (ADMELOG) corrective regimen sliding scale   SubCutaneous every 6 hours  insulin lispro (ADMELOG) corrective regimen sliding scale   SubCutaneous at bedtime  valproate sodium   IVPB 500 milliGRAM(s) IV Intermittent two times a day    MEDICATIONS  (PRN):  albuterol/ipratropium for Nebulization 3 milliLiter(s) Nebulizer every 6 hours PRN Shortness of Breath and/or Wheezing  bisacodyl Suppository 10 milliGRAM(s) Rectal daily PRN Constipation  dextrose Oral Gel 15 Gram(s) Oral once PRN Blood Glucose LESS THAN 70 milliGRAM(s)/deciliter  ondansetron Injectable 4 milliGRAM(s) IV Push every 8 hours PRN Nausea and/or Vomiting      CAPILLARY BLOOD GLUCOSE      POCT Blood Glucose.: 209 mg/dL (16 Apr 2024 12:15)  POCT Blood Glucose.: 135 mg/dL (16 Apr 2024 06:43)  POCT Blood Glucose.: 135 mg/dL (15 Apr 2024 23:55)  POCT Blood Glucose.: 140 mg/dL (15 Apr 2024 22:42)  POCT Blood Glucose.: 148 mg/dL (15 Apr 2024 17:45)  POCT Blood Glucose.: 302 mg/dL (15 Apr 2024 12:52)    I&O's Summary    15 Apr 2024 07:01  -  16 Apr 2024 07:00  --------------------------------------------------------  IN: 884 mL / OUT: 0 mL / NET: 884 mL        PHYSICAL EXAM:  Vital Signs Last 24 Hrs  T(C): 36.8 (16 Apr 2024 04:11), Max: 36.8 (16 Apr 2024 04:11)  T(F): 98.2 (16 Apr 2024 04:11), Max: 98.2 (16 Apr 2024 04:11)  HR: 58 (16 Apr 2024 09:19) (52 - 87)  BP: 168/67 (16 Apr 2024 04:11) (123/69 - 168/67)  BP(mean): --  RR: 18 (16 Apr 2024 04:11) (18 - 18)  SpO2: 97% (16 Apr 2024 09:19) (92% - 97%)    Parameters below as of 16 Apr 2024 09:19  Patient On (Oxygen Delivery Method): nasal cannula        CONSTITUTIONAL: NAD  RESPIRATORY: Normal respiratory effort; diffuse wheezing  CARDIOVASCULAR: Regular rate and rhythm, normal S1 and S2, no murmur/rub/gallop; No lower extremity edema  ABDOMEN: Nontender to palpation, normoactive bowel sounds, no rebound/guarding  MUSCLOSKELETAL: no clubbing or cyanosis of digits; no joint swelling or tenderness to palpation  PSYCH: A+O x0, unable to assess   LABS:                        9.9    5.17  )-----------( 182      ( 16 Apr 2024 08:21 )             31.6     04-16    146<H>  |  113<H>  |  21  ----------------------------<  149<H>  3.8   |  27  |  1.60<H>    Ca    8.1<L>      16 Apr 2024 08:21  Phos  3.1     04-16  Mg     1.8     04-16      PTT - ( 15 Apr 2024 13:38 )  PTT:191.3 sec      Urinalysis Basic - ( 16 Apr 2024 08:21 )    Color: x / Appearance: x / SG: x / pH: x  Gluc: 149 mg/dL / Ketone: x  / Bili: x / Urobili: x   Blood: x / Protein: x / Nitrite: x   Leuk Esterase: x / RBC: x / WBC x   Sq Epi: x / Non Sq Epi: x / Bacteria: x          RADIOLOGY & ADDITIONAL TESTS:  Results Reviewed:   Imaging Personally Reviewed:  Electrocardiogram Personally Reviewed:    COORDINATION OF CARE:  Care Discussed with Consultants/Other Providers [Y/N]:  Prior or Outpatient Records Reviewed [Y/N]:

## 2024-04-16 NOTE — BH CONSULTATION LIAISON ASSESSMENT NOTE - NSBHCHARTREVIEWLAB_PSY_A_CORE FT
9.9    5.17  )-----------( 182      ( 16 Apr 2024 08:21 )             31.6   04-16    146<H>  |  113<H>  |  21  ----------------------------<  149<H>  3.8   |  27  |  1.60<H>    Ca    8.1<L>      16 Apr 2024 08:21  Phos  3.1     04-16  Mg     1.8     04-16

## 2024-04-16 NOTE — BH CONSULTATION LIAISON ASSESSMENT NOTE - HPI (INCLUDE ILLNESS QUALITY, SEVERITY, DURATION, TIMING, CONTEXT, MODIFYING FACTORS, ASSOCIATED SIGNS AND SYMPTOMS)
Patient seen, evaluated and chart reviewed. Patient is a 79yo MWM with a PMH of COPD, HTN, HLD, DM2, CVA (4/2015), dementia, TAMMY on CPAP who presents to the ED with AMS. Patient was altered on admission so history was obtained from wife. Per wife, around 9:30pm, patient became very confused and was just repeating the word "yes." Wife notes that patient was also slurring his speech. These symptoms were similar to his last CVA in 2015. NIHSS in ED was 11. Telestoke was consulted and rec against TNK as patient last took his home Eliquis at 2:00pm. Patient presents with pleasant confusion, and as per staff gets intermittently agitated.

## 2024-04-16 NOTE — PROGRESS NOTE ADULT - PROBLEM SELECTOR PLAN 4
Chronic. On Metformin, Glimepiride and Alogliptin   - A1c 8.7%  - LDISS  - Hypoglycemia protocol  - Monitor fingersticks

## 2024-04-16 NOTE — PROGRESS NOTE ADULT - SUBJECTIVE AND OBJECTIVE BOX
Patient is a 78y old  Male who presents with a chief complaint of CVA (13 Apr 2024 00:56)       HPI:  Patient is a 77yo M with a PMH of COPD, HTN, HLD, DM2, CVA (4/2015), dementia, TAMMY on CPAP who presents to the ED with AMS. Patient was altered on admission so history was obtained from wife. Per wife, around 9:30pm, patient became very confused and was just repeating the word "yes." Wife notes that patient was also slurring his speech. These symptoms were similar to his last CVA in 2015. NIHSS in ED was 11. Telestoke was consulted and rec against TNK as patient last took his home Eliquis at 2:00pm.     Confused,      Renal consulted for SARAY. Chart reviewed. Patient is confused.     No acute events noted       PAST MEDICAL & SURGICAL HISTORY:  Diabetes mellitus      Hypertension      COPD (chronic obstructive pulmonary disease)      HLD (hyperlipidemia)      Dementia      CVA (cerebral vascular accident)      H/O hand surgery           FAMILY HISTORY:  Family history of CABG (Father)    NC    Social History:Non smoker    MEDICATIONS  (STANDING):  dextrose 10% Bolus 125 milliLiter(s) IV Bolus once  dextrose 5% + sodium chloride 0.9%. 1000 milliLiter(s) (55 mL/Hr) IV Continuous <Continuous>  dextrose 5%. 1000 milliLiter(s) (50 mL/Hr) IV Continuous <Continuous>  dextrose 5%. 1000 milliLiter(s) (100 mL/Hr) IV Continuous <Continuous>  dextrose 50% Injectable 12.5 Gram(s) IV Push once  dextrose 50% Injectable 25 Gram(s) IV Push once  glucagon  Injectable 1 milliGRAM(s) IntraMuscular once  insulin lispro (ADMELOG) corrective regimen sliding scale   SubCutaneous at bedtime  insulin lispro (ADMELOG) corrective regimen sliding scale   SubCutaneous every 6 hours    MEDICATIONS  (PRN):  dextrose Oral Gel 15 Gram(s) Oral once PRN Blood Glucose LESS THAN 70 milliGRAM(s)/deciliter  heparin   Injectable 3000 Unit(s) IV Push every 6 hours PRN For aPTT between 40 - 57  heparin   Injectable 6500 Unit(s) IV Push every 6 hours PRN For aPTT less than 40  ondansetron Injectable 4 milliGRAM(s) IV Push every 8 hours PRN Nausea and/or Vomiting   Meds reviewed    Allergies    No Known Allergies    Intolerances         REVIEW OF SYSTEMS:  Confused    ICU Vital Signs Last 24 Hrs  T(C): 36.8 (16 Apr 2024 20:35), Max: 36.8 (16 Apr 2024 04:11)  T(F): 98.3 (16 Apr 2024 20:35), Max: 98.3 (16 Apr 2024 20:35)  HR: 67 (16 Apr 2024 20:35) (52 - 87)  BP: 151/73 (16 Apr 2024 20:35) (123/69 - 168/67)  BP(mean): --  ABP: --  ABP(mean): --  RR: 18 (16 Apr 2024 20:35) (18 - 18)  SpO2: 97% (16 Apr 2024 09:19) (92% - 97%)    O2 Parameters below as of 16 Apr 2024 20:35  Patient On (Oxygen Delivery Method): nasal cannula  O2 Flow (L/min): 3.5          PHYSICAL EXAM:    GENERAL: NAD  HEAD:  Atraumatic, Normocephalic  NERVOUS SYSTEM:  Awake and Alert, confused  CHEST/LUNG: Clear to auscultation bilaterally  HEART: Regular rate and rhythm; No murmurs, rubs, or gallops  ABDOMEN: Soft, Nontender, Nondistended; Bowel sounds present  EXTREMITIES:  No Edema        LABS:                                     9.9    5.17  )-----------( 182      ( 16 Apr 2024 08:21 )             31.6     04-16    146<H>  |  113<H>  |  21  ----------------------------<  149<H>  3.8   |  27  |  1.60<H>    Ca    8.1<L>      16 Apr 2024 08:21  Phos  3.1     04-16  Mg     1.8     04-16      PTT - ( 15 Apr 2024 13:38 )  PTT:191.3 sec  Urinalysis Basic - ( 16 Apr 2024 08:21 )    Color: x / Appearance: x / SG: x / pH: x  Gluc: 149 mg/dL / Ketone: x  / Bili: x / Urobili: x   Blood: x / Protein: x / Nitrite: x   Leuk Esterase: x / RBC: x / WBC x   Sq Epi: x / Non Sq Epi: x / Bacteria: x

## 2024-04-16 NOTE — PROGRESS NOTE ADULT - SUBJECTIVE AND OBJECTIVE BOX
Neurology follow up note    RANCHO HARDYY78yMale      Interval History:    Patient events noted seen with spouse     MEDICATIONS    albuterol/ipratropium for Nebulization 3 milliLiter(s) Nebulizer every 6 hours PRN  apixaban 5 milliGRAM(s) Oral every 12 hours  aspirin enteric coated 81 milliGRAM(s) Oral daily  atorvastatin 40 milliGRAM(s) Oral at bedtime  bisacodyl Suppository 10 milliGRAM(s) Rectal daily PRN  busPIRone 5 milliGRAM(s) Oral <User Schedule>  dextrose 10% Bolus 125 milliLiter(s) IV Bolus once  dextrose 5% + sodium chloride 0.45%. 1000 milliLiter(s) IV Continuous <Continuous>  dextrose 5%. 1000 milliLiter(s) IV Continuous <Continuous>  dextrose 5%. 1000 milliLiter(s) IV Continuous <Continuous>  dextrose 50% Injectable 12.5 Gram(s) IV Push once  dextrose 50% Injectable 25 Gram(s) IV Push once  dextrose Oral Gel 15 Gram(s) Oral once PRN  donepezil 10 milliGRAM(s) Oral at bedtime  escitalopram 20 milliGRAM(s) Oral daily  glucagon  Injectable 1 milliGRAM(s) IntraMuscular once  hemorrhoidal Ointment 1 Application(s) Rectal two times a day  insulin lispro (ADMELOG) corrective regimen sliding scale   SubCutaneous every 6 hours  insulin lispro (ADMELOG) corrective regimen sliding scale   SubCutaneous at bedtime  ondansetron Injectable 4 milliGRAM(s) IV Push every 8 hours PRN  valproate sodium   IVPB 500 milliGRAM(s) IV Intermittent two times a day      Allergies    No Known Allergies    Intolerances        Height (cm): 170.2 (04-15 @ 15:54)  Weight (kg): 84.4 (04-15 @ 15:54)  BMI (kg/m2): 29.1 (04-15 @ 15:54)    Vital Signs Last 24 Hrs  T(C): 36.8 (16 Apr 2024 04:11), Max: 36.8 (16 Apr 2024 04:11)  T(F): 98.2 (16 Apr 2024 04:11), Max: 98.2 (16 Apr 2024 04:11)  HR: 58 (16 Apr 2024 09:19) (52 - 87)  BP: 168/67 (16 Apr 2024 04:11) (123/69 - 168/67)  BP(mean): --  RR: 18 (16 Apr 2024 04:11) (18 - 18)  SpO2: 97% (16 Apr 2024 09:19) (92% - 97%)    Parameters below as of 16 Apr 2024 09:19  Patient On (Oxygen Delivery Method): nasal cannula          REVIEW OF SYSTEMS:  Limited or unable to obtain secondary to patient's poor mental status.      On Neurological Examination: limited     Mental Status - Patient is Lethargic     Does not follow commands    Speech - say few words                        Cranial Nerves -   extraocular eye movements intact.   intact bilateral NLF    Motor Exam -   With stimuli positive movement of all 4 extremities    Muscle tone - is normal all over.  No asymmetry is seen.        GENERAL Exam: Nontoxic , No Acute Distress   	  HEENT:  normocephalic, atraumatic  		  LUNGS:  Decreased bilaterally  	  HEART: Normal S1S2   No murmur RRR        	  GI/ ABDOMEN:  Soft  Non tender    EXTREMITIES:   No Edema  No Clubbing  No Cyanosis     SKIN: Normal  No Ecchymosis               LABS:  CBC Full  -  ( 16 Apr 2024 08:21 )  WBC Count : 5.17 K/uL  RBC Count : 3.27 M/uL  Hemoglobin : 9.9 g/dL  Hematocrit : 31.6 %  Platelet Count - Automated : 182 K/uL  Mean Cell Volume : 96.6 fl  Mean Cell Hemoglobin : 30.3 pg  Mean Cell Hemoglobin Concentration : 31.3 gm/dL  Auto Neutrophil # : x  Auto Lymphocyte # : x  Auto Monocyte # : x  Auto Eosinophil # : x  Auto Basophil # : x  Auto Neutrophil % : x  Auto Lymphocyte % : x  Auto Monocyte % : x  Auto Eosinophil % : x  Auto Basophil % : x    Urinalysis Basic - ( 16 Apr 2024 08:21 )    Color: x / Appearance: x / SG: x / pH: x  Gluc: 149 mg/dL / Ketone: x  / Bili: x / Urobili: x   Blood: x / Protein: x / Nitrite: x   Leuk Esterase: x / RBC: x / WBC x   Sq Epi: x / Non Sq Epi: x / Bacteria: x      04-16    146<H>  |  113<H>  |  21  ----------------------------<  149<H>  3.8   |  27  |  1.60<H>    Ca    8.1<L>      16 Apr 2024 08:21  Phos  3.1     04-16  Mg     1.8     04-16      Hemoglobin A1C:       Vitamin B12   PTT - ( 15 Apr 2024 13:38 )  PTT:191.3 sec      RADIOLOGY    < from: MR Head No Cont (04.13.24 @ 14:23) >    INTERPRETATION:  CLINICAL INFORMATION: Cerebral vascular accident.    Altered mental status.  Sudden onset of confusion.  Slurred speech.    Hypertension.  Hyperlipidemia.  Type 2 diabetes mellitus.  Prior cerebral   vascular accident in April, 2015.  Dementia.    COMPARISON: CT head stroke protocol with CT perfusion and CT angiography   neck/brain from 04/12/2024.  MRI brain from 04/27/2015.    CONTRAST/COMPLICATIONS:  IV Contrast: NONE  Complications: None reported at time of study completion    TECHNIQUE: MRI of the brain without intravenous contrast was performed   using the following sequences: Sagittal T1 FLAIR, axial DWI, axial T2   FLAIR, axial T2, axial 3D SWAN, axial T1 FLAIR, coronal T2.    FINDINGS:  There is no diffusion restriction to indicate acute or recent subacute   infarct.    A punctate focus of susceptibility in the anterior left frontal region   (6:37-39) is extra-axial and correspond to a small osteoma seen on CT.    There is no MRI evidence of intracranial blood products, subdural   collection, vasogenic edema, mass effect or hydrocephalus.    There is mild generalized cerebral volume loss, with focally more severe   volume loss in the medial temporal lobes bilaterally, consistent with   history of underlying dementia.  Mild, patchy T2 FLAIR signal   hyperintensity in the periventricular white matter is compatible with   chronic microvascular ischemic disease.    There are multiple small chronic transcortical infarcts in the right   frontal convexity and right parietal convexity, and a moderate sized   chronic transcortical infarct in the right temporal lobe, within the   right middle cerebral artery vascular territory.  There is a small   chronic transcortical infarct in the left frontal convexity, within the   left middle cerebral artery vascular territory.  There is a small chronic   white matter infarct in the left frontal corona radiata.  There are small   chronic transcortical infarcts in both occipital lobes, within the   posterior cerebral artery vascular territories bilaterally.  Mild patchy   T2 FLAIR signal hyperintensity in central kellen compatible with chronic   small vessel ischemic changes.    Midline sagittal structures appear within normal limits.    There is mild patchy prenatal sinus mucosal thickening without air-fluid   level.  There is deviation nasal septum, convex right anteriorly and, to   left posteriorly, with a left-sided bony spur that contacts and deforms   both the left inferior and middle nasal turbinates.    The mastoids are clear bilaterally.    The calvarium, skull base and orbits appear within normal limits.    IMPRESSION:  No MRI evidence of acute intracranial pathology.    Cerebral volume loss and chronic ischemic changes as discussed above.    < end of copied text >    ASSESSMENT AND PLAN:      seen for ams/aphasia prolonged   brain mri- unremarkable for acute cva so susepect do to prolonged event possible seizure event  depakote 500 bid  continue AC  EEG was negative   dementia ARICEPT  psych follow up agitation  spoke to spouse tio   cell     45 minutes of time was spent with the patient, plan of care, reviewing data, with greater than  50% of the visit was spent counseling and/or coordinating care with multidisciplinary healthcare team

## 2024-04-16 NOTE — BH CONSULTATION LIAISON ASSESSMENT NOTE - CURRENT MEDICATION
MEDICATIONS  (STANDING):  apixaban 5 milliGRAM(s) Oral every 12 hours  aspirin enteric coated 81 milliGRAM(s) Oral daily  atorvastatin 40 milliGRAM(s) Oral at bedtime  busPIRone 5 milliGRAM(s) Oral <User Schedule>  dextrose 10% Bolus 125 milliLiter(s) IV Bolus once  dextrose 5% + sodium chloride 0.45%. 1000 milliLiter(s) (75 mL/Hr) IV Continuous <Continuous>  dextrose 5%. 1000 milliLiter(s) (50 mL/Hr) IV Continuous <Continuous>  dextrose 5%. 1000 milliLiter(s) (100 mL/Hr) IV Continuous <Continuous>  dextrose 50% Injectable 12.5 Gram(s) IV Push once  dextrose 50% Injectable 25 Gram(s) IV Push once  donepezil 10 milliGRAM(s) Oral at bedtime  escitalopram 20 milliGRAM(s) Oral daily  glucagon  Injectable 1 milliGRAM(s) IntraMuscular once  hemorrhoidal Ointment 1 Application(s) Rectal two times a day  insulin lispro (ADMELOG) corrective regimen sliding scale   SubCutaneous every 6 hours  insulin lispro (ADMELOG) corrective regimen sliding scale   SubCutaneous at bedtime  valproate sodium   IVPB 500 milliGRAM(s) IV Intermittent two times a day    MEDICATIONS  (PRN):  albuterol/ipratropium for Nebulization 3 milliLiter(s) Nebulizer every 6 hours PRN Shortness of Breath and/or Wheezing  bisacodyl Suppository 10 milliGRAM(s) Rectal daily PRN Constipation  dextrose Oral Gel 15 Gram(s) Oral once PRN Blood Glucose LESS THAN 70 milliGRAM(s)/deciliter  ondansetron Injectable 4 milliGRAM(s) IV Push every 8 hours PRN Nausea and/or Vomiting

## 2024-04-16 NOTE — PROGRESS NOTE ADULT - PROBLEM SELECTOR PLAN 1
Patient with altered mental status and agitation concerning for possible seizure event?  - Now with abnormal labs concerning for MM  - SPEP with M spike, elevated kappa/lambda  - heme onc consulted

## 2024-04-16 NOTE — BH CONSULTATION LIAISON ASSESSMENT NOTE - NSBHCHARTREVIEWVS_PSY_A_CORE FT
Vital Signs Last 24 Hrs  T(C): 36.8 (16 Apr 2024 04:11), Max: 36.8 (16 Apr 2024 04:11)  T(F): 98.2 (16 Apr 2024 04:11), Max: 98.2 (16 Apr 2024 04:11)  HR: 58 (16 Apr 2024 09:19) (52 - 87)  BP: 168/67 (16 Apr 2024 04:11) (123/69 - 168/67)  BP(mean): --  RR: 18 (16 Apr 2024 04:11) (18 - 18)  SpO2: 97% (16 Apr 2024 09:19) (92% - 97%)    Parameters below as of 16 Apr 2024 09:19  Patient On (Oxygen Delivery Method): nasal cannula

## 2024-04-16 NOTE — SOCIAL WORK PROGRESS NOTE - NSSWPROGRESSNOTE_GEN_ALL_CORE
Discussed today at rounds. Patient was more confused last night.  [facility] denied, [facility] accepted. I met with wife today at bedside and informed her. She now prefers [facility], referral sent , if not she  will agree to [facility]. Social work to follow.

## 2024-04-16 NOTE — PROGRESS NOTE ADULT - PROBLEM SELECTOR PLAN 3
SARAY on admission. Appears to have CKD per chart review.   - BUN/Cr 45/2.4  - eGFR 27  - Lactate 2.3, normalized  - IVF (D5 + LR) per nephro  - Monitor daily metabolic panel  - Avoid nephrotoxic meds   - Nephro Dr. Varghese consulted, recs appreciated

## 2024-04-16 NOTE — PROGRESS NOTE ADULT - SUBJECTIVE AND OBJECTIVE BOX
Patient is a 78y old  Male who presents with a chief complaint of CVA (13 Apr 2024 00:56)       HPI:  Patient is a 79yo M with a PMH of COPD, HTN, HLD, DM2, CVA (4/2015), dementia, TAMMY on CPAP who presents to the ED with AMS. Patient was altered on admission so history was obtained from wife. Per wife, around 9:30pm, patient became very confused and was just repeating the word "yes." Wife notes that patient was also slurring his speech. These symptoms were similar to his last CVA in 2015. NIHSS in ED was 11. Telestoke was consulted and rec against TNK as patient last took his home Eliquis at 2:00pm.     Confused,      Renal consulted for SARAY. Chart reviewed. Patient is confused.     No acute events noted       PAST MEDICAL & SURGICAL HISTORY:  Diabetes mellitus      Hypertension      COPD (chronic obstructive pulmonary disease)      HLD (hyperlipidemia)      Dementia      CVA (cerebral vascular accident)      H/O hand surgery           FAMILY HISTORY:  Family history of CABG (Father)    NC    Social History:Non smoker    MEDICATIONS  (STANDING):  dextrose 10% Bolus 125 milliLiter(s) IV Bolus once  dextrose 5% + sodium chloride 0.9%. 1000 milliLiter(s) (55 mL/Hr) IV Continuous <Continuous>  dextrose 5%. 1000 milliLiter(s) (50 mL/Hr) IV Continuous <Continuous>  dextrose 5%. 1000 milliLiter(s) (100 mL/Hr) IV Continuous <Continuous>  dextrose 50% Injectable 12.5 Gram(s) IV Push once  dextrose 50% Injectable 25 Gram(s) IV Push once  glucagon  Injectable 1 milliGRAM(s) IntraMuscular once  insulin lispro (ADMELOG) corrective regimen sliding scale   SubCutaneous at bedtime  insulin lispro (ADMELOG) corrective regimen sliding scale   SubCutaneous every 6 hours    MEDICATIONS  (PRN):  dextrose Oral Gel 15 Gram(s) Oral once PRN Blood Glucose LESS THAN 70 milliGRAM(s)/deciliter  heparin   Injectable 3000 Unit(s) IV Push every 6 hours PRN For aPTT between 40 - 57  heparin   Injectable 6500 Unit(s) IV Push every 6 hours PRN For aPTT less than 40  ondansetron Injectable 4 milliGRAM(s) IV Push every 8 hours PRN Nausea and/or Vomiting   Meds reviewed    Allergies    No Known Allergies    Intolerances         REVIEW OF SYSTEMS:  Confused    ICU Vital Signs Last 24 Hrs  T(C): 36.8 (16 Apr 2024 04:11), Max: 36.8 (16 Apr 2024 04:11)  T(F): 98.2 (16 Apr 2024 04:11), Max: 98.2 (16 Apr 2024 04:11)  HR: 58 (16 Apr 2024 09:19) (52 - 87)  BP: 168/67 (16 Apr 2024 04:11) (123/69 - 168/67)  BP(mean): --  ABP: --  ABP(mean): --  RR: 18 (16 Apr 2024 04:11) (18 - 18)  SpO2: 97% (16 Apr 2024 09:19) (92% - 97%)    O2 Parameters below as of 16 Apr 2024 09:19  Patient On (Oxygen Delivery Method): nasal cannula              PHYSICAL EXAM:    GENERAL: NAD  HEAD:  Atraumatic, Normocephalic  NERVOUS SYSTEM:  Awake and Alert, confused  CHEST/LUNG: Clear to auscultation bilaterally  HEART: Regular rate and rhythm; No murmurs, rubs, or gallops  ABDOMEN: Soft, Nontender, Nondistended; Bowel sounds present  EXTREMITIES:  No Edema        LABS:                                   9.9    5.17  )-----------( 182      ( 16 Apr 2024 08:21 )             31.6     04-16    146<H>  |  113<H>  |  21  ----------------------------<  149<H>  3.8   |  27  |  1.60<H>    Ca    8.1<L>      16 Apr 2024 08:21  Phos  3.1     04-16  Mg     1.8     04-16      PTT - ( 15 Apr 2024 13:38 )  PTT:191.3 sec  Urinalysis Basic - ( 16 Apr 2024 08:21 )    Color: x / Appearance: x / SG: x / pH: x  Gluc: 149 mg/dL / Ketone: x  / Bili: x / Urobili: x   Blood: x / Protein: x / Nitrite: x   Leuk Esterase: x / RBC: x / WBC x   Sq Epi: x / Non Sq Epi: x / Bacteria: x

## 2024-04-17 ENCOUNTER — TRANSCRIPTION ENCOUNTER (OUTPATIENT)
Age: 79
End: 2024-04-17

## 2024-04-17 LAB
ALBUMIN SERPL ELPH-MCNC: 2.7 G/DL — LOW (ref 3.3–5)
ALP SERPL-CCNC: 71 U/L — SIGNIFICANT CHANGE UP (ref 40–120)
ALT FLD-CCNC: 20 U/L — SIGNIFICANT CHANGE UP (ref 12–78)
ANION GAP SERPL CALC-SCNC: 6 MMOL/L — SIGNIFICANT CHANGE UP (ref 5–17)
AST SERPL-CCNC: 26 U/L — SIGNIFICANT CHANGE UP (ref 15–37)
BASOPHILS # BLD AUTO: 0.02 K/UL — SIGNIFICANT CHANGE UP (ref 0–0.2)
BASOPHILS NFR BLD AUTO: 0.4 % — SIGNIFICANT CHANGE UP (ref 0–2)
BILIRUB SERPL-MCNC: 0.3 MG/DL — SIGNIFICANT CHANGE UP (ref 0.2–1.2)
BUN SERPL-MCNC: 14 MG/DL — SIGNIFICANT CHANGE UP (ref 7–23)
CALCIUM SERPL-MCNC: 8.2 MG/DL — LOW (ref 8.5–10.1)
CHLORIDE SERPL-SCNC: 107 MMOL/L — SIGNIFICANT CHANGE UP (ref 96–108)
CO2 SERPL-SCNC: 31 MMOL/L — SIGNIFICANT CHANGE UP (ref 22–31)
CREAT ?TM UR-MCNC: 103 MG/DL — SIGNIFICANT CHANGE UP
CREAT SERPL-MCNC: 1.4 MG/DL — HIGH (ref 0.5–1.3)
EGFR: 52 ML/MIN/1.73M2 — LOW
EOSINOPHIL # BLD AUTO: 0.29 K/UL — SIGNIFICANT CHANGE UP (ref 0–0.5)
EOSINOPHIL NFR BLD AUTO: 5.2 % — SIGNIFICANT CHANGE UP (ref 0–6)
FOLATE SERPL-MCNC: 9 NG/ML — SIGNIFICANT CHANGE UP
GLUCOSE SERPL-MCNC: 215 MG/DL — HIGH (ref 70–99)
HCT VFR BLD CALC: 29.3 % — LOW (ref 39–50)
HGB BLD-MCNC: 9.3 G/DL — LOW (ref 13–17)
IMM GRANULOCYTES NFR BLD AUTO: 0.4 % — SIGNIFICANT CHANGE UP (ref 0–0.9)
KAPPA LC SER QL IFE: 4.34 MG/DL — HIGH (ref 0.33–1.94)
KAPPA/LAMBDA FREE LIGHT CHAIN RATIO, SERUM: 2.44 RATIO — HIGH (ref 0.26–1.65)
LAMBDA LC SER QL IFE: 1.78 MG/DL — SIGNIFICANT CHANGE UP (ref 0.57–2.63)
LYMPHOCYTES # BLD AUTO: 0.85 K/UL — LOW (ref 1–3.3)
LYMPHOCYTES # BLD AUTO: 15.3 % — SIGNIFICANT CHANGE UP (ref 13–44)
MAGNESIUM SERPL-MCNC: 1.8 MG/DL — SIGNIFICANT CHANGE UP (ref 1.6–2.6)
MCHC RBC-ENTMCNC: 30.2 PG — SIGNIFICANT CHANGE UP (ref 27–34)
MCHC RBC-ENTMCNC: 31.7 GM/DL — LOW (ref 32–36)
MCV RBC AUTO: 95.1 FL — SIGNIFICANT CHANGE UP (ref 80–100)
MONOCYTES # BLD AUTO: 0.57 K/UL — SIGNIFICANT CHANGE UP (ref 0–0.9)
MONOCYTES NFR BLD AUTO: 10.3 % — SIGNIFICANT CHANGE UP (ref 2–14)
NEUTROPHILS # BLD AUTO: 3.81 K/UL — SIGNIFICANT CHANGE UP (ref 1.8–7.4)
NEUTROPHILS NFR BLD AUTO: 68.4 % — SIGNIFICANT CHANGE UP (ref 43–77)
NRBC # BLD: 0 /100 WBCS — SIGNIFICANT CHANGE UP (ref 0–0)
OSMOLALITY UR: 519 MOSM/KG — SIGNIFICANT CHANGE UP (ref 50–1200)
PHOSPHATE SERPL-MCNC: 2.4 MG/DL — LOW (ref 2.5–4.5)
PLATELET # BLD AUTO: 152 K/UL — SIGNIFICANT CHANGE UP (ref 150–400)
POTASSIUM SERPL-MCNC: 3.7 MMOL/L — SIGNIFICANT CHANGE UP (ref 3.5–5.3)
POTASSIUM SERPL-SCNC: 3.7 MMOL/L — SIGNIFICANT CHANGE UP (ref 3.5–5.3)
PROT ?TM UR-MCNC: 64 MG/DL — HIGH (ref 0–12)
PROT SERPL-MCNC: 6 G/DL — SIGNIFICANT CHANGE UP (ref 6–8.3)
PROT SERPL-MCNC: 6.1 G/DL — SIGNIFICANT CHANGE UP (ref 6–8.3)
PROT/CREAT UR-RTO: 0.6 RATIO — HIGH (ref 0–0.2)
RBC # BLD: 3.08 M/UL — LOW (ref 4.2–5.8)
RBC # FLD: 13.7 % — SIGNIFICANT CHANGE UP (ref 10.3–14.5)
SODIUM SERPL-SCNC: 144 MMOL/L — SIGNIFICANT CHANGE UP (ref 135–145)
SODIUM UR-SCNC: 114 MMOL/L — SIGNIFICANT CHANGE UP
UUN UR-MCNC: 558 MG/DL — SIGNIFICANT CHANGE UP
VIT B12 SERPL-MCNC: 885 PG/ML — SIGNIFICANT CHANGE UP (ref 232–1245)
WBC # BLD: 5.56 K/UL — SIGNIFICANT CHANGE UP (ref 3.8–10.5)
WBC # FLD AUTO: 5.56 K/UL — SIGNIFICANT CHANGE UP (ref 3.8–10.5)

## 2024-04-17 PROCEDURE — 99233 SBSQ HOSP IP/OBS HIGH 50: CPT | Mod: GC

## 2024-04-17 RX ORDER — OLANZAPINE 15 MG/1
5 TABLET, FILM COATED ORAL ONCE
Refills: 0 | Status: DISCONTINUED | OUTPATIENT
Start: 2024-04-17 | End: 2024-04-17

## 2024-04-17 RX ORDER — INSULIN LISPRO 100/ML
VIAL (ML) SUBCUTANEOUS
Refills: 0 | Status: DISCONTINUED | OUTPATIENT
Start: 2024-04-17 | End: 2024-04-22

## 2024-04-17 RX ORDER — POTASSIUM PHOSPHATE, MONOBASIC POTASSIUM PHOSPHATE, DIBASIC 236; 224 MG/ML; MG/ML
15 INJECTION, SOLUTION INTRAVENOUS ONCE
Refills: 0 | Status: COMPLETED | OUTPATIENT
Start: 2024-04-17 | End: 2024-04-17

## 2024-04-17 RX ORDER — VALPROIC ACID (AS SODIUM SALT) 250 MG/5ML
500 SOLUTION, ORAL ORAL
Refills: 0 | Status: DISCONTINUED | OUTPATIENT
Start: 2024-04-17 | End: 2024-04-22

## 2024-04-17 RX ORDER — DEXTROSE 50 % IN WATER 50 %
12.5 SYRINGE (ML) INTRAVENOUS ONCE
Refills: 0 | Status: DISCONTINUED | OUTPATIENT
Start: 2024-04-17 | End: 2024-04-22

## 2024-04-17 RX ORDER — DEXTROSE 10 % IN WATER 10 %
125 INTRAVENOUS SOLUTION INTRAVENOUS ONCE
Refills: 0 | Status: DISCONTINUED | OUTPATIENT
Start: 2024-04-17 | End: 2024-04-22

## 2024-04-17 RX ORDER — MORPHINE SULFATE 50 MG/1
2 CAPSULE, EXTENDED RELEASE ORAL ONCE
Refills: 0 | Status: DISCONTINUED | OUTPATIENT
Start: 2024-04-17 | End: 2024-04-17

## 2024-04-17 RX ORDER — INSULIN LISPRO 100/ML
VIAL (ML) SUBCUTANEOUS AT BEDTIME
Refills: 0 | Status: DISCONTINUED | OUTPATIENT
Start: 2024-04-17 | End: 2024-04-22

## 2024-04-17 RX ORDER — DEXTROSE 50 % IN WATER 50 %
15 SYRINGE (ML) INTRAVENOUS ONCE
Refills: 0 | Status: DISCONTINUED | OUTPATIENT
Start: 2024-04-17 | End: 2024-04-22

## 2024-04-17 RX ORDER — DEXTROSE 50 % IN WATER 50 %
25 SYRINGE (ML) INTRAVENOUS ONCE
Refills: 0 | Status: DISCONTINUED | OUTPATIENT
Start: 2024-04-17 | End: 2024-04-22

## 2024-04-17 RX ORDER — OLANZAPINE 15 MG/1
5 TABLET, FILM COATED ORAL ONCE
Refills: 0 | Status: COMPLETED | OUTPATIENT
Start: 2024-04-17 | End: 2024-04-17

## 2024-04-17 RX ORDER — SODIUM CHLORIDE 9 MG/ML
1000 INJECTION, SOLUTION INTRAVENOUS
Refills: 0 | Status: DISCONTINUED | OUTPATIENT
Start: 2024-04-17 | End: 2024-04-22

## 2024-04-17 RX ORDER — MAGNESIUM SULFATE 500 MG/ML
1 VIAL (ML) INJECTION ONCE
Refills: 0 | Status: COMPLETED | OUTPATIENT
Start: 2024-04-17 | End: 2024-04-17

## 2024-04-17 RX ORDER — OLANZAPINE 15 MG/1
5 TABLET, FILM COATED ORAL EVERY 6 HOURS
Refills: 0 | Status: DISCONTINUED | OUTPATIENT
Start: 2024-04-17 | End: 2024-04-22

## 2024-04-17 RX ORDER — GLUCAGON INJECTION, SOLUTION 0.5 MG/.1ML
1 INJECTION, SOLUTION SUBCUTANEOUS ONCE
Refills: 0 | Status: DISCONTINUED | OUTPATIENT
Start: 2024-04-17 | End: 2024-04-22

## 2024-04-17 RX ORDER — DIVALPROEX SODIUM 500 MG/1
500 TABLET, DELAYED RELEASE ORAL ONCE
Refills: 0 | Status: DISCONTINUED | OUTPATIENT
Start: 2024-04-17 | End: 2024-04-17

## 2024-04-17 RX ORDER — IRON SUCROSE 20 MG/ML
100 INJECTION, SOLUTION INTRAVENOUS EVERY 24 HOURS
Refills: 0 | Status: COMPLETED | OUTPATIENT
Start: 2024-04-17 | End: 2024-04-19

## 2024-04-17 RX ORDER — IPRATROPIUM/ALBUTEROL SULFATE 18-103MCG
3 AEROSOL WITH ADAPTER (GRAM) INHALATION ONCE
Refills: 0 | Status: COMPLETED | OUTPATIENT
Start: 2024-04-17 | End: 2024-04-17

## 2024-04-17 RX ORDER — QUETIAPINE FUMARATE 200 MG/1
25 TABLET, FILM COATED ORAL
Refills: 0 | Status: DISCONTINUED | OUTPATIENT
Start: 2024-04-17 | End: 2024-04-18

## 2024-04-17 RX ORDER — OLANZAPINE 15 MG/1
2.5 TABLET, FILM COATED ORAL ONCE
Refills: 0 | Status: DISCONTINUED | OUTPATIENT
Start: 2024-04-17 | End: 2024-04-17

## 2024-04-17 RX ADMIN — PHENYLEPHRINE-SHARK LIVER OIL-MINERAL OIL-PETROLATUM RECTAL OINTMENT 1 APPLICATION(S): at 05:14

## 2024-04-17 RX ADMIN — ATORVASTATIN CALCIUM 40 MILLIGRAM(S): 80 TABLET, FILM COATED ORAL at 21:18

## 2024-04-17 RX ADMIN — OLANZAPINE 5 MILLIGRAM(S): 15 TABLET, FILM COATED ORAL at 16:04

## 2024-04-17 RX ADMIN — OLANZAPINE 2.5 MILLIGRAM(S): 15 TABLET, FILM COATED ORAL at 00:04

## 2024-04-17 RX ADMIN — Medication 8: at 12:56

## 2024-04-17 RX ADMIN — Medication 3 MILLILITER(S): at 20:48

## 2024-04-17 RX ADMIN — ESCITALOPRAM OXALATE 20 MILLIGRAM(S): 10 TABLET, FILM COATED ORAL at 11:14

## 2024-04-17 RX ADMIN — OLANZAPINE 5 MILLIGRAM(S): 15 TABLET, FILM COATED ORAL at 22:20

## 2024-04-17 RX ADMIN — Medication 3 MILLILITER(S): at 15:24

## 2024-04-17 RX ADMIN — APIXABAN 5 MILLIGRAM(S): 2.5 TABLET, FILM COATED ORAL at 18:10

## 2024-04-17 RX ADMIN — Medication 5 MILLIGRAM(S): at 10:51

## 2024-04-17 RX ADMIN — APIXABAN 5 MILLIGRAM(S): 2.5 TABLET, FILM COATED ORAL at 05:14

## 2024-04-17 RX ADMIN — Medication 55 MILLIGRAM(S): at 02:07

## 2024-04-17 RX ADMIN — Medication 3 MILLILITER(S): at 00:09

## 2024-04-17 RX ADMIN — POTASSIUM PHOSPHATE, MONOBASIC POTASSIUM PHOSPHATE, DIBASIC 62.5 MILLIMOLE(S): 236; 224 INJECTION, SOLUTION INTRAVENOUS at 12:29

## 2024-04-17 RX ADMIN — Medication 3 MILLILITER(S): at 20:45

## 2024-04-17 RX ADMIN — DONEPEZIL HYDROCHLORIDE 10 MILLIGRAM(S): 10 TABLET, FILM COATED ORAL at 21:18

## 2024-04-17 RX ADMIN — IRON SUCROSE 100 MILLIGRAM(S): 20 INJECTION, SOLUTION INTRAVENOUS at 18:10

## 2024-04-17 RX ADMIN — OLANZAPINE 2.5 MILLIGRAM(S): 15 TABLET, FILM COATED ORAL at 14:51

## 2024-04-17 RX ADMIN — Medication 81 MILLIGRAM(S): at 11:14

## 2024-04-17 RX ADMIN — Medication 1: at 06:40

## 2024-04-17 RX ADMIN — Medication 100 GRAM(S): at 11:14

## 2024-04-17 RX ADMIN — QUETIAPINE FUMARATE 25 MILLIGRAM(S): 200 TABLET, FILM COATED ORAL at 21:17

## 2024-04-17 RX ADMIN — Medication 500 MILLIGRAM(S): at 14:23

## 2024-04-17 NOTE — PROGRESS NOTE ADULT - SUBJECTIVE AND OBJECTIVE BOX
Patient is a 78y old  Male who presents with a chief complaint of CVA (13 Apr 2024 00:56)       HPI:  Patient is a 79yo M with a PMH of COPD, HTN, HLD, DM2, CVA (4/2015), dementia, TAMMY on CPAP who presents to the ED with AMS. Patient was altered on admission so history was obtained from wife. Per wife, around 9:30pm, patient became very confused and was just repeating the word "yes." Wife notes that patient was also slurring his speech. These symptoms were similar to his last CVA in 2015. NIHSS in ED was 11. [facility] was consulted and rec against TNK as patient last took his home Eliquis at 2:00pm.     Confused,      Renal consulted for SARAY. Chart reviewed. Patient is confused.     No acute events noted       PAST MEDICAL & SURGICAL HISTORY:  Diabetes mellitus      Hypertension      COPD (chronic obstructive pulmonary disease)      HLD (hyperlipidemia)      Dementia      CVA (cerebral vascular accident)      H/O hand surgery           FAMILY HISTORY:  Family history of CABG (Father)    [place]    Social History:Non smoker    MEDICATIONS  (STANDING):  dextrose 10% Bolus 125 milliLiter(s) IV Bolus once  dextrose 5% + sodium chloride 0.9%. 1000 milliLiter(s) (55 mL/Hr) IV Continuous <Continuous>  dextrose 5%. 1000 milliLiter(s) (50 mL/Hr) IV Continuous <Continuous>  dextrose 5%. 1000 milliLiter(s) (100 mL/Hr) IV Continuous <Continuous>  dextrose 50% Injectable 12.5 Gram(s) IV Push once  dextrose 50% Injectable 25 Gram(s) IV Push once  glucagon  Injectable 1 milliGRAM(s) IntraMuscular once  insulin lispro (ADMELOG) corrective regimen sliding scale   SubCutaneous at bedtime  insulin lispro (ADMELOG) corrective regimen sliding scale   SubCutaneous every 6 hours    MEDICATIONS  (PRN):  dextrose Oral Gel 15 Gram(s) Oral once PRN Blood Glucose LESS THAN 70 milliGRAM(s)/deciliter  heparin   Injectable 3000 Unit(s) IV Push every 6 hours PRN For aPTT between 40 - 57  heparin   Injectable 6500 Unit(s) IV Push every 6 hours PRN For aPTT less than 40  ondansetron Injectable 4 milliGRAM(s) IV Push every 8 hours PRN Nausea and/or Vomiting   Meds reviewed    Allergies    No Known Allergies    Intolerances         REVIEW OF SYSTEMS:  Confused    Vital Signs Last 24 Hrs  T(C): 36.9 (17 Apr 2024 06:30), Max: 36.9 (17 Apr 2024 06:30)  T(F): 98.4 (17 Apr 2024 06:30), Max: 98.4 (17 Apr 2024 06:30)  HR: 53 (17 Apr 2024 06:30) (53 - 88)  BP: 178/79 (17 Apr 2024 06:30) (151/73 - 178/79)  BP(mean): --  RR: 18 (17 Apr 2024 06:30) (18 - 18)  SpO2: 98% (17 Apr 2024 06:30) (96% - 98%)    Parameters below as of 17 Apr 2024 06:30  Patient On (Oxygen Delivery Method): nasal cannula  O2 Flow (L/min): 3.5        PHYSICAL EXAM:    GENERAL: NAD  HEAD:  Atraumatic, Normocephalic  NERVOUS SYSTEM:  Awake and Alert, confused  CHEST/LUNG: Clear to auscultation bilaterally  HEART: Regular rate and rhythm; No murmurs, rubs, or gallops  ABDOMEN: Soft, Nontender, Nondistended; Bowel sounds present  EXTREMITIES:  No Edema        LABS:                                   9.9    5.17  )-----------( 182      ( 16 Apr 2024 08:21 )             31.6     04-16    146<H>  |  113<H>  |  21  ----------------------------<  149<H>  3.8   |  27  |  1.60<H>    Ca    8.1<L>      16 Apr 2024 08:21  Phos  3.1     04-16  Mg     1.8     04-16      PTT - ( 15 Apr 2024 13:38 )  PTT:191.3 sec  Urinalysis Basic - ( 16 Apr 2024 08:21 )    Color: x / Appearance: x / SG: x / pH: x  Gluc: 149 mg/dL / Ketone: x  / Bili: x / Urobili: x   Blood: x / Protein: x / Nitrite: x   Leuk Esterase: x / RBC: x / WBC x   Sq Epi: x / Non Sq Epi: x / Bacteria: x

## 2024-04-17 NOTE — PROGRESS NOTE ADULT - PROBLEM SELECTOR PLAN 3
SARAY on admission, BUN/Cr 45/2.4 -- Resolved, Cr 1.4  - IVF (D5 1/2 NS) per nephro  - Monitor daily metabolic panel  - Avoid nephrotoxic meds   - Nephro Dr. Varghese consulted, recs appreciated SARAY on admission, BUN/Cr 45/2.4 -- Resolved, Cr 1.4  - IVF (D5 1/2 NS) per nephro  - Monitor daily metabolic panel  - Avoid nephrotoxic meds   - Nephro Dr. Varghese consulted

## 2024-04-17 NOTE — PROGRESS NOTE ADULT - SUBJECTIVE AND OBJECTIVE BOX
Patient is a 78y old  Male who presents with a chief complaint of CVA (17 Apr 2024 09:18)      INTERVAL HPI/OVERNIGHT EVENTS: Pt seen/examined at bedside, wife also present. Overnight, pt noted to have increased agitation and increased work of breathing s/p exertion and agitation. Agitation resolved with zyprexa x1 and work of breathing improved w/ venti mask. Pt calm, awake, and interactive this morning. States that he feels well.    MEDICATIONS  (STANDING):  albuterol/ipratropium for Nebulization. 3 milliLiter(s) Nebulizer once  apixaban 5 milliGRAM(s) Oral every 12 hours  aspirin enteric coated 81 milliGRAM(s) Oral daily  atorvastatin 40 milliGRAM(s) Oral at bedtime  busPIRone 5 milliGRAM(s) Oral <User Schedule>  dextrose 10% Bolus 125 milliLiter(s) IV Bolus once  dextrose 5% + sodium chloride 0.45%. 1000 milliLiter(s) (75 mL/Hr) IV Continuous <Continuous>  dextrose 5%. 1000 milliLiter(s) (50 mL/Hr) IV Continuous <Continuous>  dextrose 5%. 1000 milliLiter(s) (100 mL/Hr) IV Continuous <Continuous>  dextrose 50% Injectable 12.5 Gram(s) IV Push once  dextrose 50% Injectable 25 Gram(s) IV Push once  donepezil 10 milliGRAM(s) Oral at bedtime  escitalopram 20 milliGRAM(s) Oral daily  glucagon  Injectable 1 milliGRAM(s) IntraMuscular once  hemorrhoidal Ointment 1 Application(s) Rectal two times a day  insulin lispro (ADMELOG) corrective regimen sliding scale   SubCutaneous at bedtime  insulin lispro (ADMELOG) corrective regimen sliding scale   SubCutaneous every 6 hours  magnesium sulfate  IVPB 1 Gram(s) IV Intermittent once  potassium phosphate IVPB 15 milliMole(s) IV Intermittent once  QUEtiapine 12.5 milliGRAM(s) Oral at bedtime  valproate sodium   IVPB 500 milliGRAM(s) IV Intermittent two times a day    MEDICATIONS  (PRN):  albuterol/ipratropium for Nebulization 3 milliLiter(s) Nebulizer every 6 hours PRN Shortness of Breath and/or Wheezing  bisacodyl Suppository 10 milliGRAM(s) Rectal daily PRN Constipation  dextrose Oral Gel 15 Gram(s) Oral once PRN Blood Glucose LESS THAN 70 milliGRAM(s)/deciliter  OLANZapine Injectable 2.5 milliGRAM(s) IntraMuscular every 6 hours PRN Agitation  ondansetron Injectable 4 milliGRAM(s) IV Push every 8 hours PRN Nausea and/or Vomiting      Allergies    No Known Allergies    Intolerances        REVIEW OF SYSTEMS:  Unable to assess 2/2 dementia, AMS    Vital Signs Last 24 Hrs  T(C): 36.9 (17 Apr 2024 06:30), Max: 36.9 (17 Apr 2024 06:30)  T(F): 98.4 (17 Apr 2024 06:30), Max: 98.4 (17 Apr 2024 06:30)  HR: 53 (17 Apr 2024 06:30) (53 - 88)  BP: 178/79 (17 Apr 2024 06:30) (151/73 - 178/79)  BP(mean): --  RR: 18 (17 Apr 2024 06:30) (18 - 18)  SpO2: 98% (17 Apr 2024 06:30) (96% - 98%)    Parameters below as of 17 Apr 2024 06:30  Patient On (Oxygen Delivery Method): nasal cannula  O2 Flow (L/min): 3.5      PHYSICAL EXAM:  GENERAL: NAD  HEENT:  anicteric, moist mucous membranes  CHEST/LUNG:  + b/l wheezing  HEART:  RRR, S1, S2  ABDOMEN:  BS+, soft, nontender, nondistended  EXTREMITIES: no edema, cyanosis, or calf tenderness  NERVOUS SYSTEM: A&O x 1 (person)    LABS:                        9.3    5.56  )-----------( 152      ( 17 Apr 2024 08:41 )             29.3     CBC Full  -  ( 17 Apr 2024 08:41 )  WBC Count : 5.56 K/uL  Hemoglobin : 9.3 g/dL  Hematocrit : 29.3 %  Platelet Count - Automated : 152 K/uL  Mean Cell Volume : 95.1 fl  Mean Cell Hemoglobin : 30.2 pg  Mean Cell Hemoglobin Concentration : 31.7 gm/dL  Auto Neutrophil # : 3.81 K/uL  Auto Lymphocyte # : 0.85 K/uL  Auto Monocyte # : 0.57 K/uL  Auto Eosinophil # : 0.29 K/uL  Auto Basophil # : 0.02 K/uL  Auto Neutrophil % : 68.4 %  Auto Lymphocyte % : 15.3 %  Auto Monocyte % : 10.3 %  Auto Eosinophil % : 5.2 %  Auto Basophil % : 0.4 %    17 Apr 2024 08:41    144    |  107    |  14     ----------------------------<  215    3.7     |  31     |  1.40     Ca    8.2        17 Apr 2024 08:41  Phos  2.4       17 Apr 2024 08:41  Mg     1.8       17 Apr 2024 08:41    TPro  6.0    /  Alb  2.7    /  TBili  0.3    /  DBili  x      /  AST  26     /  ALT  20     /  AlkPhos  71     17 Apr 2024 08:41    PTT - ( 15 Apr 2024 13:38 )  PTT:191.3 sec  Urinalysis Basic - ( 17 Apr 2024 08:41 )    Color: x / Appearance: x / SG: x / pH: x  Gluc: 215 mg/dL / Ketone: x  / Bili: x / Urobili: x   Blood: x / Protein: x / Nitrite: x   Leuk Esterase: x / RBC: x / WBC x   Sq Epi: x / Non Sq Epi: x / Bacteria: x      CAPILLARY BLOOD GLUCOSE      POCT Blood Glucose.: 218 mg/dL (17 Apr 2024 07:44)  POCT Blood Glucose.: 191 mg/dL (17 Apr 2024 06:24)  POCT Blood Glucose.: 209 mg/dL (16 Apr 2024 23:47)  POCT Blood Glucose.: 285 mg/dL (16 Apr 2024 21:58)  POCT Blood Glucose.: 206 mg/dL (16 Apr 2024 17:33)  POCT Blood Glucose.: 209 mg/dL (16 Apr 2024 12:15)        Culture - Urine (collected 04-13-24 @ 00:00)  Source: Catheterized Catheterized  Final Report (04-14-24 @ 14:15):    <10,000 CFU/mL Normal Urogenital Veronica    Culture - Blood (collected 04-12-24 @ 23:00)  Source: .Blood Blood  Preliminary Report (04-17-24 @ 05:00):    No growth at 4 days    Culture - Blood (collected 04-12-24 @ 22:50)  Source: .Blood Blood  Preliminary Report (04-17-24 @ 05:00):    No growth at 4 days        RADIOLOGY & ADDITIONAL TESTS:    Personally reviewed.     Consultant(s) Notes Reviewed:  [x] YES  [ ] NO     Patient is a 78y old  Male who presents with a chief complaint of CVA (17 Apr 2024 09:18)      INTERVAL HPI/OVERNIGHT EVENTS: Pt seen/examined at bedside, wife also present. Overnight, pt noted to have increased agitation and increased work of breathing s/p exertion and agitation. Agitation resolved with zyprexa x1 and work of breathing improved w/ venti mask. Pt calm, awake, and interactive this morning. States that he feels well. Per wife at bedside, she notes that pt has been having episodes of twitching for the past few weeks, although only while asleep.     MEDICATIONS  (STANDING):  albuterol/ipratropium for Nebulization. 3 milliLiter(s) Nebulizer once  apixaban 5 milliGRAM(s) Oral every 12 hours  aspirin enteric coated 81 milliGRAM(s) Oral daily  atorvastatin 40 milliGRAM(s) Oral at bedtime  busPIRone 5 milliGRAM(s) Oral <User Schedule>  dextrose 10% Bolus 125 milliLiter(s) IV Bolus once  dextrose 5% + sodium chloride 0.45%. 1000 milliLiter(s) (75 mL/Hr) IV Continuous <Continuous>  dextrose 5%. 1000 milliLiter(s) (50 mL/Hr) IV Continuous <Continuous>  dextrose 5%. 1000 milliLiter(s) (100 mL/Hr) IV Continuous <Continuous>  dextrose 50% Injectable 12.5 Gram(s) IV Push once  dextrose 50% Injectable 25 Gram(s) IV Push once  donepezil 10 milliGRAM(s) Oral at bedtime  escitalopram 20 milliGRAM(s) Oral daily  glucagon  Injectable 1 milliGRAM(s) IntraMuscular once  hemorrhoidal Ointment 1 Application(s) Rectal two times a day  insulin lispro (ADMELOG) corrective regimen sliding scale   SubCutaneous at bedtime  insulin lispro (ADMELOG) corrective regimen sliding scale   SubCutaneous every 6 hours  magnesium sulfate  IVPB 1 Gram(s) IV Intermittent once  potassium phosphate IVPB 15 milliMole(s) IV Intermittent once  QUEtiapine 12.5 milliGRAM(s) Oral at bedtime  valproate sodium   IVPB 500 milliGRAM(s) IV Intermittent two times a day    MEDICATIONS  (PRN):  albuterol/ipratropium for Nebulization 3 milliLiter(s) Nebulizer every 6 hours PRN Shortness of Breath and/or Wheezing  bisacodyl Suppository 10 milliGRAM(s) Rectal daily PRN Constipation  dextrose Oral Gel 15 Gram(s) Oral once PRN Blood Glucose LESS THAN 70 milliGRAM(s)/deciliter  OLANZapine Injectable 2.5 milliGRAM(s) IntraMuscular every 6 hours PRN Agitation  ondansetron Injectable 4 milliGRAM(s) IV Push every 8 hours PRN Nausea and/or Vomiting      Allergies    No Known Allergies    Intolerances        REVIEW OF SYSTEMS:  Unable to assess 2/2 dementia, AMS    Vital Signs Last 24 Hrs  T(C): 36.9 (17 Apr 2024 06:30), Max: 36.9 (17 Apr 2024 06:30)  T(F): 98.4 (17 Apr 2024 06:30), Max: 98.4 (17 Apr 2024 06:30)  HR: 53 (17 Apr 2024 06:30) (53 - 88)  BP: 178/79 (17 Apr 2024 06:30) (151/73 - 178/79)  BP(mean): --  RR: 18 (17 Apr 2024 06:30) (18 - 18)  SpO2: 98% (17 Apr 2024 06:30) (96% - 98%)    Parameters below as of 17 Apr 2024 06:30  Patient On (Oxygen Delivery Method): nasal cannula  O2 Flow (L/min): 3.5      PHYSICAL EXAM:  GENERAL: NAD  HEENT:  anicteric, moist mucous membranes  CHEST/LUNG:  + b/l wheezing  HEART:  RRR, S1, S2  ABDOMEN:  BS+, soft, nontender, nondistended  EXTREMITIES: no edema, cyanosis, or calf tenderness  NERVOUS SYSTEM: A&O x 1 (person)    LABS:                        9.3    5.56  )-----------( 152      ( 17 Apr 2024 08:41 )             29.3     CBC Full  -  ( 17 Apr 2024 08:41 )  WBC Count : 5.56 K/uL  Hemoglobin : 9.3 g/dL  Hematocrit : 29.3 %  Platelet Count - Automated : 152 K/uL  Mean Cell Volume : 95.1 fl  Mean Cell Hemoglobin : 30.2 pg  Mean Cell Hemoglobin Concentration : 31.7 gm/dL  Auto Neutrophil # : 3.81 K/uL  Auto Lymphocyte # : 0.85 K/uL  Auto Monocyte # : 0.57 K/uL  Auto Eosinophil # : 0.29 K/uL  Auto Basophil # : 0.02 K/uL  Auto Neutrophil % : 68.4 %  Auto Lymphocyte % : 15.3 %  Auto Monocyte % : 10.3 %  Auto Eosinophil % : 5.2 %  Auto Basophil % : 0.4 %    17 Apr 2024 08:41    144    |  107    |  14     ----------------------------<  215    3.7     |  31     |  1.40     Ca    8.2        17 Apr 2024 08:41  Phos  2.4       17 Apr 2024 08:41  Mg     1.8       17 Apr 2024 08:41    TPro  6.0    /  Alb  2.7    /  TBili  0.3    /  DBili  x      /  AST  26     /  ALT  20     /  AlkPhos  71     17 Apr 2024 08:41    PTT - ( 15 Apr 2024 13:38 )  PTT:191.3 sec  Urinalysis Basic - ( 17 Apr 2024 08:41 )    Color: x / Appearance: x / SG: x / pH: x  Gluc: 215 mg/dL / Ketone: x  / Bili: x / Urobili: x   Blood: x / Protein: x / Nitrite: x   Leuk Esterase: x / RBC: x / WBC x   Sq Epi: x / Non Sq Epi: x / Bacteria: x      CAPILLARY BLOOD GLUCOSE      POCT Blood Glucose.: 218 mg/dL (17 Apr 2024 07:44)  POCT Blood Glucose.: 191 mg/dL (17 Apr 2024 06:24)  POCT Blood Glucose.: 209 mg/dL (16 Apr 2024 23:47)  POCT Blood Glucose.: 285 mg/dL (16 Apr 2024 21:58)  POCT Blood Glucose.: 206 mg/dL (16 Apr 2024 17:33)  POCT Blood Glucose.: 209 mg/dL (16 Apr 2024 12:15)        Culture - Urine (collected 04-13-24 @ 00:00)  Source: Catheterized Catheterized  Final Report (04-14-24 @ 14:15):    <10,000 CFU/mL Normal Urogenital Veronica    Culture - Blood (collected 04-12-24 @ 23:00)  Source: .Blood Blood  Preliminary Report (04-17-24 @ 05:00):    No growth at 4 days    Culture - Blood (collected 04-12-24 @ 22:50)  Source: .Blood Blood  Preliminary Report (04-17-24 @ 05:00):    No growth at 4 days        RADIOLOGY & ADDITIONAL TESTS:    Personally reviewed.     Consultant(s) Notes Reviewed:  [x] YES  [ ] NO     Patient is a 78y old  Male who presents with a chief complaint of CVA (17 Apr 2024 09:18)      INTERVAL HPI/OVERNIGHT EVENTS: Pt seen/examined at bedside, wife also present. Overnight, pt noted to have increased agitation and increased work of breathing s/p exertion and agitation. Agitation resolved with zyprexa x1 and work of breathing improved w/ venti mask. Pt calm, awake, and interactive this morning. States that he feels well. Per wife at bedside, she notes that pt has been having episodes of twitching for the past few weeks, although only while asleep.           REVIEW OF SYSTEMS:  Unable to assess 2/2 dementia, AMS    Vital Signs Last 24 Hrs  T(C): 36.9 (17 Apr 2024 06:30), Max: 36.9 (17 Apr 2024 06:30)  T(F): 98.4 (17 Apr 2024 06:30), Max: 98.4 (17 Apr 2024 06:30)  HR: 53 (17 Apr 2024 06:30) (53 - 88)  BP: 178/79 (17 Apr 2024 06:30) (151/73 - 178/79)  BP(mean): --  RR: 18 (17 Apr 2024 06:30) (18 - 18)  SpO2: 98% (17 Apr 2024 06:30) (96% - 98%)    Parameters below as of 17 Apr 2024 06:30  Patient On (Oxygen Delivery Method): nasal cannula  O2 Flow (L/min): 3.5      PHYSICAL EXAM:  GENERAL: NAD  HEENT:  anicteric, moist mucous membranes  CHEST/LUNG:  + b/l wheezing  HEART:  RRR, S1, S2 , no tachy   ABDOMEN:  BS+, soft, nontender, nondistended  EXTREMITIES: no edema, cyanosis, or calf tenderness  NERVOUS SYSTEM: A&O x 1 (person)  gu intact       MEDICATIONS  (STANDING):  albuterol/ipratropium for Nebulization. 3 milliLiter(s) Nebulizer once  apixaban 5 milliGRAM(s) Oral every 12 hours  aspirin enteric coated 81 milliGRAM(s) Oral daily  atorvastatin 40 milliGRAM(s) Oral at bedtime  busPIRone 5 milliGRAM(s) Oral <User Schedule>  dextrose 10% Bolus 125 milliLiter(s) IV Bolus once  dextrose 5% + sodium chloride 0.45%. 1000 milliLiter(s) (75 mL/Hr) IV Continuous <Continuous>  dextrose 5%. 1000 milliLiter(s) (50 mL/Hr) IV Continuous <Continuous>  dextrose 5%. 1000 milliLiter(s) (100 mL/Hr) IV Continuous <Continuous>  dextrose 50% Injectable 12.5 Gram(s) IV Push once  dextrose 50% Injectable 25 Gram(s) IV Push once  donepezil 10 milliGRAM(s) Oral at bedtime  escitalopram 20 milliGRAM(s) Oral daily  glucagon  Injectable 1 milliGRAM(s) IntraMuscular once  hemorrhoidal Ointment 1 Application(s) Rectal two times a day  insulin lispro (ADMELOG) corrective regimen sliding scale   SubCutaneous at bedtime  insulin lispro (ADMELOG) corrective regimen sliding scale   SubCutaneous every 6 hours  magnesium sulfate  IVPB 1 Gram(s) IV Intermittent once  potassium phosphate IVPB 15 milliMole(s) IV Intermittent once  QUEtiapine 12.5 milliGRAM(s) Oral at bedtime  valproate sodium   IVPB 500 milliGRAM(s) IV Intermittent two times a day    MEDICATIONS  (PRN):  albuterol/ipratropium for Nebulization 3 milliLiter(s) Nebulizer every 6 hours PRN Shortness of Breath and/or Wheezing  bisacodyl Suppository 10 milliGRAM(s) Rectal daily PRN Constipation  dextrose Oral Gel 15 Gram(s) Oral once PRN Blood Glucose LESS THAN 70 milliGRAM(s)/deciliter  OLANZapine Injectable 2.5 milliGRAM(s) IntraMuscular every 6 hours PRN Agitation  ondansetron Injectable 4 milliGRAM(s) IV Push every 8 hours PRN Nausea and/or Vomiting      Allergies    No Known Allergies    Intolerances  LABS:                        9.3    5.56  )-----------( 152      ( 17 Apr 2024 08:41 )             29.3     CBC Full  -  ( 17 Apr 2024 08:41 )  WBC Count : 5.56 K/uL  Hemoglobin : 9.3 g/dL  Hematocrit : 29.3 %  Platelet Count - Automated : 152 K/uL  Mean Cell Volume : 95.1 fl  Mean Cell Hemoglobin : 30.2 pg  Mean Cell Hemoglobin Concentration : 31.7 gm/dL  Auto Neutrophil # : 3.81 K/uL  Auto Lymphocyte # : 0.85 K/uL  Auto Monocyte # : 0.57 K/uL  Auto Eosinophil # : 0.29 K/uL  Auto Basophil # : 0.02 K/uL  Auto Neutrophil % : 68.4 %  Auto Lymphocyte % : 15.3 %  Auto Monocyte % : 10.3 %  Auto Eosinophil % : 5.2 %  Auto Basophil % : 0.4 %    17 Apr 2024 08:41    144    |  107    |  14     ----------------------------<  215    3.7     |  31     |  1.40     Ca    8.2        17 Apr 2024 08:41  Phos  2.4       17 Apr 2024 08:41  Mg     1.8       17 Apr 2024 08:41    TPro  6.0    /  Alb  2.7    /  TBili  0.3    /  DBili  x      /  AST  26     /  ALT  20     /  AlkPhos  71     17 Apr 2024 08:41    PTT - ( 15 Apr 2024 13:38 )  PTT:191.3 sec  Urinalysis Basic - ( 17 Apr 2024 08:41 )    Color: x / Appearance: x / SG: x / pH: x  Gluc: 215 mg/dL / Ketone: x  / Bili: x / Urobili: x   Blood: x / Protein: x / Nitrite: x   Leuk Esterase: x / RBC: x / WBC x   Sq Epi: x / Non Sq Epi: x / Bacteria: x      CAPILLARY BLOOD GLUCOSE      POCT Blood Glucose.: 218 mg/dL (17 Apr 2024 07:44)  POCT Blood Glucose.: 191 mg/dL (17 Apr 2024 06:24)  POCT Blood Glucose.: 209 mg/dL (16 Apr 2024 23:47)  POCT Blood Glucose.: 285 mg/dL (16 Apr 2024 21:58)  POCT Blood Glucose.: 206 mg/dL (16 Apr 2024 17:33)  POCT Blood Glucose.: 209 mg/dL (16 Apr 2024 12:15)        Culture - Urine (collected 04-13-24 @ 00:00)  Source: Catheterized Catheterized  Final Report (04-14-24 @ 14:15):    <10,000 CFU/mL Normal Urogenital Veronica    Culture - Blood (collected 04-12-24 @ 23:00)  Source: .Blood Blood  Preliminary Report (04-17-24 @ 05:00):    No growth at 4 days    Culture - Blood (collected 04-12-24 @ 22:50)  Source: .Blood Blood  Preliminary Report (04-17-24 @ 05:00):    No growth at 4 days        RADIOLOGY & ADDITIONAL TESTS:    Personally reviewed.     Consultant(s) Notes Reviewed:  [x] YES  [ ] NO

## 2024-04-17 NOTE — PROGRESS NOTE ADULT - SUBJECTIVE AND OBJECTIVE BOX
Neurology follow up note    RANCHO [id number]      Interval History:    Patient events noted received morphine     Allergies    No Known Allergies    Intolerances        MEDICATIONS    albuterol/ipratropium for Nebulization 3 milliLiter(s) Nebulizer every 6 hours PRN  albuterol/ipratropium for Nebulization. 3 milliLiter(s) Nebulizer once  apixaban 5 milliGRAM(s) Oral every 12 hours  aspirin enteric coated 81 milliGRAM(s) Oral daily  atorvastatin 40 milliGRAM(s) Oral at bedtime  bisacodyl Suppository 10 milliGRAM(s) Rectal daily PRN  busPIRone 5 milliGRAM(s) Oral <User Schedule>  dextrose 10% Bolus 125 milliLiter(s) IV Bolus once  dextrose 5% + sodium chloride 0.45%. 1000 milliLiter(s) IV Continuous <Continuous>  dextrose 5%. 1000 milliLiter(s) IV Continuous <Continuous>  dextrose 5%. 1000 milliLiter(s) IV Continuous <Continuous>  dextrose 50% Injectable 12.5 Gram(s) IV Push once  dextrose 50% Injectable 25 Gram(s) IV Push once  dextrose Oral Gel 15 Gram(s) Oral once PRN  donepezil 10 milliGRAM(s) Oral at bedtime  escitalopram 20 milliGRAM(s) Oral daily  glucagon  Injectable 1 milliGRAM(s) IntraMuscular once  hemorrhoidal Ointment 1 Application(s) Rectal two times a day  insulin lispro (ADMELOG) corrective regimen sliding scale   SubCutaneous at bedtime  insulin lispro (ADMELOG) corrective regimen sliding scale   SubCutaneous every 6 hours  magnesium sulfate  IVPB 1 Gram(s) IV Intermittent once  OLANZapine Injectable 2.5 milliGRAM(s) IntraMuscular every 6 hours PRN  ondansetron Injectable 4 milliGRAM(s) IV Push every 8 hours PRN  potassium phosphate IVPB 15 milliMole(s) IV Intermittent once  QUEtiapine 12.5 milliGRAM(s) Oral at bedtime  valproate sodium   IVPB 500 milliGRAM(s) IV Intermittent two times a day              Vital Signs Last 24 Hrs  T(C): 36.9 (17 Apr 2024 06:30), Max: 36.9 (17 Apr 2024 06:30)  T(F): 98.4 (17 Apr 2024 06:30), Max: 98.4 (17 Apr 2024 06:30)  HR: 53 (17 Apr 2024 06:30) (53 - 88)  BP: 178/79 (17 Apr 2024 06:30) (151/73 - 178/79)  BP(mean): --  RR: 18 (17 Apr 2024 06:30) (18 - 18)  SpO2: 98% (17 Apr 2024 06:30) (96% - 98%)    Parameters below as of 17 Apr 2024 06:30  Patient On (Oxygen Delivery Method): nasal cannula  O2 Flow (L/min): 3.5        REVIEW OF SYSTEMS:  Limited or unable to obtain secondary to patient's poor mental status.      On Neurological Examination: limited     Mental Status - Patient is Lethargic     Does not follow commands    Speech - say few words                        Cranial Nerves -   extraocular eye movements intact.   intact bilateral NLF    Motor Exam -   With stimuli positive movement of all 4 extremities    Muscle tone - is normal all over.  No asymmetry is seen.      GENERAL Exam: Nontoxic , No Acute Distress   	  HEENT:  normocephalic, atraumatic  		  LUNGS:  Decreased bilaterally  	  HEART: Normal S1S2   No murmur RRR        	  GI/ ABDOMEN:  Soft  Non tender    EXTREMITIES:   No Edema  No Clubbing  No Cyanosis     SKIN: Normal  No Ecchymosis                    LABS:  CBC Full  -  ( 17 Apr 2024 08:41 )  WBC Count : 5.56 K/uL  RBC Count : 3.08 M/uL  Hemoglobin : 9.3 g/dL  Hematocrit : 29.3 %  Platelet Count - Automated : 152 K/uL  Mean Cell Volume : 95.1 fl  Mean Cell Hemoglobin : 30.2 pg  Mean Cell Hemoglobin Concentration : 31.7 gm/dL  Auto Neutrophil # : 3.81 K/uL  Auto Lymphocyte # : 0.85 K/uL  Auto Monocyte # : 0.57 K/uL  Auto Eosinophil # : 0.29 K/uL  Auto Basophil # : 0.02 K/uL  Auto Neutrophil % : 68.4 %  Auto Lymphocyte % : 15.3 %  Auto Monocyte % : 10.3 %  Auto Eosinophil % : 5.2 %  Auto Basophil % : 0.4 %    Urinalysis Basic - ( 17 Apr 2024 08:41 )    Color: x / Appearance: x / SG: x / pH: x  Gluc: 215 mg/dL / Ketone: x  / Bili: x / Urobili: x   Blood: x / Protein: x / Nitrite: x   Leuk Esterase: x / RBC: x / WBC x   Sq Epi: x / Non Sq Epi: x / Bacteria: x      04-17    144  |  107  |  14  ----------------------------<  215<H>  3.7   |  31  |  1.40<H>    Ca    8.2<L>      17 Apr 2024 08:41  Phos  2.4     04-17  Mg     1.8     04-17    TPro  6.0  /  Alb  2.7<L>  /  TBili  0.3  /  DBili  x   /  AST  26  /  ALT  20  /  AlkPhos  71  04-17    Hemoglobin A1C:     LIVER FUNCTIONS - ( 17 Apr 2024 08:41 )  Alb: 2.7 g/dL / Pro: 6.0 g/dL / ALK PHOS: 71 U/L / ALT: 20 U/L / AST: 26 U/L / GGT: x           Vitamin B12 Vitamin B12, Serum: 885 pg/mL (04-16 @ 12:45)    PTT - ( 15 Apr 2024 13:38 )  PTT:191.3 sec      RADIOLOGY      < from: MR Head No Cont (04.13.24 @ 14:23) >    INTERPRETATION:  CLINICAL INFORMATION: Cerebral vascular accident.    Altered mental status.  Sudden onset of confusion.  Slurred speech.    Hypertension.  Hyperlipidemia.  Type 2 diabetes mellitus.  Prior cerebral   vascular accident in April, 2015.  Dementia.    COMPARISON: CT head stroke protocol with CT perfusion and CT angiography   neck/brain from 04/12/2024.  MRI brain from 04/27/2015.    CONTRAST/COMPLICATIONS:  IV Contrast: NONE  Complications: None reported at time of study completion    TECHNIQUE: MRI of the brain without intravenous contrast was performed   using the following sequences: Sagittal T1 FLAIR, axial DWI, axial T2   FLAIR, axial T2, axial 3D SWAN, axial T1 FLAIR, coronal T2.    FINDINGS:  There is no diffusion restriction to indicate acute or recent subacute   infarct.    A punctate focus of susceptibility in the anterior left frontal region   (6:37-39) is extra-axial and correspond to a small osteoma seen on CT.    There is no MRI evidence of intracranial blood products, subdural   collection, vasogenic edema, mass effect or hydrocephalus.    There is mild generalized cerebral volume loss, with focally more severe   volume loss in the medial temporal lobes bilaterally, consistent with   history of underlying dementia.  Mild, patchy T2 FLAIR signal   hyperintensity in the periventricular white matter is compatible with   chronic microvascular ischemic disease.    There are multiple small chronic transcortical infarcts in the right   frontal convexity and right parietal convexity, and a moderate sized   chronic transcortical infarct in the right temporal lobe, within the   right middle cerebral artery vascular territory.  There is a small   chronic transcortical infarct in the left frontal convexity, within the   left middle cerebral artery vascular territory.  There is a small chronic   white matter infarct in the left frontal corona radiata.  There are small   chronic transcortical infarcts in both occipital lobes, within the   posterior cerebral artery vascular territories bilaterally.  Mild patchy   T2 FLAIR signal hyperintensity in central kellen compatible with chronic   small vessel ischemic changes.    Midline sagittal structures appear within normal limits.    There is mild patchy prenatal sinus mucosal thickening without air-fluid   level.  There is deviation nasal septum, convex right anteriorly and, to   left posteriorly, with a left-sided bony spur that contacts and deforms   both the left inferior and middle nasal turbinates.    The mastoids are clear bilaterally.    The calvarium, skull base and orbits appear within normal limits.    IMPRESSION:  No MRI evidence of acute intracranial pathology.    Cerebral volume loss and chronic ischemic changes as discussed above.    < end of copied text >    ASSESSMENT AND PLAN:      seen for ams/aphasia prolonged   brain mri- unremarkable for acute cva so suspect do to prolonged event possible seizure event  depakote 500 bid  continue AC  EEG was negative   dementia ARICEPT  psych follow up agitation will increase seroquel to 25   spoke to spouse tio   cell  4/17 1020am    45 minutes of time was spent with the patient, plan of care, reviewing data, with greater than  50% of the visit was spent counseling and/or coordinating care with multidisciplinary healthcare team.

## 2024-04-17 NOTE — CHART NOTE - NSCHARTNOTEFT_GEN_A_CORE
Code Neo called for increased agitation.   Event verbally de-escalated verbally.   Pt with increased work of breathing s/p exertion and agitation.   Audible wheezing.   On physical exam, pt with expiratory wheezes in upper lung fields with decreased breath sounds at bases.   Pt using accessory muscles to breathe. Satting well.   Pt placed on venti mask.   Will give morphine 2mg IVP x1 for increased work of breathing.   Poor candidate for bipap as pt with increased agitation throughout the night, ripping at IVs and tele leads. Code Neo called for increased agitation.   Event verbally de-escalated verbally.   Pt with increased work of breathing s/p exertion and agitation.   Audible wheezing.   On physical exam, pt with expiratory wheezes in upper lung fields with decreased breath sounds at bases.   Pt using accessory muscles to breathe. Satting well.   Pt placed on venti mask.   Will give morphine 2mg IVP x1 for increased work of breathing.   Poor candidate for bipap as pt with increased agitation throughout the night, ripping at IVs and tele leads.    ---  Pt work of breathing improved per nurse prior to morphine.   will dc morphine

## 2024-04-17 NOTE — PROGRESS NOTE ADULT - PROBLEM SELECTOR PLAN 5
Chronic  - /58 on admission   - Hold all antihypertensives to allow for permissive HTN for 24hrs   - Give IV BP meds should SBP >220  - Monitor routine hemodynamics Chronic  - /58 on admission   - Hold all antihypertensives to allow for permissive HTN for 24hrs   - Give IV BP meds should SBP >220  -

## 2024-04-17 NOTE — PROGRESS NOTE ADULT - PROBLEM SELECTOR PLAN 1
Patient with altered mental status and agitation concerning for possible seizure event?  - EEG: Moderate diffuse/multi-focal cerebral dysfunction, not specific as to etiology. There were no epileptiform abnormalities recorded.  - Now with abnormal labs concerning for MM: SPEP with M spike, elevated kappa/lambda  - continue depakote 500mg BID for possible prolonged seizure event  - Neuro consulted  - heme onc consulted  - Psych consulted for agitation: no psychiatric contraindications to discharge Patient with altered mental status and agitation concerning for possible seizure event?  - EEG: Moderate diffuse/multi-focal cerebral dysfunction, not specific as to etiology. There were no epileptiform abnormalities recorded.  - Now with abnormal labs concerning for MM: SPEP with M spike, elevated kappa/lambda  - continue depakote 500mg BID for possible prolonged seizure event  - Neuro consulted  - heme onc consulted  - Psych consulted for agitation: no psychiatric contraindications to discharge  - Seroquel increased 25mg qHS Patient with altered mental status and agitation concerning for possible seizure event   - EEG: Moderate diffuse/multi-focal cerebral dysfunction, not specific as to etiology. There were no epileptiform abnormalities recorded.  - Now with abnormal labs concerning for MM: SPEP with M spike, elevated kappa/lambda  - continue depakote 500mg BID for possible prolonged seizure event  - Neuro consulted dr dong   - Psych consulted for agitation: no psychiatric contraindications to discharge  - Seroquel increased 25mg qHS

## 2024-04-17 NOTE — CONSULT NOTE ADULT - SUBJECTIVE AND OBJECTIVE BOX
Patient is a 78y old  Male who presents with a chief complaint of CVA (17 Apr 2024 13:50)    HPI:  Patient is a 79yo M with a PMH of COPD, HTN, HLD, DM2, CVA (4/2015), dementia, TAMMY on CPAP who presents to the ED with AMS. Patient was altered on admission so history was obtained from wife. Per wife, around 9:30pm, patient became very confused and was just repeating the word "yes." Wife notes that patient was also slurring his speech. These symptoms were similar to his last CVA in 2015. NIHSS in ED was 11. [facility] was consulted and rec against TNK as patient last took his home Eliquis at 2:00pm.     ED course:  Vitals: /58, HR 53, RR 16 SpO2 95% on RA  Labs: H/H 10.3/33.1, BUN/Cr 45/2.4, eGFR 27, Lactate 2.3  UA: Orange, protein 100, large blood, rbc 32, bacteria occasional  Given: IV Rocephin x1, 1L NaCl bolus x1    Imaging  CT HEAD: No large territory acute infarct, intracranial hemorrhage, or mass effect.   CT PERFUSION: No core infarct or acute ischemic penumbra.  CT ANGIOGRAPHY BRAIN: No large vessel occlusion, significant stenosis, aneurysm or vascular malformation.  CT ANGIOGRAPHY NECK: Vasculature of the neck is patent without significant stenosis or dissection.   (13 Apr 2024 00:56)      PAST MEDICAL & SURGICAL HISTORY:  Diabetes mellitus  Hypertension  COPD (chronic obstructive pulmonary disease)  HLD (hyperlipidemia)  Dementia  CVA (cerebral vascular accident)  H/O hand surgery       HEALTH ISSUES - PROBLEM Dx:  CVA (cerebrovascular accident)  HTN (hypertension)  CAD (coronary artery disease)  SARAY (acute kidney injury)  Type 2 diabetes mellitus  Need for prophylactic measure  Dementia  Altered mental status    Altered mental status [R41.82]  Diabetes mellitus [250.00]  Hypertension [401.9]  COPD (chronic obstructive pulmonary disease) [496]  HLD (hyperlipidemia) [272.4]  Dementia [F03.90]  CVA (cerebral vascular accident) [I63.9]  No significant past surgical history [917892105]  H/O hand surgery [Z98.89]  Dementia [F03.90]      FAMILY HISTORY:  Family history of CABG (Father)      [SOCIAL HISTORY: ]     smoking:  none currently     EtOH:  none currently     illicit drugs:  none currently     occupation:  Retired     marital status:       Other:       [ALLERGIES/INTOLERANCES:]  Allergies     No Known Allergies  Intolerances      [MEDICATIONS]  MEDICATIONS  (STANDING):  albuterol/ipratropium for Nebulization. 3 milliLiter(s) Nebulizer once  apixaban 5 milliGRAM(s) Oral every 12 hours  aspirin enteric coated 81 milliGRAM(s) Oral daily  atorvastatin 40 milliGRAM(s) Oral at bedtime  busPIRone 5 milliGRAM(s) Oral <User Schedule>  dextrose 10% Bolus 125 milliLiter(s) IV Bolus once  dextrose 5% + sodium chloride 0.45%. 1000 milliLiter(s) (75 mL/Hr) IV Continuous <Continuous>  dextrose 5%. 1000 milliLiter(s) (50 mL/Hr) IV Continuous <Continuous>  dextrose 5%. 1000 milliLiter(s) (100 mL/Hr) IV Continuous <Continuous>  dextrose 50% Injectable 12.5 Gram(s) IV Push once  dextrose 50% Injectable 25 Gram(s) IV Push once  donepezil 10 milliGRAM(s) Oral at bedtime  escitalopram 20 milliGRAM(s) Oral daily  glucagon  Injectable 1 milliGRAM(s) IntraMuscular once  hemorrhoidal Ointment 1 Application(s) Rectal two times a day  insulin lispro (ADMELOG) corrective regimen sliding scale   SubCutaneous three times a day before meals  insulin lispro (ADMELOG) corrective regimen sliding scale   SubCutaneous at bedtime  OLANZapine Injectable 5 milliGRAM(s) IntraMuscular once  QUEtiapine 25 milliGRAM(s) Oral <User Schedule>  valproic  acid Syrup 500 milliGRAM(s) Oral two times a day      MEDICATIONS  (PRN):  albuterol/ipratropium for Nebulization 3 milliLiter(s) Nebulizer every 6 hours PRN Shortness of Breath and/or Wheezing  bisacodyl Suppository 10 milliGRAM(s) Rectal daily PRN Constipation  dextrose Oral Gel 15 Gram(s) Oral once PRN Blood Glucose LESS THAN 70 milliGRAM(s)/deciliter  OLANZapine Injectable 2.5 milliGRAM(s) IntraMuscular every 6 hours PRN Agitation  ondansetron Injectable 4 milliGRAM(s) IV Push every 8 hours PRN Nausea and/or Vomiting      [REVIEW OF SYSTEMS: ]  CONSTITUTIONAL: normal, no fever, no shakes, no chills   EYES: No eye pain, no visual disturbances, no discharge  ENMT:  no discharge  NECK: No pain, no stiffness  BREASTS: No pain, no masses, no nipple discharge  RESPIRATORY: No cough, no wheezing, no chills, no hemoptysis; No shortness of breath  CARDIOVASCULAR: No chest pain, no palpitations, no dizziness, no leg swelling  GASTROINTESTINAL: No abdominal, no epigastric pain. No nausea, no vomiting, no hematemesis; No diarrhea , no constipation. No melena, no hematochezia.  GENITOURINARY: No dysuria, no frequency, no hematuria, no incontinence  NEUROLOGICAL: No headaches, no memory loss, no loss of strength, no numbness, no tremors  SKIN: No itching, no burning, no rashes, no lesions   LYMPH NODES: No enlarged glands  ENDOCRINE: No heat or cold intolerance; No hair loss  MUSCULOSKELETAL: No joint pain or swelling; No muscle, no back, no extremity pain  PSYCHIATRIC: No depression, no anxiety, no mood swings, no difficulty sleeping  HEME/LYMPH: No easy bruising, no bleeding gums      [VITALS SIGNS 24hrs]  Vital Signs Last 24 Hrs  T(C): 37 (17 Apr 2024 12:26), Max: 37 (17 Apr 2024 12:26)  T(F): 98.6 (17 Apr 2024 12:26), Max: 98.6 (17 Apr 2024 12:26)  HR: 75 (17 Apr 2024 15:24) (53 - 88)  BP: 137/75 (17 Apr 2024 12:26) (137/75 - 178/79)  BP(mean): --  RR: 18 (17 Apr 2024 12:26) (18 - 18)  SpO2: 97% (17 Apr 2024 15:24) (95% - 98%)    Parameters below as of 17 Apr 2024 15:24  Patient On (Oxygen Delivery Method): nasal cannula    Daily   I&O's Summary  16 Apr 2024 07:01  -  17 Apr 2024 07:00  --------------------------------------------------------  IN: 1650 mL / OUT: 0 mL / NET: 1650 mL  17 Apr 2024 07:01  -  17 Apr 2024 16:14  --------------------------------------------------------  IN: 900 mL / OUT: 325 mL / NET: 575 mL      [PHYSICAL EXAM]  GEN: WD NAD, confused  HEENT: normocephalic and atraumatic. EOMI.     NECK: Supple.  No lymphadenopathy   LUNGS: Clear to auscultation.  HEART: S1S2 Regular rate and rhythm, no MRG  ABDOMEN: Soft, nontender, and nondistended.  Positive bowel sounds.    : No CVA tenderness  EXTREMITIES: trace edema.  NEUROLOGIC: grossly intact.  PSYCHIATRIC: Appropriate affect. but confused  SKIN: No rash .     [LABS: ]                        9.3    5.56  )-----------( 152      ( 17 Apr 2024 08:41 )             29.3     CBC Full  -  ( 17 Apr 2024 08:41 )  WBC Count : 5.56 K/uL  RBC Count : 3.08 M/uL  Hemoglobin : 9.3 g/dL  Hematocrit : 29.3 %  Platelet Count - Automated : 152 K/uL  Mean Cell Volume : 95.1 fl  Mean Cell Hemoglobin : 30.2 pg  Mean Cell Hemoglobin Concentration : 31.7 gm/dL  Auto Neutrophil # : 3.81 K/uL  Auto Lymphocyte # : 0.85 K/uL  Auto Monocyte # : 0.57 K/uL  Auto Eosinophil # : 0.29 K/uL  Auto Basophil # : 0.02 K/uL  Auto Neutrophil % : 68.4 %  Auto Lymphocyte % : 15.3 %  Auto Monocyte % : 10.3 %  Auto Eosinophil % : 5.2 %  Auto Basophil % : 0.4 %    04-17  144  |  107  |  14  ----------------------------<  215<H>  3.7   |  31  |  1.40<H>  Ca    8.2<L>      17 Apr 2024 08:41  Phos  2.4     04-17  Mg     1.8     04-17  TPro  6.0  /  Alb  2.7<L>  /  TBili  0.3  /  DBili  x   /  AST  26  /  ALT  20  /  AlkPhos  71  04-17    LIVER FUNCTIONS - ( 17 Apr 2024 08:41 )  Alb: 2.7 g/dL / Pro: 6.0 g/dL / ALK PHOS: 71 U/L / ALT: 20 U/L / AST: 26 U/L / GGT: x           Urinalysis Basic - ( 17 Apr 2024 08:41 )  Color: x / Appearance: x / SG: x / pH: x  Gluc: 215 mg/dL / Ketone: x  / Bili: x / Urobili: x   Blood: x / Protein: x / Nitrite: x   Leuk Esterase: x / RBC: x / WBC x   Sq Epi: x / Non Sq Epi: x / Bacteria: x      CBC TREND (5 Days)  WBC Count: 5.56 K/uL (04-17 @ 08:41)  WBC Count: 5.17 K/uL (04-16 @ 08:21)  WBC Count: 5.17 K/uL (04-15 @ 06:48)  Hemoglobin: 9.3 g/dL (04-17 @ 08:41)  Hemoglobin: 9.9 g/dL (04-16 @ 08:21)  Hemoglobin: 10.8 g/dL (04-15 @ 06:48)  Hematocrit: 29.3 % (04-17 @ 08:41)  Hematocrit: 31.6 % (04-16 @ 08:21)  Hematocrit: 34.8 % (04-15 @ 06:48)  Platelet Count - Automated: 152 K/uL (04-17 @ 08:41)  Platelet Count - Automated: 182 K/uL (04-16 @ 08:21)  Platelet Count - Automated: 196 K/uL (04-15 @ 06:48)    Ferritin: 51 ng/mL (04-13 @ 09:15)  Iron Total: 60 ug/dL (04-13 @ 09:15)  Vitamin B12, Serum: 885 pg/mL (04-16 @ 12:45)  Folate, Serum: 9.0 ng/mL (04-16 @ 12:45)     Serum Protein Electrophoresis Interp: Gamma-Migrating Paraprotein Identified (04-13 @ 09:15)    [place]/Lambda Free Light Chain Ratio, Serum: 2.41 Ratio (04-13 @ 09:15)    Immunoglobulin Free Light Chains, Serum (04.13.24 @ 09:15)   AMISH Kappa: 5.21 mg/dL  AMISH Lambda: 2.16 mg/dL  Kappa/Lambda Free Light Chain Ratio, Serum: 2.41 Ratio    Immunofixation, Serum: IgG Kappa Band Identified   Reference Range: None Detected (04.13.24 @ 09:15)       [MICROBIOLOGY /  VIROLOGY:]      [PATHOLOGY]       [RADIOLOGY & ADDITIONAL STUDIES:]       < from: US Renal (04.14.24 @ 10:12) >    ACC: [id number] EXAM:  US KIDNEY(S)   ORDERED BY: HUGO CLARK     PROCEDURE DATE:  04/14/2024          INTERPRETATION:  CLINICAL INFORMATION: Acute kidney injury, renal   failure, altered mental status.    COMPARISON: CT abdomen and pelvis on 3/25/2014    TECHNIQUE: Sonography of the kidneys and bladder.    FINDINGS: Limited study due to altered mental status and patient asleep   during this examination.    Right kidney: 10.5 cm. No renal mass, hydronephrosis or calculi.    Left kidney: 11.4 cm.No renal mass or calculi. Mild left hydronephrosis.    Urinary bladder: Bladder volume of 70 cc, which is underdistended   limiting evaluation. No debris or calculi seen.    IMPRESSION:  Mild left hydronephrosis. Consider correlation with follow-up CT abdomen   and pelvis.  --- End of Report ---  MICHELLE REBOLLAR MD; Attending Radiologist  This document has been electronically signed. Apr 14 2024 10:27AM        ACC: [id number] EXAM:  CT BRAIN PERFUSION MAPS STROKE   ORDERED BY: HI DUMONT   ACC: [id number] EXAM:  CT ANGIO NECK STROKE PROTCL IC   ORDERED BY: HI DMUONT   ACC: [id number] EXAM:  CT ANGIO BRAIN STROKE PROTC IC   ORDERED BY: HI DUMONT   ACC: [id number] EXAM:  CT BRAIN STROKE PROTOCOL   ORDERED BY: HI DUMONT   PROCEDURE DATE:  04/12/2024    INTERPRETATION:  CLINICAL INFORMATION: Stroke code, altered mental status  COMPARISON: CT head performed one/20/2015.  TECHNIQUE:  Noncontrast head CT scan was performed. Sagittal andcoronal reformats were performed.  Contrast enhanced CT angiography of the neck and head was performed.  MIP reformats were generated. Perfusion maps were automatically generated using [employer] software.    FINDINGS:  CT HEAD:  ·	There is no acute intracranial hemorrhage, mass effect or midline shift.   ·	Gray-white matter discrimination is well preserved. Scattered hypodensities throughout the periventricular and subcortical white matter  are seen most consistent with sequela of chronic microvascular ischemic change. Encephalomalacia in the right frontal and temporal lobes.  ·	No extra-axial fluid collections are present. There is prominence of the ventricles and sulci consistent with generalized parenchymal volume loss.   ·	The basal cisterns are patent. Craniocervical junction and sella turcica are within normal limits.  ·	The calvarium is intact. The visualized paranasal sinuses and mastoid air cells are clear. Orbits unremarkable.  -----  CT PERFUSION:  ·	Mild motion artifact present within the CT perfusion acquisition.   ·	Adequate arterial inflow and venous outflow contrast bolus identified.  ·	No focal deficits are identified in the cerebral blood flow, cerebral blood volume, or mean transit time/time-to-peak data sets. There is no convincing evidence of core infarct or ischemic penumbra.  ·	Quantitative data analysis is as follows:  CBF <30%: 0 mL  T-Max >6.0 seconds: 0 mL  Mismatch volume: 0 mL  Mismatch ratio: None    ----------    CT ANGIOGRAPHY BRAIN:  ·	Moderate venous contamination.  ·	Internal carotid arteries: Patent bilaterally without stenosis or aneurysm. Atherosclerosis in the bilateral carotid siphons.  ·	Anterior cerebral arteries: Patent bilaterally without stenosis or aneurysm.  ·	Middle cerebral arteries: Patent bilaterally without stenosis.  ·	Anterior communicating artery: Patent without aneurysm.  ·	Posterior cerebral arteries: Patent bilaterally without stenosis or aneurysm.  ·	Vertebrobasilar: .Patent without stenosis or aneurysm.  Posterior inferior cerebellar arteries and superior cerebellar arteries are patent bilaterally. Right vertebral artery is hypoplastic and terminates as the right PICA.  ·	Venous sinuses: The superficial and deep venous systems are patent.  ·	Brain: No abnormal early arterial phase enhancement on CTA images.  No arteriovenous malformation.  ---------  CT ANGIOGRAPHY NECK:  ·	Thoracic aorta and branch vessels: Bovine arch.  No occlusion, flow limiting stenosis or dissection.  ·	Right carotid system: The common carotid artery, internal carotid artery and external carotid artery are patent. Calcified and noncalcified atherosclerotic plaque without hemodynamically significant carotid stenosis using NASCET criteria.  No carotid dissection.  ·	Left carotid system: The common carotid artery, internal carotid artery and external carotid artery are patent.  Calcified and noncalcified atherosclerotic plaque without  hemodynamically significant carotid stenosisusing NASCET criteria.  No carotid dissection.  ·	Vertebral arteries: The vertebral arteries are patent bilaterally.  No flow-limiting vertebral artery stenosis.  No vertebral dissection.  Left vertebral artery dominant.  ·	Soft tissues of the neck: Within normal limits.  ·	Cervical spine: Multilevel cervical spondylosis.  ·	Visualized lungs:  Mild interstitial prominence in Mosaic perfusion suggestive of air trapping. Mild centrilobular emphysematous change.    IMPRESSION:  CT HEAD:  No large territory acute infarct, intracranial hemorrhage, or mass effect. If persistent concern for acute stroke, MRI could further evaluate in the absence of contraindication.    CT PERFUSION:  No core infarct or acute ischemic penumbra.    CT ANGIOGRAPHY BRAIN:  No large vessel occlusion, significant stenosis, aneurysm or vascular malformation.    CT ANGIOGRAPHY NECK:  Vasculature of the neck is patent without significant stenosis or dissection.  This report was discussed with Dr. Lisa MD at 4/12/2024 11:09 PM.  --- End of Report ---  MARY WHEATLEY MD; Attending Radiologist  This document has been electronically signed. Apr 12 2024 11:19PM

## 2024-04-17 NOTE — DISCHARGE NOTE PROVIDER - PROVIDER TOKENS
PROVIDER:[TOKEN:[[id number]],FOLLOWUP:[1 week]] PROVIDER:[TOKEN:[[id number]],FOLLOWUP:[1 week]],PROVIDER:[TOKEN:[[id number]],FOLLOWUP:[1 week]]

## 2024-04-17 NOTE — CONSULT NOTE ADULT - ASSESSMENT
[ASSESSMENT and  PLAN]        RECOMMENDATIONS  Transfuse PRBC as clinically indicated.   Transfuse PRBC if Hgb <7.0 or if symptomatic.   Follow CBC    Check Anemia studies.      Ferritin, Iron studies     B12, Folate     ESR, CRP    DVT Prophylaxis  SQ Lovenox or SQ heparin    Discussed plan of care with patient and or family in detail.   Pt/Family expressed understanding of the treatment plan.   Risks, benefits and alternatives discussed in detail.   Opportunity given for questions and discussion.   Questions or concerns all addressed and answered to their satisfaction, and in lay terms.     Discussed with  xxxxxxx.    Thank you for consulting us.     > xxxxxx minutes spent in direct patient care, examining and counseling patient,  reviewing  the notes, lab data/ imaging , discussion with multidisciplinary team.      see other consult note

## 2024-04-17 NOTE — DISCHARGE NOTE PROVIDER - HOSPITAL COURSE
ADMISSION DATE:  04-12-24    ---  FROM ADMISSION H+P:   HPI:  Patient is a 77yo M with a PMH of COPD, HTN, HLD, DM2, CVA (4/2015), dementia, TAMMY on CPAP who presents to the ED with AMS. Patient was altered on admission so history was obtained from wife. Per wife, around 9:30pm, patient became very confused and was just repeating the word "yes." Wife notes that patient was also slurring his speech. These symptoms were similar to his last CVA in 2015. NIHSS in ED was 11. Telestoke was consulted and rec against TNK as patient last took his home Eliquis at 2:00pm.     ED course:  Vitals: /58, HR 53, RR 16 SpO2 95% on RA  Labs: H/H 10.3/33.1, BUN/Cr 45/2.4, eGFR 27, Lactate 2.3  UA: Orange, protein 100, large blood, rbc 32, bacteria occasional  Given: IV Rocephin x1, 1L NaCl bolus x1    Imaging  CT HEAD: No large territory acute infarct, intracranial hemorrhage, or mass effect.   CT PERFUSION: No core infarct or acute ischemic penumbra.  CT ANGIOGRAPHY BRAIN: No large vessel occlusion, significant stenosis, aneurysm or vascular malformation.  CT ANGIOGRAPHY NECK: Vasculature of the neck is patent without significant stenosis or dissection.   (13 Apr 2024 00:56)      ---  HOSPITAL COURSE/PERTINENT LABS/PROCEDURES PERFORMED/PENDING TESTS:   Pt was admitted for AMS and CVA work up. CT HEAD: No large territory acute infarct, intracranial hemorrhage, or mass effect. CT PERFUSION: No core infarct or acute ischemic penumbra. CTA BRAIN: No large vessel occlusion, significant stenosis, aneurysm or vascular malformation. CTA NECK: Vasculature of the neck is patent without significant stenosis or dissection. MRI HEAD: No acute intracranial pathology.  EEG: Moderate diffuse/multi-focal cerebral dysfunction, not specific as to etiology. There were no epileptiform abnormalities recorded. Neurology consulted, pt started on depakote for possible prolonged seizure event. Psych consulted for agitation, no psychiatric contraindications to discharge. Speech/Swallow evaluation cleared patient for minced and moist solids with thin liquids. Heme Onc consulted for abnormal blood work concerning for multiple myeloma, recommended ***.           Patient showed improvement throughout hospitalization. Patient was seen and examined on day of discharge. Patient was medically optimized for discharge with close outpatient follow up.    --  VITALS:   T(C): 36.9 (04-17-24 @ 06:30), Max: 36.9 (04-17-24 @ 06:30)  HR: 70 (04-17-24 @ 10:15) (53 - 88)  BP: 170/81 (04-17-24 @ 10:45) (151/73 - 178/79)  RR: 18 (04-17-24 @ 10:45) (18 - 18)  SpO2: 97% (04-17-24 @ 10:45) (96% - 98%)    PHYSICAL EXAM ON DAY OF DISCHARGE:    ---  CONSULTANTS:   Neuro Dr. Tapia  Psych Dr. Scott  Nephnba Varghees      ---  TIME SPENT:  I, the attending physician, was physically present for the key portions of the evaluation and management (E/M) service provided. The total amount of time spent reviewing the hospital notes, laboratory values, imaging findings, assessing/counseling the patient, discussing with consultant physicians, social work, nursing staff was -- minutes       ADMISSION DATE:  04-12-24    ---  FROM ADMISSION H+P:   HPI:  Patient is a 79yo M with a PMH of COPD, HTN, HLD, DM2, CVA (4/2015), dementia, TAMMY on CPAP who presents to the ED with AMS. Patient was altered on admission so history was obtained from wife. Per wife, around 9:30pm, patient became very confused and was just repeating the word "yes." Wife notes that patient was also slurring his speech. These symptoms were similar to his last CVA in 2015. NIHSS in ED was 11. Telestoke was consulted and rec against TNK as patient last took his home Eliquis at 2:00pm.     ED course:  Vitals: /58, HR 53, RR 16 SpO2 95% on RA  Labs: H/H 10.3/33.1, BUN/Cr 45/2.4, eGFR 27, Lactate 2.3  UA: Orange, protein 100, large blood, rbc 32, bacteria occasional  Given: IV Rocephin x1, 1L NaCl bolus x1    Imaging  CT HEAD: No large territory acute infarct, intracranial hemorrhage, or mass effect.   CT PERFUSION: No core infarct or acute ischemic penumbra.  CT ANGIOGRAPHY BRAIN: No large vessel occlusion, significant stenosis, aneurysm or vascular malformation.  CT ANGIOGRAPHY NECK: Vasculature of the neck is patent without significant stenosis or dissection.   (13 Apr 2024 00:56)      ---  HOSPITAL COURSE/PERTINENT LABS/PROCEDURES PERFORMED/PENDING TESTS:   Pt was admitted for AMS and CVA work up. CT HEAD: No large territory acute infarct, intracranial hemorrhage, or mass effect. CT PERFUSION: No core infarct or acute ischemic penumbra. CTA BRAIN: No large vessel occlusion, significant stenosis, aneurysm or vascular malformation. CTA NECK: Vasculature of the neck is patent without significant stenosis or dissection. MRI HEAD: No acute intracranial pathology.  EEG: Moderate diffuse/multi-focal cerebral dysfunction, not specific as to etiology. There were no epileptiform abnormalities recorded. Neurology consulted, pt started on depakote for possible prolonged seizure event. Psych consulted for agitation, pt's seroquel dose adjusted to manage agitation. Speech/Swallow evaluation cleared patient for minced and moist solids with thin liquids. Heme Onc consulted for MGUS (SPEP M spike 0.6g/dL, Serum FLC K/L ratio 2.41, Kappa 5.2, Lambda 2.16) and recommended further evaluation as an outpatient with skeletal survey and urine testing (UPEP, Urine AMISH, Urine bence hassan). For anemia, pt started on IV venofer.     Patient showed improvement throughout hospitalization. Patient was seen and examined on day of discharge. Patient was medically optimized for discharge with close outpatient follow up.    --  VITALS:   T(C): 36.9 (04-17-24 @ 06:30), Max: 36.9 (04-17-24 @ 06:30)  HR: 70 (04-17-24 @ 10:15) (53 - 88)  BP: 170/81 (04-17-24 @ 10:45) (151/73 - 178/79)  RR: 18 (04-17-24 @ 10:45) (18 - 18)  SpO2: 97% (04-17-24 @ 10:45) (96% - 98%)    PHYSICAL EXAM ON DAY OF DISCHARGE:    ---  CONSULTANTS:   Neuro Dr. Tapia  Psych Dr. Scott  Nephnba Bautista Onc Dr. Yadav      ---  TIME SPENT:  I, the attending physician, was physically present for the key portions of the evaluation and management (E/M) service provided. The total amount of time spent reviewing the hospital notes, laboratory values, imaging findings, assessing/counseling the patient, discussing with consultant physicians, social work, nursing staff was -- minutes       ADMISSION DATE:  04-12-24    ---  FROM ADMISSION H+P:   HPI:  Patient is a 79yo M with a PMH of COPD, HTN, HLD, DM2, CVA (4/2015), dementia, TAMMY on CPAP who presents to the ED with AMS. Patient was altered on admission so history was obtained from wife. Per wife, around 9:30pm, patient became very confused and was just repeating the word "yes." Wife notes that patient was also slurring his speech. These symptoms were similar to his last CVA in 2015. NIHSS in ED was 11. Telestoke was consulted and rec against TNK as patient last took his home Eliquis at 2:00pm.     ED course:  Vitals: /58, HR 53, RR 16 SpO2 95% on RA  Labs: H/H 10.3/33.1, BUN/Cr 45/2.4, eGFR 27, Lactate 2.3  UA: Orange, protein 100, large blood, rbc 32, bacteria occasional  Given: IV Rocephin x1, 1L NaCl bolus x1    Imaging  CT HEAD: No large territory acute infarct, intracranial hemorrhage, or mass effect.   CT PERFUSION: No core infarct or acute ischemic penumbra.  CT ANGIOGRAPHY BRAIN: No large vessel occlusion, significant stenosis, aneurysm or vascular malformation.  CT ANGIOGRAPHY NECK: Vasculature of the neck is patent without significant stenosis or dissection.   (13 Apr 2024 00:56)      ---  HOSPITAL COURSE/PERTINENT LABS/PROCEDURES PERFORMED/PENDING TESTS:   Pt was admitted for AMS and CVA work up. CT HEAD: No large territory acute infarct, intracranial hemorrhage, or mass effect. CT PERFUSION: No core infarct or acute ischemic penumbra. CTA BRAIN: No large vessel occlusion, significant stenosis, aneurysm or vascular malformation. CTA NECK: Vasculature of the neck is patent without significant stenosis or dissection. MRI HEAD: No acute intracranial pathology.  EEG: Moderate diffuse/multi-focal cerebral dysfunction, not specific as to etiology. There were no epileptiform abnormalities recorded. Neurology consulted, pt started on depakote for possible prolonged seizure event. Psych consulted for agitation, pt's seroquel dose adjusted to manage agitation. Speech/Swallow evaluation cleared patient for minced and moist solids with thin liquids. Heme Onc consulted for MGUS (SPEP M spike 0.6g/dL, Serum FLC K/L ratio 2.41, Kappa 5.2, Lambda 2.16) and recommended further evaluation as an outpatient with skeletal survey and urine testing (UPEP, Urine AMISH, Urine bence hassan). For anemia, pt started on IV venofer.     Patient showed improvement throughout hospitalization. Patient was seen and examined on day of discharge. Patient was medically optimized for discharge with close outpatient follow up.    --  Vital Signs Last 24 Hrs  T(C): 36.6 (20 Apr 2024 05:00), Max: 36.7 (19 Apr 2024 20:42)  T(F): 97.9 (20 Apr 2024 05:00), Max: 98.1 (19 Apr 2024 20:42)  HR: 72 (20 Apr 2024 08:16) (57 - 78)  BP: 183/71 (20 Apr 2024 05:00) (109/70 - 183/71)  BP(mean): --  RR: 20 (20 Apr 2024 05:00) (18 - 20)  SpO2: 95% (20 Apr 2024 08:16) (93% - 95%)    Parameters below as of 20 Apr 2024 08:16  Patient On (Oxygen Delivery Method): nasal cannula, 3l    PHYSICAL EXAM ON DAY OF DISCHARGE:  GENERAL: NAD  HEENT:  anicteric, moist mucous membranes  CHEST/LUNG:  + b/l wheezing  HEART:  RRR, S1, S2 , no tachy   ABDOMEN:  BS+, soft, nontender, nondistended  EXTREMITIES: no edema, cyanosis, or calf tenderness  NERVOUS SYSTEM: A&O x 1 (person), answers some questions, and follow some commands  gu intact     ---  CONSULTANTS:   Neuro Dr. Tapia  Psych Dr. Scott  Nephnba Varghese  Heme Onc Dr. Yadav      ---  TIME SPENT:  I, the attending physician, was physically present for the key portions of the evaluation and management (E/M) service provided. The total amount of time spent reviewing the hospital notes, laboratory values, imaging findings, assessing/counseling the patient, discussing with consultant physicians, social work, nursing staff was -- minutes       ADMISSION DATE:  04-12-24    ---  FROM ADMISSION H+P:   HPI:  Patient is a 79yo M with a PMH of COPD, HTN, HLD, DM2, CVA (4/2015), dementia, TAMMY on CPAP who presents to the ED with AMS. Patient was altered on admission so history was obtained from wife. Per wife, around 9:30pm, patient became very confused and was just repeating the word "yes." Wife notes that patient was also slurring his speech. These symptoms were similar to his last CVA in 2015. NIHSS in ED was 11. Telestoke was consulted and rec against TNK as patient last took his home Eliquis at 2:00pm.     ED course:  Vitals: /58, HR 53, RR 16 SpO2 95% on RA  Labs: H/H 10.3/33.1, BUN/Cr 45/2.4, eGFR 27, Lactate 2.3  UA: Orange, protein 100, large blood, rbc 32, bacteria occasional  Given: IV Rocephin x1, 1L NaCl bolus x1    Imaging  CT HEAD: No large territory acute infarct, intracranial hemorrhage, or mass effect.   CT PERFUSION: No core infarct or acute ischemic penumbra.  CT ANGIOGRAPHY BRAIN: No large vessel occlusion, significant stenosis, aneurysm or vascular malformation.  CT ANGIOGRAPHY NECK: Vasculature of the neck is patent without significant stenosis or dissection.   (13 Apr 2024 00:56)      ---  HOSPITAL COURSE/PERTINENT LABS/PROCEDURES PERFORMED/PENDING TESTS:   Pt was admitted for AMS possible seizure causing it  and CVA work up. CT HEAD: No large territory acute infarct, intracranial hemorrhage, or mass effect. CT PERFUSION: No core infarct or acute ischemic penumbra. CTA BRAIN: No large vessel occlusion, significant stenosis, aneurysm or vascular malformation. CTA NECK: Vasculature of the neck is patent without significant stenosis or dissection. MRI HEAD: No acute intracranial pathology.  EEG: Moderate diffuse/multi-focal cerebral dysfunction, not specific as to etiology. There were no epileptiform abnormalities recorded. Neurology consulted, pt started on depakote for possible prolonged seizure event. Psych consulted for agitation, pt's seroquel dose adjusted to manage agitation. Speech/Swallow evaluation cleared patient for minced and moist solids with thin liquids. Heme Onc consulted for MGUS (SPEP M spike 0.6g/dL, Serum FLC K/L ratio 2.41, Kappa 5.2, Lambda 2.16) and recommended further evaluation as an outpatient with skeletal survey and urine testing (UPEP, Urine AMISH, Urine bence hsasan). For anemia, pt started on IV venofer.    cva and tia ruled out ,  pt later developed dementia related delirium  started Seroquel  by  neuro dr dong  /  jeri  as per dr scott .   Patient showed improvement throughout hospitalization. Patient was seen and examined on day of discharge. Patient was medically optimized for discharge with close outpatient follow up.    physical exam  day of dc  4/21/24  Vital Signs Last 24 Hrs  T(C): 36.6 (20 Apr 2024 05:00), Max: 36.7 (19 Apr 2024 20:42)  T(F): 97.9 (20 Apr 2024 05:00), Max: 98.1 (19 Apr 2024 20:42)  HR: 72 (20 Apr 2024 08:16) (57 - 78)  BP: 183/71 (20 Apr 2024 05:00) (109/70 - 183/71)  BP(mean): --  RR: 20 (20 Apr 2024 05:00) (18 - 20)  SpO2: 95% (20 Apr 2024 08:16) (93% - 95%)    Parameters below as of 20 Apr 2024 08:16  Patient On (Oxygen Delivery Method): nasal cannula, 3l    PHYSICAL EXAM ON DAY OF DISCHARGE:  GENERAL: NAD  HEENT:  anicteric, moist mucous membranes  CHEST/LUNG:  + b/l wheezing  HEART:  RRR, S1, S2 , no tachy   ABDOMEN:  BS+, soft, nontender, nondistended  EXTREMITIES: no edema, cyanosis, or calf tenderness  NERVOUS SYSTEM: A&O x 1 (person), answers some questions, and follow some commands  gu intact     ---  CONSULTANTS:   Neuro Dr. Tapia  Psych Dr. Scott  Nephro Dr. Varghese  Heme Onc Dr. Yadav      ---  TIME SPENT:  I, the attending physician, was physically present for the key portions of the evaluation and management (E/M) service provided. The total amount of time spent reviewing the hospital notes, laboratory values, imaging findings, assessing/counseling the patient, discussing with consultant physicians, social work, nursing staff was 50-- minutes       ADMISSION DATE:  04-12-24    ---  FROM ADMISSION H+P:   HPI:  Patient is a 79yo M with a PMH of COPD, HTN, HLD, DM2, CVA (4/2015), dementia, TAMMY on CPAP who presents to the ED with AMS. Patient was altered on admission so history was obtained from wife. Per wife, around 9:30pm, patient became very confused and was just repeating the word "yes." Wife notes that patient was also slurring his speech. These symptoms were similar to his last CVA in 2015. NIHSS in ED was 11. Telestoke was consulted and rec against TNK as patient last took his home Eliquis at 2:00pm.     ED course:  Vitals: /58, HR 53, RR 16 SpO2 95% on RA  Labs: H/H 10.3/33.1, BUN/Cr 45/2.4, eGFR 27, Lactate 2.3  UA: Orange, protein 100, large blood, rbc 32, bacteria occasional  Given: IV Rocephin x1, 1L NaCl bolus x1    Imaging  CT HEAD: No large territory acute infarct, intracranial hemorrhage, or mass effect.   CT PERFUSION: No core infarct or acute ischemic penumbra.  CT ANGIOGRAPHY BRAIN: No large vessel occlusion, significant stenosis, aneurysm or vascular malformation.  CT ANGIOGRAPHY NECK: Vasculature of the neck is patent without significant stenosis or dissection.   (13 Apr 2024 00:56)      ---  HOSPITAL COURSE/PERTINENT LABS/PROCEDURES PERFORMED/PENDING TESTS:   Pt was admitted for AMS possible seizure causing it  and CVA work up. CT HEAD: No large territory acute infarct, intracranial hemorrhage, or mass effect. CT PERFUSION: No core infarct or acute ischemic penumbra. CTA BRAIN: No large vessel occlusion, significant stenosis, aneurysm or vascular malformation. CTA NECK: Vasculature of the neck is patent without significant stenosis or dissection. MRI HEAD: No acute intracranial pathology.  EEG: Moderate diffuse/multi-focal cerebral dysfunction, not specific as to etiology. There were no epileptiform abnormalities recorded. Neurology consulted, pt started on depakote for possible prolonged seizure event. Psych consulted for agitation, pt's seroquel dose adjusted to manage agitation. Speech/Swallow evaluation cleared patient for minced and moist solids with thin liquids. Heme Onc consulted for MGUS (SPEP M spike 0.6g/dL, Serum FLC K/L ratio 2.41, Kappa 5.2, Lambda 2.16) and recommended further evaluation as an outpatient with skeletal survey and urine testing (UPEP, Urine AMISH, Urine bence hassan). For anemia, pt started on IV venofer.   cva and tia ruled out ,  pt later developed dementia related delirium  started Seroquel  by  neuro dr dong  /  ativan and Zyprexa PRN as per dr scott .   Patient showed improvement throughout hospitalization. Patient was seen and examined on day of discharge. Patient was medically optimized for discharge with close outpatient follow up.    physical exam  day of dc  4/21/24  Vital Signs Last 24 Hrs  T(C): 36.6 (20 Apr 2024 05:00), Max: 36.7 (19 Apr 2024 20:42)  T(F): 97.9 (20 Apr 2024 05:00), Max: 98.1 (19 Apr 2024 20:42)  HR: 72 (20 Apr 2024 08:16) (57 - 78)  BP: 183/71 (20 Apr 2024 05:00) (109/70 - 183/71)  BP(mean): --  RR: 20 (20 Apr 2024 05:00) (18 - 20)  SpO2: 95% (20 Apr 2024 08:16) (93% - 95%)    Parameters below as of 20 Apr 2024 08:16  Patient On (Oxygen Delivery Method): nasal cannula, 3l    PHYSICAL EXAM ON DAY OF DISCHARGE:  GENERAL: NAD  HEENT:  anicteric, moist mucous membranes  CHEST/LUNG:  + b/l wheezing  HEART:  RRR, S1, S2 , no tachy   ABDOMEN:  BS+, soft, nontender, nondistended  EXTREMITIES: no edema, cyanosis, or calf tenderness  NERVOUS SYSTEM: A&O x 1 (person), answers some questions, and follow some commands  gu intact     ---  CONSULTANTS:   Neuro Dr. Tapia  Psych Dr. Scott  Nephro Dr. Halima Bautista Onc Dr. Yadav      ---  TIME SPENT:  I, the attending physician, was physically present for the key portions of the evaluation and management (E/M) service provided. The total amount of time spent reviewing the hospital notes, laboratory values, imaging findings, assessing/counseling the patient, discussing with consultant physicians, social work, nursing staff was 50-- minutes       ADMISSION DATE:  04-12-24    ---  FROM ADMISSION H+P:   HPI:  Patient is a 77yo M with a PMH of COPD, HTN, HLD, DM2, CVA (4/2015), dementia, TAMMY on CPAP who presents to the ED with AMS. Patient was altered on admission so history was obtained from wife. Per wife, around 9:30pm, patient became very confused and was just repeating the word "yes." Wife notes that patient was also slurring his speech. These symptoms were similar to his last CVA in 2015. NIHSS in ED was 11. Telestoke was consulted and rec against TNK as patient last took his home Eliquis at 2:00pm.     ED course:  Vitals: /58, HR 53, RR 16 SpO2 95% on RA  Labs: H/H 10.3/33.1, BUN/Cr 45/2.4, eGFR 27, Lactate 2.3  UA: Orange, protein 100, large blood, rbc 32, bacteria occasional  Given: IV Rocephin x1, 1L NaCl bolus x1    Imaging  CT HEAD: No large territory acute infarct, intracranial hemorrhage, or mass effect.   CT PERFUSION: No core infarct or acute ischemic penumbra.  CT ANGIOGRAPHY BRAIN: No large vessel occlusion, significant stenosis, aneurysm or vascular malformation.  CT ANGIOGRAPHY NECK: Vasculature of the neck is patent without significant stenosis or dissection.   (13 Apr 2024 00:56)      ---  HOSPITAL COURSE/PERTINENT LABS/PROCEDURES PERFORMED/PENDING TESTS:   Pt was admitted for AMS possible seizure causing it  and CVA work up. CT HEAD: No large territory acute infarct, intracranial hemorrhage, or mass effect. CT PERFUSION: No core infarct or acute ischemic penumbra. CTA BRAIN: No large vessel occlusion, significant stenosis, aneurysm or vascular malformation. CTA NECK: Vasculature of the neck is patent without significant stenosis or dissection. MRI HEAD: No acute intracranial pathology.  EEG: Moderate diffuse/multi-focal cerebral dysfunction, not specific as to etiology. There were no epileptiform abnormalities recorded. Neurology consulted, pt started on depakote for possible prolonged seizure event. Psych consulted for agitation, pt's seroquel dose adjusted to manage agitation. Speech/Swallow evaluation cleared patient for minced and moist solids with thin liquids. Heme Onc consulted for MGUS (SPEP M spike 0.6g/dL, Serum FLC K/L ratio 2.41, Kappa 5.2, Lambda 2.16) and recommended further evaluation as an outpatient with urine testing (UPEP, Urine AMISH, Urine bence hassan). Skeletal survey showed no lytic lucencies concerning for multiple myeloma. For anemia, pt started on IV venofer.   cva and tia ruled out ,  pt later developed dementia related delirium  started Seroquel by  neuro dr dong  /  ativan and Zyprexa PRN as per dr scott .   Patient showed improvement throughout hospitalization. Patient was seen and examined on day of discharge. Patient was medically optimized for discharge with close outpatient follow up.    physical exam  day of dc  4/21/24  Vital Signs Last 24 Hrs  T(C): 36.6 (20 Apr 2024 05:00), Max: 36.7 (19 Apr 2024 20:42)  T(F): 97.9 (20 Apr 2024 05:00), Max: 98.1 (19 Apr 2024 20:42)  HR: 72 (20 Apr 2024 08:16) (57 - 78)  BP: 183/71 (20 Apr 2024 05:00) (109/70 - 183/71)  BP(mean): --  RR: 20 (20 Apr 2024 05:00) (18 - 20)  SpO2: 95% (20 Apr 2024 08:16) (93% - 95%)    Parameters below as of 20 Apr 2024 08:16  Patient On (Oxygen Delivery Method): nasal cannula, 3l    PHYSICAL EXAM ON DAY OF DISCHARGE:  GENERAL: NAD  HEENT:  anicteric, moist mucous membranes  CHEST/LUNG:  + b/l wheezing  HEART:  RRR, S1, S2 , no tachy   ABDOMEN:  BS+, soft, nontender, nondistended  EXTREMITIES: no edema, cyanosis, or calf tenderness  NERVOUS SYSTEM: A&O x 1 (person), answers some questions, and follow some commands  gu intact     ---  CONSULTANTS:   Neuro Dr. Tapia  Psych Dr. Scott  Nephro Dr. Halima Bautista Onc Dr. Yadav      ---  TIME SPENT:  I, the attending physician, was physically present for the key portions of the evaluation and management (E/M) service provided. The total amount of time spent reviewing the hospital notes, laboratory values, imaging findings, assessing/counseling the patient, discussing with consultant physicians, social work, nursing staff was 50-- minutes       ADMISSION DATE:  04-12-24    ---  FROM ADMISSION H+P:   HPI:  Patient is a 79yo M with a PMH of COPD, HTN, HLD, DM2, CVA (4/2015), dementia, TAMMY on CPAP who presents to the ED with AMS. Patient was altered on admission so history was obtained from wife. Per wife, around 9:30pm, patient became very confused and was just repeating the word "yes." Wife notes that patient was also slurring his speech. These symptoms were similar to his last CVA in 2015. NIHSS in ED was 11. Telestoke was consulted and rec against TNK as patient last took his home Eliquis at 2:00pm.     ED course:  Vitals: /58, HR 53, RR 16 SpO2 95% on RA  Labs: H/H 10.3/33.1, BUN/Cr 45/2.4, eGFR 27, Lactate 2.3  UA: Orange, protein 100, large blood, rbc 32, bacteria occasional  Given: IV Rocephin x1, 1L NaCl bolus x1    Imaging  CT HEAD: No large territory acute infarct, intracranial hemorrhage, or mass effect.   CT PERFUSION: No core infarct or acute ischemic penumbra.  CT ANGIOGRAPHY BRAIN: No large vessel occlusion, significant stenosis, aneurysm or vascular malformation.  CT ANGIOGRAPHY NECK: Vasculature of the neck is patent without significant stenosis or dissection.   (13 Apr 2024 00:56)      ---  HOSPITAL COURSE/PERTINENT LABS/PROCEDURES PERFORMED/PENDING TESTS:   Pt was admitted for AMS possible new  seizure  post ictal stage causing it  and CVA work up. CT HEAD: No large territory acute infarct, intracranial hemorrhage, or mass effect. CT PERFUSION: No core infarct or acute ischemic penumbra. CTA BRAIN: No large vessel occlusion, significant stenosis, aneurysm or vascular malformation. CTA NECK: Vasculature of the neck is patent without significant stenosis or dissection. MRI HEAD: No acute intracranial pathology.  EEG: Moderate diffuse/multi-focal cerebral dysfunction, not specific as to etiology. There were no epileptiform abnormalities recorded. Neurology consulted, pt started on depakote for possible prolonged seizure event. Psych consulted for agitation, pt's seroquel dose adjusted to manage agitation. Speech/Swallow evaluation cleared patient for minced and moist solids with thin liquids. Heme Onc consulted for MGUS (SPEP M spike 0.6g/dL, Serum FLC K/L ratio 2.41, Kappa 5.2, Lambda 2.16) and recommended further evaluation as an outpatient with urine testing (UPEP, Urine AMISH, Urine bence hassan). Skeletal survey showed no lytic lucencies concerning for multiple myeloma. For anemia, pt started on IV venofer.   cva and tia ruled out ,  chr hypercarbia hx sleep apnea  pt continue night CPAP .Acute on CKD 2 stable cr.   pt later developed dementia related delirium  started Seroquel by  neuro dr dong  /  ativan and Zyprexa PRN as per dr scott .   Patient showed improvement throughout hospitalization. Patient was seen and examined on day of discharge.   Patient was medically optimized for discharge with close outpatient follow up.  wife aware  bedside  dc  tt plan .     physical exam  day of dc  4/22/24  Vital Signs Last 24 Hrs  T(C): 36.3 (22 Apr 2024 12:11), Max: 36.7 (21 Apr 2024 19:34)  T(F): 97.4 (22 Apr 2024 12:11), Max: 98.1 (21 Apr 2024 19:34)  HR: 55 (22 Apr 2024 12:11) (55 - 76)  BP: 170/73 (22 Apr 2024 12:11) (151/84 - 170/73)  BP(mean): --  RR: 17 (22 Apr 2024 12:11) (16 - 19)  SpO2: 94% (22 Apr 2024 12:11) (94% - 98%)    Parameters below as of 22 Apr 2024 12:11  Patient On (Oxygen Delivery Method): BiPAP/CPAP      Patient On (Oxygen Delivery Method): nasal cannula, 3l    PHYSICAL EXAM ON DAY OF DISCHARGE:  GENERAL: NAD  HEENT:  anicteric, moist mucous membranes  CHEST/LUNG:  + b/l wheezing  HEART:  RRR, S1, S2 , no tachy   ABDOMEN:  BS+, soft, nontender, nondistended  EXTREMITIES: no edema, cyanosis, or calf tenderness  NERVOUS SYSTEM: A&O x 2 (person), answers some questions, and follow some commands  gu intact     ---  CONSULTANTS:   Neuro Dr. Tapia  Psych Dr. Scott  Nephro Dr. Halima Bautista Onc Dr. Yadav      ---  TIME SPENT:  I, the attending physician, was physically present for the key portions of the evaluation and management (E/M) service provided. The total amount of time spent reviewing the hospital notes, laboratory values, imaging findings, assessing/counseling the patient, discussing with consultant physicians, social work, nursing staff was 50-- minutes

## 2024-04-17 NOTE — DISCHARGE NOTE PROVIDER - NSDCCPCAREPLAN_GEN_ALL_CORE_FT
PRINCIPAL DISCHARGE DIAGNOSIS  Diagnosis: Altered mental status  Assessment and Plan of Treatment: You presented with altered mental status. Your CT brain, CT angio brain/neck, and MR head were negative. Your EEG was negative. You were seen by Neurology and started on medication for possible seizure event.  - Follow up with your PCP/Neurologist in 1 week      SECONDARY DISCHARGE DIAGNOSES  Diagnosis: Dementia  Assessment and Plan of Treatment: You have a history of dementia. Continue your home medications and follow up with your PCP in 1 week.     PRINCIPAL DISCHARGE DIAGNOSIS  Diagnosis: Altered mental status  Assessment and Plan of Treatment: You presented with altered mental status. Your CT brain, CT angio brain/neck, and MR head were negative. Your EEG was negative. You were seen by Neurology and started on medication for possible seizure event.  - Follow up with your PCP/Neurologist in 1 week      SECONDARY DISCHARGE DIAGNOSES  Diagnosis: MGUS (monoclonal gammopathy of unknown significance)  Assessment and Plan of Treatment: Your blood work was abnormal (SPEP M spike 0.6g/dL, Serum FLC K/L ratio 2.41, Kappa 5.2, Lambda 2.16). Hematology Oncology was consulted and recommended further evaluation as an outpatient with skeletal survey and urine testing (UPEP, Urine AMISH, Urine bence hassan).   - Follow up with Hematology/Oncology in 1-2 weeks    Diagnosis: Dementia  Assessment and Plan of Treatment: You have a history of dementia. You were seen by Neurology and Psychiatry and your medications were adjusted to better manage episodes of agitation.   - Continue Seroquel 25mg daily at bedtime  - Continue home donepezil, lexapro, and buspar  - Follow up with your PCP in 1 week     PRINCIPAL DISCHARGE DIAGNOSIS  Diagnosis: Altered mental status  Assessment and Plan of Treatment: You presented with altered mental status. Your CT brain, CT angio brain/neck, and MR head were negative. Your EEG was negative. You were seen by Neurology and started on medication for possible seizure event.  - Continue valproic acid 500 milliGRAM(s) Oral two times a day  - Follow up with your PCP/Neurologist in 1 week      SECONDARY DISCHARGE DIAGNOSES  Diagnosis: Dementia  Assessment and Plan of Treatment: You have a history of dementia. You were seen by Neurology and Psychiatry and your medications were adjusted to better manage episodes of agitation.   - Continue Seroquel 25mg daily in the morning and 37.5mg daily at bedtime  - Continue home donepezil, lexapro, and buspar  - Follow up with your PCP in 1 week    Diagnosis: MGUS (monoclonal gammopathy of unknown significance)  Assessment and Plan of Treatment: Your blood work was abnormal (SPEP M spike 0.6g/dL, Serum FLC K/L ratio 2.41, Kappa 5.2, Lambda 2.16). Hematology Oncology was consulted and recommended further evaluation as an outpatient with skeletal survey and urine testing (UPEP, Urine AMISH, Urine bence hassan).   - Follow up with Hematology/Oncology in 1-2 weeks     PRINCIPAL DISCHARGE DIAGNOSIS  Diagnosis: Altered mental status  Assessment and Plan of Treatment: You presented with altered mental status. Your CT brain, CT angio brain/neck, and MR head were negative. Your EEG was negative. You were seen by Neurology and started on medication for possible seizure event.  - Continue valproic acid 500 milliGRAM(s) Oral two times a day  - Follow up with your PCP/Neurologist in 1 week      SECONDARY DISCHARGE DIAGNOSES  Diagnosis: Dementia  Assessment and Plan of Treatment: You have a history of dementia. You were seen by Neurology and Psychiatry and your medications were adjusted to better manage episodes of agitation.   - Continue Seroquel 37.5mg daily at bedtime  - Continue home donepezil, lexapro, and buspar  - If agitated, pt may take ativan 0.5mg up to twice/day as needed  - Follow up with your PCP in 1 week    Diagnosis: MGUS (monoclonal gammopathy of unknown significance)  Assessment and Plan of Treatment: Your blood work was abnormal (SPEP M spike 0.6g/dL, Serum FLC K/L ratio 2.41, Kappa 5.2, Lambda 2.16). Hematology Oncology was consulted and recommended further evaluation as an outpatient with skeletal survey and urine testing (UPEP, Urine AMISH, Urine bence hassan).   - Follow up with Hematology/Oncology in 1-2 weeks     PRINCIPAL DISCHARGE DIAGNOSIS  Diagnosis: Altered mental status  Assessment and Plan of Treatment: You presented with altered mental status. Your CT brain, CT angio brain/neck, and MR head were negative. Your EEG was negative. You were seen by Neurology and started on medication for possible seizure event.  - Continue valproic acid 500 milliGRAM(s) Oral two times a day  - Follow up with your PCP/Neurologist in 1 week      SECONDARY DISCHARGE DIAGNOSES  Diagnosis: Dementia  Assessment and Plan of Treatment: You have a history of dementia. You were seen by Neurology and Psychiatry and your medications were adjusted to better manage episodes of agitation.   - Continue Seroquel 37.5mg daily at bedtime  - Continue home donepezil, lexapro, and buspar  - Follow up with your PCP in 1 week    Diagnosis: MGUS (monoclonal gammopathy of unknown significance)  Assessment and Plan of Treatment: Your blood work was abnormal (SPEP M spike 0.6g/dL, Serum FLC K/L ratio 2.41, Kappa 5.2, Lambda 2.16). Hematology Oncology was consulted and recommended further evaluation as an outpatient with skeletal survey and urine testing (UPEP, Urine AMISH, Urine bence hassan).   - Follow up with Hematology/Oncology in 1-2 weeks     PRINCIPAL DISCHARGE DIAGNOSIS  Diagnosis: Altered mental status  Assessment and Plan of Treatment: possible sec to new seizure  -You presented with altered mental status. Your CT brain, CT angio brain/neck, and MR head were negative. Your EEG was negative. You were seen by Neurology and started on medication for possible seizure event.  - Continue valproic acid 500 milliGRAM(s) Oral two times a day  - Follow up with your PCP/Neurologist in 1 week      SECONDARY DISCHARGE DIAGNOSES  Diagnosis: Dementia  Assessment and Plan of Treatment: You have a history of dementia. You were seen by Neurology and Psychiatry and your medications were adjusted to better manage episodes of agitation.   - Continue Seroquel 37.5mg daily at bedtime  - Continue home donepezil, lexapro, and buspar  - Follow up with your PCP in 1 week    Diagnosis: HTN (hypertension)  Assessment and Plan of Treatment: continue   norvasc and   also   you are on  losrtan  - but   renal function with this med closely  need to   fu  out pt   [your cr 1.5 you have hx ckd2 ]   if renal  function get worse switch to other bp med .    Diagnosis: MGUS (monoclonal gammopathy of unknown significance)  Assessment and Plan of Treatment: Your blood work was abnormal (SPEP M spike 0.6g/dL, Serum FLC K/L ratio 2.41, Kappa 5.2, Lambda 2.16). Hematology Oncology was consulted and recommended further evaluation as an outpatient with skeletal survey and urine testing (UPEP, Urine AMISH, Urine bence hassan).   - Follow up with Hematology/Oncology in 1-2 weeks

## 2024-04-17 NOTE — CHART NOTE - NSCHARTNOTEFT_GEN_A_CORE
Called by RN for pt with audible wheezing.   Patient seen and examined at bedside.   Pt resting comfortably in bed; however, pt with episodes of trying to climb out of bed 2/2 excessive confusion.         T(C): 36.8 (04-16-24 @ 20:35), Max: 36.8 (04-16-24 @ 04:11)  HR: 67 (04-16-24 @ 20:35) (52 - 67)  BP: 151/73 (04-16-24 @ 20:35) (151/73 - 168/67)  RR: 18 (04-16-24 @ 20:35) (18 - 18)  SpO2: 97% (04-16-24 @ 09:19) (96% - 97%)  Wt(kg): --    Physical :  Gen- confused, ncat  Cardio - s+1,s+2, rrr, no murmur  Lung - Expiratory wheezes throughout BL lung fields   Abdomen- +BS, NT/ND, no guarding, no rebound, no masses      LABS:                        9.9    5.17  )-----------( 182      ( 16 Apr 2024 08:21 )             31.6     04-16    146<H>  |  113<H>  |  21  ----------------------------<  149<H>  3.8   |  27  |  1.60<H>    Ca    8.1<L>      16 Apr 2024 08:21  Phos  3.1     04-16  Mg     1.8     04-16      PTT - ( 15 Apr 2024 13:38 )  PTT:191.3 sec  Urinalysis Basic - ( 16 Apr 2024 08:21 )    Color: x / Appearance: x / SG: x / pH: x  Gluc: 149 mg/dL / Ketone: x  / Bili: x / Urobili: x   Blood: x / Protein: x / Nitrite: x   Leuk Esterase: x / RBC: x / WBC x   Sq Epi: x / Non Sq Epi: x / Bacteria: x      Assessment/Plan  Patient is a 77yo M with a PMH of COPD, HTN, HLD, DM2, CVA (4/2015), dementia, TAMMY on CPAP admitted for CVA workup.    - will give prn zyprexa now   - STAT duoneb   - RN to call with changes or concerns

## 2024-04-17 NOTE — DISCHARGE NOTE PROVIDER - CARE PROVIDER_API CALL
Keyon Tapia)  Neurology  [address]  [place] [address]  Phone: [phone]  Fax: ()-  Follow Up Time: 1 week   Keyon Tapia)  Neurology  [address]  [place] [address]  Phone: [phone]  Fax: ()-  Follow Up Time: 1 week    Hon Leif Diop  Medical Oncology  [facility], [address] [address]  Phone: [phone]  Fax: [phone]  Follow Up Time: 1 week

## 2024-04-17 NOTE — CONSULT NOTE ADULT - SUBJECTIVE AND OBJECTIVE BOX
INCOMPLETE NOTE.  Documentation in Progress  PT SEEN AND EVALUATED.   FULL/ADDITIONAL RECOMMENDATIONS TO FOLLOW   ***************************************************************    Patient is a 78y old  Male who presents with a chief complaint of CVA (17 Apr 2024 11:59)      HPI:  Patient is a 79yo M with a PMH of COPD, HTN, HLD, DM2, CVA (4/2015), dementia, TAMMY on CPAP who presents to the ED with AMS. Patient was altered on admission so history was obtained from wife. Per wife, around 9:30pm, patient became very confused and was just repeating the word "yes." Wife notes that patient was also slurring his speech. These symptoms were similar to his last CVA in 2015. NIHSS in ED was 11. Telestoke was consulted and rec against TNK as patient last took his home Eliquis at 2:00pm.     ED course:  Vitals: /58, HR 53, RR 16 SpO2 95% on RA  Labs: H/H 10.3/33.1, BUN/Cr 45/2.4, eGFR 27, Lactate 2.3  UA: Orange, protein 100, large blood, rbc 32, bacteria occasional  Given: IV Rocephin x1, 1L NaCl bolus x1    Imaging  CT HEAD: No large territory acute infarct, intracranial hemorrhage, or mass effect.   CT PERFUSION: No core infarct or acute ischemic penumbra.  CT ANGIOGRAPHY BRAIN: No large vessel occlusion, significant stenosis, aneurysm or vascular malformation.  CT ANGIOGRAPHY NECK: Vasculature of the neck is patent without significant stenosis or dissection.   (13 Apr 2024 00:56)      PAST MEDICAL & SURGICAL HISTORY:  Diabetes mellitus      Hypertension      COPD (chronic obstructive pulmonary disease)      HLD (hyperlipidemia)      Dementia      CVA (cerebral vascular accident)      H/O hand surgery         HEALTH ISSUES - PROBLEM Dx:  CVA (cerebrovascular accident)    HTN (hypertension)    CAD (coronary artery disease)    SARAY (acute kidney injury)    Type 2 diabetes mellitus    Need for prophylactic measure    Dementia    Altered mental status          Altered mental status [R41.82]    Diabetes mellitus [250.00]    Hypertension [401.9]    COPD (chronic obstructive pulmonary disease) [496]    HLD (hyperlipidemia) [272.4]    Dementia [F03.90]    CVA (cerebral vascular accident) [I63.9]    No significant past surgical history [926974655]    H/O hand surgery [Z98.89]    Dementia [F03.90]      FAMILY HISTORY:  Family history of CABG (Father)        [SOCIAL HISTORY: ]     smoking:  none currently     EtOH:  none currently     illicit drugs:  none currently     occupation:  Retired     marital status:       Other:       [ALLERGIES/INTOLERANCES:]  Allergies    No Known Allergies    Intolerances        [MEDICATIONS]  MEDICATIONS  (STANDING):  albuterol/ipratropium for Nebulization. 3 milliLiter(s) Nebulizer once  apixaban 5 milliGRAM(s) Oral every 12 hours  aspirin enteric coated 81 milliGRAM(s) Oral daily  atorvastatin 40 milliGRAM(s) Oral at bedtime  busPIRone 5 milliGRAM(s) Oral <User Schedule>  dextrose 10% Bolus 125 milliLiter(s) IV Bolus once  dextrose 5% + sodium chloride 0.45%. 1000 milliLiter(s) (75 mL/Hr) IV Continuous <Continuous>  dextrose 5%. 1000 milliLiter(s) (100 mL/Hr) IV Continuous <Continuous>  dextrose 5%. 1000 milliLiter(s) (50 mL/Hr) IV Continuous <Continuous>  dextrose 50% Injectable 25 Gram(s) IV Push once  dextrose 50% Injectable 12.5 Gram(s) IV Push once  donepezil 10 milliGRAM(s) Oral at bedtime  escitalopram 20 milliGRAM(s) Oral daily  glucagon  Injectable 1 milliGRAM(s) IntraMuscular once  hemorrhoidal Ointment 1 Application(s) Rectal two times a day  insulin lispro (ADMELOG) corrective regimen sliding scale   SubCutaneous three times a day before meals  insulin lispro (ADMELOG) corrective regimen sliding scale   SubCutaneous at bedtime  QUEtiapine 25 milliGRAM(s) Oral <User Schedule>  valproic  acid Syrup 500 milliGRAM(s) Oral two times a day    MEDICATIONS  (PRN):  albuterol/ipratropium for Nebulization 3 milliLiter(s) Nebulizer every 6 hours PRN Shortness of Breath and/or Wheezing  bisacodyl Suppository 10 milliGRAM(s) Rectal daily PRN Constipation  dextrose Oral Gel 15 Gram(s) Oral once PRN Blood Glucose LESS THAN 70 milliGRAM(s)/deciliter  OLANZapine Injectable 2.5 milliGRAM(s) IntraMuscular every 6 hours PRN Agitation  ondansetron Injectable 4 milliGRAM(s) IV Push every 8 hours PRN Nausea and/or Vomiting      [REVIEW OF SYSTEMS: ]  CONSTITUTIONAL: normal, no fever, no shakes, no chills   EYES: No eye pain, no visual disturbances, no discharge  ENMT:  no discharge  NECK: No pain, no stiffness  BREASTS: No pain, no masses, no nipple discharge  RESPIRATORY: No cough, no wheezing, no chills, no hemoptysis; No shortness of breath  CARDIOVASCULAR: No chest pain, no palpitations, no dizziness, no leg swelling  GASTROINTESTINAL: No abdominal, no epigastric pain. No nausea, no vomiting, no hematemesis; No diarrhea , no constipation. No melena, no hematochezia.  GENITOURINARY: No dysuria, no frequency, no hematuria, no incontinence  NEUROLOGICAL: No headaches, no memory loss, no loss of strength, no numbness, no tremors  SKIN: No itching, no burning, no rashes, no lesions   LYMPH NODES: No enlarged glands  ENDOCRINE: No heat or cold intolerance; No hair loss  MUSCULOSKELETAL: No joint pain or swelling; No muscle, no back, no extremity pain  PSYCHIATRIC: No depression, no anxiety, no mood swings, no difficulty sleeping  HEME/LYMPH: No easy bruising, no bleeding gums    [VITALS SIGNS 24hrs]  Vital Signs Last 24 Hrs  T(C): 37 (17 Apr 2024 12:26), Max: 37 (17 Apr 2024 12:26)  T(F): 98.6 (17 Apr 2024 12:26), Max: 98.6 (17 Apr 2024 12:26)  HR: 56 (17 Apr 2024 12:26) (53 - 88)  BP: 137/75 (17 Apr 2024 12:26) (137/75 - 178/79)  BP(mean): --  RR: 18 (17 Apr 2024 12:26) (18 - 18)  SpO2: 95% (17 Apr 2024 12:26) (95% - 98%)    Parameters below as of 17 Apr 2024 12:26  Patient On (Oxygen Delivery Method): nasal cannula  O2 Flow (L/min): 3    Daily     Daily     I&O's Summary    16 Apr 2024 07:01  -  17 Apr 2024 07:00  --------------------------------------------------------  IN: 1650 mL / OUT: 0 mL / NET: 1650 mL        [PHYSICAL EXAM]  GEN:   HEENT: normocephalic and atraumatic. EOMI. PERRL.    NECK: Supple.  No lymphadenopathy   LUNGS: Clear to auscultation.  HEART: S1S2 Regular rate and rhythm, no MRG  ABDOMEN: Soft, nontender, and nondistended.  Positive bowel sounds.    : No CVA tenderness  EXTREMITIES: Without edema.  NEUROLOGIC: grossly intact.  PSYCHIATRIC: Appropriate affect .  SKIN: No rash     [LABS: ]                        9.3    5.56  )-----------( 152      ( 17 Apr 2024 08:41 )             29.3     CBC Full  -  ( 17 Apr 2024 08:41 )  WBC Count : 5.56 K/uL  RBC Count : 3.08 M/uL  Hemoglobin : 9.3 g/dL  Hematocrit : 29.3 %  Platelet Count - Automated : 152 K/uL  Mean Cell Volume : 95.1 fl  Mean Cell Hemoglobin : 30.2 pg  Mean Cell Hemoglobin Concentration : 31.7 gm/dL  Auto Neutrophil # : 3.81 K/uL  Auto Lymphocyte # : 0.85 K/uL  Auto Monocyte # : 0.57 K/uL  Auto Eosinophil # : 0.29 K/uL  Auto Basophil # : 0.02 K/uL  Auto Neutrophil % : 68.4 %  Auto Lymphocyte % : 15.3 %  Auto Monocyte % : 10.3 %  Auto Eosinophil % : 5.2 %  Auto Basophil % : 0.4 %    04-17    144  |  107  |  14  ----------------------------<  215<H>  3.7   |  31  |  1.40<H>    Ca    8.2<L>      17 Apr 2024 08:41  Phos  2.4     04-17  Mg     1.8     04-17    TPro  6.0  /  Alb  2.7<L>  /  TBili  0.3  /  DBili  x   /  AST  26  /  ALT  20  /  AlkPhos  71  04-17      LIVER FUNCTIONS - ( 17 Apr 2024 08:41 )  Alb: 2.7 g/dL / Pro: 6.0 g/dL / ALK PHOS: 71 U/L / ALT: 20 U/L / AST: 26 U/L / GGT: x               Urinalysis Basic - ( 17 Apr 2024 08:41 )    Color: x / Appearance: x / SG: x / pH: x  Gluc: 215 mg/dL / Ketone: x  / Bili: x / Urobili: x   Blood: x / Protein: x / Nitrite: x   Leuk Esterase: x / RBC: x / WBC x   Sq Epi: x / Non Sq Epi: x / Bacteria: x          CBC TREND (5 Days)  WBC Count: 5.56 K/uL (04-17 @ 08:41)  WBC Count: 5.17 K/uL (04-16 @ 08:21)  WBC Count: 5.17 K/uL (04-15 @ 06:48)    Hemoglobin: 9.3 g/dL (04-17 @ 08:41)  Hemoglobin: 9.9 g/dL (04-16 @ 08:21)  Hemoglobin: 10.8 g/dL (04-15 @ 06:48)    Hematocrit: 29.3 % (04-17 @ 08:41)  Hematocrit: 31.6 % (04-16 @ 08:21)  Hematocrit: 34.8 % (04-15 @ 06:48)    Platelet Count - Automated: 152 K/uL (04-17 @ 08:41)  Platelet Count - Automated: 182 K/uL (04-16 @ 08:21)  Platelet Count - Automated: 196 K/uL (04-15 @ 06:48)        Ferritin: 51 ng/mL (04-13 @ 09:15)  Iron Total: 60 ug/dL (04-13 @ 09:15)  Vitamin B12, Serum: 885 pg/mL (04-16 @ 12:45)  Folate, Serum: 9.0 ng/mL (04-16 @ 12:45)    Serum Protein Electrophoresis Interp: Gamma-Migrating Paraprotein Identified (04-13 @ 09:15)   [place]/Lambda Free Light Chain Ratio, Serum: 2.41 Ratio (04-13 @ 09:15)       [MICROBIOLOGY /  VIROLOGY:]      [PATHOLOGY]       [RADIOLOGY & ADDITIONAL STUDIES:]        see other consult note

## 2024-04-17 NOTE — DISCHARGE NOTE PROVIDER - NSDCMRMEDTOKEN_GEN_ALL_CORE_FT
Alogliptin 12.5 mg oral tablet: 1 tab(s) orally once a day  amLODIPine 10 mg oral tablet: 1 tab(s) orally once a day  aspirin 81 mg oral tablet, chewable: 1 tab(s) orally once a day  BuSpar 5 mg oral tablet: 1 tab(s) orally once a day  chlorthalidone 25 mg oral tablet: 1 tab(s) orally once a day  donepezil 10 mg oral tablet: 1 tab(s) orally once a day (at bedtime)  Eliquis 5 mg oral tablet: 1 tab(s) orally 2 times a day  escitalopram 20 mg oral tablet: 1 tab(s) orally once a day  glimepiride 4 mg oral tablet: 1 tab(s) orally once a day  losartan 100 mg oral tablet: 1 tab(s) orally once a day  metFORMIN 500 mg oral tablet: 2 tab(s) orally 2 times a day  Norvasc 10 mg oral tablet: 1 tab(s) orally once a day  rosuvastatin 10 mg oral tablet: 1 tab(s) orally once a day   Alogliptin 12.5 mg oral tablet: 1 tab(s) orally once a day  amLODIPine 10 mg oral tablet: 1 tab(s) orally once a day  aspirin 81 mg oral tablet, chewable: 1 tab(s) orally once a day  BuSpar 5 mg oral tablet: 1 tab(s) orally once a day  donepezil 10 mg oral tablet: 1 tab(s) orally once a day (at bedtime)  Eliquis 5 mg oral tablet: 1 tab(s) orally 2 times a day  escitalopram 20 mg oral tablet: 1 tab(s) orally once a day  glimepiride 4 mg oral tablet: 1 tab(s) orally once a day  LORazepam 0.5 mg oral tablet: 1 tab(s) orally 2 times a day as needed for  agitation  losartan 100 mg oral tablet: 1 tab(s) orally once a day  metFORMIN 500 mg oral tablet: 2 tab(s) orally 2 times a day  rosuvastatin 10 mg oral tablet: 1 tab(s) orally once a day  valproic acid 250 mg/5 mL oral liquid: orally   Alogliptin 12.5 mg oral tablet: 1 tab(s) orally once a day  amLODIPine 10 mg oral tablet: 1 tab(s) orally once a day  aspirin 81 mg oral tablet, chewable: 1 tab(s) orally once a day  BuSpar 5 mg oral tablet: 1 tab(s) orally once a day  donepezil 10 mg oral tablet: 1 tab(s) orally once a day (at bedtime)  Eliquis 5 mg oral tablet: 1 tab(s) orally 2 times a day  escitalopram 20 mg oral tablet: 1 tab(s) orally once a day  glimepiride 4 mg oral tablet: 1 tab(s) orally once a day  LORazepam 0.5 mg oral tablet: 1 tab(s) orally 2 times a day as needed for  agitation  losartan 100 mg oral tablet: 1 tab(s) orally once a day  metFORMIN 500 mg oral tablet: 2 tab(s) orally 2 times a day  QUEtiapine: 37.5 milligram(s) once a day (at bedtime) Take 1 tablet every night at bedtime  rosuvastatin 10 mg oral tablet: 1 tab(s) orally once a day  valproic acid 250 mg/5 mL oral liquid: 5 milliliter(s) orally   Alogliptin 12.5 mg oral tablet: 1 tab(s) orally once a day  amLODIPine 10 mg oral tablet: 1 tab(s) orally once a day  aspirin 81 mg oral tablet, chewable: 1 tab(s) orally once a day  BuSpar 5 mg oral tablet: 1 tab(s) orally once a day  donepezil 10 mg oral tablet: 1 tab(s) orally once a day (at bedtime)  Eliquis 5 mg oral tablet: 1 tab(s) orally 2 times a day  escitalopram 20 mg oral tablet: 1 tab(s) orally once a day  glimepiride 4 mg oral tablet: 1 tab(s) orally once a day  losartan 100 mg oral tablet: 1 tab(s) orally once a day  metFORMIN 500 mg oral tablet: 2 tab(s) orally 2 times a day  QUEtiapine: 37.5 milligram(s) once a day (at bedtime) Take 1 tablet every night at bedtime  rosuvastatin 10 mg oral tablet: 1 tab(s) orally once a day  valproic acid 250 mg/5 mL oral liquid: 10 milliliter(s) orally 2 times a day

## 2024-04-17 NOTE — CONSULT NOTE ADULT - ASSESSMENT
[ASSESSMENT and  PLAN]  D47.2     MGUS  D63.8     Anemia Chronic disease  D63.1     Anemia CKD  N18.31   CKD3a  E11.22    DM, Hgb A1c 8    77yo M with a PMH of COPD, HTN, HLD, DM2, CVA (4/2015), dementia, TAMMY on CPAP admitted for CVA workup.    Noted to have MGUS  04/13/24 SPEP M spike 0.6g/dL   04/13/24 Serum FLC K/L ratio 2.41, Kappa 5.2, Lambda 2.16  M spike and K:L ratio not impressive.     has anemia but could be explained by other causes, chronic disease, Fe def    has CKD but similarly can be explained by other causes; DM, HTN    Fe def anemia  Anemia chronic disease    RECOMMENDATIONS    MGUS  if urine sample, consider check UPEP, Urine AMISH, Urine bence hassan.   Can be checked in office  Consider Check skeletal survey; can be done outpt    Anemia     Transfuse PRBC as clinically indicated.      Transfuse PRBC if Hgb <7.0 or if symptomatic.      Follow CBC  Start IV venofer.      Check FOBT     consider GI eval.    DVT Prophylaxis  SQ Lovenox or SQ heparin      Thank you for consulting us.

## 2024-04-17 NOTE — SOCIAL WORK PROGRESS NOTE - NSSWPROGRESSNOTE_GEN_ALL_CORE
Discussed at daily rounds. Patient is not ready for discharge today. [facility] accepted patient and confirmed they will have bed if cleared for discharge this week. I met with wife at bedside today and informed her. She is agreeable to [facility] but was considering taking home with home PT. Her concern is his mental status so for now plan remains for [facility]. Support provided. Social work to follow.

## 2024-04-17 NOTE — CASE MANAGEMENT PROGRESS NOTE - NSCMPROGRESSNOTE_GEN_ALL_CORE
Discussed pt in rounds. Pt remains with periods of agitation, code gray this am. Started on IV Valproic acid for possible seizures. SW following for PAUL.

## 2024-04-17 NOTE — DISCHARGE NOTE PROVIDER - NSDCFUADDAPPT_GEN_ALL_CORE_FT
follow up out pt neurologist with in 1 to 2wk  for your dementia / mood dis for adjustment of doses . pt can go with  o2 via nc taper 3lit  to room air  when ambulatory in rehab  .

## 2024-04-17 NOTE — PROVIDER CONTACT NOTE (OTHER) - ASSESSMENT
Pt attempting to urinate; on all fours in bed. Pt is not consolable or able to be reoriented; at this time pt began becoming combative screaming " LET ME USE THE TOILET!!"

## 2024-04-17 NOTE — DISCHARGE NOTE PROVIDER - DETAILS OF MALNUTRITION DIAGNOSIS/DIAGNOSES
This patient has been assessed with a concern for Malnutrition and was treated during this hospitalization for the following Nutrition diagnosis/diagnoses:     -  04/19/2024: Moderate protein-calorie malnutrition

## 2024-04-17 NOTE — PROGRESS NOTE ADULT - PROBLEM SELECTOR PLAN 4
Chronic. On Metformin, Glimepiride and Alogliptin   - A1c 8.7%  - LDISS  - Hypoglycemia protocol  - Monitor fingersticks Chronic. On Metformin, Glimepiride and Alogliptin   - A1c 8.7%  - Moderate SSI  - Hypoglycemia protocol  - Monitor fingersticks

## 2024-04-18 DIAGNOSIS — D47.2 MONOCLONAL GAMMOPATHY: ICD-10-CM

## 2024-04-18 LAB
% ALBUMIN: 56.1 % — SIGNIFICANT CHANGE UP
% ALPHA 1: 4.6 % — SIGNIFICANT CHANGE UP
% ALPHA 2: 13.9 % — SIGNIFICANT CHANGE UP
% BETA: 9.9 % — SIGNIFICANT CHANGE UP
% GAMMA: 15.5 % — SIGNIFICANT CHANGE UP
% M SPIKE: 10.5 % — SIGNIFICANT CHANGE UP
ALBUMIN SERPL ELPH-MCNC: 2.8 G/DL — LOW (ref 3.3–5)
ALBUMIN SERPL ELPH-MCNC: 3.4 G/DL — LOW (ref 3.6–5.5)
ALBUMIN/GLOB SERPL ELPH: 1.3 RATIO — SIGNIFICANT CHANGE UP
ALP SERPL-CCNC: 72 U/L — SIGNIFICANT CHANGE UP (ref 40–120)
ALPHA1 GLOB SERPL ELPH-MCNC: 0.3 G/DL — SIGNIFICANT CHANGE UP (ref 0.1–0.4)
ALPHA2 GLOB SERPL ELPH-MCNC: 0.8 G/DL — SIGNIFICANT CHANGE UP (ref 0.5–1)
ALT FLD-CCNC: 27 U/L — SIGNIFICANT CHANGE UP (ref 12–78)
ANION GAP SERPL CALC-SCNC: 5 MMOL/L — SIGNIFICANT CHANGE UP (ref 5–17)
AST SERPL-CCNC: 33 U/L — SIGNIFICANT CHANGE UP (ref 15–37)
B-GLOBULIN SERPL ELPH-MCNC: 0.6 G/DL — SIGNIFICANT CHANGE UP (ref 0.5–1)
BILIRUB SERPL-MCNC: 0.5 MG/DL — SIGNIFICANT CHANGE UP (ref 0.2–1.2)
BUN SERPL-MCNC: 13 MG/DL — SIGNIFICANT CHANGE UP (ref 7–23)
CALCIUM SERPL-MCNC: 9.2 MG/DL — SIGNIFICANT CHANGE UP (ref 8.5–10.1)
CHLORIDE SERPL-SCNC: 109 MMOL/L — HIGH (ref 96–108)
CO2 SERPL-SCNC: 32 MMOL/L — HIGH (ref 22–31)
CREAT SERPL-MCNC: 1.4 MG/DL — HIGH (ref 0.5–1.3)
CULTURE RESULTS: SIGNIFICANT CHANGE UP
CULTURE RESULTS: SIGNIFICANT CHANGE UP
EGFR: 52 ML/MIN/1.73M2 — LOW
GAMMA GLOBULIN: 0.9 G/DL — SIGNIFICANT CHANGE UP (ref 0.6–1.6)
GLUCOSE SERPL-MCNC: 161 MG/DL — HIGH (ref 70–99)
HCT VFR BLD CALC: 30 % — LOW (ref 39–50)
HGB BLD-MCNC: 9.6 G/DL — LOW (ref 13–17)
M-SPIKE: 0.6 G/DL — HIGH (ref 0–0)
MAGNESIUM SERPL-MCNC: 1.8 MG/DL — SIGNIFICANT CHANGE UP (ref 1.6–2.6)
MCHC RBC-ENTMCNC: 30.3 PG — SIGNIFICANT CHANGE UP (ref 27–34)
MCHC RBC-ENTMCNC: 32 GM/DL — SIGNIFICANT CHANGE UP (ref 32–36)
MCV RBC AUTO: 94.6 FL — SIGNIFICANT CHANGE UP (ref 80–100)
NRBC # BLD: 0 /100 WBCS — SIGNIFICANT CHANGE UP (ref 0–0)
PHOSPHATE SERPL-MCNC: 2.5 MG/DL — SIGNIFICANT CHANGE UP (ref 2.5–4.5)
PLATELET # BLD AUTO: 163 K/UL — SIGNIFICANT CHANGE UP (ref 150–400)
POTASSIUM SERPL-MCNC: 3.7 MMOL/L — SIGNIFICANT CHANGE UP (ref 3.5–5.3)
POTASSIUM SERPL-SCNC: 3.7 MMOL/L — SIGNIFICANT CHANGE UP (ref 3.5–5.3)
PROT PATTERN SERPL ELPH-IMP: SIGNIFICANT CHANGE UP
PROT SERPL-MCNC: 6.1 G/DL — SIGNIFICANT CHANGE UP (ref 6–8.3)
PROT SERPL-MCNC: 6.5 G/DL — SIGNIFICANT CHANGE UP (ref 6–8.3)
RBC # BLD: 3.17 M/UL — LOW (ref 4.2–5.8)
RBC # FLD: 13.6 % — SIGNIFICANT CHANGE UP (ref 10.3–14.5)
SODIUM SERPL-SCNC: 146 MMOL/L — HIGH (ref 135–145)
SPECIMEN SOURCE: SIGNIFICANT CHANGE UP
SPECIMEN SOURCE: SIGNIFICANT CHANGE UP
WBC # BLD: 5.59 K/UL — SIGNIFICANT CHANGE UP (ref 3.8–10.5)
WBC # FLD AUTO: 5.59 K/UL — SIGNIFICANT CHANGE UP (ref 3.8–10.5)

## 2024-04-18 PROCEDURE — 99222 1ST HOSP IP/OBS MODERATE 55: CPT

## 2024-04-18 PROCEDURE — 99231 SBSQ HOSP IP/OBS SF/LOW 25: CPT

## 2024-04-18 PROCEDURE — 99233 SBSQ HOSP IP/OBS HIGH 50: CPT | Mod: GC

## 2024-04-18 RX ORDER — QUETIAPINE FUMARATE 200 MG/1
37.5 TABLET, FILM COATED ORAL
Refills: 0 | Status: DISCONTINUED | OUTPATIENT
Start: 2024-04-18 | End: 2024-04-22

## 2024-04-18 RX ORDER — SODIUM CHLORIDE 9 MG/ML
1000 INJECTION, SOLUTION INTRAVENOUS
Refills: 0 | Status: DISCONTINUED | OUTPATIENT
Start: 2024-04-18 | End: 2024-04-19

## 2024-04-18 RX ORDER — QUETIAPINE FUMARATE 200 MG/1
25 TABLET, FILM COATED ORAL DAILY
Refills: 0 | Status: DISCONTINUED | OUTPATIENT
Start: 2024-04-18 | End: 2024-04-19

## 2024-04-18 RX ORDER — VALPROIC ACID (AS SODIUM SALT) 250 MG/5ML
500 SOLUTION, ORAL ORAL ONCE
Refills: 0 | Status: COMPLETED | OUTPATIENT
Start: 2024-04-18 | End: 2024-04-18

## 2024-04-18 RX ORDER — LOSARTAN POTASSIUM 100 MG/1
100 TABLET, FILM COATED ORAL DAILY
Refills: 0 | Status: DISCONTINUED | OUTPATIENT
Start: 2024-04-18 | End: 2024-04-22

## 2024-04-18 RX ORDER — AMLODIPINE BESYLATE 2.5 MG/1
10 TABLET ORAL DAILY
Refills: 0 | Status: DISCONTINUED | OUTPATIENT
Start: 2024-04-18 | End: 2024-04-22

## 2024-04-18 RX ADMIN — IRON SUCROSE 100 MILLIGRAM(S): 20 INJECTION, SOLUTION INTRAVENOUS at 17:50

## 2024-04-18 RX ADMIN — SODIUM CHLORIDE 75 MILLILITER(S): 9 INJECTION, SOLUTION INTRAVENOUS at 15:23

## 2024-04-18 RX ADMIN — Medication 3 MILLILITER(S): at 07:22

## 2024-04-18 RX ADMIN — Medication 0.5 MILLIGRAM(S): at 09:46

## 2024-04-18 RX ADMIN — APIXABAN 5 MILLIGRAM(S): 2.5 TABLET, FILM COATED ORAL at 05:17

## 2024-04-18 RX ADMIN — Medication 500 MILLIGRAM(S): at 00:40

## 2024-04-18 RX ADMIN — OLANZAPINE 5 MILLIGRAM(S): 15 TABLET, FILM COATED ORAL at 07:20

## 2024-04-18 RX ADMIN — Medication 0.5 MILLIGRAM(S): at 11:08

## 2024-04-18 RX ADMIN — Medication 500 MILLIGRAM(S): at 11:05

## 2024-04-18 RX ADMIN — ESCITALOPRAM OXALATE 20 MILLIGRAM(S): 10 TABLET, FILM COATED ORAL at 11:05

## 2024-04-18 RX ADMIN — Medication 81 MILLIGRAM(S): at 11:05

## 2024-04-18 RX ADMIN — PHENYLEPHRINE-SHARK LIVER OIL-MINERAL OIL-PETROLATUM RECTAL OINTMENT 1 APPLICATION(S): at 05:17

## 2024-04-18 RX ADMIN — Medication 2: at 08:05

## 2024-04-18 NOTE — CASE MANAGEMENT PROGRESS NOTE - NSCMPROGRESSNOTE_GEN_ALL_CORE
Discusssed pt in rounds. Pt remains with periods of agitation, received Zyprexa overnight and this am. Spouse unsure if she will be able to manage patient at home.

## 2024-04-18 NOTE — CHART NOTE - NSCHARTNOTEFT_GEN_A_CORE
Called by RN for increased work of breathing.   Patient seen and examined at bedside.   Somnolent and resting in bed; however, pt with audible wheezing and using accessory muscles to breathe.   Pt with hx of increased work of breathing while agitated; however, currently somnolent.   Satting well on NC.     T(C): 37.4 (04-18-24 @ 22:48), Max: 37.4 (04-18-24 @ 22:48)  HR: 65 (04-18-24 @ 22:48) (61 - 65)  BP: 140/80 (04-18-24 @ 22:48) (140/80 - 174/61)  RR: 18 (04-18-24 @ 22:48) (18 - 18)  SpO2: 94% (04-18-24 @ 22:48) (90% - 96%)  Wt(kg): --    Physical :  Gen- NAD, ncat  Cardio - s+1,s+2, rrr, no murmur  Lung - expiratory wheeze throughout, transmitted upper airway sounds, pt using accessory muscles to breathe   Neuro - somnolent, sedated     LABS:                        9.6    5.59  )-----------( 163      ( 18 Apr 2024 06:25 )             30.0     04-18    146<H>  |  109<H>  |  13  ----------------------------<  161<H>  3.7   |  32<H>  |  1.40<H>    Ca    9.2      18 Apr 2024 06:25  Phos  2.5     04-18  Mg     1.8     04-18    TPro  6.5  /  Alb  2.8<L>  /  TBili  0.5  /  DBili  x   /  AST  33  /  ALT  27  /  AlkPhos  72  04-18      Urinalysis Basic - ( 18 Apr 2024 06:25 )    Color: x / Appearance: x / SG: x / pH: x  Gluc: 161 mg/dL / Ketone: x  / Bili: x / Urobili: x   Blood: x / Protein: x / Nitrite: x   Leuk Esterase: x / RBC: x / WBC x   Sq Epi: x / Non Sq Epi: x / Bacteria: x      Assessment/Plan  Patient is a 79yo M with a PMH of COPD, HTN, HLD, DM2, CVA (4/2015), dementia, TAMMY on CPAP admitted for CVA workup.  - STAT CXR  - STAT duoneb   - pt with hx of COPD and TAMMY, per chart review patient uses CPAP at night  - No CPAP currently ordered; pt may not tolerate in hospital 2/2 to agitation and delirium Called by RN for increased work of breathing.   Patient seen and examined at bedside.   Somnolent and resting in bed; however, pt with audible wheezing and using accessory muscles to breathe.   Pt with hx of increased work of breathing while agitated; however, currently somnolent.   Satting well on NC.     T(C): 37.4 (04-18-24 @ 22:48), Max: 37.4 (04-18-24 @ 22:48)  HR: 65 (04-18-24 @ 22:48) (61 - 65)  BP: 140/80 (04-18-24 @ 22:48) (140/80 - 174/61)  RR: 18 (04-18-24 @ 22:48) (18 - 18)  SpO2: 94% (04-18-24 @ 22:48) (90% - 96%)  Wt(kg): --    Physical :  Gen- NAD, ncat  Cardio - s+1,s+2, rrr, no murmur  Lung - expiratory wheeze throughout, transmitted upper airway sounds, pt using accessory muscles to breathe   Neuro - somnolent, sedated     LABS:                        9.6    5.59  )-----------( 163      ( 18 Apr 2024 06:25 )             30.0     04-18    146<H>  |  109<H>  |  13  ----------------------------<  161<H>  3.7   |  32<H>  |  1.40<H>    Ca    9.2      18 Apr 2024 06:25  Phos  2.5     04-18  Mg     1.8     04-18    TPro  6.5  /  Alb  2.8<L>  /  TBili  0.5  /  DBili  x   /  AST  33  /  ALT  27  /  AlkPhos  72  04-18      Urinalysis Basic - ( 18 Apr 2024 06:25 )    Color: x / Appearance: x / SG: x / pH: x  Gluc: 161 mg/dL / Ketone: x  / Bili: x / Urobili: x   Blood: x / Protein: x / Nitrite: x   Leuk Esterase: x / RBC: x / WBC x   Sq Epi: x / Non Sq Epi: x / Bacteria: x      Assessment/Plan  Patient is a 77yo M with a PMH of COPD, HTN, HLD, DM2, CVA (4/2015), dementia, TAMMY on CPAP admitted for CVA workup.  - STAT CXR  - STAT duoneb   - pt with hx of COPD and TAMMY, per chart review patient uses CPAP at night  - No CPAP currently ordered; pt may not tolerate in hospital 2/2 to agitation and delirium        -------------------------------------------------  Addendum (1:29)  Cxray reviewed. Appears improved from admission. Patient saturating well on continuous pulse ox, HR stable on tele. As patient can not tolerate oral meds at this time, PO valproic acid converted to IV to replace PO night time dose. RN to continue to monitor.

## 2024-04-18 NOTE — CONSULT NOTE ADULT - ASSESSMENT
77yo M with a PMH of PAF ( on Eliquis), HTN, HLD, DM2, CVA (4/2015),  dementia, TAMMY on CPAP presents with AMS. Admitted for CVA workup.    AMS r/o CVA, HTN, HLD  - CT HEAD: No large territory acute infarct, intracranial hemorrhage, or mass effect.   - CT PERFUSION: No core infarct or acute ischemic penumbra.  - CTA BRAIN: No large vessel occlusion, significant stenosis, aneurysm or vascular malformation.  - CTA NECK: Vasculature of the neck is patent without significant stenosis or dissection.  - EKG SB with 1st degree av block, PAC  - MRI HEAD: No acute intracranial pathology  - TTE w/ bubble study: LV EF 67%, negative for intracardiac shunt  - EEG: Moderate diffuse/multi-focal cerebral dysfunction, not specific as to etiology. There were no epileptiform abnormalities recorded.  - neuro following, possible seizure event, on Depakote BID    -hx CVA 2015 and CAD, continue ASA, statin  - hx of PAF seen on ILR, continue eliquis  -BPs labile, possible reactive to agitation. takes Chlorthalidone 12.5mg day, amlodipine 10mg po qd. and losartan 100mg po qd  - continue Amlodipine 10mg  - can resume Losartan 100mg po qd  - renal following, for SARAY, now appears ? baseline 1.4    - MGUS workup on going, heme following    - Follows with Dr. Gordo don  - Monitor and replete lytes, keep K>4, Mg>2.  - Will continue to follow.    Selena Parsons NP  Nurse Practitioner- Cardiology   Call TEAMS 77yo M with a PMH of PAF ( on Eliquis), HTN, HLD, DM2, CVA (4/2015),  dementia, TAMMY on CPAP presents with AMS. Admitted for CVA workup.    AMS r/o CVA, HTN, HLD  - CT HEAD: No large territory acute infarct, intracranial hemorrhage, or mass effect.   - CT PERFUSION: No core infarct or acute ischemic penumbra.  - CTA BRAIN: No large vessel occlusion, significant stenosis, aneurysm or vascular malformation.  - CTA NECK: Vasculature of the neck is patent without significant stenosis or dissection.  - MRI HEAD: No acute intracranial pathology  - TTE w/ bubble study: LV EF 67%, negative for intracardiac shunt  - EEG: Moderate diffuse/multi-focal cerebral dysfunction, not specific as to etiology. There were no epileptiform abnormalities recorded.  - neuro following, possible seizure event, on Depakote BID    -hx CVA 2015 and CAD, continue ASA, statin  - EKG SB with 1st degree av block, PAC, no evidence of acute ischemia  - troponin neg x 1  - hx of PAF seen on ILR, continue eliquis  -BPs labile, possible reactive to agitation. takes Chlorthalidone 12.5mg day, amlodipine 10mg po qd. and losartan 100mg po qd  - continue Amlodipine 10mg  - can resume Losartan 100mg po qd  - renal following, for SARAY, now appears ? baseline 1.4    - MGUS workup on going, heme following    - Follows with Dr. Gordo barnespt  - Monitor and replete lytes, keep K>4, Mg>2.  - Will continue to follow.    Selena Parsons NP  Nurse Practitioner- Cardiology   Call TEAMS 77yo M with a PMH of PAF ( on Eliquis), HTN, HLD, DM2, CVA (4/2015),  dementia, TAMMY on CPAP presents with AMS. Admitted for CVA workup.    AMS r/o CVA, HTN, HLD  - CT HEAD: No large territory acute infarct, intracranial hemorrhage, or mass effect.   - CT PERFUSION: No core infarct or acute ischemic penumbra.  - CTA BRAIN: No large vessel occlusion, significant stenosis, aneurysm or vascular malformation.  - CTA NECK: Vasculature of the neck is patent without significant stenosis or dissection.  - MRI HEAD: No acute intracranial pathology  - TTE w/ bubble study: LV EF 67%, negative for intracardiac shunt  - EEG: Moderate diffuse/multi-focal cerebral dysfunction, not specific as to etiology. There were no epileptiform abnormalities recorded.  - neuro following, possible seizure event, on Depakote BID    -hx CVA 2015 and CAD, continue ASA, statin  - EKG SB with 1st degree av block, PAC, no evidence of acute ischemia  - troponin neg x 1  - hx of PAF seen on ILR, continue eliquis  -BPs labile, possibly reactive to agitation. takes Chlorthalidone 12.5mg day, amlodipine 10mg po qd. and losartan 100mg po qd at home  - continue Amlodipine 10mg  - can resume Losartan 100mg po qd  - renal following, for SARAY, now appears ? baseline 1.4    - MGUS workup on going, heme following    - Follows with Dr. Caro outpt  - Monitor and replete lytes, keep K>4, Mg>2.  - Will continue to follow.    Selena Parsons NP  Nurse Practitioner- Cardiology   Call TEAMS 79yo M with a PMH of PAF ( on Eliquis), HTN, HLD, DM2, CVA (4/2015),  dementia, TAMMY on CPAP presents with AMS. Admitted for CVA workup.    AMS r/o CVA, HTN, HLD  - CT HEAD: No large territory acute infarct, intracranial hemorrhage, or mass effect.   - CT PERFUSION: No core infarct or acute ischemic penumbra.  - CTA BRAIN: No large vessel occlusion, significant stenosis, aneurysm or vascular malformation.  - CTA NECK: Vasculature of the neck is patent without significant stenosis or dissection.  - MRI HEAD: No acute intracranial pathology  - TTE w/ bubble study: LV EF 67%, negative for intracardiac shunt  - EEG: Moderate diffuse/multi-focal cerebral dysfunction, not specific as to etiology. There were no epileptiform abnormalities recorded.  - neuro following, possible seizure event, on Depakote BID    - hx CVA 2015 and CAD, continue ASA, statin  - EKG SB with 1st degree av block, PAC, no evidence of acute ischemia  - troponin neg x 1  - hx of PAF seen on ILR, continue eliquis  - BPs labile, possibly reactive to agitation. takes Chlorthalidone 12.5mg day, amlodipine 10mg po qd. and losartan 100mg po qd at home  - continue Amlodipine 10mg  - can resume Losartan 100mg po qd  - renal following, for SARAY, now appears ? baseline 1.4    - MGUS workup on going, heme following    - Follows with Dr. Caro outpt  - Monitor and replete lytes, keep K>4, Mg>2.  - Will continue to follow.    Selena Parsons NP  Nurse Practitioner- Cardiology   Call TEAMS

## 2024-04-18 NOTE — BH CONSULTATION LIAISON PROGRESS NOTE - NSBHCHARTREVIEWVS_PSY_A_CORE FT
Vital Signs Last 24 Hrs  T(C): 36.3 (18 Apr 2024 11:40), Max: 37.2 (17 Apr 2024 20:22)  T(F): 97.4 (18 Apr 2024 11:40), Max: 98.9 (17 Apr 2024 20:22)  HR: 64 (18 Apr 2024 11:40) (61 - 80)  BP: 153/66 (18 Apr 2024 11:40) (153/66 - 174/61)  BP(mean): --  RR: 18 (18 Apr 2024 11:40) (17 - 18)  SpO2: 96% (18 Apr 2024 11:40) (90% - 96%)    Parameters below as of 18 Apr 2024 11:40  Patient On (Oxygen Delivery Method): nasal cannula  O2 Flow (L/min): 3

## 2024-04-18 NOTE — CONSULT NOTE ADULT - SUBJECTIVE AND OBJECTIVE BOX
[facility] - Janusz Lu,  Mynor, Gordo Ponce Goodger, Garfield Casas  Office Number: [phone]    Initial Consult Note    CHIEF COMPLAINT: Patient is a 78y old  Male who presents with a chief complaint of CVA (18 Apr 2024 12:27)      HPI:  Patient is a 77yo M with a PMH of COPD, HTN, HLD, DM2, CVA (4/2015), dementia, TAMMY on CPAP who presents to the ED with AMS. Patient was altered on admission so history was obtained from wife. Per wife, around 9:30pm, patient became very confused and was just repeating the word "yes." Wife notes that patient was also slurring his speech. These symptoms were similar to his last CVA in 2015. NIHSS in ED was 11. Telestoke was consulted and rec against TNK as patient last took his home Eliquis at 2:00pm.     ED course:  Vitals: /58, HR 53, RR 16 SpO2 95% on RA  Labs: H/H 10.3/33.1, BUN/Cr 45/2.4, eGFR 27, Lactate 2.3  UA: Orange, protein 100, large blood, rbc 32, bacteria occasional  Given: IV Rocephin x1, 1L NaCl bolus x1    Imaging  CT HEAD: No large territory acute infarct, intracranial hemorrhage, or mass effect.   CT PERFUSION: No core infarct or acute ischemic penumbra.  CT ANGIOGRAPHY BRAIN: No large vessel occlusion, significant stenosis, aneurysm or vascular malformation.  CT ANGIOGRAPHY NECK: Vasculature of the neck is patent without significant stenosis or dissection.   (13 Apr 2024 00:56)    PAST MEDICAL & SURGICAL HISTORY:  Diabetes mellitus      Hypertension      COPD (chronic obstructive pulmonary disease)      HLD (hyperlipidemia)      Dementia      CVA (cerebral vascular accident)      H/O hand surgery        SOCIAL HISTORY:  No tobacco, ethanol, or drug abuse.  FAMILY HISTORY:  Family history of CABG (Father)      No family history of acute MI or sudden cardiac death.  MEDICATIONS  (STANDING):  amLODIPine   Tablet 10 milliGRAM(s) Oral daily  apixaban 5 milliGRAM(s) Oral every 12 hours  aspirin enteric coated 81 milliGRAM(s) Oral daily  atorvastatin 40 milliGRAM(s) Oral at bedtime  busPIRone 5 milliGRAM(s) Oral <User Schedule>  dextrose 10% Bolus 125 milliLiter(s) IV Bolus once  dextrose 5% + sodium chloride 0.45%. 1000 milliLiter(s) (75 mL/Hr) IV Continuous <Continuous>  dextrose 5%. 1000 milliLiter(s) (50 mL/Hr) IV Continuous <Continuous>  dextrose 5%. 1000 milliLiter(s) (100 mL/Hr) IV Continuous <Continuous>  dextrose 50% Injectable 12.5 Gram(s) IV Push once  dextrose 50% Injectable 25 Gram(s) IV Push once  donepezil 10 milliGRAM(s) Oral at bedtime  escitalopram 20 milliGRAM(s) Oral daily  glucagon  Injectable 1 milliGRAM(s) IntraMuscular once  hemorrhoidal Ointment 1 Application(s) Rectal two times a day  insulin lispro (ADMELOG) corrective regimen sliding scale   SubCutaneous at bedtime  insulin lispro (ADMELOG) corrective regimen sliding scale   SubCutaneous three times a day before meals  iron sucrose Injectable 100 milliGRAM(s) IV Push every 24 hours  QUEtiapine 37.5 milliGRAM(s) Oral <User Schedule>  QUEtiapine 25 milliGRAM(s) Oral daily  valproic  acid Syrup 500 milliGRAM(s) Oral two times a day    MEDICATIONS  (PRN):  albuterol/ipratropium for Nebulization 3 milliLiter(s) Nebulizer every 6 hours PRN Shortness of Breath and/or Wheezing  bisacodyl Suppository 10 milliGRAM(s) Rectal daily PRN Constipation  dextrose Oral Gel 15 Gram(s) Oral once PRN Blood Glucose LESS THAN 70 milliGRAM(s)/deciliter  OLANZapine Injectable 5 milliGRAM(s) IntraMuscular every 6 hours PRN Agitation  ondansetron Injectable 4 milliGRAM(s) IV Push every 8 hours PRN Nausea and/or Vomiting    Allergies    No Known Allergies    Intolerances      REVIEW OF SYSTEMS:  All other review of systems is negative unless indicated above  VITAL SIGNS:   Vital Signs Last 24 Hrs  T(C): 36.3 (18 Apr 2024 11:40), Max: 37.2 (17 Apr 2024 20:22)  T(F): 97.4 (18 Apr 2024 11:40), Max: 98.9 (17 Apr 2024 20:22)  HR: 64 (18 Apr 2024 11:40) (61 - 80)  BP: 153/66 (18 Apr 2024 11:40) (153/66 - 174/61)  BP(mean): --  RR: 18 (18 Apr 2024 11:40) (17 - 18)  SpO2: 96% (18 Apr 2024 11:40) (90% - 97%)    Parameters below as of 18 Apr 2024 11:40  Patient On (Oxygen Delivery Method): nasal cannula  O2 Flow (L/min): 3    I&O's Summary    17 Apr 2024 07:01  -  18 Apr 2024 07:00  --------------------------------------------------------  IN: 1650 mL / OUT: 325 mL / NET: 1325 mL      On Exam:  Constitutional: NAD, confuse, able to follow commands  Lungs:  Non-labored, + wheezing, no rales or rhonchi  Cardiovascular: RRR.  S1 and S2 positive.  No murmurs, rubs, gallops or clicks  Gastrointestinal: Bowel Sounds present, soft, nontender.   Extremities: No peripheral edema.   Neurological: Alert, no focal deficits  Skin: No rashes or ulcers   Psych:  confuse aox1    LABS: All Labs Reviewed:                        9.6    5.59  )-----------( 163      ( 18 Apr 2024 06:25 )             30.0                         9.3    5.56  )-----------( 152      ( 17 Apr 2024 08:41 )             29.3                         9.9    5.17  )-----------( 182      ( 16 Apr 2024 08:21 )             31.6     18 Apr 2024 06:25    146    |  109    |  13     ----------------------------<  161    3.7     |  32     |  1.40   17 Apr 2024 08:41    144    |  107    |  14     ----------------------------<  215    3.7     |  31     |  1.40   16 Apr 2024 08:21    146    |  113    |  21     ----------------------------<  149    3.8     |  27     |  1.60     Ca    9.2        18 Apr 2024 06:25  Ca    8.2        17 Apr 2024 08:41  Ca    8.1        16 Apr 2024 08:21  Phos  2.5       18 Apr 2024 06:25  Phos  2.4       17 Apr 2024 08:41  Phos  3.1       16 Apr 2024 08:21  Mg     1.8       18 Apr 2024 06:25  Mg     1.8       17 Apr 2024 08:41  Mg     1.8       16 Apr 2024 08:21    TPro  6.5    /  Alb  2.8    /  TBili  0.5    /  DBili  x      /  AST  33     /  ALT  27     /  AlkPhos  72     18 Apr 2024 06:25  TPro  6.0    /  Alb  2.7    /  TBili  0.3    /  DBili  x      /  AST  26     /  ALT  20     /  AlkPhos  71     17 Apr 2024 08:41  TPro  6.1    /  Alb  3.4    /  TBili  x      /  DBili  x      /  AST  x      /  ALT  x      /  AlkPhos  x      16 Apr 2024 08:21          Blood Culture:     04-16 @ 12:45  TSH: 2.06      RADIOLOGY:    EKG:       [facility] - Janusz Lu,  Mynor, Gordo Ponce Goodger, Garfield Casas  Office Number: [phone]    Initial Consult Note    CHIEF COMPLAINT: Patient is a 78y old  Male who presents with a chief complaint of CVA (18 Apr 2024 12:27)      HPI:  Patient is a 79yo M with a PMH of COPD, HTN, HLD, DM2, CVA (4/2015), dementia, TAMMY on CPAP who presents to the ED with AMS. Patient was altered on admission so history was obtained from wife. Per wife, around 9:30pm, patient became very confused and was just repeating the word "yes." Wife notes that patient was also slurring his speech. These symptoms were similar to his last CVA in 2015. NIHSS in ED was 11. Telestoke was consulted and rec against TNK as patient last took his home Eliquis at 2:00pm.     ED course:  Vitals: /58, HR 53, RR 16 SpO2 95% on RA  Labs: H/H 10.3/33.1, BUN/Cr 45/2.4, eGFR 27, Lactate 2.3  UA: Orange, protein 100, large blood, rbc 32, bacteria occasional  Given: IV Rocephin x1, 1L NaCl bolus x1    Imaging  CT HEAD: No large territory acute infarct, intracranial hemorrhage, or mass effect.   CT PERFUSION: No core infarct or acute ischemic penumbra.  CT ANGIOGRAPHY BRAIN: No large vessel occlusion, significant stenosis, aneurysm or vascular malformation.  CT ANGIOGRAPHY NECK: Vasculature of the neck is patent without significant stenosis or dissection.   (13 Apr 2024 00:56)    PAST MEDICAL & SURGICAL HISTORY:  Diabetes mellitus      Hypertension      COPD (chronic obstructive pulmonary disease)      HLD (hyperlipidemia)      Dementia      CVA (cerebral vascular accident)      H/O hand surgery        SOCIAL HISTORY:  No tobacco, ethanol, or drug abuse.  FAMILY HISTORY:  Family history of CABG (Father)      No family history of acute MI or sudden cardiac death.  MEDICATIONS  (STANDING):  amLODIPine   Tablet 10 milliGRAM(s) Oral daily  apixaban 5 milliGRAM(s) Oral every 12 hours  aspirin enteric coated 81 milliGRAM(s) Oral daily  atorvastatin 40 milliGRAM(s) Oral at bedtime  busPIRone 5 milliGRAM(s) Oral <User Schedule>  dextrose 10% Bolus 125 milliLiter(s) IV Bolus once  dextrose 5% + sodium chloride 0.45%. 1000 milliLiter(s) (75 mL/Hr) IV Continuous <Continuous>  dextrose 5%. 1000 milliLiter(s) (50 mL/Hr) IV Continuous <Continuous>  dextrose 5%. 1000 milliLiter(s) (100 mL/Hr) IV Continuous <Continuous>  dextrose 50% Injectable 12.5 Gram(s) IV Push once  dextrose 50% Injectable 25 Gram(s) IV Push once  donepezil 10 milliGRAM(s) Oral at bedtime  escitalopram 20 milliGRAM(s) Oral daily  glucagon  Injectable 1 milliGRAM(s) IntraMuscular once  hemorrhoidal Ointment 1 Application(s) Rectal two times a day  insulin lispro (ADMELOG) corrective regimen sliding scale   SubCutaneous at bedtime  insulin lispro (ADMELOG) corrective regimen sliding scale   SubCutaneous three times a day before meals  iron sucrose Injectable 100 milliGRAM(s) IV Push every 24 hours  QUEtiapine 37.5 milliGRAM(s) Oral <User Schedule>  QUEtiapine 25 milliGRAM(s) Oral daily  valproic  acid Syrup 500 milliGRAM(s) Oral two times a day    MEDICATIONS  (PRN):  albuterol/ipratropium for Nebulization 3 milliLiter(s) Nebulizer every 6 hours PRN Shortness of Breath and/or Wheezing  bisacodyl Suppository 10 milliGRAM(s) Rectal daily PRN Constipation  dextrose Oral Gel 15 Gram(s) Oral once PRN Blood Glucose LESS THAN 70 milliGRAM(s)/deciliter  OLANZapine Injectable 5 milliGRAM(s) IntraMuscular every 6 hours PRN Agitation  ondansetron Injectable 4 milliGRAM(s) IV Push every 8 hours PRN Nausea and/or Vomiting    Allergies    No Known Allergies    Intolerances      REVIEW OF SYSTEMS:  All other review of systems is negative unless indicated above  VITAL SIGNS:   Vital Signs Last 24 Hrs  T(C): 36.3 (18 Apr 2024 11:40), Max: 37.2 (17 Apr 2024 20:22)  T(F): 97.4 (18 Apr 2024 11:40), Max: 98.9 (17 Apr 2024 20:22)  HR: 64 (18 Apr 2024 11:40) (61 - 80)  BP: 153/66 (18 Apr 2024 11:40) (153/66 - 174/61)  BP(mean): --  RR: 18 (18 Apr 2024 11:40) (17 - 18)  SpO2: 96% (18 Apr 2024 11:40) (90% - 97%)    Parameters below as of 18 Apr 2024 11:40  Patient On (Oxygen Delivery Method): nasal cannula  O2 Flow (L/min): 3    I&O's Summary    17 Apr 2024 07:01  -  18 Apr 2024 07:00  --------------------------------------------------------  IN: 1650 mL / OUT: 325 mL / NET: 1325 mL      On Exam:  Constitutional: NAD, confuse, able to follow commands  Lungs:  Non-labored, lungs clear b/l, no rales or rhonchi  Cardiovascular: RRR.  S1 and S2 positive.  No murmurs, rubs, gallops or clicks  Gastrointestinal: Bowel Sounds present, soft, nontender.   Extremities: No peripheral edema.   Neurological: Alert, no focal deficits  Skin: No rashes or ulcers   Psych:  confuse aox1    LABS: All Labs Reviewed:                        9.6    5.59  )-----------( 163      ( 18 Apr 2024 06:25 )             30.0                         9.3    5.56  )-----------( 152      ( 17 Apr 2024 08:41 )             29.3                         9.9    5.17  )-----------( 182      ( 16 Apr 2024 08:21 )             31.6     18 Apr 2024 06:25    146    |  109    |  13     ----------------------------<  161    3.7     |  32     |  1.40   17 Apr 2024 08:41    144    |  107    |  14     ----------------------------<  215    3.7     |  31     |  1.40   16 Apr 2024 08:21    146    |  113    |  21     ----------------------------<  149    3.8     |  27     |  1.60     Ca    9.2        18 Apr 2024 06:25  Ca    8.2        17 Apr 2024 08:41  Ca    8.1        16 Apr 2024 08:21  Phos  2.5       18 Apr 2024 06:25  Phos  2.4       17 Apr 2024 08:41  Phos  3.1       16 Apr 2024 08:21  Mg     1.8       18 Apr 2024 06:25  Mg     1.8       17 Apr 2024 08:41  Mg     1.8       16 Apr 2024 08:21    TPro  6.5    /  Alb  2.8    /  TBili  0.5    /  DBili  x      /  AST  33     /  ALT  27     /  AlkPhos  72     18 Apr 2024 06:25  TPro  6.0    /  Alb  2.7    /  TBili  0.3    /  DBili  x      /  AST  26     /  ALT  20     /  AlkPhos  71     17 Apr 2024 08:41  TPro  6.1    /  Alb  3.4    /  TBili  x      /  DBili  x      /  AST  x      /  ALT  x      /  AlkPhos  x      16 Apr 2024 08:21          Blood Culture:     04-16 @ 12:45  TSH: 2.06      RADIOLOGY:    EKG:       [facility] - Janusz Lu,  Mynor, Gordo Ponce Goodger, Garfield Casas  Office Number: [phone]    Initial Consult Note    CHIEF COMPLAINT: Patient is a 78y old  Male who presents with a chief complaint of CVA (18 Apr 2024 12:27)      HPI:  Patient is a 77yo M with a PMH of COPD, HTN, HLD, DM2, CVA (4/2015), dementia, TAMMY on CPAP who presents to the ED with AMS. Patient was altered on admission so history was obtained from wife. Per wife, around 9:30pm, patient became very confused and was just repeating the word "yes." Wife notes that patient was also slurring his speech. These symptoms were similar to his last CVA in 2015. NIHSS in ED was 11. Telestoke was consulted and rec against TNK as patient last took his home Eliquis at 2:00pm.     ED course:  Vitals: /58, HR 53, RR 16 SpO2 95% on RA  Labs: H/H 10.3/33.1, BUN/Cr 45/2.4, eGFR 27, Lactate 2.3  UA: Orange, protein 100, large blood, rbc 32, bacteria occasional  Given: IV Rocephin x1, 1L NaCl bolus x1    Imaging  CT HEAD: No large territory acute infarct, intracranial hemorrhage, or mass effect.   CT PERFUSION: No core infarct or acute ischemic penumbra.  CT ANGIOGRAPHY BRAIN: No large vessel occlusion, significant stenosis, aneurysm or vascular malformation.  CT ANGIOGRAPHY NECK: Vasculature of the neck is patent without significant stenosis or dissection.   (13 Apr 2024 00:56)    PAST MEDICAL & SURGICAL HISTORY:  Diabetes mellitus      Hypertension      COPD (chronic obstructive pulmonary disease)      HLD (hyperlipidemia)      Dementia      CVA (cerebral vascular accident)      H/O hand surgery        SOCIAL HISTORY:  No tobacco, ethanol, or drug abuse.  FAMILY HISTORY:  Family history of CABG (Father)      No family history of acute MI or sudden cardiac death.  MEDICATIONS  (STANDING):  amLODIPine   Tablet 10 milliGRAM(s) Oral daily  apixaban 5 milliGRAM(s) Oral every 12 hours  aspirin enteric coated 81 milliGRAM(s) Oral daily  atorvastatin 40 milliGRAM(s) Oral at bedtime  busPIRone 5 milliGRAM(s) Oral <User Schedule>  dextrose 10% Bolus 125 milliLiter(s) IV Bolus once  dextrose 5% + sodium chloride 0.45%. 1000 milliLiter(s) (75 mL/Hr) IV Continuous <Continuous>  dextrose 5%. 1000 milliLiter(s) (50 mL/Hr) IV Continuous <Continuous>  dextrose 5%. 1000 milliLiter(s) (100 mL/Hr) IV Continuous <Continuous>  dextrose 50% Injectable 12.5 Gram(s) IV Push once  dextrose 50% Injectable 25 Gram(s) IV Push once  donepezil 10 milliGRAM(s) Oral at bedtime  escitalopram 20 milliGRAM(s) Oral daily  glucagon  Injectable 1 milliGRAM(s) IntraMuscular once  hemorrhoidal Ointment 1 Application(s) Rectal two times a day  insulin lispro (ADMELOG) corrective regimen sliding scale   SubCutaneous at bedtime  insulin lispro (ADMELOG) corrective regimen sliding scale   SubCutaneous three times a day before meals  iron sucrose Injectable 100 milliGRAM(s) IV Push every 24 hours  QUEtiapine 37.5 milliGRAM(s) Oral <User Schedule>  QUEtiapine 25 milliGRAM(s) Oral daily  valproic  acid Syrup 500 milliGRAM(s) Oral two times a day    MEDICATIONS  (PRN):  albuterol/ipratropium for Nebulization 3 milliLiter(s) Nebulizer every 6 hours PRN Shortness of Breath and/or Wheezing  bisacodyl Suppository 10 milliGRAM(s) Rectal daily PRN Constipation  dextrose Oral Gel 15 Gram(s) Oral once PRN Blood Glucose LESS THAN 70 milliGRAM(s)/deciliter  OLANZapine Injectable 5 milliGRAM(s) IntraMuscular every 6 hours PRN Agitation  ondansetron Injectable 4 milliGRAM(s) IV Push every 8 hours PRN Nausea and/or Vomiting    Allergies    No Known Allergies    Intolerances      REVIEW OF SYSTEMS:  All other review of systems is negative unless indicated above  VITAL SIGNS:   Vital Signs Last 24 Hrs  T(C): 36.3 (18 Apr 2024 11:40), Max: 37.2 (17 Apr 2024 20:22)  T(F): 97.4 (18 Apr 2024 11:40), Max: 98.9 (17 Apr 2024 20:22)  HR: 64 (18 Apr 2024 11:40) (61 - 80)  BP: 153/66 (18 Apr 2024 11:40) (153/66 - 174/61)  BP(mean): --  RR: 18 (18 Apr 2024 11:40) (17 - 18)  SpO2: 96% (18 Apr 2024 11:40) (90% - 97%)    Parameters below as of 18 Apr 2024 11:40  Patient On (Oxygen Delivery Method): nasal cannula  O2 Flow (L/min): 3    I&O's Summary    17 Apr 2024 07:01  -  18 Apr 2024 07:00  --------------------------------------------------------  IN: 1650 mL / OUT: 325 mL / NET: 1325 mL      On Exam:  Constitutional: NAD, confuse, able to follow commands  HEENT MMM anicteric   Lungs:  Non-labored, lungs clear b/l, no rales or rhonchi  Cardiovascular: RRR.  S1 and S2 positive.  No murmurs, rubs, gallops or clicks  Gastrointestinal: Bowel Sounds present, soft, nontender.   Extremities: No peripheral edema.   Neurological: Alert, no focal deficits  Skin: No rashes or ulcers   Psych:  confused aox1    LABS: All Labs Reviewed:                        9.6    5.59  )-----------( 163      ( 18 Apr 2024 06:25 )             30.0                         9.3    5.56  )-----------( 152      ( 17 Apr 2024 08:41 )             29.3                         9.9    5.17  )-----------( 182      ( 16 Apr 2024 08:21 )             31.6     18 Apr 2024 06:25    146    |  109    |  13     ----------------------------<  161    3.7     |  32     |  1.40   17 Apr 2024 08:41    144    |  107    |  14     ----------------------------<  215    3.7     |  31     |  1.40   16 Apr 2024 08:21    146    |  113    |  21     ----------------------------<  149    3.8     |  27     |  1.60     Ca    9.2        18 Apr 2024 06:25  Ca    8.2        17 Apr 2024 08:41  Ca    8.1        16 Apr 2024 08:21  Phos  2.5       18 Apr 2024 06:25  Phos  2.4       17 Apr 2024 08:41  Phos  3.1       16 Apr 2024 08:21  Mg     1.8       18 Apr 2024 06:25  Mg     1.8       17 Apr 2024 08:41  Mg     1.8       16 Apr 2024 08:21    TPro  6.5    /  Alb  2.8    /  TBili  0.5    /  DBili  x      /  AST  33     /  ALT  27     /  AlkPhos  72     18 Apr 2024 06:25  TPro  6.0    /  Alb  2.7    /  TBili  0.3    /  DBili  x      /  AST  26     /  ALT  20     /  AlkPhos  71     17 Apr 2024 08:41  TPro  6.1    /  Alb  3.4    /  TBili  x      /  DBili  x      /  AST  x      /  ALT  x      /  AlkPhos  x      16 Apr 2024 08:21          Blood Culture:     04-16 @ 12:45  TSH: 2.06      RADIOLOGY:    EKG:

## 2024-04-18 NOTE — CONSULT NOTE ADULT - NS ATTEND AMEND GEN_ALL_CORE FT
79yo M with a PMH of PAF ( on Eliquis), HTN, HLD, DM2, CVA (4/2015),  dementia, TAMMY on CPAP presents with AMS. Admitted for CVA workup.    now with AMS and very confused.   - hx CVA 2015 and CAD, continue ASA, statin  - EKG SB with 1st degree av block, PAC, no evidence of acute ischemia  - troponin neg x 1  - hx of PAF seen on ILR, continue eliquis  - BPs labile, possibly reactive to agitation. takes Chlorthalidone 12.5mg day, amlodipine 10mg po qd. and losartan 100mg po qd at home  - continue Amlodipine 10mg  - can resume Losartan 100mg po qd  - renal following, for SARAY, now appears ? baseline 1.4 hold chlorthalidone.     - MGUS workup on going, heme following

## 2024-04-18 NOTE — PROGRESS NOTE ADULT - PROBLEM SELECTOR PLAN 3
Hx of previous CVA in 2015, on Eliquis. NIHSS 11 in ED   - CT HEAD: No large territory acute infarct, intracranial hemorrhage, or mass effect.   - CT PERFUSION: No core infarct or acute ischemic penumbra.  - CTA BRAIN: No large vessel occlusion, significant stenosis, aneurysm or vascular malformation.  - CTA NECK: Vasculature of the neck is patent without significant stenosis or dissection.  - MRI HEAD: No acute intracranial pathology  - TTE w/ bubble study: LV EF 67%, negative for intracardiac shunt  - Monitor on tele   - Neuro checks q4hrs   - resume eliquis  - Restart oral ASA and statin  - Lipid panel WNL, A1c 8.7%  - Neurology recs appreciated,

## 2024-04-18 NOTE — PROGRESS NOTE ADULT - SUBJECTIVE AND OBJECTIVE BOX
Patient is a 78y old  Male who presents with a chief complaint of CVA (18 Apr 2024 09:32)      INTERVAL HPI/OVERNIGHT EVENTS: Pt seen/examined at bedside. No acute events overnight. Pt is awake and interactive, feels well with no complaints.     MEDICATIONS  (STANDING):  amLODIPine   Tablet 10 milliGRAM(s) Oral daily  apixaban 5 milliGRAM(s) Oral every 12 hours  aspirin enteric coated 81 milliGRAM(s) Oral daily  atorvastatin 40 milliGRAM(s) Oral at bedtime  busPIRone 5 milliGRAM(s) Oral <User Schedule>  dextrose 10% Bolus 125 milliLiter(s) IV Bolus once  dextrose 5%. 1000 milliLiter(s) (100 mL/Hr) IV Continuous <Continuous>  dextrose 5%. 1000 milliLiter(s) (50 mL/Hr) IV Continuous <Continuous>  dextrose 50% Injectable 12.5 Gram(s) IV Push once  dextrose 50% Injectable 25 Gram(s) IV Push once  donepezil 10 milliGRAM(s) Oral at bedtime  escitalopram 20 milliGRAM(s) Oral daily  glucagon  Injectable 1 milliGRAM(s) IntraMuscular once  hemorrhoidal Ointment 1 Application(s) Rectal two times a day  insulin lispro (ADMELOG) corrective regimen sliding scale   SubCutaneous at bedtime  insulin lispro (ADMELOG) corrective regimen sliding scale   SubCutaneous three times a day before meals  iron sucrose Injectable 100 milliGRAM(s) IV Push every 24 hours  QUEtiapine 37.5 milliGRAM(s) Oral <User Schedule>  QUEtiapine 25 milliGRAM(s) Oral daily  valproic  acid Syrup 500 milliGRAM(s) Oral two times a day    MEDICATIONS  (PRN):  albuterol/ipratropium for Nebulization 3 milliLiter(s) Nebulizer every 6 hours PRN Shortness of Breath and/or Wheezing  bisacodyl Suppository 10 milliGRAM(s) Rectal daily PRN Constipation  dextrose Oral Gel 15 Gram(s) Oral once PRN Blood Glucose LESS THAN 70 milliGRAM(s)/deciliter  OLANZapine Injectable 5 milliGRAM(s) IntraMuscular every 6 hours PRN Agitation  ondansetron Injectable 4 milliGRAM(s) IV Push every 8 hours PRN Nausea and/or Vomiting      Allergies    No Known Allergies    Intolerances        REVIEW OF SYSTEMS:  Unable to assess 2/2 baseline dementia    Vital Signs Last 24 Hrs  T(C): 36.7 (18 Apr 2024 05:29), Max: 37.2 (17 Apr 2024 20:22)  T(F): 98.1 (18 Apr 2024 05:29), Max: 98.9 (17 Apr 2024 20:22)  HR: 61 (18 Apr 2024 09:00) (56 - 80)  BP: 159/68 (18 Apr 2024 09:00) (137/75 - 174/61)  BP(mean): --  RR: 18 (18 Apr 2024 05:29) (17 - 18)  SpO2: 90% (18 Apr 2024 05:29) (90% - 97%)    Parameters below as of 18 Apr 2024 05:29  Patient On (Oxygen Delivery Method): nasal cannula  O2 Flow (L/min): 3      PHYSICAL EXAM:  GENERAL: NAD  HEENT:  anicteric, moist mucous membranes  CHEST/LUNG:  + b/l wheezing  HEART:  RRR, S1, S2 , no tachy   ABDOMEN:  BS+, soft, nontender, nondistended  EXTREMITIES: no edema, cyanosis, or calf tenderness  NERVOUS SYSTEM: A&O x 1 (person)  gu intact       LABS:                        9.6    5.59  )-----------( 163      ( 18 Apr 2024 06:25 )             30.0     CBC Full  -  ( 18 Apr 2024 06:25 )  WBC Count : 5.59 K/uL  Hemoglobin : 9.6 g/dL  Hematocrit : 30.0 %  Platelet Count - Automated : 163 K/uL  Mean Cell Volume : 94.6 fl  Mean Cell Hemoglobin : 30.3 pg  Mean Cell Hemoglobin Concentration : 32.0 gm/dL  Auto Neutrophil # : x  Auto Lymphocyte # : x  Auto Monocyte # : x  Auto Eosinophil # : x  Auto Basophil # : x  Auto Neutrophil % : x  Auto Lymphocyte % : x  Auto Monocyte % : x  Auto Eosinophil % : x  Auto Basophil % : x    18 Apr 2024 06:25    146    |  109    |  13     ----------------------------<  161    3.7     |  32     |  1.40     Ca    9.2        18 Apr 2024 06:25  Phos  2.5       18 Apr 2024 06:25  Mg     1.8       18 Apr 2024 06:25    TPro  6.5    /  Alb  2.8    /  TBili  0.5    /  DBili  x      /  AST  33     /  ALT  27     /  AlkPhos  72     18 Apr 2024 06:25      Urinalysis Basic - ( 18 Apr 2024 06:25 )    Color: x / Appearance: x / SG: x / pH: x  Gluc: 161 mg/dL / Ketone: x  / Bili: x / Urobili: x   Blood: x / Protein: x / Nitrite: x   Leuk Esterase: x / RBC: x / WBC x   Sq Epi: x / Non Sq Epi: x / Bacteria: x      CAPILLARY BLOOD GLUCOSE      POCT Blood Glucose.: 187 mg/dL (18 Apr 2024 07:49)  POCT Blood Glucose.: 142 mg/dL (17 Apr 2024 17:27)  POCT Blood Glucose.: 343 mg/dL (17 Apr 2024 11:58)        Culture - Urine (collected 04-13-24 @ 00:00)  Source: Catheterized Catheterized  Final Report (04-14-24 @ 14:15):    <10,000 CFU/mL Normal Urogenital Veronica    Culture - Blood (collected 04-12-24 @ 23:00)  Source: .Blood Blood  Final Report (04-18-24 @ 05:01):    No growth at 5 days    Culture - Blood (collected 04-12-24 @ 22:50)  Source: .Blood Blood  Final Report (04-18-24 @ 05:01):    No growth at 5 days        RADIOLOGY & ADDITIONAL TESTS:    Personally reviewed.     Consultant(s) Notes Reviewed:  [x] YES  [ ] NO     Patient is a 78y old  Male who presents with a chief complaint of CVA (18 Apr 2024 09:32)      INTERVAL HPI/OVERNIGHT EVENTS: Pt seen/examined at bedside. No acute events overnight. Pt is awake and interactive, feels well with no complaints   but later  after breakfast again  agitated   ativan 1 mg iv given  after  discussing with psych dr vera .     MEDICATIONS  (STANDING):  amLODIPine   Tablet 10 milliGRAM(s) Oral daily  apixaban 5 milliGRAM(s) Oral every 12 hours  aspirin enteric coated 81 milliGRAM(s) Oral daily  atorvastatin 40 milliGRAM(s) Oral at bedtime  busPIRone 5 milliGRAM(s) Oral <User Schedule>  dextrose 10% Bolus 125 milliLiter(s) IV Bolus once  dextrose 5%. 1000 milliLiter(s) (100 mL/Hr) IV Continuous <Continuous>  dextrose 5%. 1000 milliLiter(s) (50 mL/Hr) IV Continuous <Continuous>  dextrose 50% Injectable 12.5 Gram(s) IV Push once  dextrose 50% Injectable 25 Gram(s) IV Push once  donepezil 10 milliGRAM(s) Oral at bedtime  escitalopram 20 milliGRAM(s) Oral daily  glucagon  Injectable 1 milliGRAM(s) IntraMuscular once  hemorrhoidal Ointment 1 Application(s) Rectal two times a day  insulin lispro (ADMELOG) corrective regimen sliding scale   SubCutaneous at bedtime  insulin lispro (ADMELOG) corrective regimen sliding scale   SubCutaneous three times a day before meals  iron sucrose Injectable 100 milliGRAM(s) IV Push every 24 hours  QUEtiapine 37.5 milliGRAM(s) Oral <User Schedule>  QUEtiapine 25 milliGRAM(s) Oral daily  valproic  acid Syrup 500 milliGRAM(s) Oral two times a day    MEDICATIONS  (PRN):  albuterol/ipratropium for Nebulization 3 milliLiter(s) Nebulizer every 6 hours PRN Shortness of Breath and/or Wheezing  bisacodyl Suppository 10 milliGRAM(s) Rectal daily PRN Constipation  dextrose Oral Gel 15 Gram(s) Oral once PRN Blood Glucose LESS THAN 70 milliGRAM(s)/deciliter  OLANZapine Injectable 5 milliGRAM(s) IntraMuscular every 6 hours PRN Agitation  ondansetron Injectable 4 milliGRAM(s) IV Push every 8 hours PRN Nausea and/or Vomiting      Allergies    No Known Allergies    Intolerances        REVIEW OF SYSTEMS:  Unable to assess 2/2 baseline dementia    Vital Signs Last 24 Hrs  T(C): 36.7 (18 Apr 2024 05:29), Max: 37.2 (17 Apr 2024 20:22)  T(F): 98.1 (18 Apr 2024 05:29), Max: 98.9 (17 Apr 2024 20:22)  HR: 61 (18 Apr 2024 09:00) (56 - 80)  BP: 159/68 (18 Apr 2024 09:00) (137/75 - 174/61)  BP(mean): --  RR: 18 (18 Apr 2024 05:29) (17 - 18)  SpO2: 90% (18 Apr 2024 05:29) (90% - 97%)    Parameters below as of 18 Apr 2024 05:29  Patient On (Oxygen Delivery Method): nasal cannula  O2 Flow (L/min): 3      PHYSICAL EXAM:  GENERAL: NAD  HEENT:  anicteric, moist mucous membranes  CHEST/LUNG:  + b/l wheezing  HEART:  RRR, S1, S2 , no tachy   ABDOMEN:  BS+, soft, nontender, nondistended  EXTREMITIES: no edema, cyanosis, or calf tenderness  NERVOUS SYSTEM: A&O x 1 (person)  gu intact       LABS:                        9.6    5.59  )-----------( 163      ( 18 Apr 2024 06:25 )             30.0     CBC Full  -  ( 18 Apr 2024 06:25 )  WBC Count : 5.59 K/uL  Hemoglobin : 9.6 g/dL  Hematocrit : 30.0 %  Platelet Count - Automated : 163 K/uL  Mean Cell Volume : 94.6 fl  Mean Cell Hemoglobin : 30.3 pg  Mean Cell Hemoglobin Concentration : 32.0 gm/dL  Auto Neutrophil # : x  Auto Lymphocyte # : x  Auto Monocyte # : x  Auto Eosinophil # : x  Auto Basophil # : x  Auto Neutrophil % : x  Auto Lymphocyte % : x  Auto Monocyte % : x  Auto Eosinophil % : x  Auto Basophil % : x    18 Apr 2024 06:25    146    |  109    |  13     ----------------------------<  161    3.7     |  32     |  1.40     Ca    9.2        18 Apr 2024 06:25  Phos  2.5       18 Apr 2024 06:25  Mg     1.8       18 Apr 2024 06:25    TPro  6.5    /  Alb  2.8    /  TBili  0.5    /  DBili  x      /  AST  33     /  ALT  27     /  AlkPhos  72     18 Apr 2024 06:25      Urinalysis Basic - ( 18 Apr 2024 06:25 )    Color: x / Appearance: x / SG: x / pH: x  Gluc: 161 mg/dL / Ketone: x  / Bili: x / Urobili: x   Blood: x / Protein: x / Nitrite: x   Leuk Esterase: x / RBC: x / WBC x   Sq Epi: x / Non Sq Epi: x / Bacteria: x      CAPILLARY BLOOD GLUCOSE      POCT Blood Glucose.: 187 mg/dL (18 Apr 2024 07:49)  POCT Blood Glucose.: 142 mg/dL (17 Apr 2024 17:27)  POCT Blood Glucose.: 343 mg/dL (17 Apr 2024 11:58)        Culture - Urine (collected 04-13-24 @ 00:00)  Source: Catheterized Catheterized  Final Report (04-14-24 @ 14:15):    <10,000 CFU/mL Normal Urogenital Veronica    Culture - Blood (collected 04-12-24 @ 23:00)  Source: .Blood Blood  Final Report (04-18-24 @ 05:01):    No growth at 5 days    Culture - Blood (collected 04-12-24 @ 22:50)  Source: .Blood Blood  Final Report (04-18-24 @ 05:01):    No growth at 5 days        RADIOLOGY & ADDITIONAL TESTS:    Personally reviewed.     Consultant(s) Notes Reviewed:  [x] YES  [ ] NO

## 2024-04-18 NOTE — PROGRESS NOTE ADULT - SUBJECTIVE AND OBJECTIVE BOX
Neurology follow up note    RANCHO HARDYY78yMale      Interval History:    Patient feels ok awake having breakfast     MEDICATIONS    albuterol/ipratropium for Nebulization 3 milliLiter(s) Nebulizer every 6 hours PRN  amLODIPine   Tablet 10 milliGRAM(s) Oral daily  apixaban 5 milliGRAM(s) Oral every 12 hours  aspirin enteric coated 81 milliGRAM(s) Oral daily  atorvastatin 40 milliGRAM(s) Oral at bedtime  bisacodyl Suppository 10 milliGRAM(s) Rectal daily PRN  busPIRone 5 milliGRAM(s) Oral <User Schedule>  dextrose 10% Bolus 125 milliLiter(s) IV Bolus once  dextrose 5%. 1000 milliLiter(s) IV Continuous <Continuous>  dextrose 5%. 1000 milliLiter(s) IV Continuous <Continuous>  dextrose 50% Injectable 12.5 Gram(s) IV Push once  dextrose 50% Injectable 25 Gram(s) IV Push once  dextrose Oral Gel 15 Gram(s) Oral once PRN  donepezil 10 milliGRAM(s) Oral at bedtime  escitalopram 20 milliGRAM(s) Oral daily  glucagon  Injectable 1 milliGRAM(s) IntraMuscular once  hemorrhoidal Ointment 1 Application(s) Rectal two times a day  insulin lispro (ADMELOG) corrective regimen sliding scale   SubCutaneous at bedtime  insulin lispro (ADMELOG) corrective regimen sliding scale   SubCutaneous three times a day before meals  iron sucrose Injectable 100 milliGRAM(s) IV Push every 24 hours  LORazepam   Injectable 0.5 milliGRAM(s) IV Push once  OLANZapine Injectable 5 milliGRAM(s) IntraMuscular every 6 hours PRN  ondansetron Injectable 4 milliGRAM(s) IV Push every 8 hours PRN  QUEtiapine 25 milliGRAM(s) Oral <User Schedule>  valproic  acid Syrup 500 milliGRAM(s) Oral two times a day      Allergies    No Known Allergies    Intolerances            Vital Signs Last 24 Hrs  T(C): 36.7 (18 Apr 2024 05:29), Max: 37.2 (17 Apr 2024 20:22)  T(F): 98.1 (18 Apr 2024 05:29), Max: 98.9 (17 Apr 2024 20:22)  HR: 61 (18 Apr 2024 09:00) (56 - 80)  BP: 159/68 (18 Apr 2024 09:00) (137/75 - 174/61)  BP(mean): --  RR: 18 (18 Apr 2024 05:29) (17 - 18)  SpO2: 90% (18 Apr 2024 05:29) (90% - 97%)    Parameters below as of 18 Apr 2024 05:29  Patient On (Oxygen Delivery Method): nasal cannula  O2 Flow (L/min): 3        REVIEW OF SYSTEMS: Limited or unable to obtain secondary to patient's poor mental status.    Constitutional: No fever, chills, fatigue, weakness  Eyes: no eye pain, visual disturbances, or discharge  ENT:  No difficulty hearing, tinnitus, vertigo; No sinus or throat pain  Neck: No pain or stiffness  Respiratory: No cough, dyspnea, wheezing   Cardiovascular: No chest pain, palpitations,   Gastrointestinal: No abdominal or epigastric pain. No nausea, vomiting  No diarrhea or constipation.   Genitourinary: No dysuria, frequency, hematuria or incontinence  Neurological: No headaches, lightheadedness, vertigo, numbness or tremors      On Neurological Examination:    Mental Status - Patient is alert, awake  Confused Dementia      Follow simple and complex commands    Speech -   Fluent                  Cranial Nerves - Pupils 3 mm equal and reactive to light,   extraocular eye movements intact.   smile symmetric  intact bilateral NLF    Motor Exam -   Right upper 4/5  Left upper 4/5  Right lower 3/5  Left lower  3/5    Muscle tone - is normal all over.  No asymmetry is seen.      Sensory    Bilateral intact to light touch    GENERAL Exam: Nontoxic , No Acute Distress   	  HEENT:  normocephalic, atraumatic  		  LUNGS: No Wheeze    	  HEART: Normal S1S2   No murmur RRR        	  GI/ ABDOMEN:  Soft  Non tender    EXTREMITIES:   No Edema  No Clubbing  No Cyanosis   	   SKIN: Normal  No Ecchymosis               LABS:  CBC Full  -  ( 18 Apr 2024 06:25 )  WBC Count : 5.59 K/uL  RBC Count : 3.17 M/uL  Hemoglobin : 9.6 g/dL  Hematocrit : 30.0 %  Platelet Count - Automated : 163 K/uL  Mean Cell Volume : 94.6 fl  Mean Cell Hemoglobin : 30.3 pg  Mean Cell Hemoglobin Concentration : 32.0 gm/dL  Auto Neutrophil # : x  Auto Lymphocyte # : x  Auto Monocyte # : x  Auto Eosinophil # : x  Auto Basophil # : x  Auto Neutrophil % : x  Auto Lymphocyte % : x  Auto Monocyte % : x  Auto Eosinophil % : x  Auto Basophil % : x    Urinalysis Basic - ( 18 Apr 2024 06:25 )    Color: x / Appearance: x / SG: x / pH: x  Gluc: 161 mg/dL / Ketone: x  / Bili: x / Urobili: x   Blood: x / Protein: x / Nitrite: x   Leuk Esterase: x / RBC: x / WBC x   Sq Epi: x / Non Sq Epi: x / Bacteria: x      04-18    146<H>  |  109<H>  |  13  ----------------------------<  161<H>  3.7   |  32<H>  |  1.40<H>    Ca    9.2      18 Apr 2024 06:25  Phos  2.5     04-18  Mg     1.8     04-18    TPro  6.5  /  Alb  2.8<L>  /  TBili  0.5  /  DBili  x   /  AST  33  /  ALT  27  /  AlkPhos  72  04-18    Hemoglobin A1C:     LIVER FUNCTIONS - ( 18 Apr 2024 06:25 )  Alb: 2.8 g/dL / Pro: 6.5 g/dL / ALK PHOS: 72 U/L / ALT: 27 U/L / AST: 33 U/L / GGT: x           Vitamin B12         RADIOLOGY        < from: MR Head No Cont (04.13.24 @ 14:23) >    INTERPRETATION:  CLINICAL INFORMATION: Cerebral vascular accident.    Altered mental status.  Sudden onset of confusion.  Slurred speech.    Hypertension.  Hyperlipidemia.  Type 2 diabetes mellitus.  Prior cerebral   vascular accident in April, 2015.  Dementia.    COMPARISON: CT head stroke protocol with CT perfusion and CT angiography   neck/brain from 04/12/2024.  MRI brain from 04/27/2015.    CONTRAST/COMPLICATIONS:  IV Contrast: NONE  Complications: None reported at time of study completion    TECHNIQUE: MRI of the brain without intravenous contrast was performed   using the following sequences: Sagittal T1 FLAIR, axial DWI, axial T2   FLAIR, axial T2, axial 3D SWAN, axial T1 FLAIR, coronal T2.    FINDINGS:  There is no diffusion restriction to indicate acute or recent subacute   infarct.    A punctate focus of susceptibility in the anterior left frontal region   (6:37-39) is extra-axial and correspond to a small osteoma seen on CT.    There is no MRI evidence of intracranial blood products, subdural   collection, vasogenic edema, mass effect or hydrocephalus.    There is mild generalized cerebral volume loss, with focally more severe   volume loss in the medial temporal lobes bilaterally, consistent with   history of underlying dementia.  Mild, patchy T2 FLAIR signal   hyperintensity in the periventricular white matter is compatible with   chronic microvascular ischemic disease.    There are multiple small chronic transcortical infarcts in the right   frontal convexity and right parietal convexity, and a moderate sized   chronic transcortical infarct in the right temporal lobe, within the   right middle cerebral artery vascular territory.  There is a small   chronic transcortical infarct in the left frontal convexity, within the   left middle cerebral artery vascular territory.  There is a small chronic   white matter infarct in the left frontal corona radiata.  There are small   chronic transcortical infarcts in both occipital lobes, within the   posterior cerebral artery vascular territories bilaterally.  Mild patchy   T2 FLAIR signal hyperintensity in central kellen compatible with chronic   small vessel ischemic changes.    Midline sagittal structures appear within normal limits.    There is mild patchy prenatal sinus mucosal thickening without air-fluid   level.  There is deviation nasal septum, convex right anteriorly and, to   left posteriorly, with a left-sided bony spur that contacts and deforms   both the left inferior and middle nasal turbinates.    The mastoids are clear bilaterally.    The calvarium, skull base and orbits appear within normal limits.    IMPRESSION:  No MRI evidence of acute intracranial pathology.    Cerebral volume loss and chronic ischemic changes as discussed above.    ASSESSMENT AND PLAN:      seen for ams/aphasia prolonged   brain mri- unremarkable for acute cva so suspect do to prolonged event possible seizure event  depakote 500 bid  continue AC  EEG was negative   dementia ARICEPT  psych follow up agitation will increase seroquel to 37.5   spoke to spouse tio   cell  4/18     45 minutes of time was spent with the patient, plan of care, reviewing data, with greater than  50% of the visit was spent counseling and/or coordinating care with multidisciplinary healthcare team.          Neurology follow up note    RANCHO HARDYY78yMale      Interval History:    Patient feels ok awake having breakfast     MEDICATIONS    albuterol/ipratropium for Nebulization 3 milliLiter(s) Nebulizer every 6 hours PRN  amLODIPine   Tablet 10 milliGRAM(s) Oral daily  apixaban 5 milliGRAM(s) Oral every 12 hours  aspirin enteric coated 81 milliGRAM(s) Oral daily  atorvastatin 40 milliGRAM(s) Oral at bedtime  bisacodyl Suppository 10 milliGRAM(s) Rectal daily PRN  busPIRone 5 milliGRAM(s) Oral <User Schedule>  dextrose 10% Bolus 125 milliLiter(s) IV Bolus once  dextrose 5%. 1000 milliLiter(s) IV Continuous <Continuous>  dextrose 5%. 1000 milliLiter(s) IV Continuous <Continuous>  dextrose 50% Injectable 12.5 Gram(s) IV Push once  dextrose 50% Injectable 25 Gram(s) IV Push once  dextrose Oral Gel 15 Gram(s) Oral once PRN  donepezil 10 milliGRAM(s) Oral at bedtime  escitalopram 20 milliGRAM(s) Oral daily  glucagon  Injectable 1 milliGRAM(s) IntraMuscular once  hemorrhoidal Ointment 1 Application(s) Rectal two times a day  insulin lispro (ADMELOG) corrective regimen sliding scale   SubCutaneous at bedtime  insulin lispro (ADMELOG) corrective regimen sliding scale   SubCutaneous three times a day before meals  iron sucrose Injectable 100 milliGRAM(s) IV Push every 24 hours  LORazepam   Injectable 0.5 milliGRAM(s) IV Push once  OLANZapine Injectable 5 milliGRAM(s) IntraMuscular every 6 hours PRN  ondansetron Injectable 4 milliGRAM(s) IV Push every 8 hours PRN  QUEtiapine 25 milliGRAM(s) Oral <User Schedule>  valproic  acid Syrup 500 milliGRAM(s) Oral two times a day      Allergies    No Known Allergies    Intolerances            Vital Signs Last 24 Hrs  T(C): 36.7 (18 Apr 2024 05:29), Max: 37.2 (17 Apr 2024 20:22)  T(F): 98.1 (18 Apr 2024 05:29), Max: 98.9 (17 Apr 2024 20:22)  HR: 61 (18 Apr 2024 09:00) (56 - 80)  BP: 159/68 (18 Apr 2024 09:00) (137/75 - 174/61)  BP(mean): --  RR: 18 (18 Apr 2024 05:29) (17 - 18)  SpO2: 90% (18 Apr 2024 05:29) (90% - 97%)    Parameters below as of 18 Apr 2024 05:29  Patient On (Oxygen Delivery Method): nasal cannula  O2 Flow (L/min): 3        REVIEW OF SYSTEMS: Limited or unable to obtain secondary to patient's poor mental status.    Constitutional: No fever, chills, fatigue, weakness  Eyes: no eye pain, visual disturbances, or discharge  ENT:  No difficulty hearing, tinnitus, vertigo; No sinus or throat pain  Neck: No pain or stiffness  Respiratory: No cough, dyspnea, wheezing   Cardiovascular: No chest pain, palpitations,   Gastrointestinal: No abdominal or epigastric pain. No nausea, vomiting  No diarrhea or constipation.   Genitourinary: No dysuria, frequency, hematuria or incontinence  Neurological: No headaches, lightheadedness, vertigo, numbness or tremors      On Neurological Examination:    Mental Status - Patient is alert, awake  Confused Dementia      Follow simple and complex commands    Speech -   Fluent                  Cranial Nerves - Pupils 3 mm equal and reactive to light,   extraocular eye movements intact.   smile symmetric  intact bilateral NLF    Motor Exam -   Right upper 4/5  Left upper 4/5  Right lower 3/5  Left lower  3/5    Muscle tone - is normal all over.  No asymmetry is seen.      Sensory    Bilateral intact to light touch    GENERAL Exam: Nontoxic , No Acute Distress   	  HEENT:  normocephalic, atraumatic  		  LUNGS: No Wheeze    	  HEART: Normal S1S2   No murmur RRR        	  GI/ ABDOMEN:  Soft  Non tender    EXTREMITIES:   No Edema  No Clubbing  No Cyanosis   	   SKIN: Normal  No Ecchymosis               LABS:  CBC Full  -  ( 18 Apr 2024 06:25 )  WBC Count : 5.59 K/uL  RBC Count : 3.17 M/uL  Hemoglobin : 9.6 g/dL  Hematocrit : 30.0 %  Platelet Count - Automated : 163 K/uL  Mean Cell Volume : 94.6 fl  Mean Cell Hemoglobin : 30.3 pg  Mean Cell Hemoglobin Concentration : 32.0 gm/dL  Auto Neutrophil # : x  Auto Lymphocyte # : x  Auto Monocyte # : x  Auto Eosinophil # : x  Auto Basophil # : x  Auto Neutrophil % : x  Auto Lymphocyte % : x  Auto Monocyte % : x  Auto Eosinophil % : x  Auto Basophil % : x    Urinalysis Basic - ( 18 Apr 2024 06:25 )    Color: x / Appearance: x / SG: x / pH: x  Gluc: 161 mg/dL / Ketone: x  / Bili: x / Urobili: x   Blood: x / Protein: x / Nitrite: x   Leuk Esterase: x / RBC: x / WBC x   Sq Epi: x / Non Sq Epi: x / Bacteria: x      04-18    146<H>  |  109<H>  |  13  ----------------------------<  161<H>  3.7   |  32<H>  |  1.40<H>    Ca    9.2      18 Apr 2024 06:25  Phos  2.5     04-18  Mg     1.8     04-18    TPro  6.5  /  Alb  2.8<L>  /  TBili  0.5  /  DBili  x   /  AST  33  /  ALT  27  /  AlkPhos  72  04-18    Hemoglobin A1C:     LIVER FUNCTIONS - ( 18 Apr 2024 06:25 )  Alb: 2.8 g/dL / Pro: 6.5 g/dL / ALK PHOS: 72 U/L / ALT: 27 U/L / AST: 33 U/L / GGT: x           Vitamin B12         RADIOLOGY        < from: MR Head No Cont (04.13.24 @ 14:23) >    INTERPRETATION:  CLINICAL INFORMATION: Cerebral vascular accident.    Altered mental status.  Sudden onset of confusion.  Slurred speech.    Hypertension.  Hyperlipidemia.  Type 2 diabetes mellitus.  Prior cerebral   vascular accident in April, 2015.  Dementia.    COMPARISON: CT head stroke protocol with CT perfusion and CT angiography   neck/brain from 04/12/2024.  MRI brain from 04/27/2015.    CONTRAST/COMPLICATIONS:  IV Contrast: NONE  Complications: None reported at time of study completion    TECHNIQUE: MRI of the brain without intravenous contrast was performed   using the following sequences: Sagittal T1 FLAIR, axial DWI, axial T2   FLAIR, axial T2, axial 3D SWAN, axial T1 FLAIR, coronal T2.    FINDINGS:  There is no diffusion restriction to indicate acute or recent subacute   infarct.    A punctate focus of susceptibility in the anterior left frontal region   (6:37-39) is extra-axial and correspond to a small osteoma seen on CT.    There is no MRI evidence of intracranial blood products, subdural   collection, vasogenic edema, mass effect or hydrocephalus.    There is mild generalized cerebral volume loss, with focally more severe   volume loss in the medial temporal lobes bilaterally, consistent with   history of underlying dementia.  Mild, patchy T2 FLAIR signal   hyperintensity in the periventricular white matter is compatible with   chronic microvascular ischemic disease.    There are multiple small chronic transcortical infarcts in the right   frontal convexity and right parietal convexity, and a moderate sized   chronic transcortical infarct in the right temporal lobe, within the   right middle cerebral artery vascular territory.  There is a small   chronic transcortical infarct in the left frontal convexity, within the   left middle cerebral artery vascular territory.  There is a small chronic   white matter infarct in the left frontal corona radiata.  There are small   chronic transcortical infarcts in both occipital lobes, within the   posterior cerebral artery vascular territories bilaterally.  Mild patchy   T2 FLAIR signal hyperintensity in central kellen compatible with chronic   small vessel ischemic changes.    Midline sagittal structures appear within normal limits.    There is mild patchy prenatal sinus mucosal thickening without air-fluid   level.  There is deviation nasal septum, convex right anteriorly and, to   left posteriorly, with a left-sided bony spur that contacts and deforms   both the left inferior and middle nasal turbinates.    The mastoids are clear bilaterally.    The calvarium, skull base and orbits appear within normal limits.    IMPRESSION:  No MRI evidence of acute intracranial pathology.    Cerebral volume loss and chronic ischemic changes as discussed above.    ASSESSMENT AND PLAN:      seen for ams/aphasia prolonged   brain mri- unremarkable for acute cva so suspect do to prolonged event possible seizure event  depakote 500 bid  continue AC  EEG was negative   dementia ARICEPT  psych follow up agitation will increase seroquel to 37.5 at night will try 25 in am   spoke to spouse tio   cell  4/18     45 minutes of time was spent with the patient, plan of care, reviewing data, with greater than  50% of the visit was spent counseling and/or coordinating care with multidisciplinary healthcare team.

## 2024-04-18 NOTE — PROGRESS NOTE ADULT - PROBLEM SELECTOR PLAN 1
Patient with altered mental status and agitation concerning for possible seizure event   - EEG: Moderate diffuse/multi-focal cerebral dysfunction, not specific as to etiology. There were no epileptiform abnormalities recorded.  - Now with abnormal labs concerning for MM: SPEP with M spike, elevated kappa/lambda  - continue depakote 500mg BID for possible prolonged seizure event  - Neuro consulted dr dong   - Psych consulted for agitation: no psychiatric contraindications to discharge  - Seroquel increased 37.5mg qHS with additional seroquel 25mg daily

## 2024-04-18 NOTE — CHART NOTE - NSCHARTNOTESELECT_GEN_ALL_CORE
Code Gray/Event Note
Event Note
Telestroke/Event Note
Event Note

## 2024-04-18 NOTE — PROGRESS NOTE ADULT - PROBLEM SELECTOR PLAN 5
Chronic. On Metformin, Glimepiride and Alogliptin   - A1c 8.7%  - Moderate SSI  - Hypoglycemia protocol  - Monitor fingersticks

## 2024-04-18 NOTE — PROGRESS NOTE ADULT - SUBJECTIVE AND OBJECTIVE BOX
Patient is a 78y old  Male who presents with a chief complaint of CVA (13 Apr 2024 00:56)       HPI:  Patient is a 77yo M with a PMH of COPD, HTN, HLD, DM2, CVA (4/2015), dementia, TAMMY on CPAP who presents to the ED with AMS. Patient was altered on admission so history was obtained from wife. Per wife, around 9:30pm, patient became very confused and was just repeating the word "yes." Wife notes that patient was also slurring his speech. These symptoms were similar to his last CVA in 2015. NIHSS in ED was 11. Telestoke was consulted and rec against TNK as patient last took his home Eliquis at 2:00pm.     Confused,      Renal consulted for SARAY. Chart reviewed. Patient is confused.     No acute events noted       PAST MEDICAL & SURGICAL HISTORY:  Diabetes mellitus      Hypertension      COPD (chronic obstructive pulmonary disease)      HLD (hyperlipidemia)      Dementia      CVA (cerebral vascular accident)      H/O hand surgery           FAMILY HISTORY:  Family history of CABG (Father)    NC    Social History:Non smoker    MEDICATIONS  (STANDING):  dextrose 10% Bolus 125 milliLiter(s) IV Bolus once  dextrose 5% + sodium chloride 0.9%. 1000 milliLiter(s) (55 mL/Hr) IV Continuous <Continuous>  dextrose 5%. 1000 milliLiter(s) (50 mL/Hr) IV Continuous <Continuous>  dextrose 5%. 1000 milliLiter(s) (100 mL/Hr) IV Continuous <Continuous>  dextrose 50% Injectable 12.5 Gram(s) IV Push once  dextrose 50% Injectable 25 Gram(s) IV Push once  glucagon  Injectable 1 milliGRAM(s) IntraMuscular once  insulin lispro (ADMELOG) corrective regimen sliding scale   SubCutaneous at bedtime  insulin lispro (ADMELOG) corrective regimen sliding scale   SubCutaneous every 6 hours    MEDICATIONS  (PRN):  dextrose Oral Gel 15 Gram(s) Oral once PRN Blood Glucose LESS THAN 70 milliGRAM(s)/deciliter  heparin   Injectable 3000 Unit(s) IV Push every 6 hours PRN For aPTT between 40 - 57  heparin   Injectable 6500 Unit(s) IV Push every 6 hours PRN For aPTT less than 40  ondansetron Injectable 4 milliGRAM(s) IV Push every 8 hours PRN Nausea and/or Vomiting   Meds reviewed    Allergies    No Known Allergies    Intolerances         REVIEW OF SYSTEMS:  Confused    Vital Signs Last 24 Hrs  T(C): 36.7 (18 Apr 2024 05:29), Max: 37.2 (17 Apr 2024 20:22)  T(F): 98.1 (18 Apr 2024 05:29), Max: 98.9 (17 Apr 2024 20:22)  HR: 65 (18 Apr 2024 05:29) (56 - 80)  BP: 174/61 (18 Apr 2024 05:29) (137/75 - 174/61)  BP(mean): --  RR: 18 (18 Apr 2024 05:29) (17 - 18)  SpO2: 90% (18 Apr 2024 05:29) (90% - 97%)    Parameters below as of 18 Apr 2024 05:29  Patient On (Oxygen Delivery Method): nasal cannula  O2 Flow (L/min): 3      PHYSICAL EXAM:    GENERAL: NAD  HEAD:  Atraumatic, Normocephalic  NERVOUS SYSTEM:  Awake and Alert, confused  CHEST/LUNG: Clear to auscultation bilaterally  HEART: Regular rate and rhythm; No murmurs, rubs, or gallops  ABDOMEN: Soft, Nontender, Nondistended; Bowel sounds present  EXTREMITIES:  No Edema        LABS:                                       9.6    5.59  )-----------( 163      ( 18 Apr 2024 06:25 )             30.0     04-18    146<H>  |  109<H>  |  13  ----------------------------<  161<H>  3.7   |  32<H>  |  1.40<H>    Ca    9.2      18 Apr 2024 06:25  Phos  2.5     04-18  Mg     1.8     04-18    TPro  6.5  /  Alb  2.8<L>  /  TBili  0.5  /  DBili  x   /  AST  33  /  ALT  27  /  AlkPhos  72  04-18      Urinalysis Basic - ( 18 Apr 2024 06:25 )    Color: x / Appearance: x / SG: x / pH: x  Gluc: 161 mg/dL / Ketone: x  / Bili: x / Urobili: x   Blood: x / Protein: x / Nitrite: x   Leuk Esterase: x / RBC: x / WBC x   Sq Epi: x / Non Sq Epi: x / Bacteria: x

## 2024-04-18 NOTE — PROGRESS NOTE ADULT - SUBJECTIVE AND OBJECTIVE BOX
Interval History:  no new complaints  family at bedside    Chart reviewed and events noted;   Overnight events:    MEDICATIONS  (STANDING):  amLODIPine   Tablet 10 milliGRAM(s) Oral daily  apixaban 5 milliGRAM(s) Oral every 12 hours  aspirin enteric coated 81 milliGRAM(s) Oral daily  atorvastatin 40 milliGRAM(s) Oral at bedtime  busPIRone 5 milliGRAM(s) Oral <User Schedule>  dextrose 10% Bolus 125 milliLiter(s) IV Bolus once  dextrose 5% + sodium chloride 0.45%. 1000 milliLiter(s) (75 mL/Hr) IV Continuous <Continuous>  dextrose 5%. 1000 milliLiter(s) (100 mL/Hr) IV Continuous <Continuous>  dextrose 5%. 1000 milliLiter(s) (50 mL/Hr) IV Continuous <Continuous>  dextrose 50% Injectable 12.5 Gram(s) IV Push once  dextrose 50% Injectable 25 Gram(s) IV Push once  donepezil 10 milliGRAM(s) Oral at bedtime  escitalopram 20 milliGRAM(s) Oral daily  glucagon  Injectable 1 milliGRAM(s) IntraMuscular once  hemorrhoidal Ointment 1 Application(s) Rectal two times a day  insulin lispro (ADMELOG) corrective regimen sliding scale   SubCutaneous at bedtime  insulin lispro (ADMELOG) corrective regimen sliding scale   SubCutaneous three times a day before meals  iron sucrose Injectable 100 milliGRAM(s) IV Push every 24 hours  QUEtiapine 25 milliGRAM(s) Oral daily  QUEtiapine 37.5 milliGRAM(s) Oral <User Schedule>  valproic  acid Syrup 500 milliGRAM(s) Oral two times a day    MEDICATIONS  (PRN):  albuterol/ipratropium for Nebulization 3 milliLiter(s) Nebulizer every 6 hours PRN Shortness of Breath and/or Wheezing  bisacodyl Suppository 10 milliGRAM(s) Rectal daily PRN Constipation  dextrose Oral Gel 15 Gram(s) Oral once PRN Blood Glucose LESS THAN 70 milliGRAM(s)/deciliter  OLANZapine Injectable 5 milliGRAM(s) IntraMuscular every 6 hours PRN Agitation  ondansetron Injectable 4 milliGRAM(s) IV Push every 8 hours PRN Nausea and/or Vomiting      Vital Signs Last 24 Hrs  T(C): 36.3 (18 Apr 2024 11:40), Max: 37.2 (17 Apr 2024 20:22)  T(F): 97.4 (18 Apr 2024 11:40), Max: 98.9 (17 Apr 2024 20:22)  HR: 64 (18 Apr 2024 11:40) (61 - 80)  BP: 153/66 (18 Apr 2024 11:40) (153/66 - 174/61)  BP(mean): --  RR: 18 (18 Apr 2024 11:40) (17 - 18)  SpO2: 96% (18 Apr 2024 11:40) (90% - 97%)    Parameters below as of 18 Apr 2024 11:40  Patient On (Oxygen Delivery Method): nasal cannula  O2 Flow (L/min): 3      PHYSICAL EXAM  General: adult in NAD  HEENT: clear oropharynx, anicteric sclera, pink conjunctivae  Neck: supple  CV: normal S1S2 with no murmur rubs or gallops  Lungs: clear to auscultation, no wheezes, no rhales  Abdomen: soft non-tender non-distended, no hepato/splenomegaly  Ext: no clubbing cyanosis or edema  Skin: no rashes and no petichiae  Neuro: lethargic      LABS:  CBC Full  -  ( 18 Apr 2024 06:25 )  WBC Count : 5.59 K/uL  RBC Count : 3.17 M/uL  Hemoglobin : 9.6 g/dL  Hematocrit : 30.0 %  Platelet Count - Automated : 163 K/uL  Mean Cell Volume : 94.6 fl  Mean Cell Hemoglobin : 30.3 pg  Mean Cell Hemoglobin Concentration : 32.0 gm/dL  Auto Neutrophil # : x  Auto Lymphocyte # : x  Auto Monocyte # : x  Auto Eosinophil # : x  Auto Basophil # : x  Auto Neutrophil % : x  Auto Lymphocyte % : x  Auto Monocyte % : x  Auto Eosinophil % : x  Auto Basophil % : x    04-18    146<H>  |  109<H>  |  13  ----------------------------<  161<H>  3.7   |  32<H>  |  1.40<H>    Ca    9.2      18 Apr 2024 06:25  Phos  2.5     04-18  Mg     1.8     04-18    TPro  6.5  /  Alb  2.8<L>  /  TBili  0.5  /  DBili  x   /  AST  33  /  ALT  27  /  AlkPhos  72  04-18        fe studies      WBC trend  5.59 K/uL (04-18-24 @ 06:25)  5.56 K/uL (04-17-24 @ 08:41)  5.17 K/uL (04-16-24 @ 08:21)      Hgb trend  9.6 g/dL (04-18-24 @ 06:25)  9.3 g/dL (04-17-24 @ 08:41)  9.9 g/dL (04-16-24 @ 08:21)      plt trend  163 K/uL (04-18-24 @ 06:25)  152 K/uL (04-17-24 @ 08:41)  182 K/uL (04-16-24 @ 08:21)        RADIOLOGY & ADDITIONAL STUDIES:

## 2024-04-18 NOTE — PROGRESS NOTE ADULT - PROBLEM SELECTOR PLAN 4
SARAY on admission, BUN/Cr 45/2.4 -- Resolved, Cr 1.4  - IVF (D5 1/2 NS) per nephro  - Monitor daily metabolic panel  - Avoid nephrotoxic meds   - Nephro Dr. Varghese consulted

## 2024-04-18 NOTE — BH CONSULTATION LIAISON PROGRESS NOTE - NSBHCHARTREVIEWLAB_PSY_A_CORE FT
9.6    5.59  )-----------( 163      ( 18 Apr 2024 06:25 )             30.0   04-18    146<H>  |  109<H>  |  13  ----------------------------<  161<H>  3.7   |  32<H>  |  1.40<H>    Ca    9.2      18 Apr 2024 06:25  Phos  2.5     04-18  Mg     1.8     04-18    TPro  6.5  /  Alb  2.8<L>  /  TBili  0.5  /  DBili  x   /  AST  33  /  ALT  27  /  AlkPhos  72  04-18

## 2024-04-18 NOTE — PATIENT CHOICE NOTE. - NSPTCHOICESTATE_GEN_ALL_CORE
I have met with the patient and/or caregiver to discuss discharge goals and treatment plan. Patient and/or caregiver also provided with instructions on accessing the CMS Compare websites for additional information related to Post Acute Provider quality and resource use measures to assist them in evaluation of the providers and in selecting their post-acute provider of choice. Patient and caregiver were informed of the facilities that are owned and/or operated by [facility]. I have discussed with the patient the availability of in-network facilities and providers. Patient and caregiver provided with a list of post-acute providers whose services are appropriate to the discharge plans and patient needs.     For patient requiring durable medical equipment, patient and/or caregiver were informed that they have the right to request who provides the required equipment.
I have met with the patient and/or caregiver to discuss discharge goals and treatment plan. Patient and/or caregiver also provided with instructions on accessing the CMS Compare websites for additional information related to Post Acute Provider quality and resource use measures to assist them in evaluation of the providers and in selecting their post-acute provider of choice. Patient and caregiver were informed of the facilities that are owned and/or operated by [facility]. I have discussed with the patient the availability of in-network facilities and providers. Patient and caregiver provided with a list of post-acute providers whose services are appropriate to the discharge plans and patient needs.     For patient requiring durable medical equipment, patient and/or caregiver were informed that they have the right to request who provides the required equipment.

## 2024-04-18 NOTE — SOCIAL WORK PROGRESS NOTE - NSSWPROGRESSNOTE_GEN_ALL_CORE
DC plan no longer for PAUL; pt spouse is going to take pt home and privately hire assistance. SW will remain available as needed.

## 2024-04-18 NOTE — BH CONSULTATION LIAISON PROGRESS NOTE - NSBHFUPINTERVALHXFT_PSY_A_CORE
Patient seen, evaluated and chart reviewed. Patient is currently lethargic, after he was agitated and confused, with combative behavior. He was given Ativan 0.5 twice with good effect.

## 2024-04-19 ENCOUNTER — TRANSCRIPTION ENCOUNTER (OUTPATIENT)
Age: 79
End: 2024-04-19

## 2024-04-19 DIAGNOSIS — Z87.09 PERSONAL HISTORY OF OTHER DISEASES OF THE RESPIRATORY SYSTEM: ICD-10-CM

## 2024-04-19 LAB
ALBUMIN SERPL ELPH-MCNC: 2.7 G/DL — LOW (ref 3.3–5)
ALP SERPL-CCNC: 77 U/L — SIGNIFICANT CHANGE UP (ref 40–120)
ALT FLD-CCNC: 22 U/L — SIGNIFICANT CHANGE UP (ref 12–78)
ANION GAP SERPL CALC-SCNC: 3 MMOL/L — LOW (ref 5–17)
AST SERPL-CCNC: 19 U/L — SIGNIFICANT CHANGE UP (ref 15–37)
BASE EXCESS BLDA CALC-SCNC: 9.1 MMOL/L — HIGH (ref -2–3)
BILIRUB SERPL-MCNC: 0.4 MG/DL — SIGNIFICANT CHANGE UP (ref 0.2–1.2)
BLOOD GAS COMMENTS ARTERIAL: SIGNIFICANT CHANGE UP
BUN SERPL-MCNC: 12 MG/DL — SIGNIFICANT CHANGE UP (ref 7–23)
CALCIUM SERPL-MCNC: 8.9 MG/DL — SIGNIFICANT CHANGE UP (ref 8.5–10.1)
CHLORIDE SERPL-SCNC: 110 MMOL/L — HIGH (ref 96–108)
CO2 SERPL-SCNC: 33 MMOL/L — HIGH (ref 22–31)
CREAT SERPL-MCNC: 1.3 MG/DL — SIGNIFICANT CHANGE UP (ref 0.5–1.3)
EGFR: 57 ML/MIN/1.73M2 — LOW
GAS PNL BLDA: SIGNIFICANT CHANGE UP
GLUCOSE SERPL-MCNC: 199 MG/DL — HIGH (ref 70–99)
HCO3 BLDA-SCNC: 34 MMOL/L — HIGH (ref 21–28)
HCT VFR BLD CALC: 33.6 % — LOW (ref 39–50)
HGB BLD-MCNC: 10.5 G/DL — LOW (ref 13–17)
HOROWITZ INDEX BLDA+IHG-RTO: 32 — SIGNIFICANT CHANGE UP
MAGNESIUM SERPL-MCNC: 1.8 MG/DL — SIGNIFICANT CHANGE UP (ref 1.6–2.6)
MCHC RBC-ENTMCNC: 30.5 PG — SIGNIFICANT CHANGE UP (ref 27–34)
MCHC RBC-ENTMCNC: 31.3 GM/DL — LOW (ref 32–36)
MCV RBC AUTO: 97.7 FL — SIGNIFICANT CHANGE UP (ref 80–100)
NRBC # BLD: 0 /100 WBCS — SIGNIFICANT CHANGE UP (ref 0–0)
PCO2 BLDA: 61 MMHG — HIGH (ref 35–48)
PH BLDA: 7.36 — SIGNIFICANT CHANGE UP (ref 7.35–7.45)
PHOSPHATE SERPL-MCNC: 3 MG/DL — SIGNIFICANT CHANGE UP (ref 2.5–4.5)
PLATELET # BLD AUTO: 160 K/UL — SIGNIFICANT CHANGE UP (ref 150–400)
PO2 BLDA: 143 MMHG — HIGH (ref 83–108)
POTASSIUM SERPL-MCNC: 4.2 MMOL/L — SIGNIFICANT CHANGE UP (ref 3.5–5.3)
POTASSIUM SERPL-SCNC: 4.2 MMOL/L — SIGNIFICANT CHANGE UP (ref 3.5–5.3)
PROT SERPL-MCNC: 6.6 G/DL — SIGNIFICANT CHANGE UP (ref 6–8.3)
RBC # BLD: 3.44 M/UL — LOW (ref 4.2–5.8)
RBC # FLD: 13.8 % — SIGNIFICANT CHANGE UP (ref 10.3–14.5)
SAO2 % BLDA: 98.8 % — HIGH (ref 94–98)
SODIUM SERPL-SCNC: 146 MMOL/L — HIGH (ref 135–145)
WBC # BLD: 6.02 K/UL — SIGNIFICANT CHANGE UP (ref 3.8–10.5)
WBC # FLD AUTO: 6.02 K/UL — SIGNIFICANT CHANGE UP (ref 3.8–10.5)

## 2024-04-19 PROCEDURE — 99233 SBSQ HOSP IP/OBS HIGH 50: CPT | Mod: GC

## 2024-04-19 PROCEDURE — 71045 X-RAY EXAM CHEST 1 VIEW: CPT | Mod: 26

## 2024-04-19 PROCEDURE — 99232 SBSQ HOSP IP/OBS MODERATE 35: CPT

## 2024-04-19 PROCEDURE — 93010 ELECTROCARDIOGRAM REPORT: CPT

## 2024-04-19 RX ORDER — SODIUM CHLORIDE 9 MG/ML
1000 INJECTION, SOLUTION INTRAVENOUS
Refills: 0 | Status: DISCONTINUED | OUTPATIENT
Start: 2024-04-19 | End: 2024-04-21

## 2024-04-19 RX ORDER — QUETIAPINE FUMARATE 200 MG/1
1 TABLET, FILM COATED ORAL
Qty: 0 | Refills: 0 | DISCHARGE
Start: 2024-04-19

## 2024-04-19 RX ORDER — CHLORTHALIDONE 50 MG
1 TABLET ORAL
Refills: 0 | DISCHARGE

## 2024-04-19 RX ORDER — VALPROIC ACID (AS SODIUM SALT) 250 MG/5ML
0 SOLUTION, ORAL ORAL
Qty: 0 | Refills: 0 | DISCHARGE
Start: 2024-04-19

## 2024-04-19 RX ORDER — IPRATROPIUM/ALBUTEROL SULFATE 18-103MCG
3 AEROSOL WITH ADAPTER (GRAM) INHALATION EVERY 6 HOURS
Refills: 0 | Status: DISCONTINUED | OUTPATIENT
Start: 2024-04-19 | End: 2024-04-22

## 2024-04-19 RX ORDER — AMLODIPINE BESYLATE 2.5 MG/1
1 TABLET ORAL
Qty: 0 | Refills: 0 | DISCHARGE

## 2024-04-19 RX ADMIN — Medication 3 MILLILITER(S): at 13:54

## 2024-04-19 RX ADMIN — Medication 55 MILLIGRAM(S): at 00:54

## 2024-04-19 RX ADMIN — Medication 5 MILLIGRAM(S): at 11:51

## 2024-04-19 RX ADMIN — IRON SUCROSE 100 MILLIGRAM(S): 20 INJECTION, SOLUTION INTRAVENOUS at 17:27

## 2024-04-19 RX ADMIN — PHENYLEPHRINE-SHARK LIVER OIL-MINERAL OIL-PETROLATUM RECTAL OINTMENT 1 APPLICATION(S): at 06:47

## 2024-04-19 RX ADMIN — QUETIAPINE FUMARATE 37.5 MILLIGRAM(S): 200 TABLET, FILM COATED ORAL at 20:54

## 2024-04-19 RX ADMIN — APIXABAN 5 MILLIGRAM(S): 2.5 TABLET, FILM COATED ORAL at 11:51

## 2024-04-19 RX ADMIN — Medication 500 MILLIGRAM(S): at 11:51

## 2024-04-19 RX ADMIN — SODIUM CHLORIDE 75 MILLILITER(S): 9 INJECTION, SOLUTION INTRAVENOUS at 16:08

## 2024-04-19 RX ADMIN — Medication 4: at 17:28

## 2024-04-19 RX ADMIN — Medication 3 MILLILITER(S): at 20:12

## 2024-04-19 RX ADMIN — Medication 81 MILLIGRAM(S): at 11:51

## 2024-04-19 RX ADMIN — Medication 2: at 08:46

## 2024-04-19 RX ADMIN — DONEPEZIL HYDROCHLORIDE 10 MILLIGRAM(S): 10 TABLET, FILM COATED ORAL at 21:09

## 2024-04-19 RX ADMIN — Medication 500 MILLIGRAM(S): at 20:55

## 2024-04-19 RX ADMIN — Medication 4: at 12:32

## 2024-04-19 RX ADMIN — Medication 3 MILLILITER(S): at 00:12

## 2024-04-19 RX ADMIN — ESCITALOPRAM OXALATE 20 MILLIGRAM(S): 10 TABLET, FILM COATED ORAL at 11:51

## 2024-04-19 RX ADMIN — SODIUM CHLORIDE 75 MILLILITER(S): 9 INJECTION, SOLUTION INTRAVENOUS at 00:32

## 2024-04-19 RX ADMIN — APIXABAN 5 MILLIGRAM(S): 2.5 TABLET, FILM COATED ORAL at 20:55

## 2024-04-19 RX ADMIN — Medication 3 MILLILITER(S): at 07:48

## 2024-04-19 RX ADMIN — ATORVASTATIN CALCIUM 40 MILLIGRAM(S): 80 TABLET, FILM COATED ORAL at 21:09

## 2024-04-19 NOTE — PROGRESS NOTE ADULT - PROBLEM SELECTOR PLAN 3
- SPEP M spike 0.6g/dL   - Serum FLC K/L ratio 2.41, Kappa 5.2, Lambda 2.16  - if urine sample, consider check UPEP, Urine AMISH, Urine bence hassan- can be checked in office  - consider Check skeletal survey; can be done outpt  - Heme Onc Dr. Yadav consulted

## 2024-04-19 NOTE — PROVIDER CONTACT NOTE (OTHER) - ACTION/TREATMENT ORDERED:
pt seen by house doctor. cardiac monitor and cont pulse ox ordered. chest xray and nebulizer treatment ordered
Duoneb via nebulizer
Pt being monitored; morphine if the pts wob does not improve.

## 2024-04-19 NOTE — DIETITIAN INITIAL EVALUATION ADULT - ORAL INTAKE PTA/DIET HISTORY
patient seen in bed sleeping with spouse at bedside. reports if patient is awake patient eats well. no difficulties chewing swallowing. no food allergies  A1c 8.6% follows [facility]AL[facility] diet PTA with metformin and saxagliptin PTA. seen here by DM NP. discussed taking glucerna at home coupons offered to trial.per family patient has lost weigh from beginning of year sleeping through meals not eating snacks.   education deferred at  this time spouse familiar with [facility] diet

## 2024-04-19 NOTE — DIETITIAN INITIAL EVALUATION ADULT - PERTINENT LABORATORY DATA
04-19    146<H>  |  110<H>  |  12  ----------------------------<  199<H>  4.2   |  33<H>  |  1.30    Ca    8.9      19 Apr 2024 07:34  Phos  3.0     04-19  Mg     1.8     04-19    TPro  6.6  /  Alb  2.7<L>  /  TBili  0.4  /  DBili  x   /  AST  19  /  ALT  22  /  AlkPhos  77  04-19  POCT Blood Glucose.: 186 mg/dL (04-19-24 @ 08:19)  A1C with Estimated Average Glucose Result: 8.6 % (04-14-24 @ 08:20)  A1C with Estimated Average Glucose Result: 8.7 % (04-13-24 @ 06:00)

## 2024-04-19 NOTE — CONSULT NOTE ADULT - ASSESSMENT
79yo M with a PMH of COPD, HTN, HLD, DM2, CVA (4/2015), dementia, TAMMY on CPAP who presents to the ED with AMS    known CVA hx  Dementia  OP  OA  TAMMY known dx  COPD  HTN  HLD  SARAY CKD    - TTE w/ bubble study: LV EF 67%, negative for intracardiac shunt  cardio and renal eval noted  I and O  serial renal indices  replete lytes  on NEBS for COPD  ABG c/w TAMMY and COPD  NIV - Bipap night time and PRN -   pt follows with Dr Clements - [facility] for sleep apnea and copd dx  caution with BENZO and Antipsychotics - monitor mentation - avoid oversedation  HOB elev  asp prec  oral hygiene  skin care  GOC discussion  goal sat > 88 pct

## 2024-04-19 NOTE — CAREGIVER ENGAGEMENT NOTE - CAREGIVER OUTREACH NOTES - FREE TEXT
CM met with patient and spouse at bedside. Pt confused. Spouse stated that she would like to take her  home instead of sending him to a rehab. Spouse stated that her  will not benefit from rehab because he is going to become more confused in a different environment. CM discussed how she will manage him at home, she stated that she will be willing to hire an Aide to assist him at home and her daughter also lives with them. Private hire list provided and spouse stated that she will start to call the agencies to try an obtain an Aide. Pt will need Home Physical therapy, home care referral initiated.
Spouse is now agreeing to take patient home. She stated that her son and daughter will be assisting her to care for patient when he goes home. Referral sent to [facility] for visiting nurse and home physical therapy services.  Awaiting response. Per MD plan is for patient to be transitioned home over weekend. Spouse will be transporting him home.

## 2024-04-19 NOTE — DIETITIAN INITIAL EVALUATION ADULT - SIGNS/SYMPTOMS
as evidenced by dysphagia on swallow study  as evidenced by weight loss 9%  and mild muscle mass loss

## 2024-04-19 NOTE — PROGRESS NOTE ADULT - NS ATTEND AMEND GEN_ALL_CORE FT
77yo M with a PMH of PAF ( on Eliquis), HTN, HLD, DM2, CVA (4/2015),  dementia, TAMMY on CPAP presents with AMS. Admitted for CVA workup.    now with AMS and very confused.   - hx CVA 2015 and CAD, continue ASA, statin  - EKG SB with 1st degree av block, PAC, no evidence of acute ischemia  - troponin neg x 1  - hx of PAF seen on ILR, continue eliquis  - Cr back to baseline  - MGUS workup on going, heme following.

## 2024-04-19 NOTE — CONSULT NOTE ADULT - SUBJECTIVE AND OBJECTIVE BOX
Date/Time Patient Seen:  		  Referring MD:   Data Reviewed	       Patient is a 78y old  Male who presents with a chief complaint of CVA (19 Apr 2024 15:21)      Subjective/HPI   Patient is a 77yo M with a PMH of COPD, HTN, HLD, DM2, CVA (4/2015), dementia, TAMMY on CPAP who presents to the ED with AMS. Patient was altered on admission so history was obtained from wife. Per wife, around 9:30pm, patient became very confused and was just repeating the word "yes." Wife notes that patient was also slurring his speech. These symptoms were similar to his last CVA in 2015. NIHSS in ED was 11. [facility] was consulted and rec against TNK as patient last took his home Eliquis at 2:00pm.   PAST MEDICAL & SURGICAL HISTORY:  Diabetes mellitus    Hypertension    COPD (chronic obstructive pulmonary disease)    HLD (hyperlipidemia)    Dementia    CVA (cerebral vascular accident)    No significant past surgical history    H/O hand surgery    HLD (hyperlipidemia)     Hypertension.     PAST SURGICAL HISTORY:  H/O hand surgery.     FAMILY HISTORY:  Father  Still living? Unknown  Family history of CABG, Age at diagnosis: Age Unknown.     Social History:  · Substance use	No  · Social History (marital status, living situation, occupation, and sexual history)	Lives: at home with wife  Alcohol Use: former drinker, quit 15yrs ago   Tobacco Use: former smoker, quit 15yrs ago     Tobacco Screening:  · Core Measure Site	Yes  · Has the patient used tobacco in the past 30 days?	No    Risk Assessment:    Present on Admission:  Deep Venous Thrombosis	no  Pulmonary Embolus	no     HIV Screening:  · In accordance with [place] State law, we offer every patient who comes to our ED an HIV test. Would you like to be tested today?	Unable to answer due to medical condition/unresponsive/etc...        Medication list         MEDICATIONS  (STANDING):  albuterol/ipratropium for Nebulization 3 milliLiter(s) Nebulizer every 6 hours  amLODIPine   Tablet 10 milliGRAM(s) Oral daily  apixaban 5 milliGRAM(s) Oral every 12 hours  aspirin enteric coated 81 milliGRAM(s) Oral daily  atorvastatin 40 milliGRAM(s) Oral at bedtime  busPIRone 5 milliGRAM(s) Oral <User Schedule>  dextrose 10% Bolus 125 milliLiter(s) IV Bolus once  dextrose 5% + sodium chloride 0.45%. 1000 milliLiter(s) (75 mL/Hr) IV Continuous <Continuous>  dextrose 5%. 1000 milliLiter(s) (50 mL/Hr) IV Continuous <Continuous>  dextrose 5%. 1000 milliLiter(s) (100 mL/Hr) IV Continuous <Continuous>  dextrose 50% Injectable 12.5 Gram(s) IV Push once  dextrose 50% Injectable 25 Gram(s) IV Push once  donepezil 10 milliGRAM(s) Oral at bedtime  escitalopram 20 milliGRAM(s) Oral daily  glucagon  Injectable 1 milliGRAM(s) IntraMuscular once  hemorrhoidal Ointment 1 Application(s) Rectal two times a day  insulin lispro (ADMELOG) corrective regimen sliding scale   SubCutaneous three times a day before meals  insulin lispro (ADMELOG) corrective regimen sliding scale   SubCutaneous at bedtime  iron sucrose Injectable 100 milliGRAM(s) IV Push every 24 hours  LORazepam     Tablet 0.5 milliGRAM(s) Oral two times a day  losartan 100 milliGRAM(s) Oral daily  QUEtiapine 37.5 milliGRAM(s) Oral <User Schedule>  valproic  acid Syrup 500 milliGRAM(s) Oral two times a day    MEDICATIONS  (PRN):  bisacodyl Suppository 10 milliGRAM(s) Rectal daily PRN Constipation  dextrose Oral Gel 15 Gram(s) Oral once PRN Blood Glucose LESS THAN 70 milliGRAM(s)/deciliter  OLANZapine Injectable 5 milliGRAM(s) IntraMuscular every 6 hours PRN Agitation  ondansetron Injectable 4 milliGRAM(s) IV Push every 8 hours PRN Nausea and/or Vomiting         Vitals log        ICU Vital Signs Last 24 Hrs  T(C): 36.6 (19 Apr 2024 11:37), Max: 37.4 (18 Apr 2024 22:48)  T(F): 97.9 (19 Apr 2024 11:37), Max: 99.3 (18 Apr 2024 22:48)  HR: 67 (19 Apr 2024 11:37) (64 - 70)  BP: 109/70 (19 Apr 2024 11:37) (109/70 - 148/78)  BP(mean): --  ABP: --  ABP(mean): --  RR: 18 (19 Apr 2024 11:37) (18 - 20)  SpO2: 94% (19 Apr 2024 13:54) (94% - 98%)    O2 Parameters below as of 19 Apr 2024 13:54  Patient On (Oxygen Delivery Method): nasal cannula                 Input and Output:  I&O's Detail    18 Apr 2024 07:01  -  19 Apr 2024 07:00  --------------------------------------------------------  IN:    Oral Fluid: 1 mL  Total IN: 1 mL    OUT:    Voided (mL): 1200 mL  Total OUT: 1200 mL    Total NET: -1199 mL          Lab Data                        10.5   6.02  )-----------( 160      ( 19 Apr 2024 07:34 )             33.6     04-19    146<H>  |  110<H>  |  12  ----------------------------<  199<H>  4.2   |  33<H>  |  1.30    Ca    8.9      19 Apr 2024 07:34  Phos  3.0     04-19  Mg     1.8     04-19    TPro  6.6  /  Alb  2.7<L>  /  TBili  0.4  /  DBili  x   /  AST  19  /  ALT  22  /  AlkPhos  77  04-19    ABG - ( 19 Apr 2024 14:13 )  pH, Arterial: 7.36  pH, Blood: x     /  pCO2: 61    /  pO2: 143   / HCO3: 34    / Base Excess: 9.1   /  SaO2: 98.8                    Review of Systems	    lethargy  weakness    Objective     Physical Examination        Pertinent Lab findings & Imaging      Fierro:  NO   Adequate UO     I&O's Detail    18 Apr 2024 07:01  -  19 Apr 2024 07:00  --------------------------------------------------------  IN:    Oral Fluid: 1 mL  Total IN: 1 mL    OUT:    Voided (mL): 1200 mL  Total OUT: 1200 mL    Total NET: -1199 mL               Discussed with:     Cultures:	        Radiology    ACC: [id number] EXAM:  XR CHEST PORTABLE IMMED 1V   ORDERED BY: MAY FATIMA     PROCEDURE DATE:  04/19/2024          INTERPRETATION:  AP chest on April 19, 2024 at 12:01 AM. Patient has   increasing work of breathing and history of COPD.    Heart magnified by technique. Left loop recorder again noted.    Slight scarring or atelectasis in the lingular area again noted.    Present film also shows now some linear atelectasis in the right lower   lung compared to April 13.    IMPRESSION: Persistent atelectasis versus scarring in the lingula. Slight   linear atelectasis now seen right lower lung.    --- End of Report ---            GINA ALICIA MD; Attending Radiologist  This document has been electronically signed. Apr 19 2024 11:51AM

## 2024-04-19 NOTE — PROGRESS NOTE ADULT - PROBLEM SELECTOR PLAN 5
SARAY on admission, BUN/Cr 45/2.4 -- Resolved, Cr 1.4  - IVF (D5 1/2 NS) per nephro  - Monitor daily metabolic panel  - Nephro Dr. Varghese consulted

## 2024-04-19 NOTE — PROGRESS NOTE ADULT - SUBJECTIVE AND OBJECTIVE BOX
[facility] -- Mynor Boudreaux Pannella, Patel, Savella, Goodger: Office # [phone]    Follow Up:  AMS r/o CVA, HTN, hyperlipidemia    Subjective/Observations: Patient seen and examined. Patient confuse, lethargic, oriented  x1.No meaningful conversation. Follows  simple verbal commands Denies chest pain palpitation dizziness, Satting 90-96 5 3LNC. Severe audible exp wheezing noted thsi morning  . notified RN for stat neb treatment. limited or unable to obtain secondary to patient's poor mental status.    REVIEW OF SYSTEMS: All other review of systems are negative unless indicated above    PAST MEDICAL & SURGICAL HISTORY:  Diabetes mellitus      Hypertension      COPD (chronic obstructive pulmonary disease)      HLD (hyperlipidemia)      Dementia      CVA (cerebral vascular accident)      H/O hand surgery          MEDICATIONS  (STANDING):  albuterol/ipratropium for Nebulization 3 milliLiter(s) Nebulizer every 6 hours  amLODIPine   Tablet 10 milliGRAM(s) Oral daily  apixaban 5 milliGRAM(s) Oral every 12 hours  aspirin enteric coated 81 milliGRAM(s) Oral daily  atorvastatin 40 milliGRAM(s) Oral at bedtime  busPIRone 5 milliGRAM(s) Oral <User Schedule>  dextrose 10% Bolus 125 milliLiter(s) IV Bolus once  dextrose 5% + sodium chloride 0.45%. 1000 milliLiter(s) (75 mL/Hr) IV Continuous <Continuous>  dextrose 5%. 1000 milliLiter(s) (100 mL/Hr) IV Continuous <Continuous>  dextrose 5%. 1000 milliLiter(s) (50 mL/Hr) IV Continuous <Continuous>  dextrose 50% Injectable 12.5 Gram(s) IV Push once  dextrose 50% Injectable 25 Gram(s) IV Push once  donepezil 10 milliGRAM(s) Oral at bedtime  escitalopram 20 milliGRAM(s) Oral daily  glucagon  Injectable 1 milliGRAM(s) IntraMuscular once  hemorrhoidal Ointment 1 Application(s) Rectal two times a day  insulin lispro (ADMELOG) corrective regimen sliding scale   SubCutaneous three times a day before meals  insulin lispro (ADMELOG) corrective regimen sliding scale   SubCutaneous at bedtime  iron sucrose Injectable 100 milliGRAM(s) IV Push every 24 hours  LORazepam     Tablet 0.5 milliGRAM(s) Oral two times a day  losartan 100 milliGRAM(s) Oral daily  QUEtiapine 37.5 milliGRAM(s) Oral <User Schedule>  QUEtiapine 25 milliGRAM(s) Oral daily  valproic  acid Syrup 500 milliGRAM(s) Oral two times a day    MEDICATIONS  (PRN):  bisacodyl Suppository 10 milliGRAM(s) Rectal daily PRN Constipation  dextrose Oral Gel 15 Gram(s) Oral once PRN Blood Glucose LESS THAN 70 milliGRAM(s)/deciliter  OLANZapine Injectable 5 milliGRAM(s) IntraMuscular every 6 hours PRN Agitation  ondansetron Injectable 4 milliGRAM(s) IV Push every 8 hours PRN Nausea and/or Vomiting    Allergies    No Known Allergies    Intolerances      Vital Signs Last 24 Hrs  T(C): 36.4 (19 Apr 2024 04:12), Max: 37.4 (18 Apr 2024 22:48)  T(F): 97.5 (19 Apr 2024 04:12), Max: 99.3 (18 Apr 2024 22:48)  HR: 64 (19 Apr 2024 07:48) (64 - 70)  BP: 148/77 (19 Apr 2024 04:12) (140/80 - 153/66)  BP(mean): --  RR: 20 (19 Apr 2024 04:12) (18 - 20)  SpO2: 94% (19 Apr 2024 07:48) (94% - 98%)    Parameters below as of 19 Apr 2024 07:48  Patient On (Oxygen Delivery Method): nasal cannula, 3 L      I&O's Summary    18 Apr 2024 07:01  -  19 Apr 2024 07:00  --------------------------------------------------------  IN: 1 mL / OUT: 1200 mL / NET: -1199 mL          TELE: NSR 60-70  PHYSICAL EXAM:  Constitutional: oriented x1  confuse, follow simple verbal commands    Pulmonary:  breath sounds +  diffuse exp wheezing  bilaterally  Cardiovascular: Regular, S1 and S2, No murmurs, No rubs, gallops or clicks  Gastrointestinal:  soft, nontender, nondistended   Lymph: No peripheral edema. No lymphadenopathy.   Skin: No visible rashes or ulcers.        LABS: All Labs Reviewed:                        10.5   6.02  )-----------( 160      ( 19 Apr 2024 07:34 )             33.6                         9.6    5.59  )-----------( 163      ( 18 Apr 2024 06:25 )             30.0                         9.3    5.56  )-----------( 152      ( 17 Apr 2024 08:41 )             29.3     19 Apr 2024 07:34    146    |  110    |  12     ----------------------------<  199    4.2     |  33     |  1.30   18 Apr 2024 06:25    146    |  109    |  13     ----------------------------<  161    3.7     |  32     |  1.40   17 Apr 2024 08:41    144    |  107    |  14     ----------------------------<  215    3.7     |  31     |  1.40     Ca    8.9        19 Apr 2024 07:34  Ca    9.2        18 Apr 2024 06:25  Ca    8.2        17 Apr 2024 08:41  Phos  3.0       19 Apr 2024 07:34  Phos  2.5       18 Apr 2024 06:25  Phos  2.4       17 Apr 2024 08:41  Mg     1.8       19 Apr 2024 07:34  Mg     1.8       18 Apr 2024 06:25  Mg     1.8       17 Apr 2024 08:41    TPro  6.6    /  Alb  2.7    /  TBili  0.4    /  DBili  x      /  AST  19     /  ALT  22     /  AlkPhos  77     19 Apr 2024 07:34  TPro  6.5    /  Alb  2.8    /  TBili  0.5    /  DBili  x      /  AST  33     /  ALT  27     /  AlkPhos  72     18 Apr 2024 06:25  TPro  6.0    /  Alb  2.7    /  TBili  0.3    /  DBili  x      /  AST  26     /  ALT  20     /  AlkPhos  71     17 Apr 2024 08:41   LIVER FUNCTIONS - ( 19 Apr 2024 07:34 )  Alb: 2.7 g/dL / Pro: 6.6 g/dL / ALK PHOS: 77 U/L / ALT: 22 U/L / AST: 19 U/L / GGT: x           Troponin I, High Sensitivity Result: 8.0 ng/L (04-12-24 @ 23:00)  Cholesterol: 86 mg/dL (04-13-24 @ 06:00)  HDL Cholesterol: 47 mg/dL (04-13-24 @ 06:00)  Triglycerides, Serum: 41 mg/dL (04-13-24 @ 06:00)    12 Lead ECG:   Ventricular Rate 54 BPM    Atrial Rate 54 BPM    P-R Interval 246 ms    QRS Duration 94 ms    Q-T Interval 484 ms    QTC Calculation(Bazett) 458 ms    P Axis 83 degrees    R Axis 77 degrees    T Axis 80 degrees    Diagnosis Line Sinus bradycardia with 1st degree AV block with premature atrial complexes  Cannot rule out Anterior infarct , age undetermined  Abnormal ECG    Confirmed by kirti Casas ([id number]) on 4/13/2024 11:48:24 AM (04-13-24 @ 01:17)      TRANSTHORACIC ECHOCARDIOGRAM REPORT  ________________________________________________________________________________                                      _______       Pt. Name:       RANCHO FRANCISCO Study Date:    4/16/2024  MRN: [id number]               YOB: 1945  Accession #:    [id number]              Age:           78 years  Account#:       [id number]             Gender:        M  Visit ID#  Heart Rate:                            Height:        66.93 in (170.00 cm)  Rhythm:                                Weight:        185.19 lb (84.00 kg)  Blood Pressure: 168/67 mmHg            BSA/BMI:       1.96 m² / 29.07 kg/m²  ________________________________________________________________________________________  Referring Physician:    Torrey Crews  Interpreting Physician: Jayme Lowe  Primary Sonographer:    Catalina Poe [facility]    CPT:               ECHO TTE WO CON COMP W DOPP - 04793.m  Indication(s):     Syncope and collapse - R55  Procedure:Transthoracic echocardiogram with 2-D, M-mode and complete                     spectral and color flow Doppler.  Ordering Location: [facility]  Admission Status:  Inpatient  Agitated Saline:   Injection with agitated saline was performed to evaluate for                   intracardiac shunting.    _______________________________________________________________________________________     CONCLUSIONS:      1. Left ventricular systolic function is normal with an ejection fraction of 67 % by Peterson's method of disks.   2. Normal right ventricular cavity size and normal systolic function.   3. The left atrium is normal in size.   4. Trace mitral regurgitation.   5. There is mild thickening of the aortic valve leaflets.   6. Agitated saline injection was negative for intracardiac shunt, though visualization was suboptimal.    ________________________________________________________________________________________  FINDINGS:     Left Ventricle:  Left ventricular systolic function is normal with a calculated ejection fraction of 67 % by the Peterson's biplane method of disks. The left ventricular diastolic function is indeterminate.     Right Ventricle:  The right ventricular cavity is normal in size and normal systolic function.     Left Atrium:  The left atrium is normal in size with an indexed volume of 16.47 ml/m².     Right Atrium:  The right atrium is normal in size.     Interatrial Septum:  Agitated saline injection was negative for intracardiac shunt.     Aortic Valve:  The aortic valve appears trileaflet. There is mild thickening of the aortic valve leaflets.     Mitral Valve:  Structurally normal mitral valve with normal leaflet excursion. There is no mitral valve stenosis. There is trace mitral regurgitation.     Tricuspid Valve:  Structurally normal tricuspid valve with normal leaflet excursion.     Pulmonic Valve:  Structurally normal pulmonic valve with normal leaflet excursion.     Pericardium:  No pericardial effusion seen.     Systemic Veins:  The inferior vena cava is normal in size measuring 1.77 cm in diameter, (normal <2.1cm) with normal inspiratory collapse (normal >50%) consistent with normal right atrial pressure (~3, range 0-5mmHg).  ____________________________________________________________________  QUANTITATIVE DATA:  Left Ventricle Measurements: (Indexed to BSA)     IVSd (2D):   0.8 cm  LVPWd (2D):  0.7 cm  LVIDd (2D):  4.3 cm  LVIDs (2D):  3.1 cm  LV Mass:     99 g   50.4 g/m²  LV Vol d, MOD A2C: 75.1 ml  38.40 ml/m²  LV Vol d, MOD A4C: 120.0 ml 61.37 ml/m²  LV Vol d, MOD BP:  99.9 ml  51.06 ml/m²  LV Vol s, MOD A2C: 25.2 ml  12.89 ml/m²  LV Vol s, MOD A4C: 43.4 ml  22.19 ml/m²  LV Vol s, MOD BP:  33.1 ml  16.94 ml/m²  LVOT SV MOD BP:    66.7 ml  LV EF% MOD BP:     67 %     MV E Vmax:    1.06m/s  MV A Vmax:    1.17 m/s  MV E/A:       0.91  e' lateral:   8.16 cm/s  e' medial:    7.18 cm/s  E/e' lateral: 12.99  E/e' medial:  14.76  E/e' Average: 13.82  MV DT:        268 msec    Aorta Measurements: (Normal range) (Indexed to BSA)     Sinuses of Valsalva: 3.70 cm (3.1 - 3.7 cm)       Left Atrium Measurements: (Indexed to BSA)  LA Diam 2D:        2.50 cm  LA Vol s, MOD A4C: 17.60 ml.  LA Vol s, MOD A2C: 56.00 ml.  LA Vol s, MOD BP:  32.20 ml  16.47 ml/m²    Mitral Valve Measurements:     MV E Vmax: 1.1 m/s         MR Vmax:          4.74 m/s  MV A Vmax: 1.2 m/s         MR Peak Gradient: 89.9 mmHg  MV E/A:    0.9       Tricuspid Valve Measurements:     RA Pressure: 3 mmHg    ________________________________________________________________________________________  Electronically signed on 4/16/2024 at 3:07:41 PM by Jayme Lowe         *** Final ***   [facility] -- Mynor Boudreaux Pannella, Patel, Savella, Goodger: Office # [phone]    Follow Up:  AMS r/o CVA, HTN, hyperlipidemia    Subjective/Observations: Patient seen and examined. Patient confuse, lethargic, oriented  x1.No meaningful conversation. Follows  simple verbal commands Denies chest pain palpitation dizziness, Satting 90-96 5 3LNC. Severe audible exp wheezing noted thsi morning  . notified RN for stat neb treatment. limited or unable to obtain secondary to patient's poor mental status.    REVIEW OF SYSTEMS: All other review of systems are negative unless indicated above    PAST MEDICAL & SURGICAL HISTORY:  Diabetes mellitus      Hypertension      COPD (chronic obstructive pulmonary disease)      HLD (hyperlipidemia)      Dementia      CVA (cerebral vascular accident)      H/O hand surgery          MEDICATIONS  (STANDING):  albuterol/ipratropium for Nebulization 3 milliLiter(s) Nebulizer every 6 hours  amLODIPine   Tablet 10 milliGRAM(s) Oral daily  apixaban 5 milliGRAM(s) Oral every 12 hours  aspirin enteric coated 81 milliGRAM(s) Oral daily  atorvastatin 40 milliGRAM(s) Oral at bedtime  busPIRone 5 milliGRAM(s) Oral <User Schedule>  dextrose 10% Bolus 125 milliLiter(s) IV Bolus once  dextrose 5% + sodium chloride 0.45%. 1000 milliLiter(s) (75 mL/Hr) IV Continuous <Continuous>  dextrose 5%. 1000 milliLiter(s) (100 mL/Hr) IV Continuous <Continuous>  dextrose 5%. 1000 milliLiter(s) (50 mL/Hr) IV Continuous <Continuous>  dextrose 50% Injectable 12.5 Gram(s) IV Push once  dextrose 50% Injectable 25 Gram(s) IV Push once  donepezil 10 milliGRAM(s) Oral at bedtime  escitalopram 20 milliGRAM(s) Oral daily  glucagon  Injectable 1 milliGRAM(s) IntraMuscular once  hemorrhoidal Ointment 1 Application(s) Rectal two times a day  insulin lispro (ADMELOG) corrective regimen sliding scale   SubCutaneous three times a day before meals  insulin lispro (ADMELOG) corrective regimen sliding scale   SubCutaneous at bedtime  iron sucrose Injectable 100 milliGRAM(s) IV Push every 24 hours  LORazepam     Tablet 0.5 milliGRAM(s) Oral two times a day  losartan 100 milliGRAM(s) Oral daily  QUEtiapine 37.5 milliGRAM(s) Oral <User Schedule>  QUEtiapine 25 milliGRAM(s) Oral daily  valproic  acid Syrup 500 milliGRAM(s) Oral two times a day    MEDICATIONS  (PRN):  bisacodyl Suppository 10 milliGRAM(s) Rectal daily PRN Constipation  dextrose Oral Gel 15 Gram(s) Oral once PRN Blood Glucose LESS THAN 70 milliGRAM(s)/deciliter  OLANZapine Injectable 5 milliGRAM(s) IntraMuscular every 6 hours PRN Agitation  ondansetron Injectable 4 milliGRAM(s) IV Push every 8 hours PRN Nausea and/or Vomiting    Allergies    No Known Allergies    Intolerances      Vital Signs Last 24 Hrs  T(C): 36.4 (19 Apr 2024 04:12), Max: 37.4 (18 Apr 2024 22:48)  T(F): 97.5 (19 Apr 2024 04:12), Max: 99.3 (18 Apr 2024 22:48)  HR: 64 (19 Apr 2024 07:48) (64 - 70)  BP: 148/77 (19 Apr 2024 04:12) (140/80 - 153/66)  BP(mean): --  RR: 20 (19 Apr 2024 04:12) (18 - 20)  SpO2: 94% (19 Apr 2024 07:48) (94% - 98%)    Parameters below as of 19 Apr 2024 07:48  Patient On (Oxygen Delivery Method): nasal cannula, 3 L      I&O's Summary    18 Apr 2024 07:01  -  19 Apr 2024 07:00  --------------------------------------------------------  IN: 1 mL / OUT: 1200 mL / NET: -1199 mL          TELE: NSR 60-70  PHYSICAL EXAM:  Constitutional: oriented x1  confuse, follow simple verbal commands    Pulmonary:  breath sounds +  diffuse exp wheezing  bilaterally  Cardiovascular: Regular, S1 and S2, No murmurs, No rubs, gallops or clicks  Gastrointestinal:  soft, nontender, nondistended   Lymph: No peripheral edema. No lymphadenopathy.   Skin: No visible rashes or ulcers.        LABS: All Labs Reviewed:                        10.5   6.02  )-----------( 160      ( 19 Apr 2024 07:34 )             33.6                         9.6    5.59  )-----------( 163      ( 18 Apr 2024 06:25 )             30.0                         9.3    5.56  )-----------( 152      ( 17 Apr 2024 08:41 )             29.3     19 Apr 2024 07:34    146    |  110    |  12     ----------------------------<  199    4.2     |  33     |  1.30   18 Apr 2024 06:25    146    |  109    |  13     ----------------------------<  161    3.7     |  32     |  1.40   17 Apr 2024 08:41    144    |  107    |  14     ----------------------------<  215    3.7     |  31     |  1.40     Ca    8.9        19 Apr 2024 07:34  Ca    9.2        18 Apr 2024 06:25  Ca    8.2        17 Apr 2024 08:41  Phos  3.0       19 Apr 2024 07:34  Phos  2.5       18 Apr 2024 06:25  Phos  2.4       17 Apr 2024 08:41  Mg     1.8       19 Apr 2024 07:34  Mg     1.8       18 Apr 2024 06:25  Mg     1.8       17 Apr 2024 08:41    TPro  6.6    /  Alb  2.7    /  TBili  0.4    /  DBili  x      /  AST  19     /  ALT  22     /  AlkPhos  77     19 Apr 2024 07:34  TPro  6.5    /  Alb  2.8    /  TBili  0.5    /  DBili  x      /  AST  33     /  ALT  27     /  AlkPhos  72     18 Apr 2024 06:25  TPro  6.0    /  Alb  2.7    /  TBili  0.3    /  DBili  x      /  AST  26     /  ALT  20     /  AlkPhos  71     17 Apr 2024 08:41   LIVER FUNCTIONS - ( 19 Apr 2024 07:34 )  Alb: 2.7 g/dL / Pro: 6.6 g/dL / ALK PHOS: 77 U/L / ALT: 22 U/L / AST: 19 U/L / GGT: x           Troponin I, High Sensitivity Result: 8.0 ng/L (04-12-24 @ 23:00)  Cholesterol: 86 mg/dL (04-13-24 @ 06:00)  HDL Cholesterol: 47 mg/dL (04-13-24 @ 06:00)  Triglycerides, Serum: 41 mg/dL (04-13-24 @ 06:00)    12 Lead ECG:   Ventricular Rate 54 BPM    Atrial Rate 54 BPM    P-R Interval 246 ms    QRS Duration 94 ms    Q-T Interval 484 ms    QTC Calculation(Bazett) 458 ms    P Axis 83 degrees    R Axis 77 degrees    T Axis 80 degrees    Diagnosis Line Sinus bradycardia with 1st degree AV block with premature atrial complexes  Cannot rule out Anterior infarct , age undetermined  Abnormal ECG    Confirmed by kirit Casas ([id number]) on 4/13/2024 11:48:24 AM (04-13-24 @ 01:17)      TRANSTHORACIC ECHOCARDIOGRAM REPORT  ________________________________________________________________________________                                      _______       Pt. Name:       RANCHO FRANCISCO Study Date:    4/16/2024  MRN: [id number]               YOB: 1945  Accession #:    [id number]              Age:           78 years  Account#:       [id number]             Gender:        M  Visit ID#  Heart Rate:                            Height:        66.93 in (170.00 cm)  Rhythm:                                Weight:        185.19 lb (84.00 kg)  Blood Pressure: 168/67 mmHg            BSA/BMI:       1.96 m² / 29.07 kg/m²  ________________________________________________________________________________________  Referring Physician:    Torrey Crews  Interpreting Physician: Jayme Lowe  Primary Sonographer:    Catalina Poe [facility]    CPT:               ECHO TTE WO CON COMP W DOPP - 70451.m  Indication(s):     Syncope and collapse - R55  Procedure:Transthoracic echocardiogram with 2-D, M-mode and complete                     spectral and color flow Doppler.  Ordering Location: [facility]  Admission Status:  Inpatient  Agitated Saline:   Injection with agitated saline was performed to evaluate for                   intracardiac shunting.    _______________________________________________________________________________________     CONCLUSIONS:      1. Left ventricular systolic function is normal with an ejection fraction of 67 % by Peterson's method of disks.   2. Normal right ventricular cavity size and normal systolic function.   3. The left atrium is normal in size.   4. Trace mitral regurgitation.   5. There is mild thickening of the aortic valve leaflets.   6. Agitated saline injection was negative for intracardiac shunt, though visualization was suboptimal.    ________________________________________________________________________________________  FINDINGS:     Left Ventricle:  Left ventricular systolic function is normal with a calculated ejection fraction of 67 % by the Peterson's biplane method of disks. The left ventricular diastolic function is indeterminate.     Right Ventricle:  The right ventricular cavity is normal in size and normal systolic function.     Left Atrium:  The left atrium is normal in size with an indexed volume of 16.47 ml/m².     Right Atrium:  The right atrium is normal in size.     Interatrial Septum:  Agitated saline injection was negative for intracardiac shunt.     Aortic Valve:  The aortic valve appears trileaflet. There is mild thickening of the aortic valve leaflets.     Mitral Valve:  Structurally normal mitral valve with normal leaflet excursion. There is no mitral valve stenosis. There is trace mitral regurgitation.     Tricuspid Valve:  Structurally normal tricuspid valve with normal leaflet excursion.     Pulmonic Valve:  Structurally normal pulmonic valve with normal leaflet excursion.     Pericardium:  No pericardial effusion seen.     Systemic Veins:  The inferior vena cava is normal in size measuring 1.77 cm in diameter, (normal <2.1cm) with normal inspiratory collapse (normal >50%) consistent with normal right atrial pressure (~3, range 0-5mmHg).  ____________________________________________________________________  QUANTITATIVE DATA:  Left Ventricle Measurements: (Indexed to BSA)     IVSd (2D):   0.8 cm  LVPWd (2D):  0.7 cm  LVIDd (2D):  4.3 cm  LVIDs (2D):  3.1 cm  LV Mass:     99 g   50.4 g/m²  LV Vol d, MOD A2C: 75.1 ml  38.40 ml/m²  LV Vol d, MOD A4C: 120.0 ml 61.37 ml/m²  LV Vol d, MOD BP:  99.9 ml  51.06 ml/m²  LV Vol s, MOD A2C: 25.2 ml  12.89 ml/m²  LV Vol s, MOD A4C: 43.4 ml  22.19 ml/m²  LV Vol s, MOD BP:  33.1 ml  16.94 ml/m²  LVOT SV MOD BP:    66.7 ml  LV EF% MOD BP:     67 %     MV E Vmax:    1.06m/s  MV A Vmax:    1.17 m/s  MV E/A:       0.91  e' lateral:   8.16 cm/s  e' medial:    7.18 cm/s  E/e' lateral: 12.99  E/e' medial:  14.76  E/e' Average: 13.82  MV DT:        268 msec    Aorta Measurements: (Normal range) (Indexed to BSA)     Sinuses of Valsalva: 3.70 cm (3.1 - 3.7 cm)       Left Atrium Measurements: (Indexed to BSA)  LA Diam 2D:        2.50 cm  LA Vol s, MOD A4C: 17.60 ml.  LA Vol s, MOD A2C: 56.00 ml.  LA Vol s, MOD BP:  32.20 ml  16.47 ml/m²    Mitral Valve Measurements:     MV E Vmax: 1.1 m/s         MR Vmax:          4.74 m/s  MV A Vmax: 1.2 m/s         MR Peak Gradient: 89.9 mmHg  MV E/A:    0.9       Tricuspid Valve Measurements:     RA Pressure: 3 mmHg    ________________________________________________________________________________________  Electronically signed on 4/16/2024 at 3:07:41 PM by Jayme Lowe         *** Final ***

## 2024-04-19 NOTE — PROVIDER CONTACT NOTE (OTHER) - SITUATION
since beginning of shift, pt is sleepy, not waking up, vitals stable, breathing heavy. missed all po meds
Pt attempting to urinate being combative and aggressively at this time; noncompliant and noncooperative with care

## 2024-04-19 NOTE — DIETITIAN INITIAL EVALUATION ADULT - ETIOLOGY
related to inadequate energy intake in setting of dementia/increased lethargy  related to motor causes

## 2024-04-19 NOTE — DIETITIAN INITIAL EVALUATION ADULT - OTHER INFO
Reason for Admission: CVA  History of Present Illness:   Patient is a 77yo M with a PMH of COPD, HTN, HLD, DM2, CVA (4/2015), dementia, TAMMY on CPAP who presents to the ED with AMS. Patient was altered on admission so history was obtained from wife. Per wife, around 9:30pm, patient became very confused and was just repeating the word "yes." Wife notes that patient was also slurring his speech. These symptoms were similar to his last CVA in 2015  weight 182# lost weight since January UBW ~ 200#   4/17 BM   Na high patient on D5NS at 75ml hr

## 2024-04-19 NOTE — DIETITIAN INITIAL EVALUATION ADULT - PERTINENT MEDS FT
MEDICATIONS  (STANDING):  albuterol/ipratropium for Nebulization 3 milliLiter(s) Nebulizer every 6 hours  amLODIPine   Tablet 10 milliGRAM(s) Oral daily  apixaban 5 milliGRAM(s) Oral every 12 hours  aspirin enteric coated 81 milliGRAM(s) Oral daily  atorvastatin 40 milliGRAM(s) Oral at bedtime  busPIRone 5 milliGRAM(s) Oral <User Schedule>  dextrose 10% Bolus 125 milliLiter(s) IV Bolus once  dextrose 5% + sodium chloride 0.45%. 1000 milliLiter(s) (75 mL/Hr) IV Continuous <Continuous>  dextrose 5%. 1000 milliLiter(s) (50 mL/Hr) IV Continuous <Continuous>  dextrose 5%. 1000 milliLiter(s) (100 mL/Hr) IV Continuous <Continuous>  dextrose 50% Injectable 12.5 Gram(s) IV Push once  dextrose 50% Injectable 25 Gram(s) IV Push once  donepezil 10 milliGRAM(s) Oral at bedtime  escitalopram 20 milliGRAM(s) Oral daily  glucagon  Injectable 1 milliGRAM(s) IntraMuscular once  hemorrhoidal Ointment 1 Application(s) Rectal two times a day  insulin lispro (ADMELOG) corrective regimen sliding scale   SubCutaneous three times a day before meals  insulin lispro (ADMELOG) corrective regimen sliding scale   SubCutaneous at bedtime  iron sucrose Injectable 100 milliGRAM(s) IV Push every 24 hours  LORazepam     Tablet 0.5 milliGRAM(s) Oral two times a day  losartan 100 milliGRAM(s) Oral daily  QUEtiapine 37.5 milliGRAM(s) Oral <User Schedule>  QUEtiapine 25 milliGRAM(s) Oral daily  valproic  acid Syrup 500 milliGRAM(s) Oral two times a day    MEDICATIONS  (PRN):  bisacodyl Suppository 10 milliGRAM(s) Rectal daily PRN Constipation  dextrose Oral Gel 15 Gram(s) Oral once PRN Blood Glucose LESS THAN 70 milliGRAM(s)/deciliter  OLANZapine Injectable 5 milliGRAM(s) IntraMuscular every 6 hours PRN Agitation  ondansetron Injectable 4 milliGRAM(s) IV Push every 8 hours PRN Nausea and/or Vomiting

## 2024-04-19 NOTE — PROGRESS NOTE ADULT - SUBJECTIVE AND OBJECTIVE BOX
Patient is a 78y old  Male who presents with a chief complaint of CVA (19 Apr 2024 10:25)      INTERVAL HPI/OVERNIGHT EVENTS: Patient seen and examined at bedside. No overnight events occurred. Patient was lethargic ON and had increased WOB after ativan 1mg IVP. Patient not given any zyprexa. Patient is somnolent but arousable, unable to obtain ros due to sedation.    MEDICATIONS  (STANDING):  albuterol/ipratropium for Nebulization 3 milliLiter(s) Nebulizer every 6 hours  amLODIPine   Tablet 10 milliGRAM(s) Oral daily  apixaban 5 milliGRAM(s) Oral every 12 hours  aspirin enteric coated 81 milliGRAM(s) Oral daily  atorvastatin 40 milliGRAM(s) Oral at bedtime  busPIRone 5 milliGRAM(s) Oral <User Schedule>  dextrose 10% Bolus 125 milliLiter(s) IV Bolus once  dextrose 5% + sodium chloride 0.45%. 1000 milliLiter(s) (75 mL/Hr) IV Continuous <Continuous>  dextrose 5%. 1000 milliLiter(s) (100 mL/Hr) IV Continuous <Continuous>  dextrose 5%. 1000 milliLiter(s) (50 mL/Hr) IV Continuous <Continuous>  dextrose 50% Injectable 12.5 Gram(s) IV Push once  dextrose 50% Injectable 25 Gram(s) IV Push once  donepezil 10 milliGRAM(s) Oral at bedtime  escitalopram 20 milliGRAM(s) Oral daily  glucagon  Injectable 1 milliGRAM(s) IntraMuscular once  hemorrhoidal Ointment 1 Application(s) Rectal two times a day  insulin lispro (ADMELOG) corrective regimen sliding scale   SubCutaneous three times a day before meals  insulin lispro (ADMELOG) corrective regimen sliding scale   SubCutaneous at bedtime  iron sucrose Injectable 100 milliGRAM(s) IV Push every 24 hours  LORazepam     Tablet 0.5 milliGRAM(s) Oral two times a day  losartan 100 milliGRAM(s) Oral daily  QUEtiapine 37.5 milliGRAM(s) Oral <User Schedule>  valproic  acid Syrup 500 milliGRAM(s) Oral two times a day    MEDICATIONS  (PRN):  bisacodyl Suppository 10 milliGRAM(s) Rectal daily PRN Constipation  dextrose Oral Gel 15 Gram(s) Oral once PRN Blood Glucose LESS THAN 70 milliGRAM(s)/deciliter  OLANZapine Injectable 5 milliGRAM(s) IntraMuscular every 6 hours PRN Agitation  ondansetron Injectable 4 milliGRAM(s) IV Push every 8 hours PRN Nausea and/or Vomiting      Allergies    No Known Allergies    Intolerances        REVIEW OF SYSTEMS: Unable to obtain ROS due to acute illness    Vital Signs Last 24 Hrs  T(C): 36.6 (19 Apr 2024 11:37), Max: 37.4 (18 Apr 2024 22:48)  T(F): 97.9 (19 Apr 2024 11:37), Max: 99.3 (18 Apr 2024 22:48)  HR: 67 (19 Apr 2024 11:37) (64 - 70)  BP: 109/70 (19 Apr 2024 11:37) (109/70 - 148/78)  BP(mean): --  RR: 18 (19 Apr 2024 11:37) (18 - 20)  SpO2: 94% (19 Apr 2024 13:54) (94% - 98%)    Parameters below as of 19 Apr 2024 13:54  Patient On (Oxygen Delivery Method): nasal cannula        PHYSICAL EXAM:  GENERAL: NAD, somnolent answers some questions  HEENT:  anicteric, moist mucous membranes  CHEST/LUNG:  + b/l wheezing  HEART:  RRR, S1, S2 , no tachy   ABDOMEN:  BS+, soft, nontender, nondistended  EXTREMITIES: no edema, cyanosis, or calf tenderness  NERVOUS SYSTEM: A&O x 1 (person), answers some questions, and follow some commands  gu intact       LABS:                        10.5   6.02  )-----------( 160      ( 19 Apr 2024 07:34 )             33.6     CBC Full  -  ( 19 Apr 2024 07:34 )  WBC Count : 6.02 K/uL  Hemoglobin : 10.5 g/dL  Hematocrit : 33.6 %  Platelet Count - Automated : 160 K/uL  Mean Cell Volume : 97.7 fl  Mean Cell Hemoglobin : 30.5 pg  Mean Cell Hemoglobin Concentration : 31.3 gm/dL  Auto Neutrophil # : x  Auto Lymphocyte # : x  Auto Monocyte # : x  Auto Eosinophil # : x  Auto Basophil # : x  Auto Neutrophil % : x  Auto Lymphocyte % : x  Auto Monocyte % : x  Auto Eosinophil % : x  Auto Basophil % : x    19 Apr 2024 07:34    146    |  110    |  12     ----------------------------<  199    4.2     |  33     |  1.30     Ca    8.9        19 Apr 2024 07:34  Phos  3.0       19 Apr 2024 07:34  Mg     1.8       19 Apr 2024 07:34    TPro  6.6    /  Alb  2.7    /  TBili  0.4    /  DBili  x      /  AST  19     /  ALT  22     /  AlkPhos  77     19 Apr 2024 07:34      Urinalysis Basic - ( 19 Apr 2024 07:34 )    Color: x / Appearance: x / SG: x / pH: x  Gluc: 199 mg/dL / Ketone: x  / Bili: x / Urobili: x   Blood: x / Protein: x / Nitrite: x   Leuk Esterase: x / RBC: x / WBC x   Sq Epi: x / Non Sq Epi: x / Bacteria: x      CAPILLARY BLOOD GLUCOSE      POCT Blood Glucose.: 230 mg/dL (19 Apr 2024 12:26)  POCT Blood Glucose.: 186 mg/dL (19 Apr 2024 08:19)  POCT Blood Glucose.: 141 mg/dL (18 Apr 2024 21:07)  POCT Blood Glucose.: 111 mg/dL (18 Apr 2024 16:44)        Culture - Urine (collected 04-13-24 @ 00:00)  Source: Catheterized Catheterized  Final Report (04-14-24 @ 14:15):    <10,000 CFU/mL Normal Urogenital Veronica    Culture - Blood (collected 04-12-24 @ 23:00)  Source: .Blood Blood  Final Report (04-18-24 @ 05:01):    No growth at 5 days    Culture - Blood (collected 04-12-24 @ 22:50)  Source: .Blood Blood  Final Report (04-18-24 @ 05:01):    No growth at 5 days        RADIOLOGY & ADDITIONAL TESTS:    Personally reviewed.     Consultant(s) Notes Reviewed:  [x] YES  [ ] NO

## 2024-04-19 NOTE — DIETITIAN NUTRITION RISK NOTIFICATION - TREATMENT: THE FOLLOWING DIET HAS BEEN RECOMMENDED
Diet, Minced and Moist:   Consistent Carbohydrate {Evening Snack}  DASH/TLC {Sodium & Cholesterol Restricted} (04-15-24 @ 10:02) [Active]

## 2024-04-19 NOTE — PROGRESS NOTE ADULT - SUBJECTIVE AND OBJECTIVE BOX
Neurology follow up note    RANCHO HARDYY78yMale      Interval History:    Patient sleeping in bed     Allergies    No Known Allergies    Intolerances        MEDICATIONS    albuterol/ipratropium for Nebulization 3 milliLiter(s) Nebulizer every 6 hours PRN  amLODIPine   Tablet 10 milliGRAM(s) Oral daily  apixaban 5 milliGRAM(s) Oral every 12 hours  aspirin enteric coated 81 milliGRAM(s) Oral daily  atorvastatin 40 milliGRAM(s) Oral at bedtime  bisacodyl Suppository 10 milliGRAM(s) Rectal daily PRN  busPIRone 5 milliGRAM(s) Oral <User Schedule>  dextrose 10% Bolus 125 milliLiter(s) IV Bolus once  dextrose 5% + sodium chloride 0.45%. 1000 milliLiter(s) IV Continuous <Continuous>  dextrose 5%. 1000 milliLiter(s) IV Continuous <Continuous>  dextrose 5%. 1000 milliLiter(s) IV Continuous <Continuous>  dextrose 50% Injectable 12.5 Gram(s) IV Push once  dextrose 50% Injectable 25 Gram(s) IV Push once  dextrose Oral Gel 15 Gram(s) Oral once PRN  donepezil 10 milliGRAM(s) Oral at bedtime  escitalopram 20 milliGRAM(s) Oral daily  glucagon  Injectable 1 milliGRAM(s) IntraMuscular once  hemorrhoidal Ointment 1 Application(s) Rectal two times a day  insulin lispro (ADMELOG) corrective regimen sliding scale   SubCutaneous three times a day before meals  insulin lispro (ADMELOG) corrective regimen sliding scale   SubCutaneous at bedtime  iron sucrose Injectable 100 milliGRAM(s) IV Push every 24 hours  LORazepam     Tablet 0.5 milliGRAM(s) Oral two times a day  losartan 100 milliGRAM(s) Oral daily  OLANZapine Injectable 5 milliGRAM(s) IntraMuscular every 6 hours PRN  ondansetron Injectable 4 milliGRAM(s) IV Push every 8 hours PRN  QUEtiapine 25 milliGRAM(s) Oral daily  QUEtiapine 37.5 milliGRAM(s) Oral <User Schedule>  valproic  acid Syrup 500 milliGRAM(s) Oral two times a day              Vital Signs Last 24 Hrs  T(C): 36.4 (19 Apr 2024 04:12), Max: 37.4 (18 Apr 2024 22:48)  T(F): 97.5 (19 Apr 2024 04:12), Max: 99.3 (18 Apr 2024 22:48)  HR: 64 (19 Apr 2024 07:48) (61 - 70)  BP: 148/77 (19 Apr 2024 04:12) (140/80 - 159/68)  BP(mean): --  RR: 20 (19 Apr 2024 04:12) (18 - 20)  SpO2: 94% (19 Apr 2024 07:48) (94% - 98%)    Parameters below as of 19 Apr 2024 07:48  Patient On (Oxygen Delivery Method): nasal cannula, 3 L          REVIEW OF SYSTEMS:  Limited or unable to obtain secondary to patient's poor mental status.      On Neurological Examination: limited     Mental Status - Patient is Lethargic     Does not follow commands    Speech - snoring                     Cranial Nerves -   extraocular eye movements intact.   intact bilateral NLF    Motor Exam -   With stimuli positive movement of all 4 extremities    Muscle tone - is normal all over.  No asymmetry is seen.      GENERAL Exam: Nontoxic , No Acute Distress   	  HEENT:  normocephalic, atraumatic  		  LUNGS:  Decreased bilaterally  	  HEART: Normal S1S2   No murmur RRR        	  GI/ ABDOMEN:  Soft  Non tender    EXTREMITIES:   No Edema  No Clubbing  No Cyanosis     SKIN: Normal  No Ecchymosis          LABS:  CBC Full  -  ( 19 Apr 2024 07:34 )  WBC Count : 6.02 K/uL  RBC Count : 3.44 M/uL  Hemoglobin : 10.5 g/dL  Hematocrit : 33.6 %  Platelet Count - Automated : 160 K/uL  Mean Cell Volume : 97.7 fl  Mean Cell Hemoglobin : 30.5 pg  Mean Cell Hemoglobin Concentration : 31.3 gm/dL  Auto Neutrophil # : x  Auto Lymphocyte # : x  Auto Monocyte # : x  Auto Eosinophil # : x  Auto Basophil # : x  Auto Neutrophil % : x  Auto Lymphocyte % : x  Auto Monocyte % : x  Auto Eosinophil % : x  Auto Basophil % : x    Urinalysis Basic - ( 19 Apr 2024 07:34 )    Color: x / Appearance: x / SG: x / pH: x  Gluc: 199 mg/dL / Ketone: x  / Bili: x / Urobili: x   Blood: x / Protein: x / Nitrite: x   Leuk Esterase: x / RBC: x / WBC x   Sq Epi: x / Non Sq Epi: x / Bacteria: x      04-19    146<H>  |  110<H>  |  12  ----------------------------<  199<H>  4.2   |  33<H>  |  1.30    Ca    8.9      19 Apr 2024 07:34  Phos  3.0     04-19  Mg     1.8     04-19    TPro  6.6  /  Alb  2.7<L>  /  TBili  0.4  /  DBili  x   /  AST  19  /  ALT  22  /  AlkPhos  77  04-19    Hemoglobin A1C:     LIVER FUNCTIONS - ( 19 Apr 2024 07:34 )  Alb: 2.7 g/dL / Pro: 6.6 g/dL / ALK PHOS: 77 U/L / ALT: 22 U/L / AST: 19 U/L / GGT: x           Vitamin B12         RADIOLOGY    < from: MR Head No Cont (04.13.24 @ 14:23) >    INTERPRETATION:  CLINICAL INFORMATION: Cerebral vascular accident.    Altered mental status.  Sudden onset of confusion.  Slurred speech.    Hypertension.  Hyperlipidemia.  Type 2 diabetes mellitus.  Prior cerebral   vascular accident in April, 2015.  Dementia.    COMPARISON: CT head stroke protocol with CT perfusion and CT angiography   neck/brain from 04/12/2024.  MRI brain from 04/27/2015.    CONTRAST/COMPLICATIONS:  IV Contrast: NONE  Complications: None reported at time of study completion    TECHNIQUE: MRI of the brain without intravenous contrast was performed   using the following sequences: Sagittal T1 FLAIR, axial DWI, axial T2   FLAIR, axial T2, axial 3D SWAN, axial T1 FLAIR, coronal T2.    FINDINGS:  There is no diffusion restriction to indicate acute or recent subacute   infarct.    A punctate focus of susceptibility in the anterior left frontal region   (6:37-39) is extra-axial and correspond to a small osteoma seen on CT.    There is no MRI evidence of intracranial blood products, subdural   collection, vasogenic edema, mass effect or hydrocephalus.    There is mild generalized cerebral volume loss, with focally more severe   volume loss in the medial temporal lobes bilaterally, consistent with   history of underlying dementia.  Mild, patchy T2 FLAIR signal   hyperintensity in the periventricular white matter is compatible with   chronic microvascular ischemic disease.    There are multiple small chronic transcortical infarcts in the right   frontal convexity and right parietal convexity, and a moderate sized   chronic transcortical infarct in the right temporal lobe, within the   right middle cerebral artery vascular territory.  There is a small   chronic transcortical infarct in the left frontal convexity, within the   left middle cerebral artery vascular territory.  There is a small chronic   white matter infarct in the left frontal corona radiata.  There are small   chronic transcortical infarcts in both occipital lobes, within the   posterior cerebral artery vascular territories bilaterally.  Mild patchy   T2 FLAIR signal hyperintensity in central kellen compatible with chronic   small vessel ischemic changes.    Midline sagittal structures appear within normal limits.    There is mild patchy prenatal sinus mucosal thickening without air-fluid   level.  There is deviation nasal septum, convex right anteriorly and, to   left posteriorly, with a left-sided bony spur that contacts and deforms   both the left inferior and middle nasal turbinates.    The mastoids are clear bilaterally.    The calvarium, skull base and orbits appear within normal limits.    IMPRESSION:  No MRI evidence of acute intracranial pathology.    Cerebral volume loss and chronic ischemic changes as discussed above.      ASSESSMENT AND PLAN:      seen for ams/aphasia prolonged   brain mri- unremarkable for acute cva so suspect do to prolonged event possible seizure event  depakote 500 bid  continue AC  EEG was negative   dementia ARICEPT  psych follow up agitation medications being adjusted   spoke to spouse tio   cell  4/19     45 minutes of time was spent with the patient, plan of care, reviewing data, with greater than  50% of the visit was spent counseling and/or coordinating care with multidisciplinary healthcare team.

## 2024-04-19 NOTE — CONSULT NOTE ADULT - CONSULT REQUESTED DATE/TIME
17-Apr-2024 13:50
19-Apr-2024 15:35
17-Apr-2024 16:14
18-Apr-2024 14:08
13-Apr-2024 08:02
15-Apr-2024 15:56

## 2024-04-19 NOTE — DIETITIAN INITIAL EVALUATION ADULT - ADD RECOMMEND
1. recommend glucerna BID left message with MD to activate diet 2. recommend MVI 3. trend blood sugars and trend Na level

## 2024-04-19 NOTE — DISCHARGE NOTE NURSING/CASE MANAGEMENT/SOCIAL WORK - PATIENT PORTAL LINK FT
You can access the FollowMyHealth Patient Portal offered by [facility] by registering at the following website: [email or web address]. By joining [facility]’s FollowMyHealth portal, you will also be able to view your health information using other applications (apps) compatible with our system.

## 2024-04-19 NOTE — DIETITIAN INITIAL EVALUATION ADULT - FACTORS AFF FOOD INTAKE
increased lethargy, speech recommends mince and moist thin liquids/difficulty swallowing/other (specify)

## 2024-04-19 NOTE — CONSULT NOTE ADULT - CONSULT REASON
D47.2     MGUS  D63.8     Anemia Chronic disease  D63.1     Anemia CKD  N18.31   CKD3a  E11.22    DM, Hgb A1c 8
AMS
SARAY
cva  artur  lethargy
78y A1C with Estimated Average Glucose Result: 8.6 % (04-14-24 @ 08:20)  A1C with Estimated Average Glucose Result: 8.7 % (04-13-24 @ 06:00)   diabetes mellitus uncontrolled type 2

## 2024-04-19 NOTE — PROGRESS NOTE ADULT - PROBLEM SELECTOR PLAN 1
Patient with altered mental status and agitation concerning for possible seizure event   - EEG: Moderate diffuse/multi-focal cerebral dysfunction, not specific as to etiology. There were no epileptiform abnormalities recorded.  - Now with abnormal labs concerning for MM: SPEP with M spike, elevated kappa/lambda  - continue depakote 500mg BID for possible prolonged seizure event  - Neuro consulted dr dong   - Psych consulted for agitation: no psychiatric contraindications to discharge  - Ct Seroquel 37.5mg qHS.  - ativan 0.5mg BID ordered -> can change to PRN upon discharge  - PRN zyprexa 5mg IVP for severe agitation.

## 2024-04-19 NOTE — PROVIDER CONTACT NOTE (OTHER) - REASON
Pt experiencing expiratory wheezing 02% 96 and hr 74 at this time
pt very sleepy and breathing heavy
Pt agitated, being combative

## 2024-04-19 NOTE — DISCHARGE NOTE NURSING/CASE MANAGEMENT/SOCIAL WORK - NSSCNAMETXT_GEN_ALL_CORE
[facility]   Nurse to visit the day after hospital discharge; physical therapist to follow. Please contact the home care agency at the above phone number if you have not heard from them by 12 noon on the day after your hospital discharge.

## 2024-04-19 NOTE — PROGRESS NOTE ADULT - SUBJECTIVE AND OBJECTIVE BOX
Patient is a 78y old  Male who presents with a chief complaint of CVA (13 Apr 2024 00:56)       HPI:  Patient is a 77yo M with a PMH of COPD, HTN, HLD, DM2, CVA (4/2015), dementia, TAMMY on CPAP who presents to the ED with AMS. Patient was altered on admission so history was obtained from wife. Per wife, around 9:30pm, patient became very confused and was just repeating the word "yes." Wife notes that patient was also slurring his speech. These symptoms were similar to his last CVA in 2015. NIHSS in ED was 11. Telestoke was consulted and rec against TNK as patient last took his home Eliquis at 2:00pm.     Confused,      Renal consulted for SARAY. Chart reviewed. Patient is confused.     No acute events noted       PAST MEDICAL & SURGICAL HISTORY:  Diabetes mellitus      Hypertension      COPD (chronic obstructive pulmonary disease)      HLD (hyperlipidemia)      Dementia      CVA (cerebral vascular accident)      H/O hand surgery           FAMILY HISTORY:  Family history of CABG (Father)    [place]    Social History:Non smoker    MEDICATIONS  (STANDING):  dextrose 10% Bolus 125 milliLiter(s) IV Bolus once  dextrose 5% + sodium chloride 0.9%. 1000 milliLiter(s) (55 mL/Hr) IV Continuous <Continuous>  dextrose 5%. 1000 milliLiter(s) (50 mL/Hr) IV Continuous <Continuous>  dextrose 5%. 1000 milliLiter(s) (100 mL/Hr) IV Continuous <Continuous>  dextrose 50% Injectable 12.5 Gram(s) IV Push once  dextrose 50% Injectable 25 Gram(s) IV Push once  glucagon  Injectable 1 milliGRAM(s) IntraMuscular once  insulin lispro (ADMELOG) corrective regimen sliding scale   SubCutaneous at bedtime  insulin lispro (ADMELOG) corrective regimen sliding scale   SubCutaneous every 6 hours    MEDICATIONS  (PRN):  dextrose Oral Gel 15 Gram(s) Oral once PRN Blood Glucose LESS THAN 70 milliGRAM(s)/deciliter  heparin   Injectable 3000 Unit(s) IV Push every 6 hours PRN For aPTT between 40 - 57  heparin   Injectable 6500 Unit(s) IV Push every 6 hours PRN For aPTT less than 40  ondansetron Injectable 4 milliGRAM(s) IV Push every 8 hours PRN Nausea and/or Vomiting   Meds reviewed    Allergies    No Known Allergies    Intolerances         REVIEW OF SYSTEMS:  Confused    Vital Signs Last 24 Hrs  T(C): 36.6 (19 Apr 2024 11:37), Max: 37.4 (18 Apr 2024 22:48)  T(F): 97.9 (19 Apr 2024 11:37), Max: 99.3 (18 Apr 2024 22:48)  HR: 67 (19 Apr 2024 11:37) (64 - 70)  BP: 109/70 (19 Apr 2024 11:37) (109/70 - 148/78)  BP(mean): --  RR: 18 (19 Apr 2024 11:37) (18 - 20)  SpO2: 94% (19 Apr 2024 13:54) (94% - 98%)    Parameters below as of 19 Apr 2024 13:54  Patient On (Oxygen Delivery Method): nasal cannula        PHYSICAL EXAM:    GENERAL: NAD  HEAD:  Atraumatic, Normocephalic  NERVOUS SYSTEM:  Awake and Alert, confused  CHEST/LUNG: Clear to auscultation bilaterally  HEART: Regular rate and rhythm; No murmurs, rubs, or gallops  ABDOMEN: Soft, Nontender, Nondistended; Bowel sounds present  EXTREMITIES:  No Edema        LABS:                                   10.5   6.02  )-----------( 160      ( 19 Apr 2024 07:34 )             33.6     04-19    146<H>  |  110<H>  |  12  ----------------------------<  199<H>  4.2   |  33<H>  |  1.30    Ca    8.9      19 Apr 2024 07:34  Phos  3.0     04-19  Mg     1.8     04-19    TPro  6.6  /  Alb  2.7<L>  /  TBili  0.4  /  DBili  x   /  AST  19  /  ALT  22  /  AlkPhos  77  04-19      Urinalysis Basic - ( 19 Apr 2024 07:34 )    Color: x / Appearance: x / SG: x / pH: x  Gluc: 199 mg/dL / Ketone: x  / Bili: x / Urobili: x   Blood: x / Protein: x / Nitrite: x   Leuk Esterase: x / RBC: x / WBC x   Sq Epi: x / Non Sq Epi: x / Bacteria: x        ABG - ( 19 Apr 2024 14:13 )  pH, Arterial: 7.36  pH, Blood: x     /  pCO2: 61    /  pO2: 143   / HCO3: 34    / Base Excess: 9.1   /  SaO2: 98.8

## 2024-04-19 NOTE — PROGRESS NOTE ADULT - SUBJECTIVE AND OBJECTIVE BOX
[INTERVAL HX: ]  Patient seen and examined;  Chart reviewed and events noted;       wife at bedside.     discussed with her MGUS found.   protein spike 0.6g/dL    Discussed workup.  Declines invasive workup to current time but will reconsider if situation warrants.    Patient herself follows in our office with Dr. Kirsten morley.      [MEDICATIONS]  MEDICATIONS  (STANDING):  albuterol/ipratropium for Nebulization 3 milliLiter(s) Nebulizer every 6 hours  amLODIPine   Tablet 10 milliGRAM(s) Oral daily  apixaban 5 milliGRAM(s) Oral every 12 hours  aspirin enteric coated 81 milliGRAM(s) Oral daily  atorvastatin 40 milliGRAM(s) Oral at bedtime  busPIRone 5 milliGRAM(s) Oral <User Schedule>  dextrose 10% Bolus 125 milliLiter(s) IV Bolus once  dextrose 5% + sodium chloride 0.45%. 1000 milliLiter(s) (75 mL/Hr) IV Continuous <Continuous>  dextrose 5%. 1000 milliLiter(s) (100 mL/Hr) IV Continuous <Continuous>  dextrose 5%. 1000 milliLiter(s) (50 mL/Hr) IV Continuous <Continuous>  dextrose 50% Injectable 12.5 Gram(s) IV Push once  dextrose 50% Injectable 25 Gram(s) IV Push once  donepezil 10 milliGRAM(s) Oral at bedtime  escitalopram 20 milliGRAM(s) Oral daily  glucagon  Injectable 1 milliGRAM(s) IntraMuscular once  hemorrhoidal Ointment 1 Application(s) Rectal two times a day  insulin lispro (ADMELOG) corrective regimen sliding scale   SubCutaneous three times a day before meals  insulin lispro (ADMELOG) corrective regimen sliding scale   SubCutaneous at bedtime  LORazepam     Tablet 0.5 milliGRAM(s) Oral two times a day  losartan 100 milliGRAM(s) Oral daily  QUEtiapine 37.5 milliGRAM(s) Oral <User Schedule>  valproic  acid Syrup 500 milliGRAM(s) Oral two times a day    MEDICATIONS  (PRN):  bisacodyl Suppository 10 milliGRAM(s) Rectal daily PRN Constipation  dextrose Oral Gel 15 Gram(s) Oral once PRN Blood Glucose LESS THAN 70 milliGRAM(s)/deciliter  OLANZapine Injectable 5 milliGRAM(s) IntraMuscular every 6 hours PRN Agitation  ondansetron Injectable 4 milliGRAM(s) IV Push every 8 hours PRN Nausea and/or Vomiting      [VITALS]  Vital Signs Last 24 Hrs  T(C): 36.6 (2024 11:37), Max: 37.4 (2024 22:48)  T(F): 97.9 (2024 11:37), Max: 99.3 (2024 22:48)  HR: 67 (2024 11:37) (64 - 70)  BP: 109/70 (2024 11:37) (109/70 - 148/77)  BP(mean): --  RR: 18 (2024 11:37) (18 - 20)  SpO2: 94% (2024 13:54) (94% - 98%)    Parameters below as of 2024 13:54  Patient On (Oxygen Delivery Method): nasal cannula      [WT/HT]  Daily     Daily Weight in k.4 (2024 04:12)  [VENT]      [PHYSICAL EXAM]  GEN: NAD  HEENT: normocephalic and atraumatic. EOMI. .    NECK: Supple.  No lymphadenopathy   LUNGS: Clear to auscultation.  HEART: Regular rate and rhythm,  no MRG  ABDOMEN: Soft, nontender, and nondistended.  Positive bowel sounds.    : No CVA tenderness  EXTREMITIES: Without edema.  SKIN: No rash     [LABS:]                        10.5   6.02  )-----------( 160      ( 2024 07:34 )             33.6     -    146<H>  |  110<H>  |  12  ----------------------------<  199<H>  4.2   |  33<H>  |  1.30    Ca    8.9      2024 07:34  Phos  3.0       Mg     1.8         TPro  6.6  /  Alb  2.7<L>  /  TBili  0.4  /  DBili  x   /  AST  19  /  ALT  22  /  AlkPhos  77        Vitamin B12, Serum: 885 pg/mL [232 - 1245] (24 @ 12:45)  Folate, Serum: 9.0 ng/mL (24 @ 12:45)  Serum Protein Electrophoresis Interp: Gamma-Migrating Paraprotein Identified (24 @ 08:21)  Iron - Total Binding Capacity.: 257 ug/dL [220 - 430] (24 @ 09:15)  Ferritin: 51 ng/mL [30 - 400] (24 @ 09:15)  Serum Protein Electrophoresis Interp: Gamma-Migrating Paraprotein Identified (24 @ 09:15)  Immunofixation, Serum:    IgG Kappa Band Identified      Reference Range: None Detected (24 @ 09:15)      Urinalysis Basic - ( 2024 07:34 )  Color: x / Appearance: x / SG: x / pH: x  Gluc: 199 mg/dL / Ketone: x  / Bili: x / Urobili: x   Blood: x / Protein: x / Nitrite: x   Leuk Esterase: x / RBC: x / WBC x   Sq Epi: x / Non Sq Epi: x / Bacteria: x      ABG - ( 2024 14:13 )  pH, Arterial: 7.36  pH, Blood: x     /  pCO2: 61    /  pO2: 143   / HCO3: 34    / Base Excess: 9.1   /  SaO2: 98.8          [RADIOLOGY STUDIES:]

## 2024-04-20 LAB
HCT VFR BLD CALC: 30 % — LOW (ref 39–50)
HGB BLD-MCNC: 9.3 G/DL — LOW (ref 13–17)
MCHC RBC-ENTMCNC: 30.1 PG — SIGNIFICANT CHANGE UP (ref 27–34)
MCHC RBC-ENTMCNC: 31 GM/DL — LOW (ref 32–36)
MCV RBC AUTO: 97.1 FL — SIGNIFICANT CHANGE UP (ref 80–100)
NRBC # BLD: 0 /100 WBCS — SIGNIFICANT CHANGE UP (ref 0–0)
PLATELET # BLD AUTO: 179 K/UL — SIGNIFICANT CHANGE UP (ref 150–400)
RBC # BLD: 3.09 M/UL — LOW (ref 4.2–5.8)
RBC # FLD: 13.7 % — SIGNIFICANT CHANGE UP (ref 10.3–14.5)
WBC # BLD: 5.22 K/UL — SIGNIFICANT CHANGE UP (ref 3.8–10.5)
WBC # FLD AUTO: 5.22 K/UL — SIGNIFICANT CHANGE UP (ref 3.8–10.5)

## 2024-04-20 PROCEDURE — 99233 SBSQ HOSP IP/OBS HIGH 50: CPT | Mod: GC

## 2024-04-20 RX ORDER — QUETIAPINE FUMARATE 200 MG/1
37.5 TABLET, FILM COATED ORAL
Qty: 0 | Refills: 0 | DISCHARGE
Start: 2024-04-20

## 2024-04-20 RX ORDER — VALPROIC ACID (AS SODIUM SALT) 250 MG/5ML
5 SOLUTION, ORAL ORAL
Qty: 300 | Refills: 0
Start: 2024-04-20 | End: 2024-05-19

## 2024-04-20 RX ADMIN — Medication 3 MILLILITER(S): at 07:39

## 2024-04-20 RX ADMIN — Medication 81 MILLIGRAM(S): at 11:31

## 2024-04-20 RX ADMIN — ATORVASTATIN CALCIUM 40 MILLIGRAM(S): 80 TABLET, FILM COATED ORAL at 21:26

## 2024-04-20 RX ADMIN — Medication 0.5 MILLIGRAM(S): at 05:39

## 2024-04-20 RX ADMIN — Medication 4: at 12:31

## 2024-04-20 RX ADMIN — AMLODIPINE BESYLATE 10 MILLIGRAM(S): 2.5 TABLET ORAL at 05:39

## 2024-04-20 RX ADMIN — Medication 0.5 MILLIGRAM(S): at 21:26

## 2024-04-20 RX ADMIN — Medication 500 MILLIGRAM(S): at 21:37

## 2024-04-20 RX ADMIN — Medication 3 MILLILITER(S): at 01:17

## 2024-04-20 RX ADMIN — Medication 4: at 17:31

## 2024-04-20 RX ADMIN — APIXABAN 5 MILLIGRAM(S): 2.5 TABLET, FILM COATED ORAL at 09:48

## 2024-04-20 RX ADMIN — Medication 2: at 23:17

## 2024-04-20 RX ADMIN — QUETIAPINE FUMARATE 37.5 MILLIGRAM(S): 200 TABLET, FILM COATED ORAL at 19:35

## 2024-04-20 RX ADMIN — ESCITALOPRAM OXALATE 20 MILLIGRAM(S): 10 TABLET, FILM COATED ORAL at 11:31

## 2024-04-20 RX ADMIN — LOSARTAN POTASSIUM 100 MILLIGRAM(S): 100 TABLET, FILM COATED ORAL at 05:39

## 2024-04-20 RX ADMIN — DONEPEZIL HYDROCHLORIDE 10 MILLIGRAM(S): 10 TABLET, FILM COATED ORAL at 21:27

## 2024-04-20 RX ADMIN — Medication 500 MILLIGRAM(S): at 11:31

## 2024-04-20 RX ADMIN — APIXABAN 5 MILLIGRAM(S): 2.5 TABLET, FILM COATED ORAL at 21:26

## 2024-04-20 RX ADMIN — Medication 3 MILLILITER(S): at 13:42

## 2024-04-20 RX ADMIN — Medication 5 MILLIGRAM(S): at 09:58

## 2024-04-20 RX ADMIN — SODIUM CHLORIDE 75 MILLILITER(S): 9 INJECTION, SOLUTION INTRAVENOUS at 05:46

## 2024-04-20 RX ADMIN — Medication 3 MILLILITER(S): at 19:47

## 2024-04-20 RX ADMIN — Medication 6: at 08:18

## 2024-04-20 RX ADMIN — PHENYLEPHRINE-SHARK LIVER OIL-MINERAL OIL-PETROLATUM RECTAL OINTMENT 1 APPLICATION(S): at 05:38

## 2024-04-20 NOTE — SOCIAL WORK PROGRESS NOTE - NSSWPROGRESSNOTE_GEN_ALL_CORE
MD is clearing patient for discharge today. Wife requested PT see him again. PT stating wife now wants sub-acute rehab. I met with wife at bedside today. She states she said she can only take him home if he can walk and right now he cannot. Informed her I will reach out to [facility] but we may not be able to reach them before Monday. Message sent to [facility] via AC informing them he is ready for discharge, await response. Message left in admissions at [facility] and  there said no one is in admissions on the weekend. Social work to follow.

## 2024-04-20 NOTE — PROGRESS NOTE ADULT - SUBJECTIVE AND OBJECTIVE BOX
Patient is a 78y old  Male who presents with a chief complaint of CVA (13 Apr 2024 00:56)       HPI:  Patient is a 79yo M with a PMH of COPD, HTN, HLD, DM2, CVA (4/2015), dementia, TAMMY on CPAP who presents to the ED with AMS. Patient was altered on admission so history was obtained from wife. Per wife, around 9:30pm, patient became very confused and was just repeating the word "yes." Wife notes that patient was also slurring his speech. These symptoms were similar to his last CVA in 2015. NIHSS in ED was 11. [facility] was consulted and rec against TNK as patient last took his home Eliquis at 2:00pm.     Confused,      Renal consulted for SARAY. Chart reviewed. Patient is confused.     No acute events noted       PAST MEDICAL & SURGICAL HISTORY:  Diabetes mellitus      Hypertension      COPD (chronic obstructive pulmonary disease)      HLD (hyperlipidemia)      Dementia      CVA (cerebral vascular accident)      H/O hand surgery           FAMILY HISTORY:  Family history of CABG (Father)    [place]    Social History:Non smoker    MEDICATIONS  (STANDING):  dextrose 10% Bolus 125 milliLiter(s) IV Bolus once  dextrose 5% + sodium chloride 0.9%. 1000 milliLiter(s) (55 mL/Hr) IV Continuous <Continuous>  dextrose 5%. 1000 milliLiter(s) (50 mL/Hr) IV Continuous <Continuous>  dextrose 5%. 1000 milliLiter(s) (100 mL/Hr) IV Continuous <Continuous>  dextrose 50% Injectable 12.5 Gram(s) IV Push once  dextrose 50% Injectable 25 Gram(s) IV Push once  glucagon  Injectable 1 milliGRAM(s) IntraMuscular once  insulin lispro (ADMELOG) corrective regimen sliding scale   SubCutaneous at bedtime  insulin lispro (ADMELOG) corrective regimen sliding scale   SubCutaneous every 6 hours    MEDICATIONS  (PRN):  dextrose Oral Gel 15 Gram(s) Oral once PRN Blood Glucose LESS THAN 70 milliGRAM(s)/deciliter  heparin   Injectable 3000 Unit(s) IV Push every 6 hours PRN For aPTT between 40 - 57  heparin   Injectable 6500 Unit(s) IV Push every 6 hours PRN For aPTT less than 40  ondansetron Injectable 4 milliGRAM(s) IV Push every 8 hours PRN Nausea and/or Vomiting   Meds reviewed    Allergies    No Known Allergies    Intolerances         REVIEW OF SYSTEMS:  Confused    Vital Signs Last 24 Hrs  T(C): 36.6 (19 Apr 2024 11:37), Max: 37.4 (18 Apr 2024 22:48)  T(F): 97.9 (19 Apr 2024 11:37), Max: 99.3 (18 Apr 2024 22:48)  HR: 67 (19 Apr 2024 11:37) (64 - 70)  BP: 109/70 (19 Apr 2024 11:37) (109/70 - 148/78)  BP(mean): --  RR: 18 (19 Apr 2024 11:37) (18 - 20)  SpO2: 94% (19 Apr 2024 13:54) (94% - 98%)    Parameters below as of 19 Apr 2024 13:54  Patient On (Oxygen Delivery Method): nasal cannula        PHYSICAL EXAM:    GENERAL: NAD  HEAD:  Atraumatic, Normocephalic  NERVOUS SYSTEM:  Awake and Alert, confused  CHEST/LUNG: Clear to auscultation bilaterally  HEART: Regular rate and rhythm; No murmurs, rubs, or gallops  ABDOMEN: Soft, Nontender, Nondistended; Bowel sounds present  EXTREMITIES:  No Edema        LABS:                                   10.5   6.02  )-----------( 160      ( 19 Apr 2024 07:34 )             33.6     04-19    146<H>  |  110<H>  |  12  ----------------------------<  199<H>  4.2   |  33<H>  |  1.30    Ca    8.9      19 Apr 2024 07:34  Phos  3.0     04-19  Mg     1.8     04-19    TPro  6.6  /  Alb  2.7<L>  /  TBili  0.4  /  DBili  x   /  AST  19  /  ALT  22  /  AlkPhos  77  04-19      Urinalysis Basic - ( 19 Apr 2024 07:34 )    Color: x / Appearance: x / SG: x / pH: x  Gluc: 199 mg/dL / Ketone: x  / Bili: x / Urobili: x   Blood: x / Protein: x / Nitrite: x   Leuk Esterase: x / RBC: x / WBC x   Sq Epi: x / Non Sq Epi: x / Bacteria: x        ABG - ( 19 Apr 2024 14:13 )  pH, Arterial: 7.36  pH, Blood: x     /  pCO2: 61    /  pO2: 143   / HCO3: 34    / Base Excess: 9.1   /  SaO2: 98.8

## 2024-04-20 NOTE — PROGRESS NOTE ADULT - SUBJECTIVE AND OBJECTIVE BOX
Neurology follow up note    RANCHO HARDYY78yMale      Interval History:    Patient resting in bed     Allergies    No Known Allergies    Intolerances        MEDICATIONS    MEDICATIONS  (STANDING):  albuterol/ipratropium for Nebulization 3 milliLiter(s) Nebulizer every 6 hours  amLODIPine   Tablet 10 milliGRAM(s) Oral daily  apixaban 5 milliGRAM(s) Oral every 12 hours  aspirin enteric coated 81 milliGRAM(s) Oral daily  atorvastatin 40 milliGRAM(s) Oral at bedtime  busPIRone 5 milliGRAM(s) Oral <User Schedule>  dextrose 10% Bolus 125 milliLiter(s) IV Bolus once  dextrose 5% + sodium chloride 0.45%. 1000 milliLiter(s) (75 mL/Hr) IV Continuous <Continuous>  dextrose 5%. 1000 milliLiter(s) (100 mL/Hr) IV Continuous <Continuous>  dextrose 5%. 1000 milliLiter(s) (50 mL/Hr) IV Continuous <Continuous>  dextrose 50% Injectable 12.5 Gram(s) IV Push once  dextrose 50% Injectable 25 Gram(s) IV Push once  donepezil 10 milliGRAM(s) Oral at bedtime  escitalopram 20 milliGRAM(s) Oral daily  glucagon  Injectable 1 milliGRAM(s) IntraMuscular once  hemorrhoidal Ointment 1 Application(s) Rectal two times a day  insulin lispro (ADMELOG) corrective regimen sliding scale   SubCutaneous three times a day before meals  insulin lispro (ADMELOG) corrective regimen sliding scale   SubCutaneous at bedtime  LORazepam     Tablet 0.5 milliGRAM(s) Oral two times a day  losartan 100 milliGRAM(s) Oral daily  QUEtiapine 37.5 milliGRAM(s) Oral <User Schedule>  valproic  acid Syrup 500 milliGRAM(s) Oral two times a day        Vital Signs Last 24 Hrs  T(C): 36.6 (20 Apr 2024 05:00), Max: 36.7 (19 Apr 2024 20:42)  T(F): 97.9 (20 Apr 2024 05:00), Max: 98.1 (19 Apr 2024 20:42)  HR: 72 (20 Apr 2024 08:16) (57 - 78)  BP: 183/71 (20 Apr 2024 05:00) (140/70 - 183/71)  BP(mean): --  RR: 20 (20 Apr 2024 05:00) (18 - 20)  SpO2: 87% (20 Apr 2024 11:15) (87% - 95%)      REVIEW OF SYSTEMS:  Limited or unable to obtain secondary to patient's poor mental status.      On Neurological Examination: limited     Mental Status - Patient is Lethargic     Does not follow commands    Speech - snoring                     Cranial Nerves -   extraocular eye movements intact.   intact bilateral NLF    Motor Exam -   With stimuli positive movement of all 4 extremities    Muscle tone - is normal all over.  No asymmetry is seen.      GENERAL Exam: Nontoxic , No Acute Distress   	  HEENT:  normocephalic, atraumatic  		  LUNGS:  Decreased bilaterally  	  HEART: Normal S1S2   No murmur RRR        	  GI/ ABDOMEN:  Soft  Non tender    EXTREMITIES:   No Edema  No Clubbing  No Cyanosis     SKIN: Normal  No Ecchymosis      LABS:                        9.3    5.22  )-----------( 179      ( 20 Apr 2024 07:23 )             30.0     CBC Full  -  ( 20 Apr 2024 07:23 )  WBC Count : 5.22 K/uL  Hemoglobin : 9.3 g/dL  Hematocrit : 30.0 %  Platelet Count - Automated : 179 K/uL  Mean Cell Volume : 97.1 fl  Mean Cell Hemoglobin : 30.1 pg  Mean Cell Hemoglobin Concentration : 31.0 gm/dL  Auto Neutrophil # : x  Auto Lymphocyte # : x  Auto Monocyte # : x  Auto Eosinophil # : x  Auto Basophil # : x  Auto Neutrophil % : x  Auto Lymphocyte % : x  Auto Monocyte % : x  Auto Eosinophil % : x  Auto Basophil % : x      Ca    8.9        19 Apr 2024 07:34      RADIOLOGY  < from: MR Head No Cont (04.13.24 @ 14:23) >    INTERPRETATION:  CLINICAL INFORMATION: Cerebral vascular accident.    Altered mental status.  Sudden onset of confusion.  Slurred speech.    Hypertension.  Hyperlipidemia.  Type 2 diabetes mellitus.  Prior cerebral   vascular accident in April, 2015.  Dementia.    COMPARISON: CT head stroke protocol with CT perfusion and CT angiography   neck/brain from 04/12/2024.  MRI brain from 04/27/2015.    CONTRAST/COMPLICATIONS:  IV Contrast: NONE  Complications: None reported at time of study completion    TECHNIQUE: MRI of the brain without intravenous contrast was performed   using the following sequences: Sagittal T1 FLAIR, axial DWI, axial T2   FLAIR, axial T2, axial 3D SWAN, axial T1 FLAIR, coronal T2.    FINDINGS:  There is no diffusion restriction to indicate acute or recent subacute   infarct.    A punctate focus of susceptibility in the anterior left frontal region   (6:37-39) is extra-axial and correspond to a small osteoma seen on CT.    There is no MRI evidence of intracranial blood products, subdural   collection, vasogenic edema, mass effect or hydrocephalus.    There is mild generalized cerebral volume loss, with focally more severe   volume loss in the medial temporal lobes bilaterally, consistent with   history of underlying dementia.  Mild, patchy T2 FLAIR signal   hyperintensity in the periventricular white matter is compatible with   chronic microvascular ischemic disease.    There are multiple small chronic transcortical infarcts in the right   frontal convexity and right parietal convexity, and a moderate sized   chronic transcortical infarct in the right temporal lobe, within the   right middle cerebral artery vascular territory.  There is a small   chronic transcortical infarct in the left frontal convexity, within the   left middle cerebral artery vascular territory.  There is a small chronic   white matter infarct in the left frontal corona radiata.  There are small   chronic transcortical infarcts in both occipital lobes, within the   posterior cerebral artery vascular territories bilaterally.  Mild patchy   T2 FLAIR signal hyperintensity in central kellen compatible with chronic   small vessel ischemic changes.    Midline sagittal structures appear within normal limits.    There is mild patchy prenatal sinus mucosal thickening without air-fluid   level.  There is deviation nasal septum, convex right anteriorly and, to   left posteriorly, with a left-sided bony spur that contacts and deforms   both the left inferior and middle nasal turbinates.    The mastoids are clear bilaterally.    The calvarium, skull base and orbits appear within normal limits.    IMPRESSION:  No MRI evidence of acute intracranial pathology.    Cerebral volume loss and chronic ischemic changes as discussed above.      ASSESSMENT AND PLAN:      seen for ams/aphasia prolonged   brain mri- unremarkable for acute cva so suspect do to prolonged event possible seizure event  depakote 500 bid  continue AC  EEG was negative   dementia ARICEPT  psych follow up agitation medications being adjusted   spoke to spouse tio   cell  4/19     44 minutes of time was spent with the patient, plan of care, reviewing data, with greater than  50% of the visit was spent counseling and/or coordinating care with multidisciplinary healthcare team.

## 2024-04-20 NOTE — PROGRESS NOTE ADULT - SUBJECTIVE AND OBJECTIVE BOX
Patient is a 78y old  Male who presents with a chief complaint of CVA (20 Apr 2024 05:42)      INTERVAL HPI/OVERNIGHT EVENTS: Pt seen/examined at bedside. Pt tolerated CPAP overnight. Pt did not require any PRN zyprexa overnight. Pt is awake and interactive, states that he feels well.     MEDICATIONS  (STANDING):  albuterol/ipratropium for Nebulization 3 milliLiter(s) Nebulizer every 6 hours  amLODIPine   Tablet 10 milliGRAM(s) Oral daily  apixaban 5 milliGRAM(s) Oral every 12 hours  aspirin enteric coated 81 milliGRAM(s) Oral daily  atorvastatin 40 milliGRAM(s) Oral at bedtime  busPIRone 5 milliGRAM(s) Oral <User Schedule>  dextrose 10% Bolus 125 milliLiter(s) IV Bolus once  dextrose 5% + sodium chloride 0.45%. 1000 milliLiter(s) (75 mL/Hr) IV Continuous <Continuous>  dextrose 5%. 1000 milliLiter(s) (100 mL/Hr) IV Continuous <Continuous>  dextrose 5%. 1000 milliLiter(s) (50 mL/Hr) IV Continuous <Continuous>  dextrose 50% Injectable 12.5 Gram(s) IV Push once  dextrose 50% Injectable 25 Gram(s) IV Push once  donepezil 10 milliGRAM(s) Oral at bedtime  escitalopram 20 milliGRAM(s) Oral daily  glucagon  Injectable 1 milliGRAM(s) IntraMuscular once  hemorrhoidal Ointment 1 Application(s) Rectal two times a day  insulin lispro (ADMELOG) corrective regimen sliding scale   SubCutaneous three times a day before meals  insulin lispro (ADMELOG) corrective regimen sliding scale   SubCutaneous at bedtime  LORazepam     Tablet 0.5 milliGRAM(s) Oral two times a day  losartan 100 milliGRAM(s) Oral daily  QUEtiapine 37.5 milliGRAM(s) Oral <User Schedule>  valproic  acid Syrup 500 milliGRAM(s) Oral two times a day    MEDICATIONS  (PRN):  bisacodyl Suppository 10 milliGRAM(s) Rectal daily PRN Constipation  dextrose Oral Gel 15 Gram(s) Oral once PRN Blood Glucose LESS THAN 70 milliGRAM(s)/deciliter  OLANZapine Injectable 5 milliGRAM(s) IntraMuscular every 6 hours PRN Agitation  ondansetron Injectable 4 milliGRAM(s) IV Push every 8 hours PRN Nausea and/or Vomiting      Allergies    No Known Allergies    Intolerances        REVIEW OF SYSTEMS:  Unable to assess 2/2 baseline dementia     Vital Signs Last 24 Hrs  T(C): 36.6 (20 Apr 2024 05:00), Max: 36.7 (19 Apr 2024 20:42)  T(F): 97.9 (20 Apr 2024 05:00), Max: 98.1 (19 Apr 2024 20:42)  HR: 72 (20 Apr 2024 08:16) (57 - 78)  BP: 183/71 (20 Apr 2024 05:00) (140/70 - 183/71)  BP(mean): --  RR: 20 (20 Apr 2024 05:00) (18 - 20)  SpO2: 87% (20 Apr 2024 11:15) (87% - 95%)    Parameters below as of 20 Apr 2024 11:15  Patient On (Oxygen Delivery Method): room air        PHYSICAL EXAM:  GENERAL: NAD  HEENT:  anicteric, moist mucous membranes  CHEST/LUNG:  + b/l wheezing  HEART:  RRR, S1, S2 , no tachy   ABDOMEN:  BS+, soft, nontender, nondistended  EXTREMITIES: no edema, cyanosis, or calf tenderness  NERVOUS SYSTEM: A&O x 1 (person), answers some questions, and follow some commands  gu intact       LABS:                        9.3    5.22  )-----------( 179      ( 20 Apr 2024 07:23 )             30.0     CBC Full  -  ( 20 Apr 2024 07:23 )  WBC Count : 5.22 K/uL  Hemoglobin : 9.3 g/dL  Hematocrit : 30.0 %  Platelet Count - Automated : 179 K/uL  Mean Cell Volume : 97.1 fl  Mean Cell Hemoglobin : 30.1 pg  Mean Cell Hemoglobin Concentration : 31.0 gm/dL  Auto Neutrophil # : x  Auto Lymphocyte # : x  Auto Monocyte # : x  Auto Eosinophil # : x  Auto Basophil # : x  Auto Neutrophil % : x  Auto Lymphocyte % : x  Auto Monocyte % : x  Auto Eosinophil % : x  Auto Basophil % : x      Ca    8.9        19 Apr 2024 07:34        Urinalysis Basic - ( 19 Apr 2024 07:34 )    Color: x / Appearance: x / SG: x / pH: x  Gluc: 199 mg/dL / Ketone: x  / Bili: x / Urobili: x   Blood: x / Protein: x / Nitrite: x   Leuk Esterase: x / RBC: x / WBC x   Sq Epi: x / Non Sq Epi: x / Bacteria: x      CAPILLARY BLOOD GLUCOSE      POCT Blood Glucose.: 235 mg/dL (20 Apr 2024 12:15)  POCT Blood Glucose.: 257 mg/dL (20 Apr 2024 08:12)  POCT Blood Glucose.: 267 mg/dL (19 Apr 2024 22:14)  POCT Blood Glucose.: 203 mg/dL (19 Apr 2024 16:50)          RADIOLOGY & ADDITIONAL TESTS:    Personally reviewed.     Consultant(s) Notes Reviewed:  [x] YES  [ ] NO

## 2024-04-20 NOTE — PROGRESS NOTE ADULT - SUBJECTIVE AND OBJECTIVE BOX
[INTERVAL HX: ]  Patient seen and examined;  Chart reviewed and events noted;     wife at bedside  discussed MGUS     pt not cooperative enough for skeletal survey    [MEDICATIONS]  MEDICATIONS  (STANDING):  albuterol/ipratropium for Nebulization 3 milliLiter(s) Nebulizer every 6 hours  amLODIPine   Tablet 10 milliGRAM(s) Oral daily  apixaban 5 milliGRAM(s) Oral every 12 hours  aspirin enteric coated 81 milliGRAM(s) Oral daily  atorvastatin 40 milliGRAM(s) Oral at bedtime  busPIRone 5 milliGRAM(s) Oral <User Schedule>  dextrose 10% Bolus 125 milliLiter(s) IV Bolus once  dextrose 5% + sodium chloride 0.45%. 1000 milliLiter(s) (75 mL/Hr) IV Continuous <Continuous>  dextrose 5%. 1000 milliLiter(s) (50 mL/Hr) IV Continuous <Continuous>  dextrose 5%. 1000 milliLiter(s) (100 mL/Hr) IV Continuous <Continuous>  dextrose 50% Injectable 25 Gram(s) IV Push once  dextrose 50% Injectable 12.5 Gram(s) IV Push once  donepezil 10 milliGRAM(s) Oral at bedtime  escitalopram 20 milliGRAM(s) Oral daily  glucagon  Injectable 1 milliGRAM(s) IntraMuscular once  hemorrhoidal Ointment 1 Application(s) Rectal two times a day  insulin lispro (ADMELOG) corrective regimen sliding scale   SubCutaneous three times a day before meals  insulin lispro (ADMELOG) corrective regimen sliding scale   SubCutaneous at bedtime  losartan 100 milliGRAM(s) Oral daily  QUEtiapine 37.5 milliGRAM(s) Oral <User Schedule>  valproic  acid Syrup 500 milliGRAM(s) Oral two times a day    MEDICATIONS  (PRN):  bisacodyl Suppository 10 milliGRAM(s) Rectal daily PRN Constipation  dextrose Oral Gel 15 Gram(s) Oral once PRN Blood Glucose LESS THAN 70 milliGRAM(s)/deciliter  LORazepam     Tablet 0.5 milliGRAM(s) Oral two times a day PRN Agitation  OLANZapine Injectable 5 milliGRAM(s) IntraMuscular every 6 hours PRN Agitation  ondansetron Injectable 4 milliGRAM(s) IV Push every 8 hours PRN Nausea and/or Vomiting      [VITALS]  Vital Signs Last 24 Hrs  T(C): 37.3 (20 Apr 2024 13:04), Max: 37.3 (20 Apr 2024 13:04)  T(F): 99.2 (20 Apr 2024 13:04), Max: 99.2 (20 Apr 2024 13:04)  HR: 75 (20 Apr 2024 13:50) (57 - 78)  BP: 150/81 (20 Apr 2024 13:04) (140/70 - 183/71)  BP(mean): --  RR: 22 (20 Apr 2024 13:04) (18 - 22)  SpO2: 93% (20 Apr 2024 13:50) (87% - 95%)    Parameters below as of 20 Apr 2024 13:50  Patient On (Oxygen Delivery Method): nasal cannula, 3l      [WT/HT]  Daily     Daily   [VENT]      [PHYSICAL EXAM]  GEN: NAD, confused  HEENT: normocephalic and atraumatic. EOMI.   NECK: Supple.  No lymphadenopathy   LUNGS: Clear to auscultation.  HEART: Regular rate and rhythm,  no MRG  ABDOMEN: Soft, nontender, and nondistended.  Positive bowel sounds.    : No CVA tenderness  EXTREMITIES: Without edema.  NEUROLOGIC: grossly intact.  PSYCHIATRIC: Appropriate affect .  SKIN: No rash     [LABS:]                        9.3    5.22  )-----------( 179      ( 20 Apr 2024 07:23 )             30.0     04-19    146<H>  |  110<H>  |  12  ----------------------------<  199<H>  4.2   |  33<H>  |  1.30    Ca    8.9      19 Apr 2024 07:34  Phos  3.0     04-19  Mg     1.8     04-19    TPro  6.6  /  Alb  2.7<L>  /  TBili  0.4  /  DBili  x   /  AST  19  /  ALT  22  /  AlkPhos  77  04-19          Vitamin B12, Serum: 885 pg/mL [232 - 1245] (04-16-24 @ 12:45)    Folate, Serum: 9.0 ng/mL (04-16-24 @ 12:45)    Serum Protein Electrophoresis Interp: Gamma-Migrating Paraprotein Identified (04-16-24 @ 08:21)    Iron - Total Binding Capacity.: 257 ug/dL [220 - 430] (04-13-24 @ 09:15)    Ferritin: 51 ng/mL [30 - 400] (04-13-24 @ 09:15)    Serum Protein Electrophoresis Interp: Gamma-Migrating Paraprotein Identified (04-13-24 @ 09:15)    Immunofixation, Serum:    IgG Kappa Band Identified      Reference Range: None Detected (04-13-24 @ 09:15)      Urinalysis Basic - ( 19 Apr 2024 07:34 )    Color: x / Appearance: x / SG: x / pH: x  Gluc: 199 mg/dL / Ketone: x  / Bili: x / Urobili: x   Blood: x / Protein: x / Nitrite: x   Leuk Esterase: x / RBC: x / WBC x   Sq Epi: x / Non Sq Epi: x / Bacteria: x          ABG - ( 19 Apr 2024 14:13 )  pH, Arterial: 7.36  pH, Blood: x     /  pCO2: 61    /  pO2: 143   / HCO3: 34    / Base Excess: 9.1   /  SaO2: 98.8                  [RADIOLOGY STUDIES:]

## 2024-04-20 NOTE — PROGRESS NOTE ADULT - SUBJECTIVE AND OBJECTIVE BOX
Date/Time Patient Seen:  		  Referring MD:   Data Reviewed	       Patient is a 78y old  Male who presents with a chief complaint of CVA (19 Apr 2024 20:05)      Subjective/HPI     PAST MEDICAL & SURGICAL HISTORY:  Diabetes mellitus    Hypertension    COPD (chronic obstructive pulmonary disease)    HLD (hyperlipidemia)    Dementia    CVA (cerebral vascular accident)    No significant past surgical history    H/O hand surgery          Medication list         MEDICATIONS  (STANDING):  albuterol/ipratropium for Nebulization 3 milliLiter(s) Nebulizer every 6 hours  amLODIPine   Tablet 10 milliGRAM(s) Oral daily  apixaban 5 milliGRAM(s) Oral every 12 hours  aspirin enteric coated 81 milliGRAM(s) Oral daily  atorvastatin 40 milliGRAM(s) Oral at bedtime  busPIRone 5 milliGRAM(s) Oral <User Schedule>  dextrose 10% Bolus 125 milliLiter(s) IV Bolus once  dextrose 5% + sodium chloride 0.45%. 1000 milliLiter(s) (75 mL/Hr) IV Continuous <Continuous>  dextrose 5%. 1000 milliLiter(s) (100 mL/Hr) IV Continuous <Continuous>  dextrose 5%. 1000 milliLiter(s) (50 mL/Hr) IV Continuous <Continuous>  dextrose 50% Injectable 12.5 Gram(s) IV Push once  dextrose 50% Injectable 25 Gram(s) IV Push once  donepezil 10 milliGRAM(s) Oral at bedtime  escitalopram 20 milliGRAM(s) Oral daily  glucagon  Injectable 1 milliGRAM(s) IntraMuscular once  hemorrhoidal Ointment 1 Application(s) Rectal two times a day  insulin lispro (ADMELOG) corrective regimen sliding scale   SubCutaneous three times a day before meals  insulin lispro (ADMELOG) corrective regimen sliding scale   SubCutaneous at bedtime  LORazepam     Tablet 0.5 milliGRAM(s) Oral two times a day  losartan 100 milliGRAM(s) Oral daily  QUEtiapine 37.5 milliGRAM(s) Oral <User Schedule>  valproic  acid Syrup 500 milliGRAM(s) Oral two times a day    MEDICATIONS  (PRN):  bisacodyl Suppository 10 milliGRAM(s) Rectal daily PRN Constipation  dextrose Oral Gel 15 Gram(s) Oral once PRN Blood Glucose LESS THAN 70 milliGRAM(s)/deciliter  OLANZapine Injectable 5 milliGRAM(s) IntraMuscular every 6 hours PRN Agitation  ondansetron Injectable 4 milliGRAM(s) IV Push every 8 hours PRN Nausea and/or Vomiting         Vitals log        ICU Vital Signs Last 24 Hrs  T(C): 36.6 (20 Apr 2024 05:00), Max: 36.7 (19 Apr 2024 20:42)  T(F): 97.9 (20 Apr 2024 05:00), Max: 98.1 (19 Apr 2024 20:42)  HR: 73 (20 Apr 2024 05:00) (57 - 73)  BP: 183/71 (20 Apr 2024 05:00) (109/70 - 183/71)  BP(mean): --  ABP: --  ABP(mean): --  RR: 20 (20 Apr 2024 05:00) (18 - 20)  SpO2: 94% (20 Apr 2024 05:00) (93% - 95%)    O2 Parameters below as of 20 Apr 2024 01:17  Patient On (Oxygen Delivery Method): nasal cannula, 3L                 Input and Output:  I&O's Detail    18 Apr 2024 07:01  -  19 Apr 2024 07:00  --------------------------------------------------------  IN:    Oral Fluid: 1 mL  Total IN: 1 mL    OUT:    Voided (mL): 1200 mL  Total OUT: 1200 mL    Total NET: -1199 mL      19 Apr 2024 07:01  -  20 Apr 2024 05:42  --------------------------------------------------------  IN:  Total IN: 0 mL    OUT:    Voided (mL): 700 mL  Total OUT: 700 mL    Total NET: -700 mL          Lab Data                        10.5   6.02  )-----------( 160      ( 19 Apr 2024 07:34 )             33.6     04-19    146<H>  |  110<H>  |  12  ----------------------------<  199<H>  4.2   |  33<H>  |  1.30    Ca    8.9      19 Apr 2024 07:34  Phos  3.0     04-19  Mg     1.8     04-19    TPro  6.6  /  Alb  2.7<L>  /  TBili  0.4  /  DBili  x   /  AST  19  /  ALT  22  /  AlkPhos  77  04-19    ABG - ( 19 Apr 2024 14:13 )  pH, Arterial: 7.36  pH, Blood: x     /  pCO2: 61    /  pO2: 143   / HCO3: 34    / Base Excess: 9.1   /  SaO2: 98.8                    Review of Systems	      Objective     Physical Examination    heart s1s2  lung dc BS  head nc  head at      Pertinent Lab findings & Imaging      Vadim:  NO   Adequate UO     I&O's Detail    18 Apr 2024 07:01  -  19 Apr 2024 07:00  --------------------------------------------------------  IN:    Oral Fluid: 1 mL  Total IN: 1 mL    OUT:    Voided (mL): 1200 mL  Total OUT: 1200 mL    Total NET: -1199 mL      19 Apr 2024 07:01  -  20 Apr 2024 05:42  --------------------------------------------------------  IN:  Total IN: 0 mL    OUT:    Voided (mL): 700 mL  Total OUT: 700 mL    Total NET: -700 mL               Discussed with:     Cultures:	        Radiology

## 2024-04-21 LAB
ANION GAP SERPL CALC-SCNC: 5 MMOL/L — SIGNIFICANT CHANGE UP (ref 5–17)
BUN SERPL-MCNC: 15 MG/DL — SIGNIFICANT CHANGE UP (ref 7–23)
CALCIUM SERPL-MCNC: 9 MG/DL — SIGNIFICANT CHANGE UP (ref 8.5–10.1)
CHLORIDE SERPL-SCNC: 104 MMOL/L — SIGNIFICANT CHANGE UP (ref 96–108)
CO2 SERPL-SCNC: 34 MMOL/L — HIGH (ref 22–31)
CREAT SERPL-MCNC: 1.4 MG/DL — HIGH (ref 0.5–1.3)
EGFR: 52 ML/MIN/1.73M2 — LOW
GLUCOSE SERPL-MCNC: 260 MG/DL — HIGH (ref 70–99)
MAGNESIUM SERPL-MCNC: 1.8 MG/DL — SIGNIFICANT CHANGE UP (ref 1.6–2.6)
PHOSPHATE SERPL-MCNC: 2.8 MG/DL — SIGNIFICANT CHANGE UP (ref 2.5–4.5)
POTASSIUM SERPL-MCNC: 3.6 MMOL/L — SIGNIFICANT CHANGE UP (ref 3.5–5.3)
POTASSIUM SERPL-SCNC: 3.6 MMOL/L — SIGNIFICANT CHANGE UP (ref 3.5–5.3)
SODIUM SERPL-SCNC: 143 MMOL/L — SIGNIFICANT CHANGE UP (ref 135–145)

## 2024-04-21 PROCEDURE — 77075 RADEX OSSEOUS SURVEY COMPL: CPT | Mod: 26

## 2024-04-21 PROCEDURE — 99233 SBSQ HOSP IP/OBS HIGH 50: CPT | Mod: GC

## 2024-04-21 RX ORDER — SODIUM CHLORIDE 9 MG/ML
1000 INJECTION, SOLUTION INTRAVENOUS
Refills: 0 | Status: DISCONTINUED | OUTPATIENT
Start: 2024-04-21 | End: 2024-04-22

## 2024-04-21 RX ADMIN — LOSARTAN POTASSIUM 100 MILLIGRAM(S): 100 TABLET, FILM COATED ORAL at 07:00

## 2024-04-21 RX ADMIN — Medication 3 MILLILITER(S): at 14:27

## 2024-04-21 RX ADMIN — PHENYLEPHRINE-SHARK LIVER OIL-MINERAL OIL-PETROLATUM RECTAL OINTMENT 1 APPLICATION(S): at 07:00

## 2024-04-21 RX ADMIN — Medication 3 MILLILITER(S): at 00:59

## 2024-04-21 RX ADMIN — Medication 500 MILLIGRAM(S): at 11:07

## 2024-04-21 RX ADMIN — Medication 2: at 12:01

## 2024-04-21 RX ADMIN — Medication 40 MILLIGRAM(S): at 11:07

## 2024-04-21 RX ADMIN — Medication 2: at 17:57

## 2024-04-21 RX ADMIN — ESCITALOPRAM OXALATE 20 MILLIGRAM(S): 10 TABLET, FILM COATED ORAL at 11:08

## 2024-04-21 RX ADMIN — AMLODIPINE BESYLATE 10 MILLIGRAM(S): 2.5 TABLET ORAL at 07:00

## 2024-04-21 RX ADMIN — APIXABAN 5 MILLIGRAM(S): 2.5 TABLET, FILM COATED ORAL at 09:01

## 2024-04-21 RX ADMIN — Medication 81 MILLIGRAM(S): at 11:07

## 2024-04-21 RX ADMIN — Medication 4: at 09:00

## 2024-04-21 RX ADMIN — Medication 3 MILLILITER(S): at 19:32

## 2024-04-21 RX ADMIN — SODIUM CHLORIDE 75 MILLILITER(S): 9 INJECTION, SOLUTION INTRAVENOUS at 13:44

## 2024-04-21 RX ADMIN — Medication 5 MILLIGRAM(S): at 11:43

## 2024-04-21 RX ADMIN — PHENYLEPHRINE-SHARK LIVER OIL-MINERAL OIL-PETROLATUM RECTAL OINTMENT 1 APPLICATION(S): at 18:03

## 2024-04-21 NOTE — PROGRESS NOTE ADULT - PROBLEM SELECTOR PLAN 7
Chronic  - /58 on admission   - home amlodipine resumed  - restart home losartan with hold parameters
Chronic, follows with Dr. Caro  - EKbpm, QTc 458, Sinus adolph w/ 1st degree AV block with PACs  - Trop 8.0   - On home ASA and statin, resume
DVT Prophylaxis: Heparin gtt
Chronic   - Continue home Donepezil   - Continue home Lexapro   - Continue home Buspar
DVT Prophylaxis: Heparin gtt
DVT Prophylaxis: Heparin gtt
Chronic   - Continue home Donepezil pending speech and swallow   - Continue home Lexapro pending speech and swallow   - Continue home Buspar pending speech and swallow
Chronic  - /58 on admission   - home amlodipine resumed  - restart home losartan with hold parameters
Chronic  - /58 on admission   - home amlodipine resumed  - restart home losartan with hold parameters

## 2024-04-21 NOTE — PROGRESS NOTE ADULT - NUTRITIONAL ASSESSMENT
This patient has been assessed with a concern for Malnutrition and has been determined to have a diagnosis/diagnoses of Moderate protein-calorie malnutrition.    This patient is being managed with:   Diet Minced and Moist-  Consistent Carbohydrate {Evening Snack}  Low Sodium  Supplement Feeding Modality:  Oral  Glucerna Shake Cans or Servings Per Day:  1       Frequency:  Two Times a day  Entered: Apr 19 2024 10:32AM    Diet Minced and Moist-  Consistent Carbohydrate {Evening Snack}  DASH/TLC {Sodium & Cholesterol Restricted}  Entered: Apr 15 2024 10:01AM    The following pending diet order is being considered for treatment of Moderate protein-calorie malnutrition:null

## 2024-04-21 NOTE — PROGRESS NOTE ADULT - PROBLEM SELECTOR PROBLEM 6
Dementia
CAD (coronary artery disease)
Dementia
HTN (hypertension)
Dementia
CAD (coronary artery disease)
Type 2 diabetes mellitus

## 2024-04-21 NOTE — PROGRESS NOTE ADULT - SUBJECTIVE AND OBJECTIVE BOX
Patient is a 78y old  Male who presents with a chief complaint of CVA (13 Apr 2024 00:56)       HPI:  Patient is a 77yo M with a PMH of COPD, HTN, HLD, DM2, CVA (4/2015), dementia, TAMMY on CPAP who presents to the ED with AMS. Patient was altered on admission so history was obtained from wife. Per wife, around 9:30pm, patient became very confused and was just repeating the word "yes." Wife notes that patient was also slurring his speech. These symptoms were similar to his last CVA in 2015. NIHSS in ED was 11. Telestoke was consulted and rec against TNK as patient last took his home Eliquis at 2:00pm.     Confused,      Renal consulted for SARAY. Chart reviewed. Patient is confused.     No acute events noted       PAST MEDICAL & SURGICAL HISTORY:  Diabetes mellitus      Hypertension      COPD (chronic obstructive pulmonary disease)      HLD (hyperlipidemia)      Dementia      CVA (cerebral vascular accident)      H/O hand surgery           FAMILY HISTORY:  Family history of CABG (Father)    [place]    Social History:Non smoker    MEDICATIONS  (STANDING):  dextrose 10% Bolus 125 milliLiter(s) IV Bolus once  dextrose 5% + sodium chloride 0.9%. 1000 milliLiter(s) (55 mL/Hr) IV Continuous <Continuous>  dextrose 5%. 1000 milliLiter(s) (50 mL/Hr) IV Continuous <Continuous>  dextrose 5%. 1000 milliLiter(s) (100 mL/Hr) IV Continuous <Continuous>  dextrose 50% Injectable 12.5 Gram(s) IV Push once  dextrose 50% Injectable 25 Gram(s) IV Push once  glucagon  Injectable 1 milliGRAM(s) IntraMuscular once  insulin lispro (ADMELOG) corrective regimen sliding scale   SubCutaneous at bedtime  insulin lispro (ADMELOG) corrective regimen sliding scale   SubCutaneous every 6 hours    MEDICATIONS  (PRN):  dextrose Oral Gel 15 Gram(s) Oral once PRN Blood Glucose LESS THAN 70 milliGRAM(s)/deciliter  heparin   Injectable 3000 Unit(s) IV Push every 6 hours PRN For aPTT between 40 - 57  heparin   Injectable 6500 Unit(s) IV Push every 6 hours PRN For aPTT less than 40  ondansetron Injectable 4 milliGRAM(s) IV Push every 8 hours PRN Nausea and/or Vomiting   Meds reviewed    Allergies    No Known Allergies    Intolerances         REVIEW OF SYSTEMS:  Confused    ICU Vital Signs Last 24 Hrs  T(C): 36.2 (21 Apr 2024 10:58), Max: 37.1 (21 Apr 2024 05:00)  T(F): 97.2 (21 Apr 2024 10:58), Max: 98.8 (21 Apr 2024 05:00)  HR: 72 (21 Apr 2024 14:27) (67 - 84)  BP: 157/77 (21 Apr 2024 10:58) (147/78 - 161/67)  BP(mean): --  ABP: --  ABP(mean): --  RR: 19 (21 Apr 2024 10:58) (18 - 21)  SpO2: 97% (21 Apr 2024 10:58) (93% - 97%)    O2 Parameters below as of 21 Apr 2024 10:58  Patient On (Oxygen Delivery Method): nasal cannula  O2 Flow (L/min): 3            PHYSICAL EXAM:    GENERAL: NAD  HEAD:  Atraumatic, Normocephalic  NERVOUS SYSTEM:  Awake and Alert, confused  CHEST/LUNG: Clear to auscultation bilaterally  HEART: Regular rate and rhythm; No murmurs, rubs, or gallops  ABDOMEN: Soft, Nontender, Nondistended; Bowel sounds present  EXTREMITIES:  No Edema        LABS:                                              9.3    5.22  )-----------( 179      ( 20 Apr 2024 07:23 )             30.0     04-21    143  |  104  |  15  ----------------------------<  260<H>  3.6   |  34<H>  |  1.40<H>    Ca    9.0      21 Apr 2024 07:33  Phos  2.8     04-21  Mg     1.8     04-21        Urinalysis Basic - ( 21 Apr 2024 07:33 )    Color: x / Appearance: x / SG: x / pH: x  Gluc: 260 mg/dL / Ketone: x  / Bili: x / Urobili: x   Blood: x / Protein: x / Nitrite: x   Leuk Esterase: x / RBC: x / WBC x   Sq Epi: x / Non Sq Epi: x / Bacteria: x

## 2024-04-21 NOTE — PROGRESS NOTE ADULT - SUBJECTIVE AND OBJECTIVE BOX
[INTERVAL HX: ]  Patient seen and examined;  Chart reviewed and events noted;     [MEDICATIONS]  MEDICATIONS  (STANDING):  albuterol/ipratropium for Nebulization 3 milliLiter(s) Nebulizer every 6 hours  amLODIPine   Tablet 10 milliGRAM(s) Oral daily  apixaban 5 milliGRAM(s) Oral every 12 hours  aspirin enteric coated 81 milliGRAM(s) Oral daily  atorvastatin 40 milliGRAM(s) Oral at bedtime  busPIRone 5 milliGRAM(s) Oral <User Schedule>  dextrose 10% Bolus 125 milliLiter(s) IV Bolus once  dextrose 5%. 1000 milliLiter(s) (50 mL/Hr) IV Continuous <Continuous>  dextrose 5%. 1000 milliLiter(s) (100 mL/Hr) IV Continuous <Continuous>  dextrose 50% Injectable 12.5 Gram(s) IV Push once  dextrose 50% Injectable 25 Gram(s) IV Push once  donepezil 10 milliGRAM(s) Oral at bedtime  escitalopram 20 milliGRAM(s) Oral daily  glucagon  Injectable 1 milliGRAM(s) IntraMuscular once  hemorrhoidal Ointment 1 Application(s) Rectal two times a day  insulin lispro (ADMELOG) corrective regimen sliding scale   SubCutaneous at bedtime  insulin lispro (ADMELOG) corrective regimen sliding scale   SubCutaneous three times a day before meals  losartan 100 milliGRAM(s) Oral daily  QUEtiapine 37.5 milliGRAM(s) Oral <User Schedule>  sodium chloride 0.45%. 1000 milliLiter(s) (75 mL/Hr) IV Continuous <Continuous>  valproic  acid Syrup 500 milliGRAM(s) Oral two times a day    MEDICATIONS  (PRN):  bisacodyl Suppository 10 milliGRAM(s) Rectal daily PRN Constipation  dextrose Oral Gel 15 Gram(s) Oral once PRN Blood Glucose LESS THAN 70 milliGRAM(s)/deciliter  LORazepam     Tablet 0.5 milliGRAM(s) Oral two times a day PRN Agitation  OLANZapine Injectable 5 milliGRAM(s) IntraMuscular every 6 hours PRN Agitation  ondansetron Injectable 4 milliGRAM(s) IV Push every 8 hours PRN Nausea and/or Vomiting      [VITALS]  Vital Signs Last 24 Hrs  T(C): 36.2 (2024 10:58), Max: 37.1 (2024 05:00)  T(F): 97.2 (2024 10:58), Max: 98.8 (2024 05:00)  HR: 69 (2024 10:58) (67 - 84)  BP: 157/77 (2024 10:58) (147/78 - 161/67)  BP(mean): --  RR: 19 (2024 10:58) (18 - 21)  SpO2: 97% (2024 10:58) (93% - 97%)    Parameters below as of 2024 10:58  Patient On (Oxygen Delivery Method): nasal cannula  O2 Flow (L/min): 3    [WT/HT]  Daily     Daily Weight in k.7 (2024 05:00)  [VENT]      [PHYSICAL EXAM]  GEN: NAD  HEENT: normocephalic and atraumatic. EOMI. PERRL.    NECK: Supple.  No lymphadenopathy   LUNGS: Clear to auscultation.  HEART: Regular rate and rhythm,  no MRG  ABDOMEN: Soft, nontender, and nondistended.  Positive bowel sounds.    : No CVA tenderness  EXTREMITIES: Without edema.  NEUROLOGIC: grossly intact.  PSYCHIATRIC: Appropriate affect .  SKIN: No rash     [LABS:]                        9.3    5.22  )-----------( 179      ( 2024 07:23 )             30.0         143  |  104  |  15  ----------------------------<  260<H>  3.6   |  34<H>  |  1.40<H>    Ca    9.0      2024 07:33  Phos  2.8       Mg     1.8                 Vitamin B12, Serum: 885 pg/mL [232 - 1245] (24 @ 12:45)    Folate, Serum: 9.0 ng/mL (24 @ 12:45)    Serum Protein Electrophoresis Interp: Gamma-Migrating Paraprotein Identified (24 @ 08:21)    Iron - Total Binding Capacity.: 257 ug/dL [220 - 430] (24 @ 09:15)    Ferritin: 51 ng/mL [30 - 400] (04-13-24 @ 09:15)    Serum Protein Electrophoresis Interp: Gamma-Migrating Paraprotein Identified (24 @ 09:15)    Immunofixation, Serum:    IgG Kappa Band Identified      Reference Range: None Detected (24 @ 09:15)      Urinalysis Basic - ( 2024 07:33 )    Color: x / Appearance: x / SG: x / pH: x  Gluc: 260 mg/dL / Ketone: x  / Bili: x / Urobili: x   Blood: x / Protein: x / Nitrite: x   Leuk Esterase: x / RBC: x / WBC x   Sq Epi: x / Non Sq Epi: x / Bacteria: x                [RADIOLOGY STUDIES:]

## 2024-04-21 NOTE — PROGRESS NOTE ADULT - PROBLEM SELECTOR PROBLEM 1
Altered mental status
Altered mental status
CVA (cerebrovascular accident)
Altered mental status
Altered mental status
CVA (cerebrovascular accident)
Altered mental status
CVA (cerebrovascular accident)
Altered mental status

## 2024-04-21 NOTE — PROGRESS NOTE ADULT - PROBLEM SELECTOR PROBLEM 2
CVA (cerebrovascular accident)
History of COPD
History of COPD
SARYA (acute kidney injury)
SARAY (acute kidney injury)
MGUS (monoclonal gammopathy of unknown significance)
SARAY (acute kidney injury)
CVA (cerebrovascular accident)
History of COPD

## 2024-04-21 NOTE — PROGRESS NOTE ADULT - PROBLEM SELECTOR PROBLEM 7
HTN (hypertension)
Need for prophylactic measure
HTN (hypertension)
Need for prophylactic measure
Dementia
Need for prophylactic measure
Dementia
CAD (coronary artery disease)
HTN (hypertension)

## 2024-04-21 NOTE — PROGRESS NOTE ADULT - SUBJECTIVE AND OBJECTIVE BOX
Neurology follow up note    RANCHO HARDYY78yMale      Interval History:    Patient events noted seen with spouse and nurse S/P ativan    Allergies    No Known Allergies    Intolerances        MEDICATIONS    albuterol/ipratropium for Nebulization 3 milliLiter(s) Nebulizer every 6 hours  amLODIPine   Tablet 10 milliGRAM(s) Oral daily  apixaban 5 milliGRAM(s) Oral every 12 hours  aspirin enteric coated 81 milliGRAM(s) Oral daily  atorvastatin 40 milliGRAM(s) Oral at bedtime  bisacodyl Suppository 10 milliGRAM(s) Rectal daily PRN  busPIRone 5 milliGRAM(s) Oral <User Schedule>  dextrose 10% Bolus 125 milliLiter(s) IV Bolus once  dextrose 5% + sodium chloride 0.45%. 1000 milliLiter(s) IV Continuous <Continuous>  dextrose 5%. 1000 milliLiter(s) IV Continuous <Continuous>  dextrose 5%. 1000 milliLiter(s) IV Continuous <Continuous>  dextrose 50% Injectable 12.5 Gram(s) IV Push once  dextrose 50% Injectable 25 Gram(s) IV Push once  dextrose Oral Gel 15 Gram(s) Oral once PRN  donepezil 10 milliGRAM(s) Oral at bedtime  escitalopram 20 milliGRAM(s) Oral daily  glucagon  Injectable 1 milliGRAM(s) IntraMuscular once  hemorrhoidal Ointment 1 Application(s) Rectal two times a day  insulin lispro (ADMELOG) corrective regimen sliding scale   SubCutaneous three times a day before meals  insulin lispro (ADMELOG) corrective regimen sliding scale   SubCutaneous at bedtime  LORazepam     Tablet 0.5 milliGRAM(s) Oral two times a day PRN  losartan 100 milliGRAM(s) Oral daily  methylPREDNISolone sodium succinate Injectable 40 milliGRAM(s) IV Push once  OLANZapine Injectable 5 milliGRAM(s) IntraMuscular every 6 hours PRN  ondansetron Injectable 4 milliGRAM(s) IV Push every 8 hours PRN  QUEtiapine 37.5 milliGRAM(s) Oral <User Schedule>  valproic  acid Syrup 500 milliGRAM(s) Oral two times a day              Vital Signs Last 24 Hrs  T(C): 37.1 (21 Apr 2024 05:00), Max: 37.3 (20 Apr 2024 13:04)  T(F): 98.8 (21 Apr 2024 05:00), Max: 99.2 (20 Apr 2024 13:04)  HR: 67 (21 Apr 2024 05:00) (67 - 84)  BP: 147/78 (21 Apr 2024 05:00) (147/78 - 161/67)  BP(mean): --  RR: 18 (21 Apr 2024 05:00) (18 - 22)  SpO2: 97% (21 Apr 2024 05:00) (87% - 97%)    Parameters below as of 21 Apr 2024 05:00  Patient On (Oxygen Delivery Method): nasal cannula        REVIEW OF SYSTEMS:  Limited or unable to obtain secondary to patient's poor mental status.      On Neurological Examination: limited     Mental Status - Patient is Lethargic     Does not follow commands    Speech - snoring                     Cranial Nerves -   extraocular eye movements intact.   intact bilateral NLF    Motor Exam -   With stimuli positive movement of all 4 extremities    Muscle tone - is normal all over.  No asymmetry is seen.      GENERAL Exam: Nontoxic , No Acute Distress   	  HEENT:  normocephalic, atraumatic  		  LUNGS:  Decreased bilaterally  	  HEART: Normal S1S2   No murmur RRR        	  GI/ ABDOMEN:  Soft  Non tender    EXTREMITIES:   No Edema  No Clubbing  No Cyanosis     SKIN: Normal  No Ecchymosis               LABS:  CBC Full  -  ( 20 Apr 2024 07:23 )  WBC Count : 5.22 K/uL  RBC Count : 3.09 M/uL  Hemoglobin : 9.3 g/dL  Hematocrit : 30.0 %  Platelet Count - Automated : 179 K/uL  Mean Cell Volume : 97.1 fl  Mean Cell Hemoglobin : 30.1 pg  Mean Cell Hemoglobin Concentration : 31.0 gm/dL  Auto Neutrophil # : x  Auto Lymphocyte # : x  Auto Monocyte # : x  Auto Eosinophil # : x  Auto Basophil # : x  Auto Neutrophil % : x  Auto Lymphocyte % : x  Auto Monocyte % : x  Auto Eosinophil % : x  Auto Basophil % : x    Urinalysis Basic - ( 21 Apr 2024 07:33 )    Color: x / Appearance: x / SG: x / pH: x  Gluc: 260 mg/dL / Ketone: x  / Bili: x / Urobili: x   Blood: x / Protein: x / Nitrite: x   Leuk Esterase: x / RBC: x / WBC x   Sq Epi: x / Non Sq Epi: x / Bacteria: x      04-21    143  |  104  |  15  ----------------------------<  260<H>  3.6   |  34<H>  |  1.40<H>    Ca    9.0      21 Apr 2024 07:33  Phos  2.8     04-21  Mg     1.8     04-21      Hemoglobin A1C:       Vitamin B12         RADIOLOGY  < from: MR Head No Cont (04.13.24 @ 14:23) >    INTERPRETATION:  CLINICAL INFORMATION: Cerebral vascular accident.    Altered mental status.  Sudden onset of confusion.  Slurred speech.    Hypertension.  Hyperlipidemia.  Type 2 diabetes mellitus.  Prior cerebral   vascular accident in April, 2015.  Dementia.    COMPARISON: CT head stroke protocol with CT perfusion and CT angiography   neck/brain from 04/12/2024.  MRI brain from 04/27/2015.    CONTRAST/COMPLICATIONS:  IV Contrast: NONE  Complications: None reported at time of study completion    TECHNIQUE: MRI of the brain without intravenous contrast was performed   using the following sequences: Sagittal T1 FLAIR, axial DWI, axial T2   FLAIR, axial T2, axial 3D SWAN, axial T1 FLAIR, coronal T2.    FINDINGS:  There is no diffusion restriction to indicate acute or recent subacute   infarct.    A punctate focus of susceptibility in the anterior left frontal region   (6:37-39) is extra-axial and correspond to a small osteoma seen on CT.    There is no MRI evidence of intracranial blood products, subdural   collection, vasogenic edema, mass effect or hydrocephalus.    There is mild generalized cerebral volume loss, with focally more severe   volume loss in the medial temporal lobes bilaterally, consistent with   history of underlying dementia.  Mild, patchy T2 FLAIR signal   hyperintensity in the periventricular white matter is compatible with   chronic microvascular ischemic disease.    There are multiple small chronic transcortical infarcts in the right   frontal convexity and right parietal convexity, and a moderate sized   chronic transcortical infarct in the right temporal lobe, within the   right middle cerebral artery vascular territory.  There is a small   chronic transcortical infarct in the left frontal convexity, within the   left middle cerebral artery vascular territory.  There is a small chronic   white matter infarct in the left frontal corona radiata.  There are small   chronic transcortical infarcts in both occipital lobes, within the   posterior cerebral artery vascular territories bilaterally.  Mild patchy   T2 FLAIR signal hyperintensity in central kellen compatible with chronic   small vessel ischemic changes.    Midline sagittal structures appear within normal limits.    There is mild patchy prenatal sinus mucosal thickening without air-fluid   level.  There is deviation nasal septum, convex right anteriorly and, to   left posteriorly, with a left-sided bony spur that contacts and deforms   both the left inferior and middle nasal turbinates.    The mastoids are clear bilaterally.    The calvarium, skull base and orbits appear within normal limits.    IMPRESSION:  No MRI evidence of acute intracranial pathology.    Cerebral volume loss and chronic ischemic changes as discussed above.      ASSESSMENT AND PLAN:      seen for ams/aphasia prolonged   brain mri- unremarkable for acute cva so suspect do to prolonged event possible seizure event  depakote 500 bid  continue AC  EEG was negative   dementia ARICEPT  psych follow up agitation medications being adjusted   spoke to spouse tio   cell  4/19  seen with spouse today 4/21    45 minutes of time was spent with the patient, plan of care, reviewing data, with greater than  50% of the visit was spent counseling and/or coordinating care with multidisciplinary healthcare team.

## 2024-04-21 NOTE — PROGRESS NOTE ADULT - PROBLEM SELECTOR PLAN 6
Chronic   - Continue home Donepezil pending speech and swallow   - Continue home Lexapro pending speech and swallow   - Continue home Buspar pending speech and swallow
Chronic. On Metformin, Glimepiride and Alogliptin   - A1c 8.7%  - Moderate SSI  - Hypoglycemia protocol  - Monitor fingersticks
Chronic   - Continue home Donepezil pending speech and swallow   - Continue home Lexapro pending speech and swallow   - Continue home Buspar pending speech and swallow
Chronic. On Metformin, Glimepiride and Alogliptin   - A1c 8.7%  - Moderate SSI  - Hypoglycemia protocol  - Monitor fingersticks
Chronic   - Continue home Donepezil pending speech and swallow   - Continue home Lexapro pending speech and swallow   - Continue home Buspar pending speech and swallow
Chronic. On Metformin, Glimepiride and Alogliptin   - A1c 8.7%  - Moderate SSI  - Hypoglycemia protocol  - Monitor fingersticks
Chronic  - /58 on admission   - home amlodipine resumed
Chronic, follows with Dr. Caro  - EKbpm, QTc 458, Sinus adolph w/ 1st degree AV block with PACs  - Trop 8.0   - On home ASA and statin, resume
Chronic, follows with Dr. Caro  - EKbpm, QTc 458, Sinus adolph w/ 1st degree AV block with PACs  - Trop 8.0   - On home ASA and statin, resume

## 2024-04-21 NOTE — PROGRESS NOTE ADULT - PROBLEM SELECTOR PROBLEM 4
Type 2 diabetes mellitus
HTN (hypertension)
HTN (hypertension)
SARAY (acute kidney injury)
HTN (hypertension)
Type 2 diabetes mellitus
CVA (cerebrovascular accident)

## 2024-04-21 NOTE — PROGRESS NOTE ADULT - PROBLEM SELECTOR PROBLEM 8
CAD (coronary artery disease)
Need for prophylactic measure
Dementia
Need for prophylactic measure

## 2024-04-21 NOTE — PROGRESS NOTE ADULT - PROBLEM SELECTOR PLAN 2
SARAY on admission. Appears to have CKD per chart review.   - BUN/Cr 45/2.4  - eGFR 27  - Lactate 2.3, normalized  - IVF (D5 + LR) per nephro  - Monitor daily metabolic panel  - Avoid nephrotoxic meds   - Nephro Dr. Varghese consulted, recs appreciated
SARAY on admission. Appears to have CKD per chart review.   - BUN/Cr 45/2.4  - eGFR 27  - Lactate 2.3, normalized  - IVF (D5 + LR) per nephro  - Monitor daily metabolic panel  - Avoid nephrotoxic meds   - Nephro Dr. Varghese consulted, recs appreciated
patient with hx of COPD  - now with increased work of breathing  - continue Duonebs standing  - nighttime CPAP ordered for hx of TAMMY  - oxygen supplementation via nasal cannula as needed
Hx of previous CVA in 2015, on Eliquis. NIHSS 11 in ED   - CT HEAD: No large territory acute infarct, intracranial hemorrhage, or mass effect.   - CT PERFUSION: No core infarct or acute ischemic penumbra.  - CTA BRAIN: No large vessel occlusion, significant stenosis, aneurysm or vascular malformation.  - CTA NECK: Vasculature of the neck is patent without significant stenosis or dissection.  - MRI HEAD: No acute intracranial pathology  - TTE w/ bubble study: LV EF 67%, negative for intracardiac shunt  - Monitor on tele   - Neuro checks q4hrs   - resume eliquis  - Restart oral ASA and statin  - Hold BP meds to allow for permissive HTN  - Lipid panel WNL, A1c 8.7%  - Neurology recs appreciated,
Hx of previous CVA in 2015, on Eliquis. NIHSS 11 in ED   - CT HEAD: No large territory acute infarct, intracranial hemorrhage, or mass effect.   - CT PERFUSION: No core infarct or acute ischemic penumbra.  - CTA BRAIN: No large vessel occlusion, significant stenosis, aneurysm or vascular malformation.  - CTA NECK: Vasculature of the neck is patent without significant stenosis or dissection.  - MRI HEAD: No acute intracranial pathology  - F/u TTE w/ bubble study   - Monitor on tele   - Neuro checks q4hrs   - resume eliquis  - Restart oral ASA and statin  - Hold BP meds to allow for permissive HTN  - Lipid panel WNL, A1c 8.7%  - Neurology recs appreciated, start depakote 500mg BID for possible prolonged seizure event  - Psych consulted for agitation
SARAY on admission. Appears to have CKD per chart review.   - BUN/Cr 45/2.4  - eGFR 27  - Lactate 2.3, normalized  - IVF (D5 + LR) per nephro  - Monitor daily metabolic panel  - Avoid nephrotoxic meds   - Nephro Dr. Varghese consulted, recs appreciated
patient with hx of COPD  - continue Duonebs standing  - nighttime CPAP ordered for hx of TAMMY  - oxygen supplementation via nasal cannula as needed
patient with hx of COPD  - now with increased work of breathing  - continue Duonebs standing  - nighttime CPAP ordered for hx of TAMMY  - oxygen supplementation via nasal cannula as needed
- SPEP M spike 0.6g/dL   - Serum FLC K/L ratio 2.41, Kappa 5.2, Lambda 2.16  - if urine sample, consider check UPEP, Urine AMISH, Urine bence hassan- can be checked in office  - consider Check skeletal survey; can be done outpt  - Heme Onc Dr. Yadav consulted

## 2024-04-21 NOTE — PROGRESS NOTE ADULT - SUBJECTIVE AND OBJECTIVE BOX
Date/Time Patient Seen:  		  Referring MD:   Data Reviewed	       Patient is a 78y old  Male who presents with a chief complaint of CVA (20 Apr 2024 21:28)      Subjective/HPI     PAST MEDICAL & SURGICAL HISTORY:  Diabetes mellitus    Hypertension    COPD (chronic obstructive pulmonary disease)    HLD (hyperlipidemia)    Dementia    CVA (cerebral vascular accident)    No significant past surgical history    H/O hand surgery          Medication list         MEDICATIONS  (STANDING):  albuterol/ipratropium for Nebulization 3 milliLiter(s) Nebulizer every 6 hours  amLODIPine   Tablet 10 milliGRAM(s) Oral daily  apixaban 5 milliGRAM(s) Oral every 12 hours  aspirin enteric coated 81 milliGRAM(s) Oral daily  atorvastatin 40 milliGRAM(s) Oral at bedtime  busPIRone 5 milliGRAM(s) Oral <User Schedule>  dextrose 10% Bolus 125 milliLiter(s) IV Bolus once  dextrose 5% + sodium chloride 0.45%. 1000 milliLiter(s) (75 mL/Hr) IV Continuous <Continuous>  dextrose 5%. 1000 milliLiter(s) (50 mL/Hr) IV Continuous <Continuous>  dextrose 5%. 1000 milliLiter(s) (100 mL/Hr) IV Continuous <Continuous>  dextrose 50% Injectable 12.5 Gram(s) IV Push once  dextrose 50% Injectable 25 Gram(s) IV Push once  donepezil 10 milliGRAM(s) Oral at bedtime  escitalopram 20 milliGRAM(s) Oral daily  glucagon  Injectable 1 milliGRAM(s) IntraMuscular once  hemorrhoidal Ointment 1 Application(s) Rectal two times a day  insulin lispro (ADMELOG) corrective regimen sliding scale   SubCutaneous at bedtime  insulin lispro (ADMELOG) corrective regimen sliding scale   SubCutaneous three times a day before meals  losartan 100 milliGRAM(s) Oral daily  QUEtiapine 37.5 milliGRAM(s) Oral <User Schedule>  valproic  acid Syrup 500 milliGRAM(s) Oral two times a day    MEDICATIONS  (PRN):  bisacodyl Suppository 10 milliGRAM(s) Rectal daily PRN Constipation  dextrose Oral Gel 15 Gram(s) Oral once PRN Blood Glucose LESS THAN 70 milliGRAM(s)/deciliter  LORazepam     Tablet 0.5 milliGRAM(s) Oral two times a day PRN Agitation  OLANZapine Injectable 5 milliGRAM(s) IntraMuscular every 6 hours PRN Agitation  ondansetron Injectable 4 milliGRAM(s) IV Push every 8 hours PRN Nausea and/or Vomiting         Vitals log        ICU Vital Signs Last 24 Hrs  T(C): 37.1 (21 Apr 2024 05:00), Max: 37.3 (20 Apr 2024 13:04)  T(F): 98.8 (21 Apr 2024 05:00), Max: 99.2 (20 Apr 2024 13:04)  HR: 67 (21 Apr 2024 05:00) (67 - 84)  BP: 147/78 (21 Apr 2024 05:00) (147/78 - 161/67)  BP(mean): --  ABP: --  ABP(mean): --  RR: 18 (21 Apr 2024 05:00) (18 - 22)  SpO2: 97% (21 Apr 2024 05:00) (87% - 97%)    O2 Parameters below as of 21 Apr 2024 05:00  Patient On (Oxygen Delivery Method): nasal cannula                 Input and Output:  I&O's Detail    20 Apr 2024 07:01  -  21 Apr 2024 07:00  --------------------------------------------------------  IN:    Oral Fluid: 360 mL  Total IN: 360 mL    OUT:  Total OUT: 0 mL    Total NET: 360 mL          Lab Data                        9.3    5.22  )-----------( 179      ( 20 Apr 2024 07:23 )             30.0           ABG - ( 19 Apr 2024 14:13 )  pH, Arterial: 7.36  pH, Blood: x     /  pCO2: 61    /  pO2: 143   / HCO3: 34    / Base Excess: 9.1   /  SaO2: 98.8                    Review of Systems	      Objective     Physical Examination    heart s1s2  lung dc BS  head nc  head at      Pertinent Lab findings & Imaging      Vadim:  NO   Adequate UO     I&O's Detail    20 Apr 2024 07:01  -  21 Apr 2024 07:00  --------------------------------------------------------  IN:    Oral Fluid: 360 mL  Total IN: 360 mL    OUT:  Total OUT: 0 mL    Total NET: 360 mL               Discussed with:     Cultures:	        Radiology

## 2024-04-21 NOTE — PROGRESS NOTE ADULT - NSPROGADDITIONALINFOA_GEN_ALL_CORE
4/14: Discussed case with patient's wife at bedside Saundra Hernandes. House # is [phone].
Discussed case with patient's wife at bedside Saundra Hernandes. House # is [phone].
Attempted to call wife regarding abnormal hematologic blood work, no response at 2:50pm
mild rick decrease po intake - 1/2 ns 75 cc/hr fu bmp caroline m .

## 2024-04-21 NOTE — PROGRESS NOTE ADULT - PROBLEM SELECTOR PROBLEM 3
Type 2 diabetes mellitus
SARAY (acute kidney injury)
MGUS (monoclonal gammopathy of unknown significance)
MGUS (monoclonal gammopathy of unknown significance)
Type 2 diabetes mellitus
Type 2 diabetes mellitus
SARAY (acute kidney injury)
MGUS (monoclonal gammopathy of unknown significance)
CVA (cerebrovascular accident)

## 2024-04-21 NOTE — PROGRESS NOTE ADULT - TIME BILLING
pt seen and examine today see above plan -altered mental status and agitation concerning for possible seizure event  , cva and tia ruled out   - EEG: Moderate diffuse/multi-focal cerebral dysfunction, not specific as to etiology. There were no epileptiform abnormalities recorded-  continue depakote 500mg BID   ,Neuro consulted dr dong .
pt seen and examine today see above plan -altered mental status and agitation concerning for possible seizure event  , cva and tia ruled out   - EEG: Moderate diffuse/multi-focal cerebral dysfunction, not specific as to etiology. There were no epileptiform abnormalities recorded-  continue depakote 500mg BID  . 	Delirium    sec to dementia  and hospital  acquired  - on  Seroquel / prn Zyprexa   psych    dr vera .
pt seen and examine today see above plan -altered mental status and agitation concerning for possible seizure event  , cva and tia ruled out   - EEG: Moderate diffuse/multi-focal cerebral dysfunction, not specific as to etiology. There were no epileptiform abnormalities recorded-  continue depakote 500mg BID   ,Neuro consulted dr dong and  psych  dr vera   adjusted [facility] meds  .
pt seen and examine today see above plan -altered mental status and agitation concerning for possible seizure event  , cva and tia ruled out   - EEG: Moderate diffuse/multi-focal cerebral dysfunction, not specific as to etiology. There were no epileptiform abnormalities recorded-  continue depakote 500mg BID   ,Neuro consulted dr dong and  psych  dr vera   adjusted pysch meds   ativen 0.5 mg po bid prn  / Seroquel 37.5   .
pt seen and examine today see above plan - poa  altered mental status and agitation concerning for possible  new seizure event   and  cva and tia ruled out   - EEG: Moderate diffuse/multi-focal cerebral dysfunction, not specific as to etiology. There were no epileptiform abnormalities recorded-  continue depakote 500mg BID   ,Neuro consulted dr iker graham dc  on meds   ,   pt  also  developed  dementia  exacerbation  and  hospital induce delirium agitation  -  psych  dr vera   adjusted pysch meds   ativen 0.5 mg po bid prn  / Seroquel 37.5 qhs  continue home Aricept , Lexapro , Buspar    hx sleep apnea  pt continue night CPAP  -   dc plan faustino  in am   . wife bedside daily update given.

## 2024-04-21 NOTE — PROGRESS NOTE ADULT - SUBJECTIVE AND OBJECTIVE BOX
Patient is a 78y old  Male who presents with a chief complaint of CVA (21 Apr 2024 07:51)      INTERVAL HPI/OVERNIGHT EVENTS: Pt seen/examined at bedside. No acute overnight events, received PRN PO 0.5mg ativan x1 overnight. Pt awake and cooperative this morning, denies any complaints.     MEDICATIONS  (STANDING):  albuterol/ipratropium for Nebulization 3 milliLiter(s) Nebulizer every 6 hours  amLODIPine   Tablet 10 milliGRAM(s) Oral daily  apixaban 5 milliGRAM(s) Oral every 12 hours  aspirin enteric coated 81 milliGRAM(s) Oral daily  atorvastatin 40 milliGRAM(s) Oral at bedtime  busPIRone 5 milliGRAM(s) Oral <User Schedule>  dextrose 10% Bolus 125 milliLiter(s) IV Bolus once  dextrose 5% + sodium chloride 0.45%. 1000 milliLiter(s) (75 mL/Hr) IV Continuous <Continuous>  dextrose 5%. 1000 milliLiter(s) (50 mL/Hr) IV Continuous <Continuous>  dextrose 5%. 1000 milliLiter(s) (100 mL/Hr) IV Continuous <Continuous>  dextrose 50% Injectable 12.5 Gram(s) IV Push once  dextrose 50% Injectable 25 Gram(s) IV Push once  donepezil 10 milliGRAM(s) Oral at bedtime  escitalopram 20 milliGRAM(s) Oral daily  glucagon  Injectable 1 milliGRAM(s) IntraMuscular once  hemorrhoidal Ointment 1 Application(s) Rectal two times a day  insulin lispro (ADMELOG) corrective regimen sliding scale   SubCutaneous three times a day before meals  insulin lispro (ADMELOG) corrective regimen sliding scale   SubCutaneous at bedtime  losartan 100 milliGRAM(s) Oral daily  QUEtiapine 37.5 milliGRAM(s) Oral <User Schedule>  valproic  acid Syrup 500 milliGRAM(s) Oral two times a day    MEDICATIONS  (PRN):  bisacodyl Suppository 10 milliGRAM(s) Rectal daily PRN Constipation  dextrose Oral Gel 15 Gram(s) Oral once PRN Blood Glucose LESS THAN 70 milliGRAM(s)/deciliter  LORazepam     Tablet 0.5 milliGRAM(s) Oral two times a day PRN Agitation  OLANZapine Injectable 5 milliGRAM(s) IntraMuscular every 6 hours PRN Agitation  ondansetron Injectable 4 milliGRAM(s) IV Push every 8 hours PRN Nausea and/or Vomiting      Allergies    No Known Allergies    Intolerances        REVIEW OF SYSTEMS:  Unable to assess 2/2 baseline dementia     Vital Signs Last 24 Hrs  T(C): 37.1 (21 Apr 2024 05:00), Max: 37.3 (20 Apr 2024 13:04)  T(F): 98.8 (21 Apr 2024 05:00), Max: 99.2 (20 Apr 2024 13:04)  HR: 67 (21 Apr 2024 05:00) (67 - 84)  BP: 147/78 (21 Apr 2024 05:00) (147/78 - 161/67)  BP(mean): --  RR: 18 (21 Apr 2024 05:00) (18 - 22)  SpO2: 97% (21 Apr 2024 05:00) (87% - 97%)    Parameters below as of 21 Apr 2024 05:00  Patient On (Oxygen Delivery Method): nasal cannula        PHYSICAL EXAM:  GENERAL: NAD  HEENT:  anicteric, moist mucous membranes  CHEST/LUNG:  + b/l wheezing  HEART:  RRR, S1, S2 , no tachy   ABDOMEN:  BS+, soft, nontender, nondistended  EXTREMITIES: no edema, cyanosis, or calf tenderness  NERVOUS SYSTEM: A&O x 1 (person), answers some questions, and follow some commands  gu intact       LABS:    CBC Full  -  ( 20 Apr 2024 07:23 )  WBC Count : 5.22 K/uL  Hemoglobin : 9.3 g/dL  Hematocrit : 30.0 %  Platelet Count - Automated : 179 K/uL  Mean Cell Volume : 97.1 fl  Mean Cell Hemoglobin : 30.1 pg  Mean Cell Hemoglobin Concentration : 31.0 gm/dL  Auto Neutrophil # : x  Auto Lymphocyte # : x  Auto Monocyte # : x  Auto Eosinophil # : x  Auto Basophil # : x  Auto Neutrophil % : x  Auto Lymphocyte % : x  Auto Monocyte % : x  Auto Eosinophil % : x  Auto Basophil % : x    21 Apr 2024 07:33    143    |  104    |  15     ----------------------------<  260    3.6     |  34     |  1.40     Ca    9.0        21 Apr 2024 07:33  Phos  2.8       21 Apr 2024 07:33  Mg     1.8       21 Apr 2024 07:33        Urinalysis Basic - ( 21 Apr 2024 07:33 )    Color: x / Appearance: x / SG: x / pH: x  Gluc: 260 mg/dL / Ketone: x  / Bili: x / Urobili: x   Blood: x / Protein: x / Nitrite: x   Leuk Esterase: x / RBC: x / WBC x   Sq Epi: x / Non Sq Epi: x / Bacteria: x      CAPILLARY BLOOD GLUCOSE      POCT Blood Glucose.: 249 mg/dL (21 Apr 2024 08:55)  POCT Blood Glucose.: 269 mg/dL (20 Apr 2024 22:31)  POCT Blood Glucose.: 233 mg/dL (20 Apr 2024 17:09)  POCT Blood Glucose.: 235 mg/dL (20 Apr 2024 12:15)          RADIOLOGY & ADDITIONAL TESTS:    Personally reviewed.     Consultant(s) Notes Reviewed:  [x] YES  [ ] NO

## 2024-04-21 NOTE — PROGRESS NOTE ADULT - PROBLEM SELECTOR PROBLEM 5
SARAY (acute kidney injury)
HTN (hypertension)
SARAY (acute kidney injury)
CAD (coronary artery disease)
HTN (hypertension)
CAD (coronary artery disease)
CAD (coronary artery disease)
SARAY (acute kidney injury)
Type 2 diabetes mellitus

## 2024-04-21 NOTE — PROGRESS NOTE ADULT - PROBLEM SELECTOR PLAN 8
DVT Prophylaxis: Eliquis
Chronic, follows with Dr. Caro  - EKbpm, QTc 458, Sinus adolph w/ 1st degree AV block with PACs  - Trop 8.0   - On home ASA and statin, resume
Chronic   - Continue home Donepezil   - Continue home Lexapro   - Continue home Buspar
Chronic, follows with Dr. Caro  - EKbpm, QTc 458, Sinus adolph w/ 1st degree AV block with PACs  - Trop 8.0   - On home ASA and statin, resume
DVT Prophylaxis: Eliquis
Chronic, follows with Dr. Caro  - EKbpm, QTc 458, Sinus adolph w/ 1st degree AV block with PACs  - Trop 8.0   - On home ASA and statin, resume

## 2024-04-21 NOTE — PROGRESS NOTE ADULT - PROBLEM SELECTOR PLAN 5
SARAY on admission, BUN/Cr 45/2.4 -- Resolved, Cr 1.4  - PO fluid intake encouraged  - Monitor daily metabolic panel  - Nephro Dr. Varghese consulted

## 2024-04-21 NOTE — SOCIAL WORK PROGRESS NOTE - NSSWPROGRESSNOTE_GEN_ALL_CORE
Molly has contacted [facility] admissions at [facility] today at [phone]. A message was left regarding pt's discharge today. Molly has also sent a message through [facility]. Molly has not heard back at this time. Molly has notified MD and spouse today. Molly will continue to remain available.

## 2024-04-21 NOTE — PROGRESS NOTE ADULT - PROBLEM SELECTOR PLAN 1
Patient with altered mental status and agitation concerning for possible seizure event   - EEG: Moderate diffuse/multi-focal cerebral dysfunction, not specific as to etiology. There were no epileptiform abnormalities recorded.  - Now with abnormal labs concerning for MM: SPEP with M spike, elevated kappa/lambda  - continue depakote 500mg BID for possible prolonged seizure event  - Neuro consulted dr dong   - Psych consulted for agitation: no psychiatric contraindications to discharge  - Ct Seroquel 37.5mg qHS.  - ativan 0.5mg BID PRN  - PRN zyprexa 5mg IVP for severe agitation. Patient with altered mental status and agitation concerning for possible seizure event   - EEG: Moderate diffuse/multi-focal cerebral dysfunction, not specific as to etiology. There were no epileptiform abnormalities recorded.  - Now with abnormal labs concerning for MM: SPEP with M spike, elevated kappa/lambda  - continue depakote 500mg BID for possible prolonged seizure event  - Neuro consulted dr dong   - Psych consulted for agitation   dementia exacerbation  ,hospital induce delirium : no psychiatric contraindications to discharge  - Ct Seroquel 37.5mg qHS.  - ativan 0.5mg BID PRN  - PRN zyprexa 5mg IVP for severe agitation.

## 2024-04-21 NOTE — PROGRESS NOTE ADULT - PROBLEM SELECTOR PLAN 9
DVT Prophylaxis: Eliquis
Chronic   - Continue home Donepezil   - Continue home Lexapro   - Continue home Buspar

## 2024-04-22 VITALS — SYSTOLIC BLOOD PRESSURE: 150 MMHG | DIASTOLIC BLOOD PRESSURE: 70 MMHG

## 2024-04-22 LAB
ANION GAP SERPL CALC-SCNC: 4 MMOL/L — LOW (ref 5–17)
BASOPHILS # BLD AUTO: 0.01 K/UL — SIGNIFICANT CHANGE UP (ref 0–0.2)
BASOPHILS NFR BLD AUTO: 0.2 % — SIGNIFICANT CHANGE UP (ref 0–2)
BUN SERPL-MCNC: 22 MG/DL — SIGNIFICANT CHANGE UP (ref 7–23)
CALCIUM SERPL-MCNC: 8.2 MG/DL — LOW (ref 8.5–10.1)
CHLORIDE SERPL-SCNC: 104 MMOL/L — SIGNIFICANT CHANGE UP (ref 96–108)
CO2 SERPL-SCNC: 34 MMOL/L — HIGH (ref 22–31)
CREAT SERPL-MCNC: 1.5 MG/DL — HIGH (ref 0.5–1.3)
DEPRECATED KAPPA LC FREE/LAMBDA SER: 9.2 RATIO — HIGH (ref 0.7–6.02)
EGFR: 48 ML/MIN/1.73M2 — LOW
EOSINOPHIL # BLD AUTO: 0.01 K/UL — SIGNIFICANT CHANGE UP (ref 0–0.5)
EOSINOPHIL NFR BLD AUTO: 0.2 % — SIGNIFICANT CHANGE UP (ref 0–6)
GLUCOSE BLDC GLUCOMTR-MCNC: 265 MG/DL — HIGH (ref 70–99)
GLUCOSE SERPL-MCNC: 272 MG/DL — HIGH (ref 70–99)
HCT VFR BLD CALC: 31 % — LOW (ref 39–50)
HGB BLD-MCNC: 9.9 G/DL — LOW (ref 13–17)
IMM GRANULOCYTES NFR BLD AUTO: 0.5 % — SIGNIFICANT CHANGE UP (ref 0–0.9)
KAPPA LC UR-MCNC: 249.96 MG/L — HIGH
LAMBDA LC UR-MCNC: 27.17 MG/L — HIGH
LYMPHOCYTES # BLD AUTO: 0.65 K/UL — LOW (ref 1–3.3)
LYMPHOCYTES # BLD AUTO: 9.8 % — LOW (ref 13–44)
MCHC RBC-ENTMCNC: 30.8 PG — SIGNIFICANT CHANGE UP (ref 27–34)
MCHC RBC-ENTMCNC: 31.9 GM/DL — LOW (ref 32–36)
MCV RBC AUTO: 96.6 FL — SIGNIFICANT CHANGE UP (ref 80–100)
MONOCYTES # BLD AUTO: 0.61 K/UL — SIGNIFICANT CHANGE UP (ref 0–0.9)
MONOCYTES NFR BLD AUTO: 9.2 % — SIGNIFICANT CHANGE UP (ref 2–14)
NEUTROPHILS # BLD AUTO: 5.32 K/UL — SIGNIFICANT CHANGE UP (ref 1.8–7.4)
NEUTROPHILS NFR BLD AUTO: 80.1 % — HIGH (ref 43–77)
NRBC # BLD: 0 /100 WBCS — SIGNIFICANT CHANGE UP (ref 0–0)
PLATELET # BLD AUTO: 223 K/UL — SIGNIFICANT CHANGE UP (ref 150–400)
POTASSIUM SERPL-MCNC: 4.5 MMOL/L — SIGNIFICANT CHANGE UP (ref 3.5–5.3)
POTASSIUM SERPL-SCNC: 4.5 MMOL/L — SIGNIFICANT CHANGE UP (ref 3.5–5.3)
RBC # BLD: 3.21 M/UL — LOW (ref 4.2–5.8)
RBC # FLD: 13.9 % — SIGNIFICANT CHANGE UP (ref 10.3–14.5)
SODIUM SERPL-SCNC: 142 MMOL/L — SIGNIFICANT CHANGE UP (ref 135–145)
WBC # BLD: 6.63 K/UL — SIGNIFICANT CHANGE UP (ref 3.8–10.5)
WBC # FLD AUTO: 6.63 K/UL — SIGNIFICANT CHANGE UP (ref 3.8–10.5)

## 2024-04-22 PROCEDURE — 83735 ASSAY OF MAGNESIUM: CPT

## 2024-04-22 PROCEDURE — 70496 CT ANGIOGRAPHY HEAD: CPT | Mod: MC

## 2024-04-22 PROCEDURE — 83605 ASSAY OF LACTIC ACID: CPT

## 2024-04-22 PROCEDURE — 85730 THROMBOPLASTIN TIME PARTIAL: CPT

## 2024-04-22 PROCEDURE — 94660 CPAP INITIATION&MGMT: CPT

## 2024-04-22 PROCEDURE — 94760 N-INVAS EAR/PLS OXIMETRY 1: CPT

## 2024-04-22 PROCEDURE — 84540 ASSAY OF URINE/UREA-N: CPT

## 2024-04-22 PROCEDURE — 80053 COMPREHEN METABOLIC PANEL: CPT

## 2024-04-22 PROCEDURE — 97112 NEUROMUSCULAR REEDUCATION: CPT

## 2024-04-22 PROCEDURE — 70498 CT ANGIOGRAPHY NECK: CPT | Mod: MC

## 2024-04-22 PROCEDURE — 85610 PROTHROMBIN TIME: CPT

## 2024-04-22 PROCEDURE — 84484 ASSAY OF TROPONIN QUANT: CPT

## 2024-04-22 PROCEDURE — 87040 BLOOD CULTURE FOR BACTERIA: CPT

## 2024-04-22 PROCEDURE — 84156 ASSAY OF PROTEIN URINE: CPT

## 2024-04-22 PROCEDURE — 97530 THERAPEUTIC ACTIVITIES: CPT

## 2024-04-22 PROCEDURE — 86036 ANCA SCREEN EACH ANTIBODY: CPT

## 2024-04-22 PROCEDURE — 84165 PROTEIN E-PHORESIS SERUM: CPT

## 2024-04-22 PROCEDURE — 86850 RBC ANTIBODY SCREEN: CPT

## 2024-04-22 PROCEDURE — 71045 X-RAY EXAM CHEST 1 VIEW: CPT

## 2024-04-22 PROCEDURE — 97116 GAIT TRAINING THERAPY: CPT

## 2024-04-22 PROCEDURE — 86160 COMPLEMENT ANTIGEN: CPT

## 2024-04-22 PROCEDURE — 86901 BLOOD TYPING SEROLOGIC RH(D): CPT

## 2024-04-22 PROCEDURE — 70551 MRI BRAIN STEM W/O DYE: CPT | Mod: MC

## 2024-04-22 PROCEDURE — 97535 SELF CARE MNGMENT TRAINING: CPT

## 2024-04-22 PROCEDURE — 86038 ANTINUCLEAR ANTIBODIES: CPT

## 2024-04-22 PROCEDURE — 93306 TTE W/DOPPLER COMPLETE: CPT

## 2024-04-22 PROCEDURE — 0042T: CPT | Mod: MC

## 2024-04-22 PROCEDURE — 97162 PT EVAL MOD COMPLEX 30 MIN: CPT

## 2024-04-22 PROCEDURE — 81001 URINALYSIS AUTO W/SCOPE: CPT

## 2024-04-22 PROCEDURE — 36415 COLL VENOUS BLD VENIPUNCTURE: CPT

## 2024-04-22 PROCEDURE — 95816 EEG AWAKE AND DROWSY: CPT

## 2024-04-22 PROCEDURE — 82728 ASSAY OF FERRITIN: CPT

## 2024-04-22 PROCEDURE — 82607 VITAMIN B-12: CPT

## 2024-04-22 PROCEDURE — 77075 RADEX OSSEOUS SURVEY COMPL: CPT

## 2024-04-22 PROCEDURE — 83935 ASSAY OF URINE OSMOLALITY: CPT

## 2024-04-22 PROCEDURE — 82570 ASSAY OF URINE CREATININE: CPT

## 2024-04-22 PROCEDURE — 82803 BLOOD GASES ANY COMBINATION: CPT

## 2024-04-22 PROCEDURE — 83036 HEMOGLOBIN GLYCOSYLATED A1C: CPT

## 2024-04-22 PROCEDURE — 99285 EMERGENCY DEPT VISIT HI MDM: CPT

## 2024-04-22 PROCEDURE — 84100 ASSAY OF PHOSPHORUS: CPT

## 2024-04-22 PROCEDURE — 99232 SBSQ HOSP IP/OBS MODERATE 35: CPT

## 2024-04-22 PROCEDURE — 92610 EVALUATE SWALLOWING FUNCTION: CPT

## 2024-04-22 PROCEDURE — 87086 URINE CULTURE/COLONY COUNT: CPT

## 2024-04-22 PROCEDURE — 85027 COMPLETE CBC AUTOMATED: CPT

## 2024-04-22 PROCEDURE — 85025 COMPLETE CBC W/AUTO DIFF WBC: CPT

## 2024-04-22 PROCEDURE — 70450 CT HEAD/BRAIN W/O DYE: CPT | Mod: MC

## 2024-04-22 PROCEDURE — 84443 ASSAY THYROID STIM HORMONE: CPT

## 2024-04-22 PROCEDURE — 82962 GLUCOSE BLOOD TEST: CPT

## 2024-04-22 PROCEDURE — 93005 ELECTROCARDIOGRAM TRACING: CPT

## 2024-04-22 PROCEDURE — 83970 ASSAY OF PARATHORMONE: CPT

## 2024-04-22 PROCEDURE — 80061 LIPID PANEL: CPT

## 2024-04-22 PROCEDURE — 80048 BASIC METABOLIC PNL TOTAL CA: CPT

## 2024-04-22 PROCEDURE — 76775 US EXAM ABDO BACK WALL LIM: CPT

## 2024-04-22 PROCEDURE — 86900 BLOOD TYPING SEROLOGIC ABO: CPT

## 2024-04-22 PROCEDURE — 83540 ASSAY OF IRON: CPT

## 2024-04-22 PROCEDURE — 82746 ASSAY OF FOLIC ACID SERUM: CPT

## 2024-04-22 PROCEDURE — 83521 IG LIGHT CHAINS FREE EACH: CPT

## 2024-04-22 PROCEDURE — 82310 ASSAY OF CALCIUM: CPT

## 2024-04-22 PROCEDURE — 84155 ASSAY OF PROTEIN SERUM: CPT

## 2024-04-22 PROCEDURE — 83516 IMMUNOASSAY NONANTIBODY: CPT

## 2024-04-22 PROCEDURE — 86334 IMMUNOFIX E-PHORESIS SERUM: CPT

## 2024-04-22 PROCEDURE — 84300 ASSAY OF URINE SODIUM: CPT

## 2024-04-22 PROCEDURE — 99239 HOSP IP/OBS DSCHRG MGMT >30: CPT

## 2024-04-22 PROCEDURE — 97166 OT EVAL MOD COMPLEX 45 MIN: CPT

## 2024-04-22 PROCEDURE — 94640 AIRWAY INHALATION TREATMENT: CPT

## 2024-04-22 PROCEDURE — 83550 IRON BINDING TEST: CPT

## 2024-04-22 RX ORDER — VALPROIC ACID (AS SODIUM SALT) 250 MG/5ML
10 SOLUTION, ORAL ORAL
Qty: 600 | Refills: 0
Start: 2024-04-22 | End: 2024-05-21

## 2024-04-22 RX ADMIN — Medication 5 MILLIGRAM(S): at 10:56

## 2024-04-22 RX ADMIN — Medication 500 MILLIGRAM(S): at 12:13

## 2024-04-22 RX ADMIN — Medication 6: at 12:12

## 2024-04-22 RX ADMIN — Medication 3 MILLILITER(S): at 07:52

## 2024-04-22 RX ADMIN — AMLODIPINE BESYLATE 10 MILLIGRAM(S): 2.5 TABLET ORAL at 06:59

## 2024-04-22 RX ADMIN — Medication 6: at 08:39

## 2024-04-22 RX ADMIN — APIXABAN 5 MILLIGRAM(S): 2.5 TABLET, FILM COATED ORAL at 02:29

## 2024-04-22 RX ADMIN — LOSARTAN POTASSIUM 100 MILLIGRAM(S): 100 TABLET, FILM COATED ORAL at 07:00

## 2024-04-22 RX ADMIN — ESCITALOPRAM OXALATE 20 MILLIGRAM(S): 10 TABLET, FILM COATED ORAL at 12:13

## 2024-04-22 RX ADMIN — DONEPEZIL HYDROCHLORIDE 10 MILLIGRAM(S): 10 TABLET, FILM COATED ORAL at 02:29

## 2024-04-22 RX ADMIN — PHENYLEPHRINE-SHARK LIVER OIL-MINERAL OIL-PETROLATUM RECTAL OINTMENT 1 APPLICATION(S): at 06:59

## 2024-04-22 RX ADMIN — APIXABAN 5 MILLIGRAM(S): 2.5 TABLET, FILM COATED ORAL at 09:30

## 2024-04-22 RX ADMIN — Medication 500 MILLIGRAM(S): at 02:28

## 2024-04-22 RX ADMIN — ATORVASTATIN CALCIUM 40 MILLIGRAM(S): 80 TABLET, FILM COATED ORAL at 02:29

## 2024-04-22 RX ADMIN — Medication 81 MILLIGRAM(S): at 12:13

## 2024-04-22 RX ADMIN — Medication 3 MILLILITER(S): at 00:35

## 2024-04-22 NOTE — PROGRESS NOTE ADULT - PROVIDER SPECIALTY LIST ADULT
Cardiology
Internal Medicine
Nephrology
Neurology
Heme/Onc
Nephrology
Nephrology
Neurology
Pulmonology
Pulmonology
Cardiology
Heme/Onc
Nephrology
Neurology
Neurology
Pulmonology
Heme/Onc
Heme/Onc
Nephrology
Internal Medicine
Hospitalist
Hospitalist
Internal Medicine
Internal Medicine
Hospitalist

## 2024-04-22 NOTE — PROGRESS NOTE ADULT - REASON FOR ADMISSION
CVA

## 2024-04-22 NOTE — PROGRESS NOTE ADULT - NS ATTEND AMEND GEN_ALL_CORE FT
79yo M with a PMH of PAF ( on Eliquis), HTN, HLD, DM2, CVA (4/2015),  dementia, TAMMY on CPAP presents with AMS. Admitted for CVA workup.      - neuro following, possible seizure event, on Depakote BID  - continue ASA, statin  - hx of PAF seen on ILR, continue eliquis  - BPs 150-170, takes Chlorthalidone 12.5mg day, amlodipine 10mg po qd. and losartan 100mg po qd at home  - continue Amlodipine 10mg and Losartan 100mg po qd

## 2024-04-22 NOTE — PROGRESS NOTE ADULT - SUBJECTIVE AND OBJECTIVE BOX
Patient is a 78y old  Male who presents with a chief complaint of CVA (13 Apr 2024 00:56)       HPI:  Patient is a 79yo M with a PMH of COPD, HTN, HLD, DM2, CVA (4/2015), dementia, TAMMY on CPAP who presents to the ED with AMS. Patient was altered on admission so history was obtained from wife. Per wife, around 9:30pm, patient became very confused and was just repeating the word "yes." Wife notes that patient was also slurring his speech. These symptoms were similar to his last CVA in 2015. NIHSS in ED was 11. [facility] was consulted and rec against TNK as patient last took his home Eliquis at 2:00pm.     Confused,      Renal consulted for SARAY. Chart reviewed. Patient is confused.     No acute events noted       PAST MEDICAL & SURGICAL HISTORY:  Diabetes mellitus      Hypertension      COPD (chronic obstructive pulmonary disease)      HLD (hyperlipidemia)      Dementia      CVA (cerebral vascular accident)      H/O hand surgery           FAMILY HISTORY:  Family history of CABG (Father)    [place]    Social History:Non smoker    MEDICATIONS  (STANDING):  dextrose 10% Bolus 125 milliLiter(s) IV Bolus once  dextrose 5% + sodium chloride 0.9%. 1000 milliLiter(s) (55 mL/Hr) IV Continuous <Continuous>  dextrose 5%. 1000 milliLiter(s) (50 mL/Hr) IV Continuous <Continuous>  dextrose 5%. 1000 milliLiter(s) (100 mL/Hr) IV Continuous <Continuous>  dextrose 50% Injectable 12.5 Gram(s) IV Push once  dextrose 50% Injectable 25 Gram(s) IV Push once  glucagon  Injectable 1 milliGRAM(s) IntraMuscular once  insulin lispro (ADMELOG) corrective regimen sliding scale   SubCutaneous at bedtime  insulin lispro (ADMELOG) corrective regimen sliding scale   SubCutaneous every 6 hours    MEDICATIONS  (PRN):  dextrose Oral Gel 15 Gram(s) Oral once PRN Blood Glucose LESS THAN 70 milliGRAM(s)/deciliter  heparin   Injectable 3000 Unit(s) IV Push every 6 hours PRN For aPTT between 40 - 57  heparin   Injectable 6500 Unit(s) IV Push every 6 hours PRN For aPTT less than 40  ondansetron Injectable 4 milliGRAM(s) IV Push every 8 hours PRN Nausea and/or Vomiting   Meds reviewed    Allergies    No Known Allergies    Intolerances         REVIEW OF SYSTEMS:  Confused    ICU Vital Signs Last 24 Hrs  T(C): 36.3 (22 Apr 2024 12:11), Max: 36.7 (21 Apr 2024 19:34)  T(F): 97.4 (22 Apr 2024 12:11), Max: 98.1 (21 Apr 2024 19:34)  HR: 55 (22 Apr 2024 12:11) (55 - 69)  BP: 150/70 (22 Apr 2024 12:49) (150/70 - 170/73)  BP(mean): --  ABP: --  ABP(mean): --  RR: 18 (22 Apr 2024 12:48) (16 - 19)  SpO2: 95% (22 Apr 2024 12:48) (94% - 98%)    O2 Parameters below as of 22 Apr 2024 12:48  Patient On (Oxygen Delivery Method): nasal cannula  O2 Flow (L/min): 3            PHYSICAL EXAM:    GENERAL: NAD  HEAD:  Atraumatic, Normocephalic  NERVOUS SYSTEM:  Awake and Alert, confused  CHEST/LUNG: Clear to auscultation bilaterally  HEART: Regular rate and rhythm; No murmurs, rubs, or gallops  ABDOMEN: Soft, Nontender, Nondistended; Bowel sounds present  EXTREMITIES:  No Edema        LABS:                                              9.9    6.63  )-----------( 223      ( 22 Apr 2024 06:15 )             31.0     04-22    142  |  104  |  22  ----------------------------<  272<H>  4.5   |  34<H>  |  1.50<H>    Ca    8.2<L>      22 Apr 2024 06:15  Phos  2.8     04-21  Mg     1.8     04-21        Urinalysis Basic - ( 22 Apr 2024 06:15 )    Color: x / Appearance: x / SG: x / pH: x  Gluc: 272 mg/dL / Ketone: x  / Bili: x / Urobili: x   Blood: x / Protein: x / Nitrite: x   Leuk Esterase: x / RBC: x / WBC x   Sq Epi: x / Non Sq Epi: x / Bacteria: x

## 2024-04-22 NOTE — PROGRESS NOTE ADULT - SUBJECTIVE AND OBJECTIVE BOX
Neurology follow up note    RANCHO HARDYY78yMale      Interval History:    Patient resting in bed     Allergies    No Known Allergies    Intolerances        MEDICATIONS    albuterol/ipratropium for Nebulization 3 milliLiter(s) Nebulizer every 6 hours  amLODIPine   Tablet 10 milliGRAM(s) Oral daily  apixaban 5 milliGRAM(s) Oral every 12 hours  aspirin enteric coated 81 milliGRAM(s) Oral daily  atorvastatin 40 milliGRAM(s) Oral at bedtime  bisacodyl Suppository 10 milliGRAM(s) Rectal daily PRN  busPIRone 5 milliGRAM(s) Oral <User Schedule>  dextrose 10% Bolus 125 milliLiter(s) IV Bolus once  dextrose 5%. 1000 milliLiter(s) IV Continuous <Continuous>  dextrose 5%. 1000 milliLiter(s) IV Continuous <Continuous>  dextrose 50% Injectable 12.5 Gram(s) IV Push once  dextrose 50% Injectable 25 Gram(s) IV Push once  dextrose Oral Gel 15 Gram(s) Oral once PRN  donepezil 10 milliGRAM(s) Oral at bedtime  escitalopram 20 milliGRAM(s) Oral daily  glucagon  Injectable 1 milliGRAM(s) IntraMuscular once  hemorrhoidal Ointment 1 Application(s) Rectal two times a day  insulin lispro (ADMELOG) corrective regimen sliding scale   SubCutaneous three times a day before meals  insulin lispro (ADMELOG) corrective regimen sliding scale   SubCutaneous at bedtime  losartan 100 milliGRAM(s) Oral daily  OLANZapine Injectable 5 milliGRAM(s) IntraMuscular every 6 hours PRN  ondansetron Injectable 4 milliGRAM(s) IV Push every 8 hours PRN  QUEtiapine 37.5 milliGRAM(s) Oral <User Schedule>  sodium chloride 0.45%. 1000 milliLiter(s) IV Continuous <Continuous>  valproic  acid Syrup 500 milliGRAM(s) Oral two times a day              Vital Signs Last 24 Hrs  T(C): 36.4 (22 Apr 2024 05:35), Max: 36.7 (21 Apr 2024 19:34)  T(F): 97.6 (22 Apr 2024 05:35), Max: 98.1 (21 Apr 2024 19:34)  HR: 67 (22 Apr 2024 05:35) (64 - 76)  BP: 168/81 (22 Apr 2024 05:35) (151/84 - 168/81)  BP(mean): --  RR: 16 (22 Apr 2024 05:35) (16 - 19)  SpO2: 94% (22 Apr 2024 05:35) (94% - 98%)    Parameters below as of 22 Apr 2024 05:35  Patient On (Oxygen Delivery Method): BiPAP/CPAP          REVIEW OF SYSTEMS: Limited or unable to obtain secondary to patient's poor mental status.    Constitutional: No fever, chills, fatigue, weakness  Eyes: no eye pain, visual disturbances, or discharge  ENT:  No difficulty hearing, tinnitus, vertigo; No sinus or throat pain  Neck: No pain or stiffness  Respiratory: No cough, dyspnea, wheezing   Cardiovascular: No chest pain, palpitations,   Gastrointestinal: No abdominal or epigastric pain. No nausea, vomiting  No diarrhea or constipation.   Genitourinary: No dysuria, frequency, hematuria or incontinence  Neurological: No headaches, lightheadedness, vertigo, numbness or tremors      On Neurological Examination:    Mental Status - Patient is alert, awake  Confused Dementia      Follow simple and complex commands    Speech -   Fluent                  Cranial Nerves - Pupils 3 mm equal and reactive to light,   extraocular eye movements intact.   smile symmetric  intact bilateral NLF    Motor Exam -   Right upper 4/5  Left upper 4/5  Right lower 3/5  Left lower  3/5    Muscle tone - is normal all over.  No asymmetry is seen.      Sensory    Bilateral intact to light touch    GENERAL Exam: Nontoxic , No Acute Distress   	  HEENT:  normocephalic, atraumatic  		  LUNGS: No Wheeze    	  HEART: Normal S1S2   No murmur RRR        	  GI/ ABDOMEN:  Soft  Non tender    EXTREMITIES:   No Edema  No Clubbing  No Cyanosis   	   SKIN: Normal  No Ecchymosis               LABS:  CBC Full  -  ( 22 Apr 2024 06:15 )  WBC Count : 6.63 K/uL  RBC Count : 3.21 M/uL  Hemoglobin : 9.9 g/dL  Hematocrit : 31.0 %  Platelet Count - Automated : 223 K/uL  Mean Cell Volume : 96.6 fl  Mean Cell Hemoglobin : 30.8 pg  Mean Cell Hemoglobin Concentration : 31.9 gm/dL  Auto Neutrophil # : 5.32 K/uL  Auto Lymphocyte # : 0.65 K/uL  Auto Monocyte # : 0.61 K/uL  Auto Eosinophil # : 0.01 K/uL  Auto Basophil # : 0.01 K/uL  Auto Neutrophil % : 80.1 %  Auto Lymphocyte % : 9.8 %  Auto Monocyte % : 9.2 %  Auto Eosinophil % : 0.2 %  Auto Basophil % : 0.2 %    Urinalysis Basic - ( 22 Apr 2024 06:15 )    Color: x / Appearance: x / SG: x / pH: x  Gluc: 272 mg/dL / Ketone: x  / Bili: x / Urobili: x   Blood: x / Protein: x / Nitrite: x   Leuk Esterase: x / RBC: x / WBC x   Sq Epi: x / Non Sq Epi: x / Bacteria: x      04-22    142  |  104  |  22  ----------------------------<  272<H>  4.5   |  34<H>  |  1.50<H>    Ca    8.2<L>      22 Apr 2024 06:15  Phos  2.8     04-21  Mg     1.8     04-21      Hemoglobin A1C:       Vitamin B12         RADIOLOGY  < from: MR Head No Cont (04.13.24 @ 14:23) >    INTERPRETATION:  CLINICAL INFORMATION: Cerebral vascular accident.    Altered mental status.  Sudden onset of confusion.  Slurred speech.    Hypertension.  Hyperlipidemia.  Type 2 diabetes mellitus.  Prior cerebral   vascular accident in April, 2015.  Dementia.    COMPARISON: CT head stroke protocol with CT perfusion and CT angiography   neck/brain from 04/12/2024.  MRI brain from 04/27/2015.    CONTRAST/COMPLICATIONS:  IV Contrast: NONE  Complications: None reported at time of study completion    TECHNIQUE: MRI of the brain without intravenous contrast was performed   using the following sequences: Sagittal T1 FLAIR, axial DWI, axial T2   FLAIR, axial T2, axial 3D SWAN, axial T1 FLAIR, coronal T2.    FINDINGS:  There is no diffusion restriction to indicate acute or recent subacute   infarct.    A punctate focus of susceptibility in the anterior left frontal region   (6:37-39) is extra-axial and correspond to a small osteoma seen on CT.    There is no MRI evidence of intracranial blood products, subdural   collection, vasogenic edema, mass effect or hydrocephalus.    There is mild generalized cerebral volume loss, with focally more severe   volume loss in the medial temporal lobes bilaterally, consistent with   history of underlying dementia.  Mild, patchy T2 FLAIR signal   hyperintensity in the periventricular white matter is compatible with   chronic microvascular ischemic disease.    There are multiple small chronic transcortical infarcts in the right   frontal convexity and right parietal convexity, and a moderate sized   chronic transcortical infarct in the right temporal lobe, within the   right middle cerebral artery vascular territory.  There is a small   chronic transcortical infarct in the left frontal convexity, within the   left middle cerebral artery vascular territory.  There is a small chronic   white matter infarct in the left frontal corona radiata.  There are small   chronic transcortical infarcts in both occipital lobes, within the   posterior cerebral artery vascular territories bilaterally.  Mild patchy   T2 FLAIR signal hyperintensity in central kellen compatible with chronic   small vessel ischemic changes.    Midline sagittal structures appear within normal limits.    There is mild patchy prenatal sinus mucosal thickening without air-fluid   level.  There is deviation nasal septum, convex right anteriorly and, to   left posteriorly, with a left-sided bony spur that contacts and deforms   both the left inferior and middle nasal turbinates.    The mastoids are clear bilaterally.    The calvarium, skull base and orbits appear within normal limits.    IMPRESSION:  No MRI evidence of acute intracranial pathology.    Cerebral volume loss and chronic ischemic changes as discussed above.      ASSESSMENT AND PLAN:      seen for ams/aphasia prolonged   brain mri- unremarkable for acute cva so suspect do to prolonged event possible seizure event  Depakote 500 bid  continue AC  EEG was negative   dementia ARICEPT  psych follow up agitation medications being adjusted   more awake and interactive 4/22 she agrees too   spoke to spouse tio   cell  4/19  seen with spouse today 4/21    45 minutes of time was spent with the patient, plan of care, reviewing data, with greater than  50% of the visit was spent counseling and/or coordinating care with multidisciplinary healthcare team.

## 2024-04-22 NOTE — PROGRESS NOTE ADULT - SUBJECTIVE AND OBJECTIVE BOX
Date/Time Patient Seen:  		  Referring MD:   Data Reviewed	       Patient is a 78y old  Male who presents with a chief complaint of CVA (21 Apr 2024 19:17)      Subjective/HPI     PAST MEDICAL & SURGICAL HISTORY:  Diabetes mellitus    Hypertension    COPD (chronic obstructive pulmonary disease)    HLD (hyperlipidemia)    Dementia    CVA (cerebral vascular accident)    No significant past surgical history    H/O hand surgery          Medication list         MEDICATIONS  (STANDING):  albuterol/ipratropium for Nebulization 3 milliLiter(s) Nebulizer every 6 hours  amLODIPine   Tablet 10 milliGRAM(s) Oral daily  apixaban 5 milliGRAM(s) Oral every 12 hours  aspirin enteric coated 81 milliGRAM(s) Oral daily  atorvastatin 40 milliGRAM(s) Oral at bedtime  busPIRone 5 milliGRAM(s) Oral <User Schedule>  dextrose 10% Bolus 125 milliLiter(s) IV Bolus once  dextrose 5%. 1000 milliLiter(s) (100 mL/Hr) IV Continuous <Continuous>  dextrose 5%. 1000 milliLiter(s) (50 mL/Hr) IV Continuous <Continuous>  dextrose 50% Injectable 12.5 Gram(s) IV Push once  dextrose 50% Injectable 25 Gram(s) IV Push once  donepezil 10 milliGRAM(s) Oral at bedtime  escitalopram 20 milliGRAM(s) Oral daily  glucagon  Injectable 1 milliGRAM(s) IntraMuscular once  hemorrhoidal Ointment 1 Application(s) Rectal two times a day  insulin lispro (ADMELOG) corrective regimen sliding scale   SubCutaneous at bedtime  insulin lispro (ADMELOG) corrective regimen sliding scale   SubCutaneous three times a day before meals  losartan 100 milliGRAM(s) Oral daily  QUEtiapine 37.5 milliGRAM(s) Oral <User Schedule>  sodium chloride 0.45%. 1000 milliLiter(s) (75 mL/Hr) IV Continuous <Continuous>  valproic  acid Syrup 500 milliGRAM(s) Oral two times a day    MEDICATIONS  (PRN):  bisacodyl Suppository 10 milliGRAM(s) Rectal daily PRN Constipation  dextrose Oral Gel 15 Gram(s) Oral once PRN Blood Glucose LESS THAN 70 milliGRAM(s)/deciliter  OLANZapine Injectable 5 milliGRAM(s) IntraMuscular every 6 hours PRN Agitation  ondansetron Injectable 4 milliGRAM(s) IV Push every 8 hours PRN Nausea and/or Vomiting         Vitals log        ICU Vital Signs Last 24 Hrs  T(C): 36.4 (22 Apr 2024 05:35), Max: 36.7 (21 Apr 2024 19:34)  T(F): 97.6 (22 Apr 2024 05:35), Max: 98.1 (21 Apr 2024 19:34)  HR: 67 (22 Apr 2024 05:35) (64 - 76)  BP: 168/81 (22 Apr 2024 05:35) (151/84 - 168/81)  BP(mean): --  ABP: --  ABP(mean): --  RR: 16 (22 Apr 2024 05:35) (16 - 19)  SpO2: 94% (22 Apr 2024 05:35) (94% - 98%)    O2 Parameters below as of 22 Apr 2024 05:35  Patient On (Oxygen Delivery Method): BiPAP/CPAP                 Input and Output:  I&O's Detail      Lab Data                        9.9    6.63  )-----------( 223      ( 22 Apr 2024 06:15 )             31.0     04-22    142  |  104  |  22  ----------------------------<  272<H>  4.5   |  34<H>  |  1.50<H>    Ca    8.2<L>      22 Apr 2024 06:15  Phos  2.8     04-21  Mg     1.8     04-21              Review of Systems	      Objective     Physical Examination    heart s1s2  lung dc BS  head nc  head at      Pertinent Lab findings & Imaging      Vadim:  NO   Adequate UO     I&O's Detail           Discussed with:     Cultures:	        Radiology

## 2024-04-22 NOTE — SOCIAL WORK PROGRESS NOTE - NSSWPROGRESSNOTE_GEN_ALL_CORE
Discussed today at rounds. MD confirmed patient remains stable for d/c to sub-acute rehab. [facility] sent updates, including SCREEN and confirmed they can accept patient today. I requested ambulance thru [facility] [phone] for 12:30PM today. I met with wife at bedside and informed her, she remains in agreement with d/c to [facility]. Nurse on unit aware of d/c time.

## 2024-04-22 NOTE — PROGRESS NOTE ADULT - ASSESSMENT
79yo M with a PMH of COPD, HTN, HLD, DM2, CVA (4/2015), dementia, TAMMY on CPAP who presents to the ED with AMS    known CVA hx  Dementia  OP  OA  TAMMY known dx  COPD  HTN  HLD  SARAY CKD    NIV use  vs noted  Blood gas noted  outpatient pulm notes reviewed - Dr Tyree hopson PAUL    - TTE w/ bubble study: LV EF 67%, negative for intracardiac shunt  cardio and renal eval noted  I and O  serial renal indices  replete lytes  on NEBS for COPD  ABG c/w TAMMY and COPD  NIV - Bipap night time and PRN -   pt follows with Dr Clements [facility] for sleep apnea and copd dx  caution with BENZO and Antipsychotics - monitor mentation - avoid oversedation  HOB elev  asp prec  oral hygiene  skin care  GOC discussion  goal sat > 88 pct
Acute on CKD  Hypernatremia  Hypoalbuminemia  AMS  Anemia  HTN with CKD      -Unknown baseline creatinine. Likely have underlying CKD due to age at minimum  -Urine indices reviewed  -Renal sono reviewed; mild left hydro noted; consider urology eval if the renal indices worsen again; may need repeat imaging  -Limited serologies including FLC and SPEP due to paraprotein gap - pending  -s/p IVF  -PTH 23  -BP stable thus far  -No acute indication for dialysis  -Creatinine improving well, now stable at 1.3; likely baseline?  -Hypernatremia improved    4/20/24-d/w wife  Thank you
Acute on CKD  Hypernatremia  Hypoalbuminemia  AMS  Anemia  HTN with CKD      -Unknown baseline creatinine. Likely have underlying CKD due to age at minimum  -Urine indices reviewed  -Renal sono reviewed; mild left hydro noted; consider urology eval if the renal indices worsen again; may need repeat imaging  -Limited serologies including FLC and SPEP due to paraprotein gap - pending  -s/p IVF  -PTH 23  -BP stable thus far  -No acute indication for dialysis  -Creatinine improving well, now stable at 1.3; likely baseline?  -Oral fluid for mild hypernatremia     4/20/24-d/w wife  Thank you
[ASSESSMENT and  PLAN]  D47.2     MGUS  D63.8     Anemia Chronic disease  D63.1     Anemia CKD  N18.31   CKD3a  E11.22    DM, Hgb A1c 8    77yo M with a PMH of COPD, HTN, HLD, DM2, CVA (4/2015), dementia, TAMMY on CPAP admitted for CVA workup.    Noted to have MGUS  04/13/24 SPEP M spike 0.6g/dL   04/13/24 Serum FLC K/L ratio 2.41, Kappa 5.2, Lambda 2.16  M spike and K:L ratio not impressive.     has anemia but could be explained by other causes, chronic disease, Fe def    has CKD but similarly can be explained by other causes; DM, HTN    Fe def anemia  Anemia chronic disease    RECOMMENDATIONS    MGUS  if urine sample, consider check UPEP, Urine AMISH, Urine bence hassan.   Can be checked in office  Consider Check skeletal survey; can be done outpt    Anemia     Transfuse PRBC as clinically indicated.      Transfuse PRBC if Hgb <7.0 or if symptomatic.      Follow CBC  Start IV venofer.      Check FOBT     consider GI eval.    DVT Prophylaxis  SQ Lovenox or SQ heparin    remains hematologically stable  please call if additional evaluation required   
[ASSESSMENT and  PLAN]  D47.2     MGUS  D63.8     Anemia Chronic disease  D63.1     Anemia CKD  N18.31   CKD3a  E11.22    DM, Hgb A1c 8    77yo M with a PMH of COPD, HTN, HLD, DM2, CVA (4/2015), dementia, TAMMY on CPAP admitted for CVA workup.    Noted to have MGUS  04/13/24 SPEP M spike 0.6g/dL   04/13/24 Serum FLC K/L ratio 2.41, Kappa 5.2, Lambda 2.16  M spike and K:L ratio not impressive.     has anemia but could be explained by other causes, chronic disease, Fe def    has CKD but similarly can be explained by other causes; DM, HTN  Skeletal survey negative      Fe def anemia  Anemia chronic disease    RECOMMENDATIONS    MGUS  Sent today :  urine sample, consider check UPEP, Urine AMISH, Urine bence hassan.   Negative skeletal survey    Anemia     Transfuse PRBC as clinically indicated.      Transfuse PRBC if Hgb <7.0 or if symptomatic.      Follow CBC  completed  IV venofer.      Check FOBT [ordered]     consider GI eval. if FOBT+    DVT Prophylaxis  SQ Lovenox or SQ heparin    remains hematologically stable  Defer invasive workup testing for the time being.    Thank you for consulting us.   No additional recommendations at current time.   Will sign off on case for now.   Please call, or re-consult if needed.     Follow in office post DC with Dr Yadav or Dr Azul [wife's Oncologist]  
77yo M with a PMH of PAF ( on Eliquis), HTN, HLD, DM2, CVA (4/2015),  dementia, TAMMY on CPAP presents with AMS. Admitted for CVA workup.    AMS r/o CVA, HTN, HLD  -CTA H/N: unremarkable  - MRI HEAD: No acute intracranial pathology  - TTE w/ bubble study: LV EF 67%, negative for intracardiac shunt  - EEG:  no epileptiform abnormalities recorded.  - neuro following, possible seizure event, on Depakote BID    - hx CVA 2015 and CAD, continue ASA, statin  - EKG SB with 1st degree av block, PAC, no evidence of acute ischemia  - troponin neg x 1  - hx of PAF seen on ILR, continue eliquis  - BPs labile 120-140, takes Chlorthalidone 12.5mg day, amlodipine 10mg po qd. and losartan 100mg po qd at home  - continue Amlodipine 10mg and Losartan 100mg po qd  - renal following, for SARAY, now resolved , Scr 1.3 today  - MGUS workup on going, heme following    - Follows with Dr. Caro outpt  - Monitor and replete lytes, keep K>4, Mg>2.  - Will continue to follow.
Acute on CKD  Hypernatremia  Hypoalbuminemia  AMS  Anemia  HTN with CKD      -Unknown baseline creatinine. Likely have underlying CKD due to age at minimum  -Check urine indices, UPC - pending  -Renal sono reviewed; mild left hydro noted; consider urology eval if the renal indices worsen again; may need repeat imaging  -Limited serologies including FLC and SPEP due to paraprotein gap - pending  -Adjust IVF to D5+1/2NS  -PTH 23  -BP stable thus far  -No acute indication for dialysis  -Creatinine improving  -Hypernatremia stable      Thank you
Acute on CKD  Hypernatremia  Hypoalbuminemia  AMS  Anemia  HTN with CKD      -Unknown baseline creatinine. Likely have underlying CKD due to age at minimum  -Check urine indices, UPC - pending  -Renal sono reviewed; mild left hydro noted; consider urology eval if the renal indices worsen again; may need repeat imaging  -Limited serologies including FLC and SPEP due to paraprotein gap - pending  -Adjust IVF to D5+1/2NS; sodium 146  -PTH 23  -BP stable thus far  -Daily chem; repeat labs show an improving creatinine; monitor for now  -No acute indication for dialysis    Thank you
Acute on CKD  Hypernatremia  Hypoalbuminemia  AMS  Anemia  HTN with CKD      -Unknown baseline creatinine. Likely have underlying CKD due to age at minimum  -Check urine indices, UPC - pending  -Renal sono reviewed; mild left hydro noted; consider urology eval if the renal indices worsen again; may need repeat imaging  -Limited serologies including FLC and SPEP due to paraprotein gap - pending  -Adjusted IVF to D5+1/2NS  -PTH 23  -BP stable thus far  -No acute indication for dialysis  -Creatinine improving  -Awaiting today's lab result       Thank you
[ASSESSMENT and  PLAN]  D47.2     MGUS  D63.8     Anemia Chronic disease  D63.1     Anemia CKD  N18.31   CKD3a  E11.22    DM, Hgb A1c 8    77yo M with a PMH of COPD, HTN, HLD, DM2, CVA (4/2015), dementia, TAMMY on CPAP admitted for CVA workup.    Noted to have MGUS  04/13/24 SPEP M spike 0.6g/dL   04/13/24 Serum FLC K/L ratio 2.41, Kappa 5.2, Lambda 2.16  M spike and K:L ratio not impressive.     has anemia but could be explained by other causes, chronic disease, Fe def    has CKD but similarly can be explained by other causes; DM, HTN    Fe def anemia  Anemia chronic disease    RECOMMENDATIONS    MGUS  Sent today :  urine sample, consider check UPEP, Urine AMISH, Urine bence hassan.     Consider Check skeletal survey; can be done outpt  Ordered but awaiting pt ability to cooperate for test    Anemia     Transfuse PRBC as clinically indicated.      Transfuse PRBC if Hgb <7.0 or if symptomatic.      Follow CBC  Start IV venofer.      Check FOBT [ordered]     consider GI eval. if FOBT+    DVT Prophylaxis  SQ Lovenox or SQ heparin    remains hematologically stable  Further recommendations pending patient's course, as well as family decision.    Defer invasive workup testing for the time being.  Okay with noninvasive testing.  To further stratify this.  
77yo M with a PMH of COPD, HTN, HLD, DM2, CVA (4/2015), dementia, TAMMY on CPAP who presents to the ED with AMS    known CVA hx  Dementia  OP  OA  TAMMY known dx  COPD  HTN  HLD  SARAY CKD    s/p IVF  NIV use  vs noted  Blood gas noted  outpatient pulm notes reviewed - Dr Tyree Galeana TTLLOYD w/ bubble study: LV EF 67%, negative for intracardiac shunt  cardio and renal eval noted  I and O  serial renal indices  replete lytes  on NEBS for COPD  ABG c/w TAMMY and COPD  NIV - Bipap night time and PRN -   pt follows with Dr Tyree [facility] for sleep apnea and copd dx  caution with BENZO and Antipsychotics - monitor mentation - avoid oversedation  HOB elev  asp prec  oral hygiene  skin care  GOC discussion  goal sat > 88 pct
77yo M with a PMH of COPD, HTN, HLD, DM2, CVA (4/2015), dementia, TAMMY on CPAP who presents to the ED with AMS    known CVA hx  Dementia  OP  OA  TAMMY known dx  COPD  HTN  HLD  SARAY CKD    s/p IVF  NIV use  vs noted  Blood gas noted  outpatient pulm notes reviewed - Dr Tyree hopson PAUL    - TTE w/ bubble study: LV EF 67%, negative for intracardiac shunt  cardio and renal eval noted  I and O  serial renal indices  replete lytes  on NEBS for COPD  ABG c/w TAMMY and COPD  NIV - Bipap night time and PRN -   pt follows with Dr Clements - [facility] for sleep apnea and copd dx  caution with BENZO and Antipsychotics - monitor mentation - avoid oversedation  HOB elev  asp prec  oral hygiene  skin care  GOC discussion  goal sat > 88 pct  
79yo M with a PMH of PAF ( on Eliquis), HTN, HLD, DM2, CVA (4/2015),  dementia, TAMMY on CPAP presents with AMS. Admitted for CVA workup.    AMS r/o CVA, HTN, HLD  -CTA H/N: unremarkable  - MRI HEAD: No acute intracranial pathology  - TTE w/ bubble study: LV EF 67%, negative for intracardiac shunt  - EEG:  no epileptiform abnormalities recorded.  - neuro following, possible seizure event, on Depakote BID    - hx CVA 2015 and coronary artery disease  - continue ASA, statin  - EKG SB with 1st degree av block, PAC, no evidence of acute ischemia  - troponin neg x 1  - hx of PAF seen on ILR, continue eliquis  - BPs 150-170, takes Chlorthalidone 12.5mg day, amlodipine 10mg po qd. and losartan 100mg po qd at home  - continue Amlodipine 10mg and Losartan 100mg po qd  - renal following, for SARAY,(Unknown baseline creatinin) today S creat 1.5      - Follows with Dr. Caro outpt  - Monitor and replete lytes, keep K>4, Mg>2.  - Will continue to follow.
Acute on CKD  Hypernatremia  Hypoalbuminemia  AMS  Anemia  HTN with CKD      -Unknown baseline creatinine. Likely have underlying CKD due to age at minimum  -Urine indices reviewed  -Renal sono reviewed; mild left hydro noted; consider urology eval if the renal indices worsen again; may need repeat imaging  -Limited serologies including FLC and SPEP due to paraprotein gap - pending  -s/p IVF  -PTH 23  -BP stable thus far  -No acute indication for dialysis  -Creatinine improving well, now stable at 1.3; likely baseline?  -Hypernatremia improved    4/20/24-d/w wife  Thank you
Acute on CKD  Hypernatremia  Hypoalbuminemia  AMS  Anemia  HTN with CKD      -Unknown baseline creatinine. Will have underlying CKD due to age at minimum  -Check urine indices, UPC - pending  -Renal sono reviewed; mild left hydro noted; consider urology eval if the renal indices worsen again; may need repeat imaging  -Limited serologies including FLC and SPEP due to paraprotein gap - pending  -Adjust IVF to D5+1/2NS; sodium 147  -PTH 23  -BP stable thus far  -Daily chem; repeat labs show an improving creatinine; monitor for now  -No acute indication for dialysis    Thank you
Acute on CKD  Hypernatremia  Hypoalbuminemia  AMS  Anemia  HTN with CKD      -Unknown baseline creatinine. Likely have underlying CKD due to age at minimum  -Check urine indices, UPC - pending  -Renal sono reviewed; mild left hydro noted; consider urology eval if the renal indices worsen again; may need repeat imaging  -Limited serologies including FLC and SPEP due to paraprotein gap - pending  -Adjust IVF to D5+1/2NS  -PTH 23  -BP stable thus far  -No acute indication for dialysis  -Creatinine improving  -Hypernatremia worsening, D5W      Thank you
Acute on CKD  Hypernatremia  Hypoalbuminemia  AMS  Anemia  HTN with CKD      -Unknown baseline creatinine. Likely have underlying CKD due to age at minimum  -Urine indices reviewed  -Renal sono reviewed; mild left hydro noted; consider urology eval if the renal indices worsen again; may need repeat imaging  -Limited serologies including FLC and SPEP due to paraprotein gap - pending  -s/p IVF  -PTH 23  -BP stable thus far  -No acute indication for dialysis  -Creatinine improving well, now stable at 1.3; likely baseline?  -Oral fluid for mild hypernatremia       Thank you
Acute on CKD  Hypernatremia  Hypoalbuminemia  AMS  Anemia  HTN with CKD      -Unknown baseline creatinine. Likely have underlying CKD due to age at minimum  -Urine indices reviewed  -Renal sono reviewed; mild left hydro noted; consider urology eval if the renal indices worsen again; may need repeat imaging  -Limited serologies including FLC and SPEP due to paraprotein gap - pending  -s/p IVF  -PTH 23  -BP stable thus far  -No acute indication for dialysis  -Creatinine improving well, now stable at 1.4; likely baseline?        Thank you
[ASSESSMENT and  PLAN]  D47.2     MGUS  D63.8     Anemia Chronic disease  D63.1     Anemia CKD  N18.31   CKD3a  E11.22    DM, Hgb A1c 8    79yo M with a PMH of COPD, HTN, HLD, DM2, CVA (4/2015), dementia, TAMMY on CPAP admitted for CVA workup.    Noted to have MGUS  04/13/24 SPEP M spike 0.6g/dL   04/13/24 Serum FLC K/L ratio 2.41, Kappa 5.2, Lambda 2.16  M spike and K:L ratio not impressive.     has anemia but could be explained by other causes, chronic disease, Fe def    has CKD but similarly can be explained by other causes; DM, HTN    Fe def anemia  Anemia chronic disease    RECOMMENDATIONS    MGUS  Sent today :  urine sample, consider check UPEP, Urine AMISH, Urine bence hassan.     Consider Check skeletal survey; can be done outpt  Ordered but awaiting pt ability to cooperate for test    Anemia     Transfuse PRBC as clinically indicated.      Transfuse PRBC if Hgb <7.0 or if symptomatic.      Follow CBC  completed  IV venofer.      Check FOBT [ordered]     consider GI eval. if FOBT+    DVT Prophylaxis  SQ Lovenox or SQ heparin    remains hematologically stable  Further recommendations pending patient's course, as well as family decision.    Defer invasive workup testing for the time being.  Okay with noninvasive testing, to further stratify risk .  Follow in office post DC with Dr Yadav or Dr Azul [wife's Oncologist]  
Patient is a 79yo M with a PMH of COPD, HTN, HLD, DM2, CVA (4/2015), dementia, TAMMY on CPAP admitted for CVA workup.
Patient is a 77yo M with a PMH of COPD, HTN, HLD, DM2, CVA (4/2015), dementia, TAMMY on CPAP admitted for CVA workup.
Patient is a 79yo M with a PMH of COPD, HTN, HLD, DM2, CVA (4/2015), dementia, TAMMY on CPAP admitted for CVA workup.
Patient is a 77yo M with a PMH of COPD, HTN, HLD, DM2, CVA (4/2015), dementia, TAMMY on CPAP admitted for CVA workup.
Patient is a 79yo M with a PMH of COPD, HTN, HLD, DM2, CVA (4/2015), dementia, TAMMY on CPAP admitted for CVA workup.

## 2024-04-22 NOTE — PROGRESS NOTE ADULT - SUBJECTIVE AND OBJECTIVE BOX
[facility] -- Mynor Boudreaux Pannella, Patel, Savella, Goodger: Office # [phone]    Follow Up:    AMS r/o CVA, HTN, hyperlipidemia  Subjective/Observations: Patient seen and examined. Patient confuse, lethargic, oriented  x1.No meaningful conversation. exp wheezing noted thsi morning. On 3l NC  REVIEW OF SYSTEMS: All other review of systems are negative unless indicated above    PAST MEDICAL & SURGICAL HISTORY:  Diabetes mellitus      Hypertension      COPD (chronic obstructive pulmonary disease)      HLD (hyperlipidemia)      Dementia      CVA (cerebral vascular accident)      H/O hand surgery          MEDICATIONS  (STANDING):  albuterol/ipratropium for Nebulization 3 milliLiter(s) Nebulizer every 6 hours  amLODIPine   Tablet 10 milliGRAM(s) Oral daily  apixaban 5 milliGRAM(s) Oral every 12 hours  aspirin enteric coated 81 milliGRAM(s) Oral daily  atorvastatin 40 milliGRAM(s) Oral at bedtime  busPIRone 5 milliGRAM(s) Oral <User Schedule>  dextrose 10% Bolus 125 milliLiter(s) IV Bolus once  dextrose 5%. 1000 milliLiter(s) (100 mL/Hr) IV Continuous <Continuous>  dextrose 5%. 1000 milliLiter(s) (50 mL/Hr) IV Continuous <Continuous>  dextrose 50% Injectable 25 Gram(s) IV Push once  dextrose 50% Injectable 12.5 Gram(s) IV Push once  donepezil 10 milliGRAM(s) Oral at bedtime  escitalopram 20 milliGRAM(s) Oral daily  glucagon  Injectable 1 milliGRAM(s) IntraMuscular once  hemorrhoidal Ointment 1 Application(s) Rectal two times a day  insulin lispro (ADMELOG) corrective regimen sliding scale   SubCutaneous three times a day before meals  insulin lispro (ADMELOG) corrective regimen sliding scale   SubCutaneous at bedtime  losartan 100 milliGRAM(s) Oral daily  QUEtiapine 37.5 milliGRAM(s) Oral <User Schedule>  sodium chloride 0.45%. 1000 milliLiter(s) (75 mL/Hr) IV Continuous <Continuous>  valproic  acid Syrup 500 milliGRAM(s) Oral two times a day    MEDICATIONS  (PRN):  bisacodyl Suppository 10 milliGRAM(s) Rectal daily PRN Constipation  dextrose Oral Gel 15 Gram(s) Oral once PRN Blood Glucose LESS THAN 70 milliGRAM(s)/deciliter  OLANZapine Injectable 5 milliGRAM(s) IntraMuscular every 6 hours PRN Agitation  ondansetron Injectable 4 milliGRAM(s) IV Push every 8 hours PRN Nausea and/or Vomiting    Allergies    No Known Allergies    Intolerances      Vital Signs Last 24 Hrs  T(C): 36.3 (22 Apr 2024 12:11), Max: 36.7 (21 Apr 2024 19:34)  T(F): 97.4 (22 Apr 2024 12:11), Max: 98.1 (21 Apr 2024 19:34)  HR: 55 (22 Apr 2024 12:11) (55 - 76)  BP: 150/70 (22 Apr 2024 12:49) (150/70 - 170/73)  BP(mean): --  RR: 18 (22 Apr 2024 12:48) (16 - 19)  SpO2: 95% (22 Apr 2024 12:48) (94% - 98%)    Parameters below as of 22 Apr 2024 12:48  Patient On (Oxygen Delivery Method): nasal cannula  O2 Flow (L/min): 3    I&O's Summary        TELE: NSR 60-80  PHYSICAL EXAM:  Constitutional: NAD, awake and lethargic   Pulmonary: Exp wheezing  bilaterally, rales or rhonchi  Cardiovascular: Regular, S1 and S2, No murmurs, No rubs, gallops or clicks  Gastrointestinal:  soft, nontender, nondistended   Lymph: No peripheral edema. No lymphadenopathy.   Skin: No visible rashes or ulcers.        LABS: All Labs Reviewed:                        9.9    6.63  )-----------( 223      ( 22 Apr 2024 06:15 )             31.0                         9.3    5.22  )-----------( 179      ( 20 Apr 2024 07:23 )             30.0     22 Apr 2024 06:15    142    |  104    |  22     ----------------------------<  272    4.5     |  34     |  1.50   21 Apr 2024 07:33    143    |  104    |  15     ----------------------------<  260    3.6     |  34     |  1.40     Ca    8.2        22 Apr 2024 06:15  Ca    9.0        21 Apr 2024 07:33  Phos  2.8       21 Apr 2024 07:33  Mg     1.8       21 Apr 2024 07:33       Troponin I, High Sensitivity Result: 8.0 ng/L (04-12-24 @ 23:00)  Cholesterol: 86 mg/dL (04-13-24 @ 06:00)  HDL Cholesterol: 47 mg/dL (04-13-24 @ 06:00)  Triglycerides, Serum: 41 mg/dL (04-13-24 @ 06:00)    12 Lead ECG:   Ventricular Rate 69 BPM    Atrial Rate 69 BPM    P-R Interval 220 ms    QRS Duration 92 ms    Q-T Interval 464 ms    QTC Calculation(Bazett) 497 ms    P Axis 79 degrees    R Axis 73 degrees    T Axis 78 degrees    Diagnosis Line Sinus rhythm with 1st degree AV block  ST & T wave abnormality, consider anterior ischemia  Abnormal ECG    Confirmed by Carmela Merrill ([id number]) on 4/19/2024 2:30:48 PM (04-19-24 @ 08:24)      TRANSTHORACIC ECHOCARDIOGRAM REPORT  ________________________________________________________________________________                                      _______       Pt. Name:       RANCHO FRANCISCO Study Date:    4/16/2024  MRN: [id number]               YOB: 1945  Accession #:    [id number]              Age:           78 years  Account#:       [id number]             Gender:        M  Visit ID#  Heart Rate:                            Height:        66.93 in (170.00 cm)  Rhythm:                                Weight:        185.19 lb (84.00 kg)  Blood Pressure: 168/67 mmHg            BSA/BMI:       1.96 m² / 29.07 kg/m²  ________________________________________________________________________________________  Referring Physician:    Torrey rCews  Interpreting Physician: Jayme Lowe  Primary Sonographer:    Catalina Poe CHARLENE    CPT:               ECHO TTE WO CON COMP W DOPP - 73486.m  Indication(s):     Syncope and collapse - R55  Procedure:Transthoracic echocardiogram with 2-D, M-mode and complete                     spectral and color flow Doppler.  Ordering Location: [facility]  Admission Status:  Inpatient  Agitated Saline:   Injection with agitated saline was performed to evaluate for                   intracardiac shunting.    _______________________________________________________________________________________     CONCLUSIONS:      1. Left ventricular systolic function is normal with an ejection fraction of 67 % by Peterson's method of disks.   2. Normal right ventricular cavity size and normal systolic function.   3. The left atrium is normal in size.   4. Trace mitral regurgitation.   5. There is mild thickening of the aortic valve leaflets.   6. Agitated saline injection was negative for intracardiac shunt, though visualization was suboptimal.    ________________________________________________________________________________________  FINDINGS:     Left Ventricle:  Left ventricular systolic function is normal with a calculated ejection fraction of 67 % by the Peterson's biplane method of disks. The left ventricular diastolic function is indeterminate.     Right Ventricle:  The right ventricular cavity is normal in size and normal systolic function.     Left Atrium:  The left atrium is normal in size with an indexed volume of 16.47 ml/m².     Right Atrium:  The right atrium is normal in size.     Interatrial Septum:  Agitated saline injection was negative for intracardiac shunt.     Aortic Valve:  The aortic valve appears trileaflet. There is mild thickening of the aortic valve leaflets.     Mitral Valve:  Structurally normal mitral valve with normal leaflet excursion. There is no mitral valve stenosis. There is trace mitral regurgitation.     Tricuspid Valve:  Structurally normal tricuspid valve with normal leaflet excursion.     Pulmonic Valve:  Structurally normal pulmonic valve with normal leaflet excursion.     Pericardium:  No pericardial effusion seen.     Systemic Veins:  The inferior vena cava is normal in size measuring 1.77 cm in diameter, (normal <2.1cm) with normal inspiratory collapse (normal >50%) consistent with normal right atrial pressure (~3, range 0-5mmHg).  ____________________________________________________________________  QUANTITATIVE DATA:  Left Ventricle Measurements: (Indexed to BSA)     IVSd (2D):   0.8 cm  LVPWd (2D):  0.7 cm  LVIDd (2D):  4.3 cm  LVIDs (2D):  3.1 cm  LV Mass:     99 g   50.4 g/m²  LV Vol d, MOD A2C: 75.1 ml  38.40 ml/m²  LV Vol d, MOD A4C: 120.0 ml 61.37 ml/m²  LV Vol d, MOD BP:  99.9 ml  51.06 ml/m²  LV Vol s, MOD A2C: 25.2 ml  12.89 ml/m²  LV Vol s, MOD A4C: 43.4 ml  22.19 ml/m²  LV Vol s, MOD BP:  33.1 ml  16.94 ml/m²  LVOT SV MOD BP:    66.7 ml  LV EF% MOD BP:     67 %     MV E Vmax:    1.06m/s  MV A Vmax:    1.17 m/s  MV E/A:       0.91  e' lateral:   8.16 cm/s  e' medial:    7.18 cm/s  E/e' lateral: 12.99  E/e' medial:  14.76  E/e' Average: 13.82  MV DT:        268 msec    Aorta Measurements: (Normal range) (Indexed to BSA)     Sinuses of Valsalva: 3.70 cm (3.1 - 3.7 cm)       Left Atrium Measurements: (Indexed to BSA)  LA Diam 2D:        2.50 cm  LA Vol s, MOD A4C: 17.60 ml.  LA Vol s, MOD A2C: 56.00 ml.  LA Vol s, MOD BP:  32.20 ml  16.47 ml/m²    Mitral Valve Measurements:     MV E Vmax: 1.1 m/s         MR Vmax:          4.74 m/s  MV A Vmax: 1.2 m/s         MR Peak Gradient: 89.9 mmHg  MV E/A:    0.9       Tricuspid Valve Measurements:     RA Pressure: 3 mmHg    ________________________________________________________________________________________  Electronically signed on 4/16/2024 at 3:07:41 PM by Jayme Lowe         *** Final ***

## 2024-10-14 ENCOUNTER — APPOINTMENT (OUTPATIENT)
Dept: CARDIOLOGY | Facility: CLINIC | Age: 79
End: 2024-10-14
Payer: MEDICARE

## 2024-10-14 ENCOUNTER — NON-APPOINTMENT (OUTPATIENT)
Age: 79
End: 2024-10-14

## 2024-10-14 VITALS
WEIGHT: 182 LBS | DIASTOLIC BLOOD PRESSURE: 83 MMHG | HEART RATE: 53 BPM | BODY MASS INDEX: 28.56 KG/M2 | SYSTOLIC BLOOD PRESSURE: 176 MMHG | HEIGHT: 67 IN | OXYGEN SATURATION: 94 %

## 2024-10-14 DIAGNOSIS — I10 ESSENTIAL (PRIMARY) HYPERTENSION: ICD-10-CM

## 2024-10-14 DIAGNOSIS — I48.0 PAROXYSMAL ATRIAL FIBRILLATION: ICD-10-CM

## 2024-10-14 PROCEDURE — 99214 OFFICE O/P EST MOD 30 MIN: CPT

## 2024-10-14 PROCEDURE — 93000 ELECTROCARDIOGRAM COMPLETE: CPT

## 2024-10-14 PROCEDURE — G2211 COMPLEX E/M VISIT ADD ON: CPT

## 2024-12-01 ENCOUNTER — INPATIENT (INPATIENT)
Facility: HOSPITAL | Age: 79
LOS: 2 days | Discharge: HOME | DRG: 682 | End: 2024-12-04
Attending: STUDENT IN AN ORGANIZED HEALTH CARE EDUCATION/TRAINING PROGRAM | Admitting: INTERNAL MEDICINE
Payer: MEDICARE

## 2024-12-01 VITALS
WEIGHT: 240.08 LBS | HEIGHT: 68 IN | DIASTOLIC BLOOD PRESSURE: 56 MMHG | HEART RATE: 54 BPM | SYSTOLIC BLOOD PRESSURE: 110 MMHG | TEMPERATURE: 99 F | OXYGEN SATURATION: 96 % | RESPIRATION RATE: 18 BRPM

## 2024-12-01 DIAGNOSIS — R93.89 ABNORMAL FINDINGS ON DIAGNOSTIC IMAGING OF OTHER SPECIFIED BODY STRUCTURES: ICD-10-CM

## 2024-12-01 DIAGNOSIS — I10 ESSENTIAL (PRIMARY) HYPERTENSION: ICD-10-CM

## 2024-12-01 DIAGNOSIS — N17.9 ACUTE KIDNEY FAILURE, UNSPECIFIED: ICD-10-CM

## 2024-12-01 DIAGNOSIS — T83.9XXA UNSPECIFIED COMPLICATION OF GENITOURINARY PROSTHETIC DEVICE, IMPLANT AND GRAFT, INITIAL ENCOUNTER: ICD-10-CM

## 2024-12-01 DIAGNOSIS — R33.9 RETENTION OF URINE, UNSPECIFIED: ICD-10-CM

## 2024-12-01 DIAGNOSIS — G47.33 OBSTRUCTIVE SLEEP APNEA (ADULT) (PEDIATRIC): ICD-10-CM

## 2024-12-01 DIAGNOSIS — E11.9 TYPE 2 DIABETES MELLITUS WITHOUT COMPLICATIONS: ICD-10-CM

## 2024-12-01 DIAGNOSIS — B34.9 VIRAL INFECTION, UNSPECIFIED: ICD-10-CM

## 2024-12-01 DIAGNOSIS — Z98.89 OTHER SPECIFIED POSTPROCEDURAL STATES: Chronic | ICD-10-CM

## 2024-12-01 DIAGNOSIS — I25.10 ATHEROSCLEROTIC HEART DISEASE OF NATIVE CORONARY ARTERY WITHOUT ANGINA PECTORIS: ICD-10-CM

## 2024-12-01 DIAGNOSIS — Z29.9 ENCOUNTER FOR PROPHYLACTIC MEASURES, UNSPECIFIED: ICD-10-CM

## 2024-12-01 LAB
ALBUMIN SERPL ELPH-MCNC: 3.6 G/DL — SIGNIFICANT CHANGE UP (ref 3.3–5)
ALP SERPL-CCNC: 86 U/L — SIGNIFICANT CHANGE UP (ref 40–120)
ALT FLD-CCNC: 19 U/L — SIGNIFICANT CHANGE UP (ref 12–78)
ANION GAP SERPL CALC-SCNC: 6 MMOL/L — SIGNIFICANT CHANGE UP (ref 5–17)
APPEARANCE UR: CLEAR — SIGNIFICANT CHANGE UP
APTT BLD: 36.3 SEC — HIGH (ref 24.5–35.6)
AST SERPL-CCNC: 11 U/L — LOW (ref 15–37)
BACTERIA # UR AUTO: ABNORMAL /HPF
BASOPHILS # BLD AUTO: 0.03 K/UL — SIGNIFICANT CHANGE UP (ref 0–0.2)
BASOPHILS NFR BLD AUTO: 0.5 % — SIGNIFICANT CHANGE UP (ref 0–2)
BILIRUB SERPL-MCNC: <0.1 MG/DL — LOW (ref 0.2–1.2)
BILIRUB UR-MCNC: NEGATIVE — SIGNIFICANT CHANGE UP
BUN SERPL-MCNC: 55 MG/DL — HIGH (ref 7–23)
CALCIUM SERPL-MCNC: 9.1 MG/DL — SIGNIFICANT CHANGE UP (ref 8.5–10.1)
CHLORIDE SERPL-SCNC: 111 MMOL/L — HIGH (ref 96–108)
CO2 SERPL-SCNC: 26 MMOL/L — SIGNIFICANT CHANGE UP (ref 22–31)
COLOR SPEC: YELLOW — SIGNIFICANT CHANGE UP
CREAT SERPL-MCNC: 3.8 MG/DL — HIGH (ref 0.5–1.3)
DIFF PNL FLD: NEGATIVE — SIGNIFICANT CHANGE UP
EGFR: 16 ML/MIN/1.73M2 — LOW
EOSINOPHIL # BLD AUTO: 0.23 K/UL — SIGNIFICANT CHANGE UP (ref 0–0.5)
EOSINOPHIL NFR BLD AUTO: 3.7 % — SIGNIFICANT CHANGE UP (ref 0–6)
EPI CELLS # UR: PRESENT
FLUAV AG NPH QL: SIGNIFICANT CHANGE UP
FLUBV AG NPH QL: SIGNIFICANT CHANGE UP
GLUCOSE SERPL-MCNC: 127 MG/DL — HIGH (ref 70–99)
GLUCOSE UR QL: NEGATIVE MG/DL — SIGNIFICANT CHANGE UP
HCT VFR BLD CALC: 37.7 % — LOW (ref 39–50)
HGB BLD-MCNC: 12.2 G/DL — LOW (ref 13–17)
IMM GRANULOCYTES NFR BLD AUTO: 0.3 % — SIGNIFICANT CHANGE UP (ref 0–0.9)
INR BLD: 1.05 RATIO — SIGNIFICANT CHANGE UP (ref 0.85–1.16)
KETONES UR-MCNC: NEGATIVE MG/DL — SIGNIFICANT CHANGE UP
LACTATE SERPL-SCNC: 1.4 MMOL/L — SIGNIFICANT CHANGE UP (ref 0.7–2)
LEUKOCYTE ESTERASE UR-ACNC: NEGATIVE — SIGNIFICANT CHANGE UP
LYMPHOCYTES # BLD AUTO: 1.13 K/UL — SIGNIFICANT CHANGE UP (ref 1–3.3)
LYMPHOCYTES # BLD AUTO: 18.2 % — SIGNIFICANT CHANGE UP (ref 13–44)
MCHC RBC-ENTMCNC: 30.7 PG — SIGNIFICANT CHANGE UP (ref 27–34)
MCHC RBC-ENTMCNC: 32.4 G/DL — SIGNIFICANT CHANGE UP (ref 32–36)
MCV RBC AUTO: 95 FL — SIGNIFICANT CHANGE UP (ref 80–100)
MONOCYTES # BLD AUTO: 0.46 K/UL — SIGNIFICANT CHANGE UP (ref 0–0.9)
MONOCYTES NFR BLD AUTO: 7.4 % — SIGNIFICANT CHANGE UP (ref 2–14)
NEUTROPHILS # BLD AUTO: 4.35 K/UL — SIGNIFICANT CHANGE UP (ref 1.8–7.4)
NEUTROPHILS NFR BLD AUTO: 69.9 % — SIGNIFICANT CHANGE UP (ref 43–77)
NITRITE UR-MCNC: NEGATIVE — SIGNIFICANT CHANGE UP
NRBC # BLD: 0 /100 WBCS — SIGNIFICANT CHANGE UP (ref 0–0)
PH UR: 5.5 — SIGNIFICANT CHANGE UP (ref 5–8)
PLATELET # BLD AUTO: 182 K/UL — SIGNIFICANT CHANGE UP (ref 150–400)
POTASSIUM SERPL-MCNC: 4.6 MMOL/L — SIGNIFICANT CHANGE UP (ref 3.5–5.3)
POTASSIUM SERPL-SCNC: 4.6 MMOL/L — SIGNIFICANT CHANGE UP (ref 3.5–5.3)
PROT SERPL-MCNC: 7.1 G/DL — SIGNIFICANT CHANGE UP (ref 6–8.3)
PROT UR-MCNC: 30 MG/DL
PROTHROM AB SERPL-ACNC: 12.3 SEC — SIGNIFICANT CHANGE UP (ref 9.9–13.4)
RAPID RVP RESULT: DETECTED
RBC # BLD: 3.97 M/UL — LOW (ref 4.2–5.8)
RBC # FLD: 13.9 % — SIGNIFICANT CHANGE UP (ref 10.3–14.5)
RBC CASTS # UR COMP ASSIST: 0 /HPF — SIGNIFICANT CHANGE UP (ref 0–4)
RSV RNA NPH QL NAA+NON-PROBE: SIGNIFICANT CHANGE UP
RV+EV RNA SPEC QL NAA+PROBE: DETECTED
SARS-COV-2 RNA SPEC QL NAA+PROBE: SIGNIFICANT CHANGE UP
SARS-COV-2 RNA SPEC QL NAA+PROBE: SIGNIFICANT CHANGE UP
SODIUM SERPL-SCNC: 143 MMOL/L — SIGNIFICANT CHANGE UP (ref 135–145)
SP GR SPEC: 1.02 — SIGNIFICANT CHANGE UP (ref 1–1.03)
UROBILINOGEN FLD QL: 0.2 MG/DL — SIGNIFICANT CHANGE UP (ref 0.2–1)
WBC # BLD: 6.22 K/UL — SIGNIFICANT CHANGE UP (ref 3.8–10.5)
WBC # FLD AUTO: 6.22 K/UL — SIGNIFICANT CHANGE UP (ref 3.8–10.5)
WBC UR QL: 2 /HPF — SIGNIFICANT CHANGE UP (ref 0–5)

## 2024-12-01 PROCEDURE — 71045 X-RAY EXAM CHEST 1 VIEW: CPT | Mod: 26

## 2024-12-01 PROCEDURE — 70450 CT HEAD/BRAIN W/O DYE: CPT | Mod: 26,MC

## 2024-12-01 PROCEDURE — 74176 CT ABD & PELVIS W/O CONTRAST: CPT | Mod: 26,MC

## 2024-12-01 PROCEDURE — 93010 ELECTROCARDIOGRAM REPORT: CPT

## 2024-12-01 PROCEDURE — 99285 EMERGENCY DEPT VISIT HI MDM: CPT

## 2024-12-01 PROCEDURE — 99223 1ST HOSP IP/OBS HIGH 75: CPT | Mod: GC

## 2024-12-01 RX ORDER — MAGNESIUM, ALUMINUM HYDROXIDE 200-225/5
30 SUSPENSION, ORAL (FINAL DOSE FORM) ORAL EVERY 4 HOURS
Refills: 0 | Status: DISCONTINUED | OUTPATIENT
Start: 2024-12-01 | End: 2024-12-04

## 2024-12-01 RX ORDER — ALBUTEROL 90 MCG
0 AEROSOL (GRAM) INHALATION
Refills: 0 | DISCHARGE

## 2024-12-01 RX ORDER — ESCITALOPRAM OXALATE 10 MG/1
20 TABLET, FILM COATED ORAL DAILY
Refills: 0 | Status: DISCONTINUED | OUTPATIENT
Start: 2024-12-01 | End: 2024-12-04

## 2024-12-01 RX ORDER — GLUCAGON INJECTION, SOLUTION 0.5 MG/.1ML
1 INJECTION, SOLUTION SUBCUTANEOUS ONCE
Refills: 0 | Status: DISCONTINUED | OUTPATIENT
Start: 2024-12-01 | End: 2024-12-04

## 2024-12-01 RX ORDER — FLUTICASONE PROPIONATE AND SALMETEROL XINAFOATE 45; 21 UG/1; UG/1
1 AEROSOL, METERED RESPIRATORY (INHALATION)
Refills: 0 | Status: DISCONTINUED | OUTPATIENT
Start: 2024-12-01 | End: 2024-12-04

## 2024-12-01 RX ORDER — DONEPEZIL HYDROCHLORIDE 5 MG/1
10 TABLET, FILM COATED ORAL AT BEDTIME
Refills: 0 | Status: DISCONTINUED | OUTPATIENT
Start: 2024-12-01 | End: 2024-12-04

## 2024-12-01 RX ORDER — ROSUVASTATIN CALCIUM 5 MG/1
10 TABLET, FILM COATED ORAL AT BEDTIME
Refills: 0 | Status: DISCONTINUED | OUTPATIENT
Start: 2024-12-01 | End: 2024-12-04

## 2024-12-01 RX ORDER — BUSPIRONE HCL 15 MG
5 TABLET ORAL
Refills: 0 | Status: DISCONTINUED | OUTPATIENT
Start: 2024-12-01 | End: 2024-12-04

## 2024-12-01 RX ORDER — APIXABAN 2.5 MG/1
5 TABLET, FILM COATED ORAL
Refills: 0 | Status: DISCONTINUED | OUTPATIENT
Start: 2024-12-01 | End: 2024-12-04

## 2024-12-01 RX ORDER — AMLODIPINE BESYLATE 10 MG/1
10 TABLET ORAL DAILY
Refills: 0 | Status: DISCONTINUED | OUTPATIENT
Start: 2024-12-01 | End: 2024-12-04

## 2024-12-01 RX ORDER — ACETAMINOPHEN, DIPHENHYDRAMINE HCL, PHENYLEPHRINE HCL 325; 25; 5 MG/1; MG/1; MG/1
3 TABLET ORAL AT BEDTIME
Refills: 0 | Status: DISCONTINUED | OUTPATIENT
Start: 2024-12-01 | End: 2024-12-04

## 2024-12-01 RX ORDER — 0.9 % SODIUM CHLORIDE 0.9 %
1000 INTRAVENOUS SOLUTION INTRAVENOUS
Refills: 0 | Status: DISCONTINUED | OUTPATIENT
Start: 2024-12-01 | End: 2024-12-04

## 2024-12-01 RX ORDER — SODIUM CHLORIDE 9 MG/ML
1000 INJECTION, SOLUTION INTRAMUSCULAR; INTRAVENOUS; SUBCUTANEOUS
Refills: 0 | Status: DISCONTINUED | OUTPATIENT
Start: 2024-12-01 | End: 2024-12-01

## 2024-12-01 RX ORDER — ACETAMINOPHEN 500MG 500 MG/1
650 TABLET, COATED ORAL EVERY 6 HOURS
Refills: 0 | Status: DISCONTINUED | OUTPATIENT
Start: 2024-12-01 | End: 2024-12-04

## 2024-12-01 RX ORDER — PIOGLITAZONE HCL 15 MG
1 TABLET ORAL
Refills: 0 | DISCHARGE

## 2024-12-01 RX ORDER — FLUTICASONE PROPIONATE AND SALMETEROL XINAFOATE 45; 21 UG/1; UG/1
0 AEROSOL, METERED RESPIRATORY (INHALATION)
Refills: 0 | DISCHARGE

## 2024-12-01 RX ORDER — 0.9 % SODIUM CHLORIDE 0.9 %
1000 INTRAVENOUS SOLUTION INTRAVENOUS
Refills: 0 | Status: DISCONTINUED | OUTPATIENT
Start: 2024-12-01 | End: 2024-12-02

## 2024-12-01 RX ORDER — MEMANTINE HYDROCHLORIDE 14 MG/1
5 CAPSULE, EXTENDED RELEASE ORAL
Refills: 0 | Status: DISCONTINUED | OUTPATIENT
Start: 2024-12-01 | End: 2024-12-04

## 2024-12-01 RX ORDER — SODIUM CHLORIDE 9 MG/ML
1000 INJECTION, SOLUTION INTRAMUSCULAR; INTRAVENOUS; SUBCUTANEOUS ONCE
Refills: 0 | Status: COMPLETED | OUTPATIENT
Start: 2024-12-01 | End: 2024-12-01

## 2024-12-01 RX ORDER — LORAZEPAM 2 MG/1
1 TABLET ORAL ONCE
Refills: 0 | Status: DISCONTINUED | OUTPATIENT
Start: 2024-12-01 | End: 2024-12-01

## 2024-12-01 RX ORDER — MEMANTINE HYDROCHLORIDE 14 MG/1
1 CAPSULE, EXTENDED RELEASE ORAL
Refills: 0 | DISCHARGE

## 2024-12-01 RX ADMIN — DONEPEZIL HYDROCHLORIDE 10 MILLIGRAM(S): 5 TABLET, FILM COATED ORAL at 22:53

## 2024-12-01 RX ADMIN — Medication 25 MILLIGRAM(S): at 22:53

## 2024-12-01 RX ADMIN — ROSUVASTATIN CALCIUM 10 MILLIGRAM(S): 5 TABLET, FILM COATED ORAL at 22:53

## 2024-12-01 RX ADMIN — SODIUM CHLORIDE 1000 MILLILITER(S): 9 INJECTION, SOLUTION INTRAMUSCULAR; INTRAVENOUS; SUBCUTANEOUS at 12:47

## 2024-12-01 RX ADMIN — LORAZEPAM 1 MILLIGRAM(S): 2 TABLET ORAL at 14:32

## 2024-12-01 NOTE — ED PROVIDER NOTE - PHYSICAL EXAMINATION
Gen: NAD   Head: NC/AT  Neck: trachea midline  cv: rrr  Resp:  No distress, lungs clear  abd: nontender   Ext: no deformities  Neuro:  awake but not following commands   Skin:  Warm and dry as visualized  Psych:  Normal affect and mood

## 2024-12-01 NOTE — H&P ADULT - HISTORY OF PRESENT ILLNESS
78yo M with a PMH of COPD, HTN, HLD, DM2, CVA (4/2015), dementia, TAMMY on CPAP who presents to the ED with cough and AMS.    ED course  Vitals: T 99.2, HR 54, /56, RR 18, SpO2 96% on RA  Significant labs: Cr 3.8, lactate 1.4   UA grossly normal   +Enterorhino  Imaging:   CT Abdomen and Pelvis shows Distended bladder. No discrete mass is identified. Prostate gland is   mildly enlarged. Please correlate clinically. Mild infrarenal abdominal aortic aneurysm measuring up to 3.3 x 2.7 does not appear significantly changed compared with 3/25/2014.  EKG:   In ED patient given: 1L NS bolus x1, Ativan 1mg IVP x1   78yo M with a PMH of COPD, HTN, HLD, DM2, CVA (4/2015), dementia, TAMMY on CPAP who presents to the ED with cough and AMS. Patient's wife at bedside and assisted with history given patients history of dementia. Pt has cough for the past week, productive of clear phlegm. Pt lives w/ grandchildren who have had URI symptoms as well. Pt's wife has been giving albuterol nebulizer 2x daily to help with cough. She reports this AM patient was lethargic and she had difficulty waking him up from sleep. She also noted his speech was "garbled" and felt he was off balance when ambulating.Has SOB with exertion, which is chronic.     ROS:   ROS limited 2/2 dementia however per wife Denies fever, chills, chest pain, abdominal pain.    (+): fatigue, lethargy, cough, SOB on exertion (chronic), nasal discharge    ED course  Vitals: T 99.2, HR 54, /56, RR 18, SpO2 96% on RA  Significant labs: Cr 3.8, lactate 1.4   UA grossly normal   +Enterorhino  Imaging:   CT Abdomen and Pelvis shows Distended bladder. No discrete mass is identified. Prostate gland is mildly enlarged. Please correlate clinically. Mild infrarenal abdominal aortic aneurysm measuring up to 3.3 x 2.7 does not appear significantly changed compared with 3/25/2014.  CXR:  No radiographic evidence of active chest disease.  CTHead non con: Stable head CT.  EKG:   In ED patient given: 1L NS bolus x1, Ativan 1mg IVP x1   78yo M with a PMH of COPD, HTN, HLD, DM2, CVA (4/2015), dementia, TAMMY on CPAP who presents to the ED with cough and AMS. Patient's wife at bedside and assisted with history given patients history of dementia. Pt has cough for the past week, productive of clear phlegm. Pt lives w/ grandchildren who have had URI symptoms as well. Pt's wife has been giving albuterol nebulizer 2x daily to help with cough. She reports this AM patient was lethargic and she had difficulty waking him up from sleep. She also noted his speech was "garbled" and felt he was off balance when ambulating. Has SOB with exertion, which is chronic.     ROS:   ROS limited 2/2 dementia however per wife Denies fever, chills, chest pain, abdominal pain.    (+): fatigue, lethargy, cough, SOB on exertion (chronic), nasal discharge    ED course  Vitals: T 99.2, HR 54, /56, RR 18, SpO2 96% on RA  Significant labs: Cr 3.8, lactate 1.4   UA grossly normal   +Enterorhino  Imaging:   CT Abdomen and Pelvis shows Distended bladder. No discrete mass is identified. Prostate gland is mildly enlarged. Please correlate clinically. Mild infrarenal abdominal aortic aneurysm measuring up to 3.3 x 2.7 does not appear significantly changed compared with 3/25/2014.  CXR:  No radiographic evidence of active chest disease.  CTHead non con: Stable head CT.  EKG: sinus bradycardia with 1st degree AV block, septal infarct (age undetermined), QTc 447   In ED patient given: 1L NS bolus x1, Ativan 1mg IVP x1   80yo M with a PMH of COPD, HTN, HLD, DM2, CVA (4/2015), dementia, TAMMY on CPAP who presents to the ED with cough and AMS. Patient's wife at bedside and assisted with history given patients history of dementia. Pt has cough for the past week, productive of clear phlegm. Pt lives w/ grandchildren who have had URI symptoms as well. Pt's wife has been giving albuterol nebulizer 2x daily to help with cough. She reports this AM patient was lethargic and she had difficulty waking him up from sleep. She also noted his speech was "garbled" and felt he was off balance when ambulating. Also has decreased PO intake for the past few days. Per wife he is AAOx1-2 at baseline. Has SOB with exertion, which is chronic.     ROS:   ROS limited 2/2 dementia however per wife Denies fever, chills, chest pain, abdominal pain.    (+): fatigue, lethargy, cough, SOB on exertion (chronic), nasal discharge    ED course  Vitals: T 99.2, HR 54, /56, RR 18, SpO2 96% on RA  Significant labs: Cr 3.8, lactate 1.4   UA grossly normal   +Enterorhino  Imaging:   CT Abdomen and Pelvis shows Distended bladder. No discrete mass is identified. Prostate gland is mildly enlarged. Please correlate clinically. Mild infrarenal abdominal aortic aneurysm measuring up to 3.3 x 2.7 does not appear significantly changed compared with 3/25/2014.  CXR:  No radiographic evidence of active chest disease.  CTHead non con: Stable head CT.  EKG: sinus bradycardia with 1st degree AV block, septal infarct (age undetermined), QTc 447   In ED patient given: 1L NS bolus x1, Ativan 1mg IVP x1

## 2024-12-01 NOTE — H&P ADULT - ASSESSMENT
80yo M with a PMH of COPD, HTN, HLD, DM2, CVA (4/2015), dementia, TAMMY on CPAP who presents to the ED with cough and AMS. Admitted for SARAY.

## 2024-12-01 NOTE — H&P ADULT - PROBLEM SELECTOR PLAN 8
DVT ppx: c/w home eliquis 5 mg bid chronic  - per wife on CPAP pressure 4 chronic  - per wife on CPAP pressure 4, CPAP ordered

## 2024-12-01 NOTE — ED ADULT NURSE NOTE - NSFALLHARMRISKINTERV_ED_ALL_ED

## 2024-12-01 NOTE — H&P ADULT - PROBLEM SELECTOR PLAN 9
DVT ppx: c/w home eliquis 5 mg bid CT A/P showed incidental finding: Mild infrarenal abdominal aortic aneurysm measuring up to 3.3 x 2.7 does not appear significantly changed compared with 3/25/2014.  - wife informed of above result and to follow up with PCP

## 2024-12-01 NOTE — ED ADULT NURSE NOTE - OBJECTIVE STATEMENT
Pt presents to the ED BIB wife c/o increased lethargy x1 day. Hx of dementia, pt not at baseline at this time. As per wife pt behavior is abnormal. Pt unresponsive to questions at this time. Wife also endorsing new cough. Denies fevers. IV established in left arm with a 20G, labs drawn and sent, call bell in reach, warm blanket provided, bed in lowest position, side rails up x2, MD evaluation in progress, pt on telemetry. Pt not in acute distress, SpO2 93% on RA, respiartions equal and unlabored.

## 2024-12-01 NOTE — H&P ADULT - ATTENDING COMMENTS
Patient is seen and examined with the resident. Agree with above assessment and plan. Patient presented with weakness. Found to have SARAY and Rhinovirus infection. s/p Fierro cath placement by urology in ED. Started on IVF. will follow Nephrology and urology consult. Continue all the medication for dementia.

## 2024-12-01 NOTE — H&P ADULT - PROBLEM SELECTOR PLAN 3
Chronic. On Metformin, Glimepiride and Alogliptin   - Start LDISS  - Hypoglycemia protocol  - Monitor fingersticks   - F/u AM A1c. Chronic. On Metformin, Glimepiride and Alogliptin   - hold home DM meds  - Start LDISS  - Hypoglycemia protocol  - Monitor fingersticks   - F/u AM A1c.

## 2024-12-01 NOTE — H&P ADULT - PROBLEM SELECTOR PLAN 7
heparin gtt Chronic   - EKG QTc QTc 447   - Continue home Donepezil   - Continue home Lexapro   - Continue home Buspar Chronic   - EKG QTc QTc 447   - Continue home Donepezil   - Continue home Lexapro   - Continue home Buspar bid   - continue home memantine  - continue home seroquel Chronic   - EKG QTc 447   - Continue home Donepezil   - Continue home Lexapro   - Continue home Buspar bid   - continue home memantine  - continue home seroquel

## 2024-12-01 NOTE — H&P ADULT - PROBLEM SELECTOR PLAN 6
Chronic   - Continue home Donepezil   - Continue home Lexapro   - Continue home Buspar chronic. on spiriva and wixela inhalers at home  - c/w home spiriva inhaler qd   - c/w advair inhaler bid (interchange for wixela)

## 2024-12-01 NOTE — H&P ADULT - PROBLEM SELECTOR PLAN 4
Chronic.   - /56 on admission   - continue __ with hold parameters   - Monitor routine hemodynamics. Chronic. at home on amlodipine and losartan  - hold home losartan in setting of SARAY  - /56 on admission   - continue amlodipine 10 mg qd with hold parameters   - Monitor routine hemodynamics.

## 2024-12-01 NOTE — H&P ADULT - PROBLEM SELECTOR PLAN 2
Pt presented to the ED for lethargy and AMS   - enterorhino+ on admission   - supportive care   - f/u blood cultures x2   - tylenol prn for fever

## 2024-12-01 NOTE — PROCEDURE NOTE - ADDITIONAL PROCEDURE DETAILS
Proper hand hygiene was performed, Sterile gloves were worn for the duration of the procedure, A sterile drape was used to cover all adjacent areas, The site was cleaned with sterile solution (betadine), The catheter 16 Fr coude was appropriately lubricated. Catheter introduced into urinary meatus and advanced without difficulty. Catheter then passed into the bladder with ease. Clear, yellow urine drained into collection bag. Pt tolerated procedure well.

## 2024-12-01 NOTE — H&P ADULT - NSHPROSALLOTHERNEGRD_GEN_ALL_CORE
"ED OBSERVATION PROGRESS/DISCHARGE SUMMARY    Date of Admission: 9/24/2024   LOS: 0 days   PCP: Mirza Scott Sr., MD    Final Diagnosis pending      Subjective     Hospital Outcome: Pending    Anastasiia Faria is a 75 y.o. female with a past medical history of kidney stones requiring ureteral stent placement, CKD, hypertension, hyperlipidemia, C-Diff, and bacterial infection due to pseudomonas who presented to the emergency department with a complaint of bilateral lower leg edema and pain. Patient states she has had bilateral lower leg edema for approximately 1 month that has significantly worsened over the past couple of days with some mild redness and worsening pain. Patient was seen by her PCP who started her on Lasix 20 mg PO daily, but the swelling has continued to increase. Patient states she was hospitalized at [facility] on 9/9-9/13 for a \"spine infection\" and states since the admission at [facility] she has been experiencing swelling to her bilateral lower extremities. Patient is currently on Levaquin 750 mg PO daily for treatment of a spine infection. Denies fever, chest pain, or shortness of breath.      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 She has been prescribed Levaquin 750 mg for treatment and is currently taking this medication and is followed by infectious disease, Dr. Du.      Ms. Faria was admitted to the ED Observation Unit for further evaluation and work-up of bilateral lower extremity edema, pain, and mild redness without erythema.     ED work-up: . CMP grossly unremarkable. CBC grossly unremarkable, Hgb 11.5.      On admission to the ED Observation Unit, patient is alert & oriented X4. Complains of bilateral lower extremity edema and discomfort. " Vital signs stable. Afebrile.     ROS:  General: no fevers, chills  Respiratory: no cough, dyspnea  Cardiovascular: no chest pain, palpitations.  Lower extremity swelling  Abdomen: No abdominal pain, nausea, vomiting, or diarrhea  Neurologic: No focal weakness    9/24/2024.    11:42 AM.  Patient has been evaluated by infectious disease.  Patient noted to have right SI joint infection and we are to continue Levaquin 750 mg daily.  Lower extremity edema deferred to obvious team.  It is not felt the patient has lower extremity cellulitis.  Patient has infectious disease appointment October 2.  Pedal edema still significant.  Plan to repeat Lasix dose in the morning and recheck BMP.    Objective   Physical Exam:  I have reviewed the vital signs.  Temp:  [97.9 °F (36.6 °C)-98.6 °F (37 °C)] 98.2 °F (36.8 °C)  Heart Rate:  [62-78] 78  Resp:  [16-18] 16  BP: (122-158)/(59-75) 130/59  General Appearance:    Alert, cooperative, no distress  Head:    Normocephalic, atraumatic  Eyes:    Sclerae anicteric  Neck:   Supple, no mass  Lungs: Clear to auscultation bilaterally, respirations unlabored  Heart: Regular rate and rhythm, S1 and S2 normal, no murmur, rub or gallop  Abdomen:  Soft, nontender, bowel sounds active, nondistended  Extremities: No clubbing, cyanosis, or edema to lower extremities  Pulses:  2+ and symmetric in distal lower extremities  Skin: No rashes   Neurologic: Oriented x3, Normal strength to extremities    Results Review:    I have reviewed the labs, radiology results and diagnostic studies.    Results from last 7 days   Lab Units 09/24/24  0120   WBC 10*3/mm3 5.75   HEMOGLOBIN g/dL 11.5*   HEMATOCRIT % 35.6   PLATELETS 10*3/mm3 318     Results from last 7 days   Lab Units 09/24/24  0120   SODIUM mmol/L 141   POTASSIUM mmol/L 3.6   CHLORIDE mmol/L 104   CO2 mmol/L 29.0   BUN mg/dL 17   CREATININE mg/dL 0.94   CALCIUM mg/dL 9.9   BILIRUBIN mg/dL 0.4   ALK PHOS U/L 93   ALT (SGPT) U/L <5   AST (SGOT) U/L 10    GLUCOSE mg/dL 111*     Imaging Results (Last 24 Hours)       ** No results found for the last 24 hours. **            I have reviewed the medications.  ---------------------------------------------------------------------------------------------  Assessment & Plan   Assessment/Problem List    Swelling of lower leg      Plan:    BLE edema/pain/mild redness without erythema  +3 non-pitting edema with few, small blisters. No weeping  Labs unremarkable  Afebrile    Continue with Lasix 40 mg daily  Analgesic medication for pain management       Right hip pain/right SI septic arthritis  -Continue with p.o. Levaquin 750 mg daily    Hypothyroidism  Continue levothyroxine     Hypertension  Continue nifedipine  Hold atenolol  VS q4h        VTE Prophylaxis:  No VTE prophylaxis order currently exists.       Disposition: Pending    Follow-up after Discharge: Pending    This note will serve as a progress note    Mati Mesa III, PA 09/24/24 18:11 EDT    I have worn appropriate PPE during this patient encounter, sanitized my hands both with entering and exiting patient's room.      51 minutes has been spent by [facility] providers in the care of this patient while under observation status            All other review of systems negative, except as noted in HPI

## 2024-12-01 NOTE — PROCEDURE NOTE - NSICDXPROBLEMMLMBOX_GEN
IPSTART~^~1~^~[id number]~^~~^~IPEND  PKSTART~^~[id number]~^~PKEND  IPSTART~^~2~^~[id number]~^~~^~IPEND  PKSTART~^~[id number]~^~PKEND  IPSTART~^~3~^~[id number]~^~~^~IPEND  PKSTART~^~[id number]~^~PKEND

## 2024-12-01 NOTE — ED PROVIDER NOTE - CLINICAL SUMMARY MEDICAL DECISION MAKING FREE TEXT BOX
78yo M with a PMH of COPD, HTN, HLD, DM2, CVA (4/2015), dementia, TAMMY on CPAP who presents to the ED with cough and AMS. per wife pt was ok until last night. they were watching TV and he fell asleep which was unusual. he also appeared to be weak and imbalanced. he does not usually have meaningful conversation but can follow commands but has not been doing that since last night and appears more weak and fatigued than usual. pt has also been coughing. has been exposed to grandchild with URI sx.   will eval for infectious, metabolid or central etiology of ams

## 2024-12-01 NOTE — H&P ADULT - NSHPSOCIALHISTORY_GEN_ALL_CORE
Lives: at home with wife  Alcohol Use: former drinker, quit 15yrs ago   Tobacco Use: former smoker, quit 15yrs ago Lives: at home with wife and grandchildren  Alcohol Use: former drinker, quit 20yrs ago   Tobacco Use: former smoker, quit 20yrs ago  Ambulation: indepedent Lives: at home with wife and grandchildren  Alcohol Use: former drinker, quit 20yrs ago   Tobacco Use: former smoker, quit 20yrs ago  Ambulation: independent

## 2024-12-01 NOTE — H&P ADULT - PROBLEM SELECTOR PLAN 5
Chronic   - EKG:   - On home ASA and statin, continue  - F/u AM lipid panel Chronic   - EKG: sinus bradycardia with 1st degree AV block, septal infarct (age undetermined), QTc 447   - On home ASA and statin, continue  - F/u AM lipid panel

## 2024-12-01 NOTE — ED PROVIDER NOTE - PROGRESS NOTE DETAILS
I, Dr. Dutton, received this patient in sign out at 1500 from Dr. Kline, pending CT and urology    discussed with urology Dr. Tipton and surgical PA, will see patient as RNs unable to place brewster. Fierro placed by surgery.  Patient required mission for SARAY.

## 2024-12-01 NOTE — H&P ADULT - NSHPPHYSICALEXAM_GEN_ALL_CORE
T(C): 37.3 (12-01-24 @ 11:39), Max: 37.3 (12-01-24 @ 11:39)  HR: 54 (12-01-24 @ 11:39) (54 - 54)  BP: 110/56 (12-01-24 @ 11:39) (110/56 - 110/56)  RR: 18 (12-01-24 @ 11:39) (18 - 18)  SpO2: 96% (12-01-24 @ 11:39) (96% - 96%)    GENERAL: patient appears well, no acute distress, appropriately interactive  EYES: sclera clear, no exudates  ENMT: oropharynx clear without erythema, no exudates, moist mucous membranes  NECK: supple, soft  LUNGS: good air entry bilaterally, clear to auscultation, symmetric breath sounds, no wheezing or rhonchi appreciated  HEART: soft S1/S2, regular rate and rhythm, no murmurs noted, no lower extremity edema  GASTROINTESTINAL: abdomen is soft, nontender, nondistended, normoactive bowel sounds  INTEGUMENT: good skin turgor, warm skin, appears well perfused  MUSCULOSKELETAL: no clubbing or cyanosis, no obvious deformity  NEUROLOGIC: awake, alert, oriented x3, good muscle tone in all 4 extremities  HEME/LYMPH: no obvious ecchymosis or petechiae T(C): 37.3 (12-01-24 @ 11:39), Max: 37.3 (12-01-24 @ 11:39)  HR: 54 (12-01-24 @ 11:39) (54 - 54)  BP: 110/56 (12-01-24 @ 11:39) (110/56 - 110/56)  RR: 18 (12-01-24 @ 11:39) (18 - 18)  SpO2: 96% (12-01-24 @ 11:39) (96% - 96%)    GENERAL: no acute distress, lying supine comfortably in bed   EYES: sclera clear, no exudates  ENMT: oropharynx clear without erythema, no exudates, dry mucous membranes  LUNGS: good air entry bilaterally, clear to auscultation, symmetric breath sounds, no wheezing or rhonchi appreciated  HEART: soft S1/S2, regular rate and rhythm, no lower extremity edema  GASTROINTESTINAL: abdomen is soft, nontender, nondistended, normoactive bowel sounds  INTEGUMENT: good skin turgor, warm skin, appears well perfused  MUSCULOSKELETAL: no clubbing or cyanosis, no obvious deformity  NEUROLOGIC: awake, alert, oriented x2, follows some commands, speech is clear, answers some questions appropriately  HEME/LYMPH: no obvious ecchymosis or petechiae

## 2024-12-01 NOTE — H&P ADULT - PROBLEM SELECTOR PLAN 1
SARAY on admission. Appears to have CKD per chart review. baseline appears around Cr 1.4   likely component of pre-renal 2/2 to viral syndrome and post renal 2/2 obstruction   - BUN/Cr 55/3.8  - eGFR 16  - Lactate 1.4  - Start IVF LR 70cc/hr   - Monitor daily CMP  - Urology consulted by ED; brewster placed   - Avoid nephrotoxic meds   - Nephro consulted, f/u recs. SARAY on admission. Appears to have CKD per chart review. baseline appears around Cr 1.4   likely component of pre-renal 2/2 to viral syndrome and post renal 2/2 obstruction   - BUN/Cr 55/3.8  - eGFR 16  - Lactate 1.4  - Start IVF LR 70cc/hr   - Monitor daily CMP  - Urology consulted by ED; brewster placed   - Avoid nephrotoxic meds   - Nephro consulted (Dr. Henderson), f/u recs.

## 2024-12-01 NOTE — ED PROVIDER NOTE - OBJECTIVE STATEMENT
80yo M with a PMH of COPD, HTN, HLD, DM2, CVA (4/2015), dementia, TAMMY on CPAP who presents to the ED with cough and AMS. per wife pt was ok until last night. they were watching TV and he fell asleep which was unusual. he also appeared to be weak and imbalanced. he does not usually have meaningful conversation but can follow commands but has not been doing that since last night and appears more weak and fatigued than usual. pt has also been coughing. has been exposed to grandchild with URI sx.

## 2024-12-01 NOTE — ED ADULT NURSE NOTE - DOES PATIENT HAVE ADVANCE DIRECTIVE
Date of Service: 11/16/2022    SUBJECTIVE AND CHIEF COMPLAINT:    Chronic hand stiffness and right knee pain, gastroesophageal reflux disease, dyslipidemia.    HISTORY OF PRESENT ILLNESS:    This is a 61-year-old male here today with above concerns.  The patient has been having problems with stiffness in his hands for years.  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  The patient was referred to occupational therapy for the arthralgias of his hands, but he did not follow through with occupational therapy referral.  He has also had chronic right knee pain for years.  His right knee pain would be worse when he goes up and down the steps of his truck.  He did have an x-ray of his right knee on 10/03/2022 which the impression was tricompartmental osteoarthritis, worse in the medial compartment.  He was referred to outpatient physical therapy for this and did not follow through with the referral.  The patient has not had any problems with chest pain, shortness of breath.  He does have gastroesophageal reflux disease and has been taking Omeprazole daily for his gastroesophageal reflux disease symptoms.  The Omeprazole significantly helps with his symptoms.  He would like to have this medication refilled.  He has not had any problems with difficult swallowing, pain with swallowing, abdominal pain or blood in stool.  The patient does have dyslipidemia and in the past month, he is trying to be more consistent with a low cholesterol diet, has been eating more salads and has lost 2 pounds.  He has not taken medications for dyslipidemia.    ALLERGIES AND MEDICATIONS:    Reviewed from [facility] on 11/16/2022.    SOCIAL HISTORY:    Negative for cigarette smoking.    OBJECTIVE:    VITAL SIGNS:  Blood pressure 150/98, retaken 138/86, pulse 64, weight 122.6 kg.  CARDIOVASCULAR:  S1, S2, no murmurs,  regular rate and rhythm.  LUNGS:  Clear to auscultation bilaterally.  Negative crackles, wheeze, rhonchi.  Unlabored respiration.    LABORATORY DATA:    On 10/03/2022, cholesterol 211, triglycerides 346, HDL 53, LDL 89.    ASSESSMENT AND PLAN:    1.  Osteoarthritis of both hands.  Pathophysiology of osteoarthritis was reviewed with the patient today. Acetaminophen 500 mg 1-2 tablets p.o. q.8h. p.r.n. Try to decrease repetitive motions.  If the patient is interested in occupational therapy referral, he may contact my office.  If symptoms become exacerbated, may follow up at that time and as needed.  2.  Osteoarthritis, right knee.  Pathophysiology of osteoarthritis reviewed with the patient today.  Acetaminophen as above.  If the patient requires an intra-articular Kenalog injection or referral to outpatient physical therapy, he may contact my office.  He otherwise may follow up as needed.  3.  Gastroesophageal reflux disease. Gastroesophageal reflux disease diet, Omeprazole 40 mg p.o. daily.  If the patient's symptoms become exacerbated, he may see me at time, otherwise may follow up in one year.  4.  Dyslipidemia.  Low fat, low cholesterol diet, less than 2000 calorie diet daily, 1-2 pound weight loss weekly.  We will check a fasting lipid panel in 3 months.  We will notify the patient of test results and manage accordingly.      Dictated By: Nicholas Phillip MD  Signing Provider: MD LUZMA Rubio/zaheer ([id number])   DD: 11/16/2022 10:49:39 AM TD: 11/18/2022 8:10:08 AM       No

## 2024-12-01 NOTE — ED PROVIDER NOTE - CARE PLAN
Principal Discharge DX:	SARAY (acute kidney injury)  Secondary Diagnosis:	Acute urinary retention  Secondary Diagnosis:	Rhinovirus infection   1

## 2024-12-02 ENCOUNTER — TRANSCRIPTION ENCOUNTER (OUTPATIENT)
Age: 79
End: 2024-12-02

## 2024-12-02 LAB
A1C WITH ESTIMATED AVERAGE GLUCOSE RESULT: 7.8 % — HIGH (ref 4–5.6)
ALBUMIN SERPL ELPH-MCNC: 3.1 G/DL — LOW (ref 3.3–5)
ALP SERPL-CCNC: 78 U/L — SIGNIFICANT CHANGE UP (ref 40–120)
ALT FLD-CCNC: 19 U/L — SIGNIFICANT CHANGE UP (ref 12–78)
ANION GAP SERPL CALC-SCNC: 7 MMOL/L — SIGNIFICANT CHANGE UP (ref 5–17)
AST SERPL-CCNC: 14 U/L — LOW (ref 15–37)
BASOPHILS # BLD AUTO: 0.04 K/UL — SIGNIFICANT CHANGE UP (ref 0–0.2)
BASOPHILS NFR BLD AUTO: 0.6 % — SIGNIFICANT CHANGE UP (ref 0–2)
BILIRUB SERPL-MCNC: 0.3 MG/DL — SIGNIFICANT CHANGE UP (ref 0.2–1.2)
BUN SERPL-MCNC: 37 MG/DL — HIGH (ref 7–23)
CALCIUM SERPL-MCNC: 8.7 MG/DL — SIGNIFICANT CHANGE UP (ref 8.5–10.1)
CHLORIDE SERPL-SCNC: 114 MMOL/L — HIGH (ref 96–108)
CHOLEST SERPL-MCNC: 102 MG/DL — SIGNIFICANT CHANGE UP
CO2 SERPL-SCNC: 25 MMOL/L — SIGNIFICANT CHANGE UP (ref 22–31)
CREAT SERPL-MCNC: 2.7 MG/DL — HIGH (ref 0.5–1.3)
EGFR: 23 ML/MIN/1.73M2 — LOW
EOSINOPHIL # BLD AUTO: 0.08 K/UL — SIGNIFICANT CHANGE UP (ref 0–0.5)
EOSINOPHIL NFR BLD AUTO: 1.2 % — SIGNIFICANT CHANGE UP (ref 0–6)
ESTIMATED AVERAGE GLUCOSE: 177 MG/DL — HIGH (ref 68–114)
GLUCOSE SERPL-MCNC: 141 MG/DL — HIGH (ref 70–99)
HCT VFR BLD CALC: 33 % — LOW (ref 39–50)
HDLC SERPL-MCNC: 50 MG/DL — SIGNIFICANT CHANGE UP
HGB BLD-MCNC: 10.9 G/DL — LOW (ref 13–17)
IMM GRANULOCYTES NFR BLD AUTO: 0.5 % — SIGNIFICANT CHANGE UP (ref 0–0.9)
LIPID PNL WITH DIRECT LDL SERPL: 31 MG/DL — SIGNIFICANT CHANGE UP
LYMPHOCYTES # BLD AUTO: 0.68 K/UL — LOW (ref 1–3.3)
LYMPHOCYTES # BLD AUTO: 10.4 % — LOW (ref 13–44)
MCHC RBC-ENTMCNC: 31.3 PG — SIGNIFICANT CHANGE UP (ref 27–34)
MCHC RBC-ENTMCNC: 33 G/DL — SIGNIFICANT CHANGE UP (ref 32–36)
MCV RBC AUTO: 94.8 FL — SIGNIFICANT CHANGE UP (ref 80–100)
MONOCYTES # BLD AUTO: 0.73 K/UL — SIGNIFICANT CHANGE UP (ref 0–0.9)
MONOCYTES NFR BLD AUTO: 11.2 % — SIGNIFICANT CHANGE UP (ref 2–14)
NEUTROPHILS # BLD AUTO: 4.98 K/UL — SIGNIFICANT CHANGE UP (ref 1.8–7.4)
NEUTROPHILS NFR BLD AUTO: 76.1 % — SIGNIFICANT CHANGE UP (ref 43–77)
NON HDL CHOLESTEROL: 52 MG/DL — SIGNIFICANT CHANGE UP
NRBC # BLD: 0 /100 WBCS — SIGNIFICANT CHANGE UP (ref 0–0)
PLATELET # BLD AUTO: 163 K/UL — SIGNIFICANT CHANGE UP (ref 150–400)
POTASSIUM SERPL-MCNC: 4 MMOL/L — SIGNIFICANT CHANGE UP (ref 3.5–5.3)
POTASSIUM SERPL-SCNC: 4 MMOL/L — SIGNIFICANT CHANGE UP (ref 3.5–5.3)
PROT SERPL-MCNC: 6.4 G/DL — SIGNIFICANT CHANGE UP (ref 6–8.3)
RBC # BLD: 3.48 M/UL — LOW (ref 4.2–5.8)
RBC # FLD: 14.2 % — SIGNIFICANT CHANGE UP (ref 10.3–14.5)
SODIUM SERPL-SCNC: 146 MMOL/L — HIGH (ref 135–145)
TRIGL SERPL-MCNC: 118 MG/DL — SIGNIFICANT CHANGE UP
WBC # BLD: 6.54 K/UL — SIGNIFICANT CHANGE UP (ref 3.8–10.5)
WBC # FLD AUTO: 6.54 K/UL — SIGNIFICANT CHANGE UP (ref 3.8–10.5)

## 2024-12-02 PROCEDURE — 99233 SBSQ HOSP IP/OBS HIGH 50: CPT | Mod: GC

## 2024-12-02 RX ORDER — POLYETHYLENE GLYCOL 3350 17 G/17G
17 POWDER, FOR SOLUTION ORAL DAILY
Refills: 0 | Status: DISCONTINUED | OUTPATIENT
Start: 2024-12-02 | End: 2024-12-04

## 2024-12-02 RX ORDER — SENNOSIDES 8.6 MG
2 TABLET ORAL AT BEDTIME
Refills: 0 | Status: DISCONTINUED | OUTPATIENT
Start: 2024-12-02 | End: 2024-12-04

## 2024-12-02 RX ORDER — MINERAL OIL
133 OIL (ML) ORAL ONCE
Refills: 0 | Status: COMPLETED | OUTPATIENT
Start: 2024-12-02 | End: 2024-12-02

## 2024-12-02 RX ORDER — 0.9 % SODIUM CHLORIDE 0.9 %
1000 INTRAVENOUS SOLUTION INTRAVENOUS
Refills: 0 | Status: DISCONTINUED | OUTPATIENT
Start: 2024-12-02 | End: 2024-12-04

## 2024-12-02 RX ADMIN — Medication 81 MILLIGRAM(S): at 12:23

## 2024-12-02 RX ADMIN — Medication 70 MILLILITER(S): at 15:02

## 2024-12-02 RX ADMIN — ACETAMINOPHEN, DIPHENHYDRAMINE HCL, PHENYLEPHRINE HCL 3 MILLIGRAM(S): 325; 25; 5 TABLET ORAL at 21:33

## 2024-12-02 RX ADMIN — DONEPEZIL HYDROCHLORIDE 10 MILLIGRAM(S): 5 TABLET, FILM COATED ORAL at 21:33

## 2024-12-02 RX ADMIN — MEMANTINE HYDROCHLORIDE 5 MILLIGRAM(S): 14 CAPSULE, EXTENDED RELEASE ORAL at 18:00

## 2024-12-02 RX ADMIN — Medication 70 MILLILITER(S): at 05:54

## 2024-12-02 RX ADMIN — Medication 2 TABLET(S): at 21:33

## 2024-12-02 RX ADMIN — Medication 1: at 17:54

## 2024-12-02 RX ADMIN — APIXABAN 5 MILLIGRAM(S): 2.5 TABLET, FILM COATED ORAL at 18:00

## 2024-12-02 RX ADMIN — Medication 75 MILLILITER(S): at 18:00

## 2024-12-02 RX ADMIN — FLUTICASONE PROPIONATE AND SALMETEROL XINAFOATE 1 DOSE(S): 45; 21 AEROSOL, METERED RESPIRATORY (INHALATION) at 21:33

## 2024-12-02 RX ADMIN — Medication 5 MILLIGRAM(S): at 06:57

## 2024-12-02 RX ADMIN — APIXABAN 5 MILLIGRAM(S): 2.5 TABLET, FILM COATED ORAL at 06:57

## 2024-12-02 RX ADMIN — Medication 5 MILLIGRAM(S): at 18:00

## 2024-12-02 RX ADMIN — MEMANTINE HYDROCHLORIDE 5 MILLIGRAM(S): 14 CAPSULE, EXTENDED RELEASE ORAL at 06:57

## 2024-12-02 RX ADMIN — AMLODIPINE BESYLATE 10 MILLIGRAM(S): 10 TABLET ORAL at 06:57

## 2024-12-02 RX ADMIN — ROSUVASTATIN CALCIUM 10 MILLIGRAM(S): 5 TABLET, FILM COATED ORAL at 21:33

## 2024-12-02 RX ADMIN — ESCITALOPRAM OXALATE 20 MILLIGRAM(S): 10 TABLET, FILM COATED ORAL at 12:23

## 2024-12-02 RX ADMIN — POLYETHYLENE GLYCOL 3350 17 GRAM(S): 17 POWDER, FOR SOLUTION ORAL at 12:23

## 2024-12-02 NOTE — CARE COORDINATION ASSESSMENT. - OTHER PERTINENT DISCHARGE PLANNING INFORMATION:
Met with patient, wife and son at bedside. Role of CM/transition planning explained. Patient consents to conversation with family present. Patient verbalizes understanding. Transition information provided at this time. Patient lives in private house w/ wife, daughter, 3 grandkids, 1 TARA, pt resides on main floor. Independent w/ all ADL's and ambulation, pt does not use any DME but owns cane & rollator. Pt does NOT have home O2 PTA. No needs/HCS present PTA. Pt & wife are declining all HCS at this time. Pt has had HCS in past and does not want again. Family will transport home. CM contact information provided. CM will continue to follow.

## 2024-12-02 NOTE — DISCHARGE NOTE PROVIDER - NSDCMRMEDTOKEN_GEN_ALL_CORE_FT
Albuterol: by nebulizer as needed for  bronchospasm  Alogliptin 12.5 mg oral tablet: 1 tab(s) orally once a day  amLODIPine 10 mg oral tablet: 1 tab(s) orally once a day  aspirin 81 mg oral tablet, chewable: 1 tab(s) orally once a day  BuSpar 5 mg oral tablet: 1 tab(s) orally 2 times a day  donepezil 10 mg oral tablet: 1 tab(s) orally once a day (at bedtime)  Eliquis 5 mg oral tablet: 1 tab(s) orally 2 times a day  escitalopram 20 mg oral tablet: 1 tab(s) orally once a day  Fluticasone-Salmeterol: inhaled once a day  glimepiride 4 mg oral tablet: 1 tab(s) orally once a day  losartan 100 mg oral tablet: 1 tab(s) orally once a day  memantine 5 mg oral tablet: 1 tab(s) orally 2 times a day  metFORMIN 500 mg oral tablet: 1 tab(s) orally 2 times a day  pioglitazone 30 mg oral tablet: 1 tab(s) orally once a day  QUEtiapine: 25 milligram(s) once a day (at bedtime)  rosuvastatin 10 mg oral tablet: 1 tab(s) orally once a day  tiotropium: inhaled once a day

## 2024-12-02 NOTE — CONSULT NOTE ADULT - SUBJECTIVE AND OBJECTIVE BOX
UROLOGY PA CONSULT NOTE:    CHIEF COMPLAINT:  Patient is a 79y old  Male who presents with a chief complaint of weakness    HPI:  HPI:  80yo M with a PMH of COPD, HTN, HLD, DM2, CVA (4/2015), dementia, TAMMY on CPAP who presents to the ED with cough and AMS. per wife pt was ok until last night. they were watching TV and he fell asleep which was unusual. he also appeared to be weak and imbalanced. he does not usually have meaningful conversation but can follow commands but has not been doing that since last night and appears more weak and fatigued than usual. pt has also been coughing. has been exposed to grandchild with URI sx.    INTERVAL HPI:  HPI as per above. Patient somnolent, unable to provide thorough history. Wife at bedside, states patient has been weak and seemed to be confused since yesterday night. Patient then went to sleep but was difficult to arouse which prompted wife to bring patient to ED. Wife states patient was having no trouble urinating at home nor does he have a history of urinary retention or difficulty urinating. Unable to obtain ROS from patient.     PAST MEDICAL HISTORY:  PAST MEDICAL & SURGICAL HISTORY:  Diabetes mellitus      Hypertension      COPD (chronic obstructive pulmonary disease)      HLD (hyperlipidemia)      Dementia      CVA (cerebral vascular accident)      H/O hand surgery      REVIEW OF SYSTEMS:  CONSTITUTIONAL: No weakness, fevers or chills, no weight loss  EYES/ENT: No visual changes;  No vertigo or throat pain   NECK: No pain or stiffness  ENDOCRINE: No thyroid problems  RESPIRATORY: No cough, wheezing, hemoptysis; No shortness of breath  CARDIOVASCULAR: No chest pain or palpitations  GASTROINTESTINAL: No abdominal or epigastric pain. No nausea, vomiting, or hematemesis; No diarrhea or constipation. No melena or hematochezia.  GENITOURINARY: No dysuria, frequency or hematuria  MSK: No joint swelling  NEUROLOGICAL: No numbness or weakness  SKIN: No itching, burning, rashes, or lesions   All other review of systems is negative unless indicated above.    MEDICATIONS:  Home Medications:  Alogliptin 12.5 mg oral tablet: 1 tab(s) orally once a day (13 Apr 2024 01:47)  amLODIPine 10 mg oral tablet: 1 tab(s) orally once a day (13 Apr 2024 01:47)  aspirin 81 mg oral tablet, chewable: 1 tab(s) orally once a day (13 Apr 2024 01:48)  BuSpar 5 mg oral tablet: 1 tab(s) orally once a day (13 Apr 2024 01:47)  donepezil 10 mg oral tablet: 1 tab(s) orally once a day (at bedtime) (13 Apr 2024 01:48)  Eliquis 5 mg oral tablet: 1 tab(s) orally 2 times a day (13 Apr 2024 01:48)  escitalopram 20 mg oral tablet: 1 tab(s) orally once a day (13 Apr 2024 01:48)  glimepiride 4 mg oral tablet: 1 tab(s) orally once a day (13 Apr 2024 01:48)  losartan 100 mg oral tablet: 1 tab(s) orally once a day (13 Apr 2024 01:48)  metFORMIN 500 mg oral tablet: 2 tab(s) orally 2 times a day (13 Apr 2024 01:48)  QUEtiapine: 37.5 milligram(s) once a day (at bedtime) Take 1 tablet every night at bedtime (20 Apr 2024 12:39)  rosuvastatin 10 mg oral tablet: 1 tab(s) orally once a day (13 Apr 2024 01:48)    MEDICATIONS  (STANDING):    MEDICATIONS  (PRN):      ALLERGIES:  Allergies    No Known Allergies    Intolerances        SOCIAL HISTORY:  Social History:    Smoking: Yes [ ]  No [ ]   ______pk yrs  ETOH  Yes [ ]  No [ ]  Social [ ]  DRUGS:  Yes [ ]  No [ ]  if so what______________    FAMILY HISTORY:  FAMILY HISTORY:  Family history of CABG (Father)        PHYSICAL EXAM:  Vital Signs Last 24 Hrs  T(C): 37.3 (01 Dec 2024 11:39), Max: 37.3 (01 Dec 2024 11:39)  T(F): 99.2 (01 Dec 2024 11:39), Max: 99.2 (01 Dec 2024 11:39)  HR: 54 (01 Dec 2024 11:39) (54 - 54)  BP: 110/56 (01 Dec 2024 11:39) (110/56 - 110/56)  BP(mean): --  RR: 18 (01 Dec 2024 11:39) (18 - 18)  SpO2: 96% (01 Dec 2024 11:39) (96% - 96%)    Parameters below as of 01 Dec 2024 11:39  Patient On (Oxygen Delivery Method): room air        CONSTITUTIONAL: No apparent distress, lying comfortably in bed  HEAD:  Atraumatic, normocephalic  EYES: EOMI, PERRLA, conjunctiva and sclera clear  ENMT: Oral mucosa with moist membranes. Normal dentition; no pharyngeal injection or exudates  NECK: Supple, symmetric and without tracheal deviation   RESP: No respiratory distress, no use of accessory muscles; CTA b/l, no WRR  CV: RRR, +S1S2, no MRG; no JVD; no peripheral edema  GI: Soft, NT, ND, no rebound, no guarding; no palpable masses; no hepatosplenomegaly; no hernia palpated  LYMPH: No cervical LAD or tenderness; no axillary LAD or tenderness; no inguinal LAD or tenderness  EXTREMITIES: 2+ peripheral pulses, no clubbing, cyanosis, or edema  PSYCH: A&O x3  NEUROLOGY: Non-focal, motor & sensory grossly intact  SKIN: Warm & dry, no rashes or lesions; no subcutaneous nodules or induration palpable  : urethral meatus covered with blood and small amounts of clot present. bladder does not appear to be distended nor was there TTP in suprapubic region.    LABS:                        12.2   6.22  )-----------( 182      ( 01 Dec 2024 12:15 )             37.7     12-01    143  |  111[H]  |  55[H]  ----------------------------<  127[H]  4.6   |  26  |  3.80[H]    Ca    9.1      01 Dec 2024 12:15    TPro  7.1  /  Alb  3.6  /  TBili  <0.1[L]  /  DBili  x   /  AST  11[L]  /  ALT  19  /  AlkPhos  86  12-01    PT/INR - ( 01 Dec 2024 12:15 )   PT: 12.3 sec;   INR: 1.05 ratio         PTT - ( 01 Dec 2024 12:15 )  PTT:36.3 sec  Lactate, Blood: 1.4 mmol/L (12-01 @ 12:15)    Urinalysis Basic - ( 01 Dec 2024 12:15 )    Color: x / Appearance: x / SG: x / pH: x  Gluc: 127 mg/dL / Ketone: x  / Bili: x / Urobili: x   Blood: x / Protein: x / Nitrite: x   Leuk Esterase: x / RBC: x / WBC x   Sq Epi: x / Non Sq Epi: x / Bacteria: x      LIVER FUNCTIONS - ( 01 Dec 2024 12:15 )  Alb: 3.6 g/dL / Pro: 7.1 g/dL / ALK PHOS: 86 U/L / ALT: 19 U/L / AST: 11 U/L / GGT: x               RADIOLOGY & ADDITIONAL STUDIES:  < from: CT Abdomen and Pelvis No Cont (12.01.24 @ 15:12) >    ACC: [id number] EXAM:  CT ABDOMEN AND PELVIS   ORDERED BY: JOSI TUTTLE     PROCEDURE DATE:  12/01/2024          INTERPRETATION:  CLINICAL INFORMATION: Urinary retention    COMPARISON: CT scan of the abdomen and pelvis from 325    CONTRAST/COMPLICATIONS:  IV Contrast: NONE  Oral Contrast: NONE      PROCEDURE:  CT of the Abdomen and Pelvis was performed.  Sagittal and coronal reformats were performed.    FINDINGS:  LOWER CHEST: Motion artifact limits fine evaluation lung parenchyma.   Bronchial wall thickening in the right base. 4 mm nodule in the right   lower lobe on series 301 image 3 is nonspecific. This region was not   included on the prior examination. Bilateral gynecomastia.    LIVER: Within normal limits.  BILE DUCTS: Normal caliber.  GALLBLADDER: Within normal limits.  SPLEEN: Within normal limits.  PANCREAS: Within normal limits.  ADRENALS: Within normal limits.  KIDNEYS/URETERS: Within normal limits.    BLADDER: Distended bladder.  REPRODUCTIVE ORGANS: The gland is mildly prominent size.    BOWEL: No bowel obstruction. Appendix within normal limits. Multiple   colonic diverticuli without focal inflammation.  PERITONEUM/RETROPERITONEUM: Within normal limits.  VESSELS: Vascular calcifications. Mild infrarenal abdominal aortic   aneurysm measuring up to 3.3 x 2.7 does not appear significantly changed   compared with 3/25/2014.  LYMPH NODES: No lymphadenopathy.  ABDOMINAL WALL: Within normal limits.  BONES: Degenerative changes of bone. Mild scoliosis.    IMPRESSION:  Distended bladder. No discrete mass is identified. Prostate gland is   mildly enlarged. Please correlate clinically.        --- End of Report ---    < end of copied text >    ASSESSMENT:  80yo M with a PMH of COPD, HTN, HLD, DM2, CVA (4/2015), dementia, TAMMY on CPAP who presents to the ED with cough and AMS. Urology consulted for difficult brewster placement. 18fr coude catheter successfully placed with positive return of clear, yellow urine. No other acute urological intervention indicated at this time. Please reconsult PRN, urology to sign off.     PLAN:  - Recommend admission to medicine for workup of SARAY  - VSSAF  - Labs with no leukocytosis, Cr 3.8  - CT findings with distended bladder, mildly enlarged prostate gland  - Pain regimen/supportive care  - Monitor I&Os      Case discussed with Dr. Tipton
                           [facility]                             Nephrology and Hypertension                             Perry Ellis                                          [phone]     Patient is a 79y old  Male who presents with a chief complaint of SARAY (02 Dec 2024 09:45)       HPI:  80yo M with a PMH of COPD, HTN, HLD, DM2, CVA (2015), dementia, TAMMY on CPAP who presents to the ED with cough and AMS. Patient's wife at bedside and assisted with history given patients history of dementia. Pt has cough for the past week, productive of clear phlegm. Pt lives w/ grandchildren who have had URI symptoms as well. Pt's wife has been giving albuterol nebulizer 2x daily to help with cough. She reports this AM patient was lethargic and she had difficulty waking him up from sleep. She also noted his speech was "garbled" and felt he was off balance when ambulating. Also has decreased PO intake for the past few days. Per wife he is AAOx1-2 at baseline. Has SOB with exertion, which is chronic.   Has not seen nephrologist in the past.  Denies any N/V/SOB, but has dementia.     ROS:   ROS limited 2/2 dementia however per wife Denies fever, chills, chest pain, abdominal pain.    (+): fatigue, lethargy, cough, SOB on exertion (chronic), nasal discharge    ED course  Vitals: T 99.2, HR 54, /56, RR 18, SpO2 96% on RA  Significant labs: Cr 3.8, lactate 1.4   UA grossly normal   +Enterorhino  Imaging:   CT Abdomen and Pelvis shows Distended bladder. No discrete mass is identified. Prostate gland is mildly enlarged. Please correlate clinically. Mild infrarenal abdominal aortic aneurysm measuring up to 3.3 x 2.7 does not appear significantly changed compared with 3/25/2014.  CXR:  No radiographic evidence of active chest disease.  CTHead non con: Stable head CT.  EKG: sinus bradycardia with 1st degree AV block, septal infarct (age undetermined), QTc 447   In ED patient given: 1L NS bolus x1, Ativan 1mg IVP x1   (01 Dec 2024 19:06)       PAST MEDICAL & SURGICAL HISTORY:  Diabetes mellitus      Hypertension      COPD (chronic obstructive pulmonary disease)      HLD (hyperlipidemia)      Dementia      CVA (cerebral vascular accident)      H/O hand surgery           FAMILY HISTORY:  Family history of CABG (Father)    NC    Social History:Non smoker    MEDICATIONS  (STANDING):  amLODIPine   Tablet 10 milliGRAM(s) Oral daily  apixaban 5 milliGRAM(s) Oral two times a day  aspirin  chewable 81 milliGRAM(s) Oral daily  busPIRone 5 milliGRAM(s) Oral two times a day  dextrose 5%. 1000 milliLiter(s) (100 mL/Hr) IV Continuous <Continuous>  dextrose 5%. 1000 milliLiter(s) (50 mL/Hr) IV Continuous <Continuous>  dextrose 50% Injectable 25 Gram(s) IV Push once  dextrose 50% Injectable 12.5 Gram(s) IV Push once  dextrose 50% Injectable 25 Gram(s) IV Push once  donepezil 10 milliGRAM(s) Oral at bedtime  escitalopram 20 milliGRAM(s) Oral daily  fluticasone propionate/ salmeterol 100-50 MICROgram(s) Diskus 1 Dose(s) Inhalation two times a day  glucagon  Injectable 1 milliGRAM(s) IntraMuscular once  insulin lispro (ADMELOG) corrective regimen sliding scale   SubCutaneous three times a day before meals  insulin lispro (ADMELOG) corrective regimen sliding scale   SubCutaneous at bedtime  memantine 5 milliGRAM(s) Oral two times a day  mineral oil enema 133 milliLiter(s) Rectal once  polyethylene glycol 3350 17 Gram(s) Oral daily  rosuvastatin 10 milliGRAM(s) Oral at bedtime  senna 2 Tablet(s) Oral at bedtime  sodium chloride 0.45%. 1000 milliLiter(s) (75 mL/Hr) IV Continuous <Continuous>  tiotropium 2.5 MICROgram(s) Inhaler 2 Puff(s) Inhalation daily    MEDICATIONS  (PRN):  acetaminophen     Tablet .. 650 milliGRAM(s) Oral every 6 hours PRN Temp greater or equal to 38C (100.4F), Mild Pain (1 - 3)  aluminum hydroxide/magnesium hydroxide/simethicone Suspension 30 milliLiter(s) Oral every 4 hours PRN Dyspepsia  dextrose Oral Gel 15 Gram(s) Oral once PRN Blood Glucose LESS THAN 70 milliGRAM(s)/deciliter  melatonin 3 milliGRAM(s) Oral at bedtime PRN Insomnia   Meds reviewed    Allergies    No Known Allergies    Intolerances         REVIEW OF SYSTEMS:    Review of Systems:   Constitutional: Denies fatigue  HEENT: Denies headaches and dizziness  Respiratory: denies SOB, cough, or wheezing  Cardiovascular: denies CP, palpitations  Gastrointestinal: Denies nausea, denies vomiting, diarrhea, constipation, abdominal pain, or bloody stools  Genitourinary: denies painful urination, increased frequency, urgency, or bloody urine  Skin: denies rashes or itching  Musculoskeletal: denies muscle aches, joint swelling  Neurologic: Denies generalized weakness, denies loss of sensation, numbness, or tingling      Vital Signs Last 24 Hrs  T(C): 37.2 (02 Dec 2024 11:00), Max: 38 (02 Dec 2024 05:53)  T(F): 98.9 (02 Dec 2024 11:00), Max: 100.4 (02 Dec 2024 05:53)  HR: 65 (02 Dec 2024 11:00) (62 - 84)  BP: 134/71 (02 Dec 2024 11:00) (132/72 - 147/73)  BP(mean): --  RR: 17 (02 Dec 2024 11:00) (16 - 17)  SpO2: 94% (02 Dec 2024 11:00) (94% - 98%)    Parameters below as of 02 Dec 2024 11:00  Patient On (Oxygen Delivery Method): nasal cannula, 2L      Daily     Daily Weight in k.6 (02 Dec 2024 13:15)    PHYSICAL EXAM:    GENERAL: NAD  HEAD:  Atraumatic, Normocephalic  EYES: EOMI, conjunctiva and sclera clear  ENMT: No Drainage from nares, No drainage from ears  NERVOUS SYSTEM:  Awake and Alert  CHEST/LUNG: Clear to percussion bilaterally  EXTREMITIES:  No Edema  SKIN: No rashes No obvious ecchymosis      LABS:                        10.9   6.54  )-----------( 163      ( 02 Dec 2024 09:47 )             33.0     12-02    146[H]  |  114[H]  |  37[H]  ----------------------------<  141[H]  4.0   |  25  |  2.70[H]    Ca    8.7      02 Dec 2024 09:47    TPro  6.4  /  Alb  3.1[L]  /  TBili  0.3  /  DBili  x   /  AST  14[L]  /  ALT  19  /  AlkPhos  78  12-02    PT/INR - ( 01 Dec 2024 12:15 )   PT: 12.3 sec;   INR: 1.05 ratio         PTT - ( 01 Dec 2024 12:15 )  PTT:36.3 sec  Urinalysis Basic - ( 02 Dec 2024 09:47 )    Color: x / Appearance: x / SG: x / pH: x  Gluc: 141 mg/dL / Ketone: x  / Bili: x / Urobili: x   Blood: x / Protein: x / Nitrite: x   Leuk Esterase: x / RBC: x / WBC x   Sq Epi: x / Non Sq Epi: x / Bacteria: x              RADIOLOGY & ADDITIONAL TESTS:

## 2024-12-02 NOTE — PHYSICAL THERAPY INITIAL EVALUATION ADULT - BED MOBILITY TRAINING, PT EVAL
"Initial BP (!) 133/90 (BP Location: Right arm, Patient Position: Chair, Cuff Size: Adult Large)   Pulse 111   Temp 99.7  F (37.6  C) (Tympanic)   Resp 16   Ht 1.676 m (5' 6\")   Wt 147.7 kg (325 lb 11.2 oz)   LMP 07/14/2021   Breastfeeding No   BMI 52.57 kg/m   Estimated body mass index is 52.57 kg/m  as calculated from the following:    Height as of this encounter: 1.676 m (5' 6\").    Weight as of this encounter: 147.7 kg (325 lb 11.2 oz). .    Karen Funez, ADEN    " GOAL: Patient will complete bed mobility independently (rolling, supine<>sit) (2 weeks)

## 2024-12-02 NOTE — PROGRESS NOTE ADULT - PROBLEM SELECTOR PLAN 1
SARAY on admission. Appears to have CKD per chart review. baseline appears around Cr 1.4   likely component of pre-renal 2/2 to viral syndrome and post renal 2/2 obstruction   - BUN/Cr 55/3.8, eGFR 16 on admission  - Lactate 1.4  - c/w IVF LR 70cc/hr   - Monitor daily CMP  - Urology consulted by ED; brewster placed   - Avoid nephrotoxic meds   - Nephro consulted (Dr. Henderson), f/u recs.

## 2024-12-02 NOTE — PATIENT PROFILE ADULT - NSPRONUTRITIONRISK_GEN_A_NUR
Pt seen 1/18/21, Pt with eyes closed, but mumbling to questions, moving right side spontaneously and left foot spontaneously, good resistance with left arm and squeezes hand.  Follows commands right side.  Continue monitoring closely. No indicators present

## 2024-12-02 NOTE — CONSULT NOTE ADULT - ASSESSMENT
SARAY SARAY on CKD  Hypernatremia  HTN    -Baseline Creatinine  -SARAY Likely 2/2   -Check Urine lytes  -Check UA SARAY on CKD 3  Hypernatremia  HTN  Dementia    -Baseline Creatinine 1.4  -SARAY multifactorial volume depletion/dehydration + urinary retention  -Check Urine lytes  -Check UA  -Fierro was placed in ER by urology  -Adjust IVF to hypotonic fluid for hypernatremia  -BP controlled    12/2/24-d/w wife

## 2024-12-02 NOTE — PATIENT PROFILE ADULT - FALL HARM RISK - HARM RISK INTERVENTIONS

## 2024-12-02 NOTE — PROGRESS NOTE ADULT - PROBLEM SELECTOR PLAN 7
Chronic   - EKG QTc 447   - Continue home Donepezil   - Continue home Lexapro   - Continue home Buspar bid   - continue home memantine  - continue home seroquel Chronic   - EKG QTc 447   - Continue home Donepezil   - Continue home Lexapro   - Continue home Buspar bid   - continue home memantine  - hold home seroquel as pt lethargic  - if agitated overnight can give one time dose of home med 96

## 2024-12-02 NOTE — CARE COORDINATION ASSESSMENT. - NSCAREPROVIDERS_GEN_ALL_CORE_FT
Name band;
CARE PROVIDERS:  Accepting Physician: Heather Hernandez  Administration: Frankie Butler  Administration: John Tafoya  Administration: Arlene Bolton  Admitting: Heather Hernandez  Attending: Jocelynn Tariq  Case Management: Payal Alfaro  Consultant: Keyon Tapia  Consultant: Weil, Patricia  Consultant: Javier Henderson  Consultant: Ryder Pang  Covering Team: Tonio Trejo  ED Attending: Valery Kline  ED Attending2: Charlie Dutton  ED Nurse: Gisel Gonzales  HIM/Billing & Coding: Shanice Pablo  Nurse: Gisel Gonzales  Nurse: Dafne Hays  Ordered: Doctor, Unknown  Outpatient Provider: Aníbal Lu  Outpatient Provider: Keyon Tapia  Outpatient Provider: Biran Caro  Outpatient Provider: Corey Hardy  PCA/Nursing Assistant: Yessenia Guerrero  Physical Therapy: Shawn Orozco  Primary Team: Jocelynn Tariq  Primary Team: Luke Luciano  Primary Team: Naseem Rice  Primary Team: Corby Fatima  Primary Team: Von Turner  Respiratory Therapy: Bernardino Harrison// Supp. Assoc.: Karen Marquez

## 2024-12-02 NOTE — PHYSICAL THERAPY INITIAL EVALUATION ADULT - PATIENT PROFILE REVIEW, REHAB EVAL
yes [Initial Evaluation] : an initial evaluation [Father] : father [FreeTextEntry1] : bilateral sever pes plano valgus

## 2024-12-02 NOTE — PHYSICAL THERAPY INITIAL EVALUATION ADULT - PERTINENT HX OF CURRENT PROBLEM, REHAB EVAL
78yo M with a PMH of COPD, HTN, HLD, DM2, CVA (4/2015), dementia, TAMMY on CPAP who presents to the ED with cough and AMS. Admitted for SARAY.

## 2024-12-02 NOTE — PHYSICAL THERAPY INITIAL EVALUATION ADULT - ADDITIONAL COMMENTS
Patient lives with wife, private house, has RW however does not use, wife assists with ADLs as needed, pt baez by hx of dementia.

## 2024-12-02 NOTE — DISCHARGE NOTE PROVIDER - ATTENDING DISCHARGE PHYSICAL EXAMINATION:
gen: NAD  neuro: Alert, occasionally follows commands, grossly moves all extremities  lungs: diminished at bases overall CTA bl no wheezing

## 2024-12-02 NOTE — DISCHARGE NOTE PROVIDER - NSDCCPCAREPLAN_GEN_ALL_CORE_FT
PRINCIPAL DISCHARGE DIAGNOSIS  Diagnosis: SARAY (acute kidney injury)  Assessment and Plan of Treatment: You were found to have significantly decreased renal function on arrival to the hospital. On imaging, your bladder was dilated and full of urine, suggesting an obstruction. A brewster catheter was placed, allowing the urine to drain, thus improving your kidney function. Please follow up with Urology within 2 weeks of discharge for ongoing management.      SECONDARY DISCHARGE DIAGNOSES  Diagnosis: Acute urinary retention  Assessment and Plan of Treatment: On imaging, your bladder was dilated and full of urine, suggesting an obstruction. A brewster catheter was placed, allowing the urine to drain, thus improving your kidney function. Please follow up with Urology within 2 weeks of discharge for ongoing management.    Diagnosis: Rhinovirus infection  Assessment and Plan of Treatment: You were incidentally found to have Rhino/Entero Virus on admission, a cause of the common cold. Supportive care was provided and you clinically improved throughout your course. Please follow up with your outpatient Primary care for ongoing management if symptoms persist or recur.     PRINCIPAL DISCHARGE DIAGNOSIS  Diagnosis: SARAY (acute kidney injury)  Assessment and Plan of Treatment: You were found to have significantly decreased renal function on arrival to the hospital. On imaging, your bladder was dilated and full of urine, suggesting an obstruction. A brewster catheter was placed, allowing the urine to drain, thus improving your kidney function. The catheter was removed and you were able to void independently. Please follow up with Urology within 2 weeks of discharge for ongoing management.      SECONDARY DISCHARGE DIAGNOSES  Diagnosis: Acute urinary retention  Assessment and Plan of Treatment: On imaging, your bladder was dilated and full of urine, suggesting an obstruction. A brewster catheter was placed, allowing the urine to drain, thus improving your kidney function. The catheter was removed and you were able to void independently. Please follow up with Urology within 2 weeks of discharge for ongoing management.    Diagnosis: Rhinovirus infection  Assessment and Plan of Treatment: You were incidentally found to have Rhino/Entero Virus on admission, a cause of the common cold. Supportive care was provided and you clinically improved throughout your course. Please follow up with your outpatient Primary care for ongoing management if symptoms persist or recur.     PRINCIPAL DISCHARGE DIAGNOSIS  Diagnosis: SARAY (acute kidney injury)  Assessment and Plan of Treatment: You were found to have significantly decreased renal function on arrival to the hospital. On imaging, your bladder was dilated and full of urine, suggesting an obstruction. A brewster catheter was placed, allowing the urine to drain, thus improving your kidney function. The catheter was removed and you were able to void independently. Please follow up with Urology within 2 weeks of discharge for ongoing management.  It is important to avoid constipation - use senna daily.   Repeat blood work in 1 week to assess your kidney function  Follow up with your urologist within the week      SECONDARY DISCHARGE DIAGNOSES  Diagnosis: Acute urinary retention  Assessment and Plan of Treatment: On imaging, your bladder was dilated and full of urine, suggesting an obstruction. A brewster catheter was placed, allowing the urine to drain, thus improving your kidney function. The catheter was removed and you were able to void independently. Please follow up with Urology within 2 weeks of discharge for ongoing management.    Diagnosis: Rhinovirus infection  Assessment and Plan of Treatment: You were incidentally found to have Rhino/Entero Virus on admission, a cause of the common cold. Supportive care was provided and you clinically improved throughout your course. Please follow up with your outpatient Primary care for ongoing management if symptoms persist or recur.  Take Robitussin as need for cough.

## 2024-12-02 NOTE — DISCHARGE NOTE PROVIDER - HOSPITAL COURSE
FROM ADMISSION H+P:   HPI:  78yo M with a PMH of COPD, HTN, HLD, DM2, CVA (4/2015), dementia, TAMMY on CPAP who presents to the ED with cough and AMS. Patient's wife at bedside and assisted with history given patients history of dementia. Pt has cough for the past week, productive of clear phlegm. Pt lives w/ grandchildren who have had URI symptoms as well. Pt's wife has been giving albuterol nebulizer 2x daily to help with cough. She reports this AM patient was lethargic and she had difficulty waking him up from sleep. She also noted his speech was "garbled" and felt he was off balance when ambulating. Also has decreased PO intake for the past few days. Per wife he is AAOx1-2 at baseline. Has SOB with exertion, which is chronic.     ROS:   ROS limited 2/2 dementia however per wife Denies fever, chills, chest pain, abdominal pain.    (+): fatigue, lethargy, cough, SOB on exertion (chronic), nasal discharge    ED course  Vitals: T 99.2, HR 54, /56, RR 18, SpO2 96% on RA  Significant labs: Cr 3.8, lactate 1.4   UA grossly normal   +Enterorhino  Imaging:   CT Abdomen and Pelvis shows Distended bladder. No discrete mass is identified. Prostate gland is mildly enlarged. Please correlate clinically. Mild infrarenal abdominal aortic aneurysm measuring up to 3.3 x 2.7 does not appear significantly changed compared with 3/25/2014.  CXR:  No radiographic evidence of active chest disease.  CTHead non con: Stable head CT.  EKG: sinus bradycardia with 1st degree AV block, septal infarct (age undetermined), QTc 447   In ED patient given: 1L NS bolus x1, Ativan 1mg IVP x1   (01 Dec 2024 19:06)      ---  HOSPITAL COURSE/PERTINENT LABS/PROCEDURES PERFORMED/PENDING TESTS:  Admitted with urinary retention and SARAY. CT revealing distended bladder without mass, with mildly enlarged prostate gland; known chronic 3.3 x2.7 infrarenal abdominal aortic aneurism, unchanged in size since 2014. Urology consulted and placed Coude Fierro catheter placed. UA grossly normal. Nephrology consulted for Cr 3.8, suspect post obstructive, recommending ...     Enterorhino virus detected on admission, supportive care.     Patient continued to improve throughout course and was medically optimized for discharge [home/[facility]].     ---  PATIENT CONDITION:  - stable    ---  PHYSICAL EXAM ON DAY OF DISCHARGE:    ---  CONSULTANTS:   Urology: Rebecca Chatterjee  Nephrology: Javier Baker   ---  ADVANCED CARE PLANNING:  - Code status:      - MOLST completed:      [  ] NO     [  ] YES    ---  TIME SPENT:  I, the attending physician, was physically present for the key portions of the evaluation and management (E/M) service provided. The total amount of time spent reviewing the hospital notes, laboratory values, imaging findings, assessing/counseling the patient, discussing with consultant physicians, social work, nursing staff was -- minutes FROM ADMISSION H+P:   HPI:  80yo M with a PMH of COPD, HTN, HLD, DM2, CVA (4/2015), dementia, TAMMY on CPAP who presents to the ED with cough and AMS. Patient's wife at bedside and assisted with history given patients history of dementia. Pt has cough for the past week, productive of clear phlegm. Pt lives w/ grandchildren who have had URI symptoms as well. Pt's wife has been giving albuterol nebulizer 2x daily to help with cough. She reports this AM patient was lethargic and she had difficulty waking him up from sleep. She also noted his speech was "garbled" and felt he was off balance when ambulating. Also has decreased PO intake for the past few days. Per wife he is AAOx1-2 at baseline. Has SOB with exertion, which is chronic.     ROS:   ROS limited 2/2 dementia however per wife Denies fever, chills, chest pain, abdominal pain.    (+): fatigue, lethargy, cough, SOB on exertion (chronic), nasal discharge    ED course  Vitals: T 99.2, HR 54, /56, RR 18, SpO2 96% on RA  Significant labs: Cr 3.8, lactate 1.4   UA grossly normal   +Enterorhino  Imaging:   CT Abdomen and Pelvis shows Distended bladder. No discrete mass is identified. Prostate gland is mildly enlarged. Please correlate clinically. Mild infrarenal abdominal aortic aneurysm measuring up to 3.3 x 2.7 does not appear significantly changed compared with 3/25/2014.  CXR:  No radiographic evidence of active chest disease.  CTHead non con: Stable head CT.  EKG: sinus bradycardia with 1st degree AV block, septal infarct (age undetermined), QTc 447   In ED patient given: 1L NS bolus x1, Ativan 1mg IVP x1   (01 Dec 2024 19:06)      ---  HOSPITAL COURSE/PERTINENT LABS/PROCEDURES PERFORMED/PENDING TESTS:  Admitted with urinary retention and SARAY. CT revealing distended bladder without mass, with mildly enlarged prostate gland; known chronic 3.3 x2.7 infrarenal abdominal aortic aneurism, unchanged in size since 2014. Urology consulted and placed Coude Fierro catheter placed. UA grossly normal. Nephrology consulted for Cr 3.8, suspect post obstructive. Renal function continued to improve with IVF. Trial of Void performed 12/3, patient able to pass urine without incident. Enterorhino virus detected on admission, supportive care. Patient continued to improve throughout course and was medically optimized for discharge home with assist from wife to perform exercises 2-3x per week.     ---  PATIENT CONDITION:  - stable    ---  PHYSICAL EXAM ON DAY OF DISCHARGE:    T(C): 37.1 (12-04-24 @ 10:17), Max: 37.1 (12-03-24 @ 13:09)  HR: 65 (12-04-24 @ 10:17) (49 - 65)  BP: 136/71 (12-04-24 @ 10:17) (136/71 - 170/71)  RR: 12 (12-04-24 @ 10:17) (12 - 18)  SpO2: 99% (12-04-24 @ 10:17) (92% - 99%)  PHYSICAL EXAM: exam limited due to mentation and pt declining further examination  GENERAL: NAD, sleeping but arousable  CHEST/LUNG:  b/l wheezing in upper and lower lung fields  HEART:  RRR, S1, S2  ABDOMEN:  BS+, soft, non-distended  NEUROLOGIC: pleasantly confused, oriented x1      ---  CONSULTANTS:   Urology: Rebecca Chatterjee  Nephrology: Javier Baker   ---  ADVANCED CARE PLANNING:  - Code status:    Full Code   - MOLST completed:      [ X ] NO     [  ] YES    ---  TIME SPENT:  I, the attending physician, was physically present for the key portions of the evaluation and management (E/M) service provided. The total amount of time spent reviewing the hospital notes, laboratory values, imaging findings, assessing/counseling the patient, discussing with consultant physicians, social work, nursing staff was -- minutes FROM ADMISSION H+P:   HPI:  78yo M with a PMH of COPD, HTN, HLD, DM2, CVA (4/2015), dementia, TAMMY on CPAP who presents to the ED with cough and AMS. Patient's wife at bedside and assisted with history given patients history of dementia. Pt has cough for the past week, productive of clear phlegm. Pt lives w/ grandchildren who have had URI symptoms as well. Pt's wife has been giving albuterol nebulizer 2x daily to help with cough. She reports this AM patient was lethargic and she had difficulty waking him up from sleep. She also noted his speech was "garbled" and felt he was off balance when ambulating. Also has decreased PO intake for the past few days. Per wife he is AAOx1-2 at baseline. Has SOB with exertion, which is chronic.     ROS:   ROS limited 2/2 dementia however per wife Denies fever, chills, chest pain, abdominal pain.    (+): fatigue, lethargy, cough, SOB on exertion (chronic), nasal discharge    ED course  Vitals: T 99.2, HR 54, /56, RR 18, SpO2 96% on RA  Significant labs: Cr 3.8, lactate 1.4   UA grossly normal   +Enterorhino  Imaging:   CT Abdomen and Pelvis shows Distended bladder. No discrete mass is identified. Prostate gland is mildly enlarged. Please correlate clinically. Mild infrarenal abdominal aortic aneurysm measuring up to 3.3 x 2.7 does not appear significantly changed compared with 3/25/2014.  CXR:  No radiographic evidence of active chest disease.  CTHead non con: Stable head CT.  EKG: sinus bradycardia with 1st degree AV block, septal infarct (age undetermined), QTc 447   In ED patient given: 1L NS bolus x1, Ativan 1mg IVP x1   (01 Dec 2024 19:06)      ---  HOSPITAL COURSE/PERTINENT LABS/PROCEDURES PERFORMED/PENDING TESTS:  Admitted with urinary retention and SARAY. CT revealing distended bladder without mass, with mildly enlarged prostate gland; known chronic 3.3 x2.7 infrarenal abdominal aortic aneurism, unchanged in size since 2014. Urology consulted and placed Coude Fierro catheter placed. UA grossly normal. Nephrology consulted for Cr 3.8, suspect post obstructive. Renal function continued to improve with IVF. Trial of Void performed 12/3, patient able to pass urine without incident. Enterorhino virus detected on admission, supportive care. Patient continued to improve throughout course and was medically optimized for discharge home with assist from wife to perform exercises 2-3x per week.     Discussed with renal Dr Halima hogue for dc home with outpatient follow up. Cr improving. Passed TOV  Discussed with wife at bedside, would like patient home - advised repeat blood work in 1 week.   ---  PATIENT CONDITION:  - stable    ---  PHYSICAL EXAM ON DAY OF DISCHARGE:    T(C): 37.1 (12-04-24 @ 10:17), Max: 37.1 (12-03-24 @ 13:09)  HR: 65 (12-04-24 @ 10:17) (49 - 65)  BP: 136/71 (12-04-24 @ 10:17) (136/71 - 170/71)  RR: 12 (12-04-24 @ 10:17) (12 - 18)  SpO2: 99% (12-04-24 @ 10:17) (92% - 99%)  PHYSICAL EXAM: exam limited due to mentation and pt declining further examination  GENERAL: NAD, sleeping but arousable  CHEST/LUNG:  b/l wheezing in upper and lower lung fields  HEART:  RRR, S1, S2  ABDOMEN:  BS+, soft, non-distended  NEUROLOGIC: pleasantly confused, oriented x1      ---  CONSULTANTS:   Urology: Rebecca Chatterjee  Nephrology: Javier Baker   --- No

## 2024-12-02 NOTE — DISCHARGE NOTE PROVIDER - CARE PROVIDER_API CALL
Rebecca Tipton [facility]  Urology  [address] [place] [id number]  Phone: [phone]  Fax: [phone]  Follow Up Time:

## 2024-12-03 LAB
ALBUMIN SERPL ELPH-MCNC: 3.1 G/DL — LOW (ref 3.3–5)
ALP SERPL-CCNC: 85 U/L — SIGNIFICANT CHANGE UP (ref 40–120)
ALT FLD-CCNC: 20 U/L — SIGNIFICANT CHANGE UP (ref 12–78)
ANION GAP SERPL CALC-SCNC: 4 MMOL/L — LOW (ref 5–17)
AST SERPL-CCNC: 16 U/L — SIGNIFICANT CHANGE UP (ref 15–37)
BASOPHILS # BLD AUTO: 0.03 K/UL — SIGNIFICANT CHANGE UP (ref 0–0.2)
BASOPHILS NFR BLD AUTO: 0.4 % — SIGNIFICANT CHANGE UP (ref 0–2)
BILIRUB SERPL-MCNC: 0.2 MG/DL — SIGNIFICANT CHANGE UP (ref 0.2–1.2)
BUN SERPL-MCNC: 34 MG/DL — HIGH (ref 7–23)
CALCIUM SERPL-MCNC: 8.8 MG/DL — SIGNIFICANT CHANGE UP (ref 8.5–10.1)
CHLORIDE SERPL-SCNC: 106 MMOL/L — SIGNIFICANT CHANGE UP (ref 96–108)
CO2 SERPL-SCNC: 28 MMOL/L — SIGNIFICANT CHANGE UP (ref 22–31)
CREAT SERPL-MCNC: 2.2 MG/DL — HIGH (ref 0.5–1.3)
EGFR: 30 ML/MIN/1.73M2 — LOW
EOSINOPHIL # BLD AUTO: 0.11 K/UL — SIGNIFICANT CHANGE UP (ref 0–0.5)
EOSINOPHIL NFR BLD AUTO: 1.5 % — SIGNIFICANT CHANGE UP (ref 0–6)
GLUCOSE SERPL-MCNC: 256 MG/DL — HIGH (ref 70–99)
HCT VFR BLD CALC: 34.3 % — LOW (ref 39–50)
HGB BLD-MCNC: 11.3 G/DL — LOW (ref 13–17)
IMM GRANULOCYTES NFR BLD AUTO: 0.3 % — SIGNIFICANT CHANGE UP (ref 0–0.9)
LYMPHOCYTES # BLD AUTO: 0.71 K/UL — LOW (ref 1–3.3)
LYMPHOCYTES # BLD AUTO: 9.9 % — LOW (ref 13–44)
MAGNESIUM SERPL-MCNC: 2 MG/DL — SIGNIFICANT CHANGE UP (ref 1.6–2.6)
MCHC RBC-ENTMCNC: 31.4 PG — SIGNIFICANT CHANGE UP (ref 27–34)
MCHC RBC-ENTMCNC: 32.9 G/DL — SIGNIFICANT CHANGE UP (ref 32–36)
MCV RBC AUTO: 95.3 FL — SIGNIFICANT CHANGE UP (ref 80–100)
MONOCYTES # BLD AUTO: 0.68 K/UL — SIGNIFICANT CHANGE UP (ref 0–0.9)
MONOCYTES NFR BLD AUTO: 9.5 % — SIGNIFICANT CHANGE UP (ref 2–14)
NEUTROPHILS # BLD AUTO: 5.63 K/UL — SIGNIFICANT CHANGE UP (ref 1.8–7.4)
NEUTROPHILS NFR BLD AUTO: 78.4 % — HIGH (ref 43–77)
NRBC # BLD: 0 /100 WBCS — SIGNIFICANT CHANGE UP (ref 0–0)
PHOSPHATE SERPL-MCNC: 2.8 MG/DL — SIGNIFICANT CHANGE UP (ref 2.5–4.5)
PLATELET # BLD AUTO: 149 K/UL — LOW (ref 150–400)
POTASSIUM SERPL-MCNC: 4.1 MMOL/L — SIGNIFICANT CHANGE UP (ref 3.5–5.3)
POTASSIUM SERPL-SCNC: 4.1 MMOL/L — SIGNIFICANT CHANGE UP (ref 3.5–5.3)
PROT SERPL-MCNC: 6.6 G/DL — SIGNIFICANT CHANGE UP (ref 6–8.3)
RBC # BLD: 3.6 M/UL — LOW (ref 4.2–5.8)
RBC # FLD: 13.7 % — SIGNIFICANT CHANGE UP (ref 10.3–14.5)
SODIUM SERPL-SCNC: 138 MMOL/L — SIGNIFICANT CHANGE UP (ref 135–145)
WBC # BLD: 7.18 K/UL — SIGNIFICANT CHANGE UP (ref 3.8–10.5)
WBC # FLD AUTO: 7.18 K/UL — SIGNIFICANT CHANGE UP (ref 3.8–10.5)

## 2024-12-03 PROCEDURE — 99233 SBSQ HOSP IP/OBS HIGH 50: CPT | Mod: GC

## 2024-12-03 RX ADMIN — APIXABAN 5 MILLIGRAM(S): 2.5 TABLET, FILM COATED ORAL at 16:53

## 2024-12-03 RX ADMIN — ACETAMINOPHEN, DIPHENHYDRAMINE HCL, PHENYLEPHRINE HCL 3 MILLIGRAM(S): 325; 25; 5 TABLET ORAL at 22:31

## 2024-12-03 RX ADMIN — Medication 1: at 12:21

## 2024-12-03 RX ADMIN — Medication 5 MILLIGRAM(S): at 16:53

## 2024-12-03 RX ADMIN — Medication 81 MILLIGRAM(S): at 11:32

## 2024-12-03 RX ADMIN — ROSUVASTATIN CALCIUM 10 MILLIGRAM(S): 5 TABLET, FILM COATED ORAL at 22:30

## 2024-12-03 RX ADMIN — Medication 2 PUFF(S): at 05:38

## 2024-12-03 RX ADMIN — Medication 5 MILLIGRAM(S): at 05:37

## 2024-12-03 RX ADMIN — MEMANTINE HYDROCHLORIDE 5 MILLIGRAM(S): 14 CAPSULE, EXTENDED RELEASE ORAL at 16:53

## 2024-12-03 RX ADMIN — Medication 75 MILLILITER(S): at 05:38

## 2024-12-03 RX ADMIN — DONEPEZIL HYDROCHLORIDE 10 MILLIGRAM(S): 5 TABLET, FILM COATED ORAL at 22:31

## 2024-12-03 RX ADMIN — MEMANTINE HYDROCHLORIDE 5 MILLIGRAM(S): 14 CAPSULE, EXTENDED RELEASE ORAL at 05:38

## 2024-12-03 RX ADMIN — AMLODIPINE BESYLATE 10 MILLIGRAM(S): 10 TABLET ORAL at 05:38

## 2024-12-03 RX ADMIN — ESCITALOPRAM OXALATE 20 MILLIGRAM(S): 10 TABLET, FILM COATED ORAL at 11:32

## 2024-12-03 RX ADMIN — POLYETHYLENE GLYCOL 3350 17 GRAM(S): 17 POWDER, FOR SOLUTION ORAL at 11:32

## 2024-12-03 RX ADMIN — APIXABAN 5 MILLIGRAM(S): 2.5 TABLET, FILM COATED ORAL at 05:38

## 2024-12-03 NOTE — CASE MANAGEMENT PROGRESS NOTE - NSCMPROGRESSNOTE_GEN_ALL_CORE
CM consult noted for health literacy-consult completed. Patient with a hx of Dementia. CM will continue to follow for transition planning.

## 2024-12-03 NOTE — PROGRESS NOTE ADULT - SUBJECTIVE AND OBJECTIVE BOX
Patient is a 79y old  Male who presents with a chief complaint of SARAY (03 Dec 2024 06:57)      INTERVAL HPI/OVERNIGHT EVENTS: No acute events overnight. Pt was seen and examined at the bedside this AM. Sleeping but arousable. Irritable this morning, states would like to be left alone to sleep. Exam limited as pt declined to be examined. Per discussion with spouse at bedside yesterday, pt is near baseline mentation due to dementia which worsens during hospitalizations.     MEDICATIONS  (STANDING):  amLODIPine   Tablet 10 milliGRAM(s) Oral daily  apixaban 5 milliGRAM(s) Oral two times a day  aspirin  chewable 81 milliGRAM(s) Oral daily  busPIRone 5 milliGRAM(s) Oral two times a day  dextrose 5%. 1000 milliLiter(s) (100 mL/Hr) IV Continuous <Continuous>  dextrose 5%. 1000 milliLiter(s) (50 mL/Hr) IV Continuous <Continuous>  dextrose 50% Injectable 25 Gram(s) IV Push once  dextrose 50% Injectable 12.5 Gram(s) IV Push once  dextrose 50% Injectable 25 Gram(s) IV Push once  donepezil 10 milliGRAM(s) Oral at bedtime  escitalopram 20 milliGRAM(s) Oral daily  fluticasone propionate/ salmeterol 100-50 MICROgram(s) Diskus 1 Dose(s) Inhalation two times a day  glucagon  Injectable 1 milliGRAM(s) IntraMuscular once  insulin lispro (ADMELOG) corrective regimen sliding scale   SubCutaneous three times a day before meals  insulin lispro (ADMELOG) corrective regimen sliding scale   SubCutaneous at bedtime  memantine 5 milliGRAM(s) Oral two times a day  polyethylene glycol 3350 17 Gram(s) Oral daily  rosuvastatin 10 milliGRAM(s) Oral at bedtime  senna 2 Tablet(s) Oral at bedtime  sodium chloride 0.45%. 1000 milliLiter(s) (75 mL/Hr) IV Continuous <Continuous>  tiotropium 2.5 MICROgram(s) Inhaler 2 Puff(s) Inhalation daily    MEDICATIONS  (PRN):  acetaminophen     Tablet .. 650 milliGRAM(s) Oral every 6 hours PRN Temp greater or equal to 38C (100.4F), Mild Pain (1 - 3)  aluminum hydroxide/magnesium hydroxide/simethicone Suspension 30 milliLiter(s) Oral every 4 hours PRN Dyspepsia  dextrose Oral Gel 15 Gram(s) Oral once PRN Blood Glucose LESS THAN 70 milliGRAM(s)/deciliter  melatonin 3 milliGRAM(s) Oral at bedtime PRN Insomnia      Allergies    No Known Allergies    Intolerances        REVIEW OF SYSTEMS:  Unable to obtain reliable ROS due to dementia    Vital Signs Last 24 Hrs  T(C): 36.4 (03 Dec 2024 05:58), Max: 37.2 (02 Dec 2024 11:00)  T(F): 97.5 (03 Dec 2024 05:58), Max: 98.9 (02 Dec 2024 11:00)  HR: 55 (03 Dec 2024 05:58) (55 - 65)  BP: 150/69 (03 Dec 2024 05:58) (134/71 - 151/73)  BP(mean): --  RR: 18 (03 Dec 2024 05:58) (17 - 18)  SpO2: 95% (03 Dec 2024 05:58) (94% - 96%)    Parameters below as of 03 Dec 2024 05:58  Patient On (Oxygen Delivery Method): nasal cannula  O2 Flow (L/min): 2      PHYSICAL EXAM:  GENERAL: NAD, sleeping but arousable  CHEST/LUNG:  wheezes appreciated b/l worse on R  HEART:  RRR, S1, S2  ABDOMEN:  BS+, soft, nondistended        LABS:                        10.9   6.54  )-----------( 163      ( 02 Dec 2024 09:47 )             33.0     CBC Full  -  ( 02 Dec 2024 09:47 )  WBC Count : 6.54 K/uL  Hemoglobin : 10.9 g/dL  Hematocrit : 33.0 %  Platelet Count - Automated : 163 K/uL  Mean Cell Volume : 94.8 fl  Mean Cell Hemoglobin : 31.3 pg  Mean Cell Hemoglobin Concentration : 33.0 g/dL  Auto Neutrophil # : 4.98 K/uL  Auto Lymphocyte # : 0.68 K/uL  Auto Monocyte # : 0.73 K/uL  Auto Eosinophil # : 0.08 K/uL  Auto Basophil # : 0.04 K/uL  Auto Neutrophil % : 76.1 %  Auto Lymphocyte % : 10.4 %  Auto Monocyte % : 11.2 %  Auto Eosinophil % : 1.2 %  Auto Basophil % : 0.6 %    02 Dec 2024 09:47    146    |  114    |  37     ----------------------------<  141    4.0     |  25     |  2.70     Ca    8.7        02 Dec 2024 09:47    TPro  6.4    /  Alb  3.1    /  TBili  0.3    /  DBili  x      /  AST  14     /  ALT  19     /  AlkPhos  78     02 Dec 2024 09:47    PT/INR - ( 01 Dec 2024 12:15 )   PT: 12.3 sec;   INR: 1.05 ratio         PTT - ( 01 Dec 2024 12:15 )  PTT:36.3 sec  Urinalysis Basic - ( 02 Dec 2024 09:47 )    Color: x / Appearance: x / SG: x / pH: x  Gluc: 141 mg/dL / Ketone: x  / Bili: x / Urobili: x   Blood: x / Protein: x / Nitrite: x   Leuk Esterase: x / RBC: x / WBC x   Sq Epi: x / Non Sq Epi: x / Bacteria: x      CAPILLARY BLOOD GLUCOSE      POCT Blood Glucose.: 138 mg/dL (03 Dec 2024 07:32)  POCT Blood Glucose.: 203 mg/dL (02 Dec 2024 22:03)  POCT Blood Glucose.: 194 mg/dL (02 Dec 2024 17:07)  POCT Blood Glucose.: 150 mg/dL (02 Dec 2024 12:18)        Urinalysis with Rflx Culture (collected 12-01-24 @ 18:01)    Culture - Blood (collected 12-01-24 @ 12:20)  Source: .Blood BLOOD  Preliminary Report (12-02-24 @ 17:01):    No growth at 24 hours    Culture - Blood (collected 12-01-24 @ 12:10)  Source: .Blood BLOOD  Preliminary Report (12-02-24 @ 17:01):    No growth at 24 hours        RADIOLOGY & ADDITIONAL TESTS:    Personally reviewed.     Consultant(s) Notes Reviewed:  [x] YES  [ ] NO     Patient is a 79y old  Male who presents with a chief complaint of SARAY (03 Dec 2024 06:57)      INTERVAL HPI/OVERNIGHT EVENTS: No acute events overnight. Pt was seen and examined at the bedside this AM. Sleeping but arousable. Irritable this morning, states would like to be left alone to sleep. Exam limited as pt declined to be examined. Pt also declining AM labs. Per discussion with spouse at bedside yesterday, pt is near baseline mentation due to dementia which worsens during hospitalizations.     MEDICATIONS  (STANDING):  amLODIPine   Tablet 10 milliGRAM(s) Oral daily  apixaban 5 milliGRAM(s) Oral two times a day  aspirin  chewable 81 milliGRAM(s) Oral daily  busPIRone 5 milliGRAM(s) Oral two times a day  dextrose 5%. 1000 milliLiter(s) (100 mL/Hr) IV Continuous <Continuous>  dextrose 5%. 1000 milliLiter(s) (50 mL/Hr) IV Continuous <Continuous>  dextrose 50% Injectable 25 Gram(s) IV Push once  dextrose 50% Injectable 12.5 Gram(s) IV Push once  dextrose 50% Injectable 25 Gram(s) IV Push once  donepezil 10 milliGRAM(s) Oral at bedtime  escitalopram 20 milliGRAM(s) Oral daily  fluticasone propionate/ salmeterol 100-50 MICROgram(s) Diskus 1 Dose(s) Inhalation two times a day  glucagon  Injectable 1 milliGRAM(s) IntraMuscular once  insulin lispro (ADMELOG) corrective regimen sliding scale   SubCutaneous three times a day before meals  insulin lispro (ADMELOG) corrective regimen sliding scale   SubCutaneous at bedtime  memantine 5 milliGRAM(s) Oral two times a day  polyethylene glycol 3350 17 Gram(s) Oral daily  rosuvastatin 10 milliGRAM(s) Oral at bedtime  senna 2 Tablet(s) Oral at bedtime  sodium chloride 0.45%. 1000 milliLiter(s) (75 mL/Hr) IV Continuous <Continuous>  tiotropium 2.5 MICROgram(s) Inhaler 2 Puff(s) Inhalation daily    MEDICATIONS  (PRN):  acetaminophen     Tablet .. 650 milliGRAM(s) Oral every 6 hours PRN Temp greater or equal to 38C (100.4F), Mild Pain (1 - 3)  aluminum hydroxide/magnesium hydroxide/simethicone Suspension 30 milliLiter(s) Oral every 4 hours PRN Dyspepsia  dextrose Oral Gel 15 Gram(s) Oral once PRN Blood Glucose LESS THAN 70 milliGRAM(s)/deciliter  melatonin 3 milliGRAM(s) Oral at bedtime PRN Insomnia      Allergies    No Known Allergies    Intolerances        REVIEW OF SYSTEMS:  Unable to obtain reliable ROS due to dementia    Vital Signs Last 24 Hrs  T(C): 36.4 (03 Dec 2024 05:58), Max: 37.2 (02 Dec 2024 11:00)  T(F): 97.5 (03 Dec 2024 05:58), Max: 98.9 (02 Dec 2024 11:00)  HR: 55 (03 Dec 2024 05:58) (55 - 65)  BP: 150/69 (03 Dec 2024 05:58) (134/71 - 151/73)  BP(mean): --  RR: 18 (03 Dec 2024 05:58) (17 - 18)  SpO2: 95% (03 Dec 2024 05:58) (94% - 96%)    Parameters below as of 03 Dec 2024 05:58  Patient On (Oxygen Delivery Method): nasal cannula  O2 Flow (L/min): 2      PHYSICAL EXAM:  GENERAL: NAD, sleeping but arousable  CHEST/LUNG:  wheezes appreciated b/l worse on R  HEART:  RRR, S1, S2  ABDOMEN:  BS+, soft, nondistended        LABS:                        10.9   6.54  )-----------( 163      ( 02 Dec 2024 09:47 )             33.0     CBC Full  -  ( 02 Dec 2024 09:47 )  WBC Count : 6.54 K/uL  Hemoglobin : 10.9 g/dL  Hematocrit : 33.0 %  Platelet Count - Automated : 163 K/uL  Mean Cell Volume : 94.8 fl  Mean Cell Hemoglobin : 31.3 pg  Mean Cell Hemoglobin Concentration : 33.0 g/dL  Auto Neutrophil # : 4.98 K/uL  Auto Lymphocyte # : 0.68 K/uL  Auto Monocyte # : 0.73 K/uL  Auto Eosinophil # : 0.08 K/uL  Auto Basophil # : 0.04 K/uL  Auto Neutrophil % : 76.1 %  Auto Lymphocyte % : 10.4 %  Auto Monocyte % : 11.2 %  Auto Eosinophil % : 1.2 %  Auto Basophil % : 0.6 %    02 Dec 2024 09:47    146    |  114    |  37     ----------------------------<  141    4.0     |  25     |  2.70     Ca    8.7        02 Dec 2024 09:47    TPro  6.4    /  Alb  3.1    /  TBili  0.3    /  DBili  x      /  AST  14     /  ALT  19     /  AlkPhos  78     02 Dec 2024 09:47    PT/INR - ( 01 Dec 2024 12:15 )   PT: 12.3 sec;   INR: 1.05 ratio         PTT - ( 01 Dec 2024 12:15 )  PTT:36.3 sec  Urinalysis Basic - ( 02 Dec 2024 09:47 )    Color: x / Appearance: x / SG: x / pH: x  Gluc: 141 mg/dL / Ketone: x  / Bili: x / Urobili: x   Blood: x / Protein: x / Nitrite: x   Leuk Esterase: x / RBC: x / WBC x   Sq Epi: x / Non Sq Epi: x / Bacteria: x      CAPILLARY BLOOD GLUCOSE      POCT Blood Glucose.: 138 mg/dL (03 Dec 2024 07:32)  POCT Blood Glucose.: 203 mg/dL (02 Dec 2024 22:03)  POCT Blood Glucose.: 194 mg/dL (02 Dec 2024 17:07)  POCT Blood Glucose.: 150 mg/dL (02 Dec 2024 12:18)        Urinalysis with Rflx Culture (collected 12-01-24 @ 18:01)    Culture - Blood (collected 12-01-24 @ 12:20)  Source: .Blood BLOOD  Preliminary Report (12-02-24 @ 17:01):    No growth at 24 hours    Culture - Blood (collected 12-01-24 @ 12:10)  Source: .Blood BLOOD  Preliminary Report (12-02-24 @ 17:01):    No growth at 24 hours        RADIOLOGY & ADDITIONAL TESTS:    Personally reviewed.     Consultant(s) Notes Reviewed:  [x] YES  [ ] NO

## 2024-12-03 NOTE — PROGRESS NOTE ADULT - PROBLEM SELECTOR PLAN 1
SARAY on admission. Appears to have CKD per chart review. baseline appears around Cr 1.4   likely component of pre-renal 2/2 to viral syndrome and post renal 2/2 obstruction (IMPROVING)  - BUN/Cr 55/3.8, eGFR 16 on admission  - Lactate 1.4  - 0.45%NS @75cc/hr   - Monitor daily CMP   - Urology consulted by ED; brewster placed   - Avoid nephrotoxic meds   - Nephro consulted (Dr. Henderson), f/u recs.    #hypernatremia  - c/w hypotonic IVF SARAY on admission. Appears to have CKD per chart review. baseline appears around Cr 1.4   likely component of pre-renal 2/2 to viral syndrome and post renal 2/2 obstruction (IMPROVING)  - BUN/Cr 55/3.8, eGFR 16 on admission  - Lactate 1.4  - 0.45%NS @75cc/hr   - Monitor daily CMP   - Urology consulted by ED; brewster placed   - possible TOV pending AM labs - pt declining labs, will reattempt  - Avoid nephrotoxic meds   - Nephro consulted (Dr. Henderson), f/u recs.    #hypernatremia  - c/w hypotonic IVF

## 2024-12-03 NOTE — PROGRESS NOTE ADULT - SUBJECTIVE AND OBJECTIVE BOX
Neurology follow up note    RANCHO FRANCISCO79yMale      Interval History:    Patient feels ok no new complaints.    Allergies    No Known Allergies    Intolerances        MEDICATIONS    acetaminophen     Tablet .. 650 milliGRAM(s) Oral every 6 hours PRN  aluminum hydroxide/magnesium hydroxide/simethicone Suspension 30 milliLiter(s) Oral every 4 hours PRN  amLODIPine   Tablet 10 milliGRAM(s) Oral daily  apixaban 5 milliGRAM(s) Oral two times a day  aspirin  chewable 81 milliGRAM(s) Oral daily  busPIRone 5 milliGRAM(s) Oral two times a day  dextrose 5%. 1000 milliLiter(s) IV Continuous <Continuous>  dextrose 5%. 1000 milliLiter(s) IV Continuous <Continuous>  dextrose 50% Injectable 25 Gram(s) IV Push once  dextrose 50% Injectable 12.5 Gram(s) IV Push once  dextrose 50% Injectable 25 Gram(s) IV Push once  dextrose Oral Gel 15 Gram(s) Oral once PRN  donepezil 10 milliGRAM(s) Oral at bedtime  escitalopram 20 milliGRAM(s) Oral daily  fluticasone propionate/ salmeterol 100-50 MICROgram(s) Diskus 1 Dose(s) Inhalation two times a day  glucagon  Injectable 1 milliGRAM(s) IntraMuscular once  insulin lispro (ADMELOG) corrective regimen sliding scale   SubCutaneous three times a day before meals  insulin lispro (ADMELOG) corrective regimen sliding scale   SubCutaneous at bedtime  melatonin 3 milliGRAM(s) Oral at bedtime PRN  memantine 5 milliGRAM(s) Oral two times a day  polyethylene glycol 3350 17 Gram(s) Oral daily  rosuvastatin 10 milliGRAM(s) Oral at bedtime  senna 2 Tablet(s) Oral at bedtime  sodium chloride 0.45%. 1000 milliLiter(s) IV Continuous <Continuous>  tiotropium 2.5 MICROgram(s) Inhaler 2 Puff(s) Inhalation daily              Vital Signs Last 24 Hrs  T(C): 36.4 (03 Dec 2024 05:58), Max: 37.4 (02 Dec 2024 08:11)  T(F): 97.5 (03 Dec 2024 05:58), Max: 99.3 (02 Dec 2024 08:11)  HR: 55 (03 Dec 2024 05:58) (55 - 65)  BP: 150/69 (03 Dec 2024 05:58) (134/71 - 151/73)  BP(mean): --  RR: 18 (03 Dec 2024 05:58) (16 - 18)  SpO2: 95% (03 Dec 2024 05:58) (94% - 98%)    Parameters below as of 03 Dec 2024 05:58  Patient On (Oxygen Delivery Method): nasal cannula  O2 Flow (L/min): 2    REVIEW OF SYSTEMS:  CONSTITUTIONAL:  Slightly limited, but constitutionally, he denies fever, chills, night sweats.  HEAD:  No headache.  EYES:  No double vision, blurry vision.  EARS:  No ringing in the ears.  NECK:  No neck pain.  CARDIOVASCULAR:  No chest pain.  RESPIRATORY:  Positive history of cough.  ABDOMEN:  No nausea, vomiting, or abdominal pain.  EXTREMITIES/NEUROLOGICAL:  No numbness or tingling.  MUSCULOSKELETAL:  No joint pain.    PHYSICAL EXAMINATION:  HEAD:  Normocephalic, atraumatic.  EYES:  No scleral icterus.  EARS:  Hearing appear to be intact.  NECK:  Supple.  CARDIOVASCULAR:  S1 and S2 heard.  RESPIRATORY:  Air entry bilaterally.  ABDOMEN:  Soft, nontender.  EXTREMITIES:  No clubbing or cyanosis were noted.    NEUROLOGIC:  The patient awake, alert.  Extraocular movements were intact.  Speech was fluent, smile symmetric.  Upon conversant, the patient has signs of forgetfulness, but does have underlying dementia.  Motor, bilateral upper 4/5; bilateral lower 3+/5.  Sensory, bilateral upper and lower intact to light touch.      LABS:  CBC Full  -  ( 02 Dec 2024 09:47 )  WBC Count : 6.54 K/uL  RBC Count : 3.48 M/uL  Hemoglobin : 10.9 g/dL  Hematocrit : 33.0 %  Platelet Count - Automated : 163 K/uL  Mean Cell Volume : 94.8 fl  Mean Cell Hemoglobin : 31.3 pg  Mean Cell Hemoglobin Concentration : 33.0 g/dL  Auto Neutrophil # : 4.98 K/uL  Auto Lymphocyte # : 0.68 K/uL  Auto Monocyte # : 0.73 K/uL  Auto Eosinophil # : 0.08 K/uL  Auto Basophil # : 0.04 K/uL  Auto Neutrophil % : 76.1 %  Auto Lymphocyte % : 10.4 %  Auto Monocyte % : 11.2 %  Auto Eosinophil % : 1.2 %  Auto Basophil % : 0.6 %    Urinalysis Basic - ( 02 Dec 2024 09:47 )    Color: x / Appearance: x / SG: x / pH: x  Gluc: 141 mg/dL / Ketone: x  / Bili: x / Urobili: x   Blood: x / Protein: x / Nitrite: x   Leuk Esterase: x / RBC: x / WBC x   Sq Epi: x / Non Sq Epi: x / Bacteria: x      12-02    146[H]  |  114[H]  |  37[H]  ----------------------------<  141[H]  4.0   |  25  |  2.70[H]    Ca    8.7      02 Dec 2024 09:47    TPro  6.4  /  Alb  3.1[L]  /  TBili  0.3  /  DBili  x   /  AST  14[L]  /  ALT  19  /  AlkPhos  78  12-02    Hemoglobin A1C:   Lipid Panel 12-02 @ 09:47  Total Cholesterol, Serum 102  LDL --  Triglycerides 118    LIVER FUNCTIONS - ( 02 Dec 2024 09:47 )  Alb: 3.1 g/dL / Pro: 6.4 g/dL / ALK PHOS: 78 U/L / ALT: 19 U/L / AST: 14 U/L / GGT: x           Vitamin B12   PT/INR - ( 01 Dec 2024 12:15 )   PT: 12.3 sec;   INR: 1.05 ratio         PTT - ( 01 Dec 2024 12:15 )  PTT:36.3 sec      RADIOLOGY  ANALYSIS AND PLAN:  This is a 79-year-old with episode of altered mental status.    1.For episode of altered mental status most likely metabolic encephalopathy secondary to underlying respiratory issues also suspect secondary to dementia becoming more prominent in the hospital setting, suspect less likely this is a primary CNS event.  2.Antibiotics as needed.  3.For history of mild cognitive impairment/subtle dementia would recommend to continue the patient on his home dose of amantadine and Aricept.  4.For history of hypertension, monitor systolic blood pressure.  5.For history of diabetes to control blood sugars.  6.For history of hyperlipidemia, continue the patient on statin.  7.For history of any agitation, okay to continue Seroquel, can adjust dose as needed.  8.Safety concerns were provided to staff.  9.Advance care directives and planning were discussed.  10.Attempted to contact spouse, Sanudra, at [phone] and at [phone], went to [facility].  11.52minutes of time was spent with the patient, plan of care, reviewing data, speaking to multidisciplinary healthcare team with greater than 50% of the time in counseling and care coordination.    Thank you for the courtesy of this consultation.  PATIENT HAS NOT YET BEEN SEEN AND EXAMINED TODAY. NOTE AND CHART REVIEWED IN AM AND EXAM FORM PREVIOUS.  ONCE PATIENT SEEN, CHART WILL BE UPDATE AT PRESENT NOTE IS INCOMPLETE     Neurology follow up note    RANCHO FRANCISCO79yMale      Interval History:    Patient feels ok no new complaints.    Allergies    No Known Allergies    Intolerances        MEDICATIONS    acetaminophen     Tablet .. 650 milliGRAM(s) Oral every 6 hours PRN  aluminum hydroxide/magnesium hydroxide/simethicone Suspension 30 milliLiter(s) Oral every 4 hours PRN  amLODIPine   Tablet 10 milliGRAM(s) Oral daily  apixaban 5 milliGRAM(s) Oral two times a day  aspirin  chewable 81 milliGRAM(s) Oral daily  busPIRone 5 milliGRAM(s) Oral two times a day  dextrose 5%. 1000 milliLiter(s) IV Continuous <Continuous>  dextrose 5%. 1000 milliLiter(s) IV Continuous <Continuous>  dextrose 50% Injectable 25 Gram(s) IV Push once  dextrose 50% Injectable 12.5 Gram(s) IV Push once  dextrose 50% Injectable 25 Gram(s) IV Push once  dextrose Oral Gel 15 Gram(s) Oral once PRN  donepezil 10 milliGRAM(s) Oral at bedtime  escitalopram 20 milliGRAM(s) Oral daily  fluticasone propionate/ salmeterol 100-50 MICROgram(s) Diskus 1 Dose(s) Inhalation two times a day  glucagon  Injectable 1 milliGRAM(s) IntraMuscular once  insulin lispro (ADMELOG) corrective regimen sliding scale   SubCutaneous three times a day before meals  insulin lispro (ADMELOG) corrective regimen sliding scale   SubCutaneous at bedtime  melatonin 3 milliGRAM(s) Oral at bedtime PRN  memantine 5 milliGRAM(s) Oral two times a day  polyethylene glycol 3350 17 Gram(s) Oral daily  rosuvastatin 10 milliGRAM(s) Oral at bedtime  senna 2 Tablet(s) Oral at bedtime  sodium chloride 0.45%. 1000 milliLiter(s) IV Continuous <Continuous>  tiotropium 2.5 MICROgram(s) Inhaler 2 Puff(s) Inhalation daily              Vital Signs Last 24 Hrs  T(C): 36.4 (03 Dec 2024 05:58), Max: 37.4 (02 Dec 2024 08:11)  T(F): 97.5 (03 Dec 2024 05:58), Max: 99.3 (02 Dec 2024 08:11)  HR: 55 (03 Dec 2024 05:58) (55 - 65)  BP: 150/69 (03 Dec 2024 05:58) (134/71 - 151/73)  BP(mean): --  RR: 18 (03 Dec 2024 05:58) (16 - 18)  SpO2: 95% (03 Dec 2024 05:58) (94% - 98%)    Parameters below as of 03 Dec 2024 05:58  Patient On (Oxygen Delivery Method): nasal cannula  O2 Flow (L/min): 2    REVIEW OF SYSTEMS:  CONSTITUTIONAL:  Slightly limited, but constitutionally, he denies fever, chills, night sweats.  HEAD:  No headache.  EYES:  No double vision, blurry vision.  EARS:  No ringing in the ears.  NECK:  No neck pain.  CARDIOVASCULAR:  No chest pain.  RESPIRATORY:  Positive history of cough.  ABDOMEN:  No nausea, vomiting, or abdominal pain.  EXTREMITIES/NEUROLOGICAL:  No numbness or tingling.  MUSCULOSKELETAL:  No joint pain.    PHYSICAL EXAMINATION:  HEAD:  Normocephalic, atraumatic.  EYES:  No scleral icterus.  EARS:  Hearing appear to be intact.  NECK:  Supple.  CARDIOVASCULAR:  S1 and S2 heard.  RESPIRATORY:  Air entry bilaterally.  ABDOMEN:  Soft, nontender.  EXTREMITIES:  No clubbing or cyanosis were noted.    NEUROLOGIC:  The patient awake, alert.  Extraocular movements were intact.  Speech was fluent, smile symmetric.  Upon conversant, the patient has signs of forgetfulness, but does have underlying dementia.  Motor, bilateral upper 4/5; bilateral lower 3+/5.  Sensory, bilateral upper and lower intact to light touch.      LABS:  CBC Full  -  ( 02 Dec 2024 09:47 )  WBC Count : 6.54 K/uL  RBC Count : 3.48 M/uL  Hemoglobin : 10.9 g/dL  Hematocrit : 33.0 %  Platelet Count - Automated : 163 K/uL  Mean Cell Volume : 94.8 fl  Mean Cell Hemoglobin : 31.3 pg  Mean Cell Hemoglobin Concentration : 33.0 g/dL  Auto Neutrophil # : 4.98 K/uL  Auto Lymphocyte # : 0.68 K/uL  Auto Monocyte # : 0.73 K/uL  Auto Eosinophil # : 0.08 K/uL  Auto Basophil # : 0.04 K/uL  Auto Neutrophil % : 76.1 %  Auto Lymphocyte % : 10.4 %  Auto Monocyte % : 11.2 %  Auto Eosinophil % : 1.2 %  Auto Basophil % : 0.6 %    Urinalysis Basic - ( 02 Dec 2024 09:47 )    Color: x / Appearance: x / SG: x / pH: x  Gluc: 141 mg/dL / Ketone: x  / Bili: x / Urobili: x   Blood: x / Protein: x / Nitrite: x   Leuk Esterase: x / RBC: x / WBC x   Sq Epi: x / Non Sq Epi: x / Bacteria: x      12-02    146[H]  |  114[H]  |  37[H]  ----------------------------<  141[H]  4.0   |  25  |  2.70[H]    Ca    8.7      02 Dec 2024 09:47    TPro  6.4  /  Alb  3.1[L]  /  TBili  0.3  /  DBili  x   /  AST  14[L]  /  ALT  19  /  AlkPhos  78  12-02    Hemoglobin A1C:   Lipid Panel 12-02 @ 09:47  Total Cholesterol, Serum 102  LDL --  Triglycerides 118    LIVER FUNCTIONS - ( 02 Dec 2024 09:47 )  Alb: 3.1 g/dL / Pro: 6.4 g/dL / ALK PHOS: 78 U/L / ALT: 19 U/L / AST: 14 U/L / GGT: x           Vitamin B12   PT/INR - ( 01 Dec 2024 12:15 )   PT: 12.3 sec;   INR: 1.05 ratio         PTT - ( 01 Dec 2024 12:15 )  PTT:36.3 sec      RADIOLOGY  ANALYSIS AND PLAN:  This is a 79-year-old with episode of altered mental status.    1.For episode of altered mental status most likely metabolic encephalopathy secondary to underlying respiratory issues also suspect secondary to dementia becoming more prominent in the hospital setting, suspect less likely this is a primary CNS event.  2.Antibiotics as needed.  3.For history of mild cognitive impairment/subtle dementia would recommend to continue the patient on his home dose of amantadine and Aricept.  4.For history of hypertension, monitor systolic blood pressure.  5.For history of diabetes to control blood sugars.  6.For history of hyperlipidemia, continue the patient on statin.  7.For history of any agitation, okay to continue Seroquel, can adjust dose as needed.  Attempted to contact spouse, Saundra, at [phone]  [facility] 9am  .52minutes of time was spent with the patient, plan of care, reviewing data, speaking to multidisciplinary healthcare team with greater than 50% of the time in counseling and care coordination.    Thank you for the courtesy of this consultation.

## 2024-12-03 NOTE — PROGRESS NOTE ADULT - PROBLEM SELECTOR PLAN 7
Chronic   - EKG QTc 447   - Continue home Donepezil   - Continue home Lexapro   - Continue home Buspar bid   - continue home memantine  - hold home seroquel as pt lethargic  - if agitated overnight can give one time dose of home med

## 2024-12-03 NOTE — PROGRESS NOTE ADULT - SUBJECTIVE AND OBJECTIVE BOX
[facility]                             Nephrology and Hypertension                             Perry Ellis                                          [phone]     Patient is a 79y old  Male who presents with a chief complaint of SARAY (02 Dec 2024 09:45)       HPI:  78yo M with a PMH of COPD, HTN, HLD, DM2, CVA (2015), dementia, TAMMY on CPAP who presents to the ED with cough and AMS. Patient's wife at bedside and assisted with history given patients history of dementia. Pt has cough for the past week, productive of clear phlegm. Pt lives w/ grandchildren who have had URI symptoms as well. Pt's wife has been giving albuterol nebulizer 2x daily to help with cough. She reports this AM patient was lethargic and she had difficulty waking him up from sleep. She also noted his speech was "garbled" and felt he was off balance when ambulating. Also has decreased PO intake for the past few days. Per wife he is AAOx1-2 at baseline. Has SOB with exertion, which is chronic.   Has not seen nephrologist in the past.  Denies any N/V/SOB, but has dementia.     ROS:   ROS limited 2/2 dementia however per wife Denies fever, chills, chest pain, abdominal pain.    (+): fatigue, lethargy, cough, SOB on exertion (chronic), nasal discharge    ED course  Vitals: T 99.2, HR 54, /56, RR 18, SpO2 96% on RA  Significant labs: Cr 3.8, lactate 1.4   UA grossly normal   +Enterorhino  Imaging:   CT Abdomen and Pelvis shows Distended bladder. No discrete mass is identified. Prostate gland is mildly enlarged. Please correlate clinically. Mild infrarenal abdominal aortic aneurysm measuring up to 3.3 x 2.7 does not appear significantly changed compared with 3/25/2014.  CXR:  No radiographic evidence of active chest disease.  CTHead non con: Stable head CT.  EKG: sinus bradycardia with 1st degree AV block, septal infarct (age undetermined), QTc 447   In ED patient given: 1L NS bolus x1, Ativan 1mg IVP x1   (01 Dec 2024 19:06)    No acute events noted       PAST MEDICAL & SURGICAL HISTORY:  Diabetes mellitus      Hypertension      COPD (chronic obstructive pulmonary disease)      HLD (hyperlipidemia)      Dementia      CVA (cerebral vascular accident)      H/O hand surgery           FAMILY HISTORY:  Family history of CABG (Father)    NC    Social History:Non smoker    MEDICATIONS  (STANDING):  amLODIPine   Tablet 10 milliGRAM(s) Oral daily  apixaban 5 milliGRAM(s) Oral two times a day  aspirin  chewable 81 milliGRAM(s) Oral daily  busPIRone 5 milliGRAM(s) Oral two times a day  dextrose 5%. 1000 milliLiter(s) (100 mL/Hr) IV Continuous <Continuous>  dextrose 5%. 1000 milliLiter(s) (50 mL/Hr) IV Continuous <Continuous>  dextrose 50% Injectable 25 Gram(s) IV Push once  dextrose 50% Injectable 12.5 Gram(s) IV Push once  dextrose 50% Injectable 25 Gram(s) IV Push once  donepezil 10 milliGRAM(s) Oral at bedtime  escitalopram 20 milliGRAM(s) Oral daily  fluticasone propionate/ salmeterol 100-50 MICROgram(s) Diskus 1 Dose(s) Inhalation two times a day  glucagon  Injectable 1 milliGRAM(s) IntraMuscular once  insulin lispro (ADMELOG) corrective regimen sliding scale   SubCutaneous three times a day before meals  insulin lispro (ADMELOG) corrective regimen sliding scale   SubCutaneous at bedtime  memantine 5 milliGRAM(s) Oral two times a day  mineral oil enema 133 milliLiter(s) Rectal once  polyethylene glycol 3350 17 Gram(s) Oral daily  rosuvastatin 10 milliGRAM(s) Oral at bedtime  senna 2 Tablet(s) Oral at bedtime  sodium chloride 0.45%. 1000 milliLiter(s) (75 mL/Hr) IV Continuous <Continuous>  tiotropium 2.5 MICROgram(s) Inhaler 2 Puff(s) Inhalation daily    MEDICATIONS  (PRN):  acetaminophen     Tablet .. 650 milliGRAM(s) Oral every 6 hours PRN Temp greater or equal to 38C (100.4F), Mild Pain (1 - 3)  aluminum hydroxide/magnesium hydroxide/simethicone Suspension 30 milliLiter(s) Oral every 4 hours PRN Dyspepsia  dextrose Oral Gel 15 Gram(s) Oral once PRN Blood Glucose LESS THAN 70 milliGRAM(s)/deciliter  melatonin 3 milliGRAM(s) Oral at bedtime PRN Insomnia   Meds reviewed    Allergies    No Known Allergies    Intolerances         REVIEW OF SYSTEMS:    Review of Systems:   Constitutional: Denies fatigue  HEENT: Denies headaches and dizziness  Respiratory: denies SOB, cough, or wheezing  Cardiovascular: denies CP, palpitations  Gastrointestinal: Denies nausea, denies vomiting, diarrhea, constipation, abdominal pain, or bloody stools  Genitourinary: denies painful urination, increased frequency, urgency, or bloody urine  Skin: denies rashes or itching  Musculoskeletal: denies muscle aches, joint swelling  Neurologic: Denies generalized weakness, denies loss of sensation, numbness, or tingling      Vital Signs Last 24 Hrs  T(C): 36.3 (03 Dec 2024 05:10), Max: 37.1 (03 Dec 2024 13:09)  T(F): 97.4 (03 Dec 2024 05:10), Max: 98.8 (03 Dec 2024 13:09)  HR: 53 (03 Dec 2024 05:10) (49 - 60)  BP: 156/72 (03 Dec 2024 05:10) (156/72 - 170/71)  BP(mean): --  RR: 18 (03 Dec 2024 05:10) (18 - 18)  SpO2: 92% (03 Dec 2024 05:10) (92% - 96%)    Parameters below as of 04 Dec 2024 05:10  Patient On (Oxygen Delivery Method): nasal cannula  O2 Flow (L/min): 2      Daily     Daily Weight in k.6 (02 Dec 2024 13:15)    PHYSICAL EXAM:    GENERAL: NAD  HEAD:  Atraumatic, Normocephalic  EYES: EOMI, conjunctiva and sclera clear  ENMT: No Drainage from nares, No drainage from ears  NERVOUS SYSTEM:  Awake and Alert  CHEST/LUNG: Clear to percussion bilaterally  EXTREMITIES:  No Edema  SKIN: No rashes No obvious ecchymosis      LABS:                    12/3/24: labs reviewed; Cr 2.2

## 2024-12-03 NOTE — SOCIAL WORK PROGRESS NOTE - NSSWPROGRESSNOTE_GEN_ALL_CORE
SW consult received, pt lives at home with spouse, strong support involved. Plan is DC  home with family. SW to remain available for follow up as needed.

## 2024-12-04 ENCOUNTER — TRANSCRIPTION ENCOUNTER (OUTPATIENT)
Age: 79
End: 2024-12-04

## 2024-12-04 VITALS — RESPIRATION RATE: 18 BRPM | OXYGEN SATURATION: 90 % | HEART RATE: 58 BPM

## 2024-12-04 LAB
ANION GAP SERPL CALC-SCNC: 4 MMOL/L — LOW (ref 5–17)
BUN SERPL-MCNC: 27 MG/DL — HIGH (ref 7–23)
CALCIUM SERPL-MCNC: 8.8 MG/DL — SIGNIFICANT CHANGE UP (ref 8.5–10.1)
CHLORIDE SERPL-SCNC: 108 MMOL/L — SIGNIFICANT CHANGE UP (ref 96–108)
CO2 SERPL-SCNC: 28 MMOL/L — SIGNIFICANT CHANGE UP (ref 22–31)
CREAT SERPL-MCNC: 1.8 MG/DL — HIGH (ref 0.5–1.3)
EGFR: 38 ML/MIN/1.73M2 — LOW
GLUCOSE SERPL-MCNC: 182 MG/DL — HIGH (ref 70–99)
HCT VFR BLD CALC: 35.7 % — LOW (ref 39–50)
HGB BLD-MCNC: 11.9 G/DL — LOW (ref 13–17)
MCHC RBC-ENTMCNC: 31 PG — SIGNIFICANT CHANGE UP (ref 27–34)
MCHC RBC-ENTMCNC: 33.3 G/DL — SIGNIFICANT CHANGE UP (ref 32–36)
MCV RBC AUTO: 93 FL — SIGNIFICANT CHANGE UP (ref 80–100)
NRBC # BLD: 0 /100 WBCS — SIGNIFICANT CHANGE UP (ref 0–0)
OSMOLALITY UR: 562 MOSM/KG — SIGNIFICANT CHANGE UP (ref 50–1200)
PLATELET # BLD AUTO: 162 K/UL — SIGNIFICANT CHANGE UP (ref 150–400)
POTASSIUM SERPL-MCNC: 3.9 MMOL/L — SIGNIFICANT CHANGE UP (ref 3.5–5.3)
POTASSIUM SERPL-SCNC: 3.9 MMOL/L — SIGNIFICANT CHANGE UP (ref 3.5–5.3)
RBC # BLD: 3.84 M/UL — LOW (ref 4.2–5.8)
RBC # FLD: 13.2 % — SIGNIFICANT CHANGE UP (ref 10.3–14.5)
SODIUM SERPL-SCNC: 140 MMOL/L — SIGNIFICANT CHANGE UP (ref 135–145)
WBC # BLD: 4.54 K/UL — SIGNIFICANT CHANGE UP (ref 3.8–10.5)
WBC # FLD AUTO: 4.54 K/UL — SIGNIFICANT CHANGE UP (ref 3.8–10.5)

## 2024-12-04 PROCEDURE — 85027 COMPLETE CBC AUTOMATED: CPT

## 2024-12-04 PROCEDURE — 0225U NFCT DS DNA&RNA 21 SARSCOV2: CPT

## 2024-12-04 PROCEDURE — 96374 THER/PROPH/DIAG INJ IV PUSH: CPT

## 2024-12-04 PROCEDURE — 71045 X-RAY EXAM CHEST 1 VIEW: CPT

## 2024-12-04 PROCEDURE — 81001 URINALYSIS AUTO W/SCOPE: CPT

## 2024-12-04 PROCEDURE — 83605 ASSAY OF LACTIC ACID: CPT

## 2024-12-04 PROCEDURE — 80048 BASIC METABOLIC PNL TOTAL CA: CPT

## 2024-12-04 PROCEDURE — 83036 HEMOGLOBIN GLYCOSYLATED A1C: CPT

## 2024-12-04 PROCEDURE — 80061 LIPID PANEL: CPT

## 2024-12-04 PROCEDURE — 99285 EMERGENCY DEPT VISIT HI MDM: CPT | Mod: 25

## 2024-12-04 PROCEDURE — 83735 ASSAY OF MAGNESIUM: CPT

## 2024-12-04 PROCEDURE — 97162 PT EVAL MOD COMPLEX 30 MIN: CPT

## 2024-12-04 PROCEDURE — 99239 HOSP IP/OBS DSCHRG MGMT >30: CPT

## 2024-12-04 PROCEDURE — 85025 COMPLETE CBC W/AUTO DIFF WBC: CPT

## 2024-12-04 PROCEDURE — 94640 AIRWAY INHALATION TREATMENT: CPT

## 2024-12-04 PROCEDURE — 80053 COMPREHEN METABOLIC PANEL: CPT

## 2024-12-04 PROCEDURE — 87637 SARSCOV2&INF A&B&RSV AMP PRB: CPT

## 2024-12-04 PROCEDURE — 71045 X-RAY EXAM CHEST 1 VIEW: CPT | Mod: 26

## 2024-12-04 PROCEDURE — 85610 PROTHROMBIN TIME: CPT

## 2024-12-04 PROCEDURE — 94660 CPAP INITIATION&MGMT: CPT

## 2024-12-04 PROCEDURE — 83935 ASSAY OF URINE OSMOLALITY: CPT

## 2024-12-04 PROCEDURE — 74176 CT ABD & PELVIS W/O CONTRAST: CPT | Mod: MC

## 2024-12-04 PROCEDURE — 93005 ELECTROCARDIOGRAM TRACING: CPT

## 2024-12-04 PROCEDURE — 85730 THROMBOPLASTIN TIME PARTIAL: CPT

## 2024-12-04 PROCEDURE — 82962 GLUCOSE BLOOD TEST: CPT

## 2024-12-04 PROCEDURE — 70450 CT HEAD/BRAIN W/O DYE: CPT | Mod: MC

## 2024-12-04 PROCEDURE — 87040 BLOOD CULTURE FOR BACTERIA: CPT

## 2024-12-04 PROCEDURE — 36415 COLL VENOUS BLD VENIPUNCTURE: CPT

## 2024-12-04 PROCEDURE — 84100 ASSAY OF PHOSPHORUS: CPT

## 2024-12-04 RX ORDER — 0.9 % SODIUM CHLORIDE 0.9 %
1000 INTRAVENOUS SOLUTION INTRAVENOUS
Refills: 0 | Status: DISCONTINUED | OUTPATIENT
Start: 2024-12-04 | End: 2024-12-04

## 2024-12-04 RX ADMIN — ESCITALOPRAM OXALATE 20 MILLIGRAM(S): 10 TABLET, FILM COATED ORAL at 11:44

## 2024-12-04 RX ADMIN — Medication 2 PUFF(S): at 05:15

## 2024-12-04 RX ADMIN — FLUTICASONE PROPIONATE AND SALMETEROL XINAFOATE 1 DOSE(S): 45; 21 AEROSOL, METERED RESPIRATORY (INHALATION) at 05:15

## 2024-12-04 RX ADMIN — Medication 5 MILLIGRAM(S): at 05:16

## 2024-12-04 RX ADMIN — APIXABAN 5 MILLIGRAM(S): 2.5 TABLET, FILM COATED ORAL at 05:16

## 2024-12-04 RX ADMIN — MEMANTINE HYDROCHLORIDE 5 MILLIGRAM(S): 14 CAPSULE, EXTENDED RELEASE ORAL at 05:16

## 2024-12-04 RX ADMIN — Medication 75 MILLILITER(S): at 12:36

## 2024-12-04 RX ADMIN — Medication 81 MILLIGRAM(S): at 11:43

## 2024-12-04 RX ADMIN — AMLODIPINE BESYLATE 10 MILLIGRAM(S): 10 TABLET ORAL at 05:16

## 2024-12-04 RX ADMIN — Medication 1: at 08:17

## 2024-12-04 NOTE — PROGRESS NOTE ADULT - TIME BILLING
Reviewing chart notes and data, reviewing telemetry monitor records, face to face time counseling the patient, coordinating care with SW/CM at [facility].   Pt seen and examined. Case discussed with resident. Agree with assessment and plan above (edited by me in detail)
Reviewing chart notes and data, reviewing telemetry monitor records, face to face time counseling the patient, coordinating care with SW/CM at [facility].   Pt seen and examined. Case discussed with resident. Agree with assessment and plan above (edited by me in detail)
Reviewing chart notes and data, reviewing telemetry monitor records, face to face time counseling the patient, coordinating care with SW/CM at [facility].   Pt seen and examined. Case discussed with resident. Agree with assessment and plan above (edited by me in detail)

## 2024-12-04 NOTE — PROGRESS NOTE ADULT - PROBLEM SELECTOR PLAN 5
Chronic   - EKG: sinus bradycardia with 1st degree AV block, septal infarct (age undetermined), QTc 447   - On home ASA and statin, continue  - lipid panel WNL
Chronic   - EKG: sinus bradycardia with 1st degree AV block, septal infarct (age undetermined), QTc 447   - On home ASA and statin, continue  - F/u AM lipid panel
Chronic   - EKG: sinus bradycardia with 1st degree AV block, septal infarct (age undetermined), QTc 447   - On home ASA and statin, continue  - lipid panel WNL

## 2024-12-04 NOTE — PROGRESS NOTE ADULT - PROBLEM SELECTOR PLAN 8
chronic  - per wife on CPAP pressure 4, CPAP ordered

## 2024-12-04 NOTE — CAREGIVER ENGAGEMENT NOTE - CAREGIVER OUTREACH NOTES - FREE TEXT
Per MD, patient is medically cleared for discharge home today. Chart reviewed. Patient passed TOV. PT recommending no  skilled PT needs or DME. Patient is on room air. CM met with the patient and his spouse Saundra at the bedside to discuss transition planning. Patient oriented to self at this time. Saundra stated she is the patient's caregiver, also has her daughter at home to assist as needed. Patient's spouse assist with medication management and transports the patient to medical appointments. No skilled home care needs identified-spouse is in agreement. Spouse to transport the patient home. IMM reviewed, patient's spouse declined to sign/copy provided at the bedside. Bedside RN aware of plan. CM remains available.

## 2024-12-04 NOTE — DISCHARGE NOTE NURSING/CASE MANAGEMENT/SOCIAL WORK - PATIENT PORTAL LINK FT
You can access the FollowMyHealth Patient Portal offered by [facility] by registering at the following website: [email or web address]. By joining [employer]’s FollowMyHealth portal, you will also be able to view your health information using other applications (apps) compatible with our system.

## 2024-12-04 NOTE — DISCHARGE NOTE NURSING/CASE MANAGEMENT/SOCIAL WORK - NSDCPEFALRISK_GEN_ALL_CORE
For information on Fall & Injury Prevention, visit: [email or web address] OR  [email or web address] OR  https://www.cdc.gov/steadi/patient.html

## 2024-12-04 NOTE — PROGRESS NOTE ADULT - PROBLEM SELECTOR PLAN 6
chronic. on spiriva and wixela inhalers at home  - c/w home spiriva inhaler qd   - c/w advair inhaler bid (interchange for wixela)

## 2024-12-04 NOTE — PROGRESS NOTE ADULT - SUBJECTIVE AND OBJECTIVE BOX
Neurology follow up note    RANCHO FRANCISCO79yMale      Interval History:    Patient feels ok no new complaints.    Allergies    No Known Allergies    Intolerances        MEDICATIONS    acetaminophen     Tablet .. 650 milliGRAM(s) Oral every 6 hours PRN  aluminum hydroxide/magnesium hydroxide/simethicone Suspension 30 milliLiter(s) Oral every 4 hours PRN  amLODIPine   Tablet 10 milliGRAM(s) Oral daily  apixaban 5 milliGRAM(s) Oral two times a day  aspirin  chewable 81 milliGRAM(s) Oral daily  busPIRone 5 milliGRAM(s) Oral two times a day  dextrose 5%. 1000 milliLiter(s) IV Continuous <Continuous>  dextrose 5%. 1000 milliLiter(s) IV Continuous <Continuous>  dextrose 50% Injectable 25 Gram(s) IV Push once  dextrose 50% Injectable 12.5 Gram(s) IV Push once  dextrose 50% Injectable 25 Gram(s) IV Push once  dextrose Oral Gel 15 Gram(s) Oral once PRN  donepezil 10 milliGRAM(s) Oral at bedtime  escitalopram 20 milliGRAM(s) Oral daily  fluticasone propionate/ salmeterol 100-50 MICROgram(s) Diskus 1 Dose(s) Inhalation two times a day  glucagon  Injectable 1 milliGRAM(s) IntraMuscular once  insulin lispro (ADMELOG) corrective regimen sliding scale   SubCutaneous three times a day before meals  insulin lispro (ADMELOG) corrective regimen sliding scale   SubCutaneous at bedtime  melatonin 3 milliGRAM(s) Oral at bedtime PRN  memantine 5 milliGRAM(s) Oral two times a day  polyethylene glycol 3350 17 Gram(s) Oral daily  rosuvastatin 10 milliGRAM(s) Oral at bedtime  senna 2 Tablet(s) Oral at bedtime  sodium chloride 0.45%. 1000 milliLiter(s) IV Continuous <Continuous>  tiotropium 2.5 MICROgram(s) Inhaler 2 Puff(s) Inhalation daily              Vital Signs Last 24 Hrs  T(C): 36.3 (04 Dec 2024 05:10), Max: 37.1 (03 Dec 2024 13:09)  T(F): 97.4 (04 Dec 2024 05:10), Max: 98.8 (03 Dec 2024 13:09)  HR: 53 (04 Dec 2024 05:10) (49 - 60)  BP: 156/72 (04 Dec 2024 05:10) (156/72 - 170/71)  BP(mean): --  RR: 18 (04 Dec 2024 05:10) (18 - 18)  SpO2: 92% (04 Dec 2024 05:10) (92% - 96%)    Parameters below as of 04 Dec 2024 05:10  Patient On (Oxygen Delivery Method): nasal cannula  O2 Flow (L/min): 2      REVIEW OF SYSTEMS:  CONSTITUTIONAL:  Slightly limited, but constitutionally, he denies fever, chills, night sweats.  HEAD:  No headache.  EYES:  No double vision, blurry vision.  EARS:  No ringing in the ears.  NECK:  No neck pain.  CARDIOVASCULAR:  No chest pain.  RESPIRATORY:  Positive history of cough.  ABDOMEN:  No nausea, vomiting, or abdominal pain.  EXTREMITIES/NEUROLOGICAL:  No numbness or tingling.  MUSCULOSKELETAL:  No joint pain.    PHYSICAL EXAMINATION:  HEAD:  Normocephalic, atraumatic.  EYES:  No scleral icterus.  EARS:  Hearing appear to be intact.  NECK:  Supple.  CARDIOVASCULAR:  S1 and S2 heard.  RESPIRATORY:  Air entry bilaterally.  ABDOMEN:  Soft, nontender.  EXTREMITIES:  No clubbing or cyanosis were noted.    NEUROLOGIC:  The patient awake, alert.  Extraocular movements were intact.  Speech was fluent, smile symmetric.  Upon conversant, the patient has signs of forgetfulness, but does have underlying dementia.  Motor, bilateral upper 4/5; bilateral lower 3+/5.  Sensory, bilateral upper and lower intact to light touch.              LABS:  CBC Full  -  ( 03 Dec 2024 14:20 )  WBC Count : 7.18 K/uL  RBC Count : 3.60 M/uL  Hemoglobin : 11.3 g/dL  Hematocrit : 34.3 %  Platelet Count - Automated : 149 K/uL  Mean Cell Volume : 95.3 fl  Mean Cell Hemoglobin : 31.4 pg  Mean Cell Hemoglobin Concentration : 32.9 g/dL  Auto Neutrophil # : 5.63 K/uL  Auto Lymphocyte # : 0.71 K/uL  Auto Monocyte # : 0.68 K/uL  Auto Eosinophil # : 0.11 K/uL  Auto Basophil # : 0.03 K/uL  Auto Neutrophil % : 78.4 %  Auto Lymphocyte % : 9.9 %  Auto Monocyte % : 9.5 %  Auto Eosinophil % : 1.5 %  Auto Basophil % : 0.4 %    Urinalysis Basic - ( 03 Dec 2024 14:20 )    Color: x / Appearance: x / SG: x / pH: x  Gluc: 256 mg/dL / Ketone: x  / Bili: x / Urobili: x   Blood: x / Protein: x / Nitrite: x   Leuk Esterase: x / RBC: x / WBC x   Sq Epi: x / Non Sq Epi: x / Bacteria: x      12-03    138  |  106  |  34[H]  ----------------------------<  256[H]  4.1   |  28  |  2.20[H]    Ca    8.8      03 Dec 2024 14:20  Phos  2.8     12-03  Mg     2.0     12-03    TPro  6.6  /  Alb  3.1[L]  /  TBili  0.2  /  DBili  x   /  AST  16  /  ALT  20  /  AlkPhos  85  12-03    Hemoglobin A1C:     LIVER FUNCTIONS - ( 03 Dec 2024 14:20 )  Alb: 3.1 g/dL / Pro: 6.6 g/dL / ALK PHOS: 85 U/L / ALT: 20 U/L / AST: 16 U/L / GGT: x           Vitamin B12         RADIOLOGY  ANALYSIS AND PLAN:  This is a 79-year-old with episode of altered mental status.    1.For episode of altered mental status most likely metabolic encephalopathy secondary to underlying respiratory issues also suspect secondary to dementia becoming more prominent in the hospital setting, suspect less likely this is a primary CNS event.  2.Antibiotics as needed.  3.For history of mild cognitive impairment/subtle dementia would recommend to continue the patient on his home dose of amantadine and Aricept.  4.For history of hypertension, monitor systolic blood pressure.  5.For history of diabetes to control blood sugars.  6.For history of hyperlipidemia, continue the patient on statin.  7.For history of any agitation, okay to continue Seroquel, can adjust dose as needed.  Attempted to contact spouse, Saundra, at [phone]  [facility]  .52minutes of time was spent with the patient, plan of care, reviewing data, speaking to multidisciplinary healthcare team with greater than 50% of the time in counseling and care coordination.    Thank you for the courtesy of this consultation.    PATIENT HAS NOT YET BEEN SEEN AND EXAMINED TODAY. NOTE AND CHART REVIEWED IN AM AND EXAM FORM PREVIOUS.  ONCE PATIENT SEEN, CHART WILL BE UPDATE AT PRESENT NOTE IS INCOMPLETE     Neurology follow up note    RANCHO FRANCISCO79yMale      Interval History:    Patient feels ok no new complaints.    Allergies    No Known Allergies    Intolerances        MEDICATIONS    acetaminophen     Tablet .. 650 milliGRAM(s) Oral every 6 hours PRN  aluminum hydroxide/magnesium hydroxide/simethicone Suspension 30 milliLiter(s) Oral every 4 hours PRN  amLODIPine   Tablet 10 milliGRAM(s) Oral daily  apixaban 5 milliGRAM(s) Oral two times a day  aspirin  chewable 81 milliGRAM(s) Oral daily  busPIRone 5 milliGRAM(s) Oral two times a day  dextrose 5%. 1000 milliLiter(s) IV Continuous <Continuous>  dextrose 5%. 1000 milliLiter(s) IV Continuous <Continuous>  dextrose 50% Injectable 25 Gram(s) IV Push once  dextrose 50% Injectable 12.5 Gram(s) IV Push once  dextrose 50% Injectable 25 Gram(s) IV Push once  dextrose Oral Gel 15 Gram(s) Oral once PRN  donepezil 10 milliGRAM(s) Oral at bedtime  escitalopram 20 milliGRAM(s) Oral daily  fluticasone propionate/ salmeterol 100-50 MICROgram(s) Diskus 1 Dose(s) Inhalation two times a day  glucagon  Injectable 1 milliGRAM(s) IntraMuscular once  insulin lispro (ADMELOG) corrective regimen sliding scale   SubCutaneous three times a day before meals  insulin lispro (ADMELOG) corrective regimen sliding scale   SubCutaneous at bedtime  melatonin 3 milliGRAM(s) Oral at bedtime PRN  memantine 5 milliGRAM(s) Oral two times a day  polyethylene glycol 3350 17 Gram(s) Oral daily  rosuvastatin 10 milliGRAM(s) Oral at bedtime  senna 2 Tablet(s) Oral at bedtime  sodium chloride 0.45%. 1000 milliLiter(s) IV Continuous <Continuous>  tiotropium 2.5 MICROgram(s) Inhaler 2 Puff(s) Inhalation daily              Vital Signs Last 24 Hrs  T(C): 36.3 (04 Dec 2024 05:10), Max: 37.1 (03 Dec 2024 13:09)  T(F): 97.4 (04 Dec 2024 05:10), Max: 98.8 (03 Dec 2024 13:09)  HR: 53 (04 Dec 2024 05:10) (49 - 60)  BP: 156/72 (04 Dec 2024 05:10) (156/72 - 170/71)  BP(mean): --  RR: 18 (04 Dec 2024 05:10) (18 - 18)  SpO2: 92% (04 Dec 2024 05:10) (92% - 96%)    Parameters below as of 04 Dec 2024 05:10  Patient On (Oxygen Delivery Method): nasal cannula  O2 Flow (L/min): 2      REVIEW OF SYSTEMS:  CONSTITUTIONAL:  Slightly limited, but constitutionally, he denies fever, chills, night sweats.  HEAD:  No headache.  EYES:  No double vision, blurry vision.  EARS:  No ringing in the ears.  NECK:  No neck pain.  CARDIOVASCULAR:  No chest pain.  RESPIRATORY:  Positive history of cough.  ABDOMEN:  No nausea, vomiting, or abdominal pain.  EXTREMITIES/NEUROLOGICAL:  No numbness or tingling.  MUSCULOSKELETAL:  No joint pain.    PHYSICAL EXAMINATION:  HEAD:  Normocephalic, atraumatic.  EYES:  No scleral icterus.  EARS:  Hearing appear to be intact.  NECK:  Supple.  CARDIOVASCULAR:  S1 and S2 heard.  RESPIRATORY:  Air entry bilaterally.  ABDOMEN:  Soft, nontender.  EXTREMITIES:  No clubbing or cyanosis were noted.    NEUROLOGIC:  The patient awake, alert.  Extraocular movements were intact.  Speech was fluent, smile symmetric.  Upon conversant, the patient has signs of forgetfulness, but does have underlying dementia.  Motor, bilateral upper 4/5; bilateral lower 3+/5.  Sensory, bilateral upper and lower intact to light touch.              LABS:  CBC Full  -  ( 03 Dec 2024 14:20 )  WBC Count : 7.18 K/uL  RBC Count : 3.60 M/uL  Hemoglobin : 11.3 g/dL  Hematocrit : 34.3 %  Platelet Count - Automated : 149 K/uL  Mean Cell Volume : 95.3 fl  Mean Cell Hemoglobin : 31.4 pg  Mean Cell Hemoglobin Concentration : 32.9 g/dL  Auto Neutrophil # : 5.63 K/uL  Auto Lymphocyte # : 0.71 K/uL  Auto Monocyte # : 0.68 K/uL  Auto Eosinophil # : 0.11 K/uL  Auto Basophil # : 0.03 K/uL  Auto Neutrophil % : 78.4 %  Auto Lymphocyte % : 9.9 %  Auto Monocyte % : 9.5 %  Auto Eosinophil % : 1.5 %  Auto Basophil % : 0.4 %    Urinalysis Basic - ( 03 Dec 2024 14:20 )    Color: x / Appearance: x / SG: x / pH: x  Gluc: 256 mg/dL / Ketone: x  / Bili: x / Urobili: x   Blood: x / Protein: x / Nitrite: x   Leuk Esterase: x / RBC: x / WBC x   Sq Epi: x / Non Sq Epi: x / Bacteria: x      12-03    138  |  106  |  34[H]  ----------------------------<  256[H]  4.1   |  28  |  2.20[H]    Ca    8.8      03 Dec 2024 14:20  Phos  2.8     12-03  Mg     2.0     12-03    TPro  6.6  /  Alb  3.1[L]  /  TBili  0.2  /  DBili  x   /  AST  16  /  ALT  20  /  AlkPhos  85  12-03    Hemoglobin A1C:     LIVER FUNCTIONS - ( 03 Dec 2024 14:20 )  Alb: 3.1 g/dL / Pro: 6.6 g/dL / ALK PHOS: 85 U/L / ALT: 20 U/L / AST: 16 U/L / GGT: x           Vitamin B12         RADIOLOGY  ANALYSIS AND PLAN:  This is a 79-year-old with episode of altered mental status.    1.For episode of altered mental status most likely metabolic encephalopathy secondary to underlying respiratory issues also suspect secondary to dementia becoming more prominent in the hospital setting, suspect less likely this is a primary CNS event.  2.Antibiotics as needed.  3.For history of mild cognitive impairment/subtle dementia would recommend to continue the patient on his home dose of amantadine and Aricept.  4.For history of hypertension, monitor systolic blood pressure.  5.For history of diabetes to control blood sugars.  6.For history of hyperlipidemia, continue the patient on statin.  7.For history of any agitation, okay to continue Seroquel, can adjust dose as needed.  Attempted to contact spouse, Saundra, at [phone]  went to [employer] 12/3 9am  .52minutes of time was spent with the patient, plan of care, reviewing data, speaking to multidisciplinary healthcare team with greater than 50% of the time in counseling and care coordination.    Thank you for the courtesy of this consultation.

## 2024-12-04 NOTE — PROGRESS NOTE ADULT - PROBLEM SELECTOR PLAN 1
SARAY on admission. Appears to have CKD per chart review. 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 Henderson), f/u recs.    #hypernatremia - improved  - c/w IVF

## 2024-12-04 NOTE — PROGRESS NOTE ADULT - ATTENDING COMMENTS
80yo M with a PMH of COPD, HTN, HLD, DM2, CVA (4/2015), dementia, TAMMY on CPAP who presents to the ED with cough and AMS. Admitted for SARAY. s/p IVF. SARAY improving. Passed TOV. Had BM. Mobilize, avoid constipation. Bowel regimen started. SARAY on CKD 3 improving - likely volume depletion/dehydration and urinary retention. Improving; Renal following, recs appreciated. Discussed with Dr Varghese renal stable for dc home with outpatient follow up.   Type 2 DM - A1C 7.8 continue insulin regimen blood glucose goal 100-180 in hospital setting.  Discussed with wife at bedside, aware and in agreement with above
78yo M with a PMH of COPD, HTN, HLD, DM2, CVA (4/2015), dementia, TAMMY on CPAP who presents to the ED with cough and AMS. Admitted for SARAY. s/p IVF. SARAY improving. TOV today, Fierro. Had BM. Mobilize, avoid constipation. Bowel regimen started. SARAY on CKD 3 improving - likely volume depletion/dehydration and urinary retention. Improving; Check UA, urine lytes. Renal following, recs appreciated.   Type 2 DM - A1C 7.8 continue insulin regimen blood glucose goal 100-180 in hospital setting.  Discussed with wife at bedside, aware and in agreement with above - would like patient to be DC home ASAP - reports no hx of urinary retention.
80yo M with a PMH of COPD, HTN, HLD, DM2, CVA (4/2015), dementia, TAMMY on CPAP who presents to the ED with cough and AMS. Admitted for SARAY.  Continue brewster. Mobilize, avoid constipation. Bowel regimen started. SARAY on CKD 3 improving - likely volume depletion/dehydration and urinary retention. Improving, continue IVF. Adjusted to 1/2NS due to hypernatremia - monitor electrolytes. Check UA, urine lytes. Renal following, recs appreciated. Plan for TOV pending BMs.   Type 2 DM - A1C 7.8 continue insulin regimen blood glucose goal 100-180 in hospital setting.

## 2024-12-04 NOTE — PROGRESS NOTE ADULT - PROBLEM SELECTOR PLAN 2
Pt presented to the ED for lethargy and AMS   - enterorhino+ on admission   - supportive care   - BCx NGTD  - tylenol prn for fever  - CXR no change compared to prior    #wheezing 2/2 URI and hx of COPD  - continue supportive care and home inhalers
Pt presented to the ED for lethargy and AMS   - enterorhino+ on admission   - supportive care   - BCx NGTD  - tylenol prn for fever  - CXR as pt now wheezing
Pt presented to the ED for lethargy and AMS   - enterorhino+ on admission   - supportive care   - f/u blood cultures x2   - tylenol prn for fever

## 2024-12-04 NOTE — PROGRESS NOTE ADULT - PROBLEM SELECTOR PLAN 3
Chronic. On Metformin, Glimepiride and Alogliptin   - hold home DM meds  - Start LDISS  - Hypoglycemia protocol  - Monitor fingersticks   - A1c 7.8
Chronic. On Metformin, Glimepiride and Alogliptin   - hold home DM meds  - Start LDISS  - Hypoglycemia protocol  - Monitor fingersticks   - F/u AM A1c.
Chronic. On Metformin, Glimepiride and Alogliptin   - hold home DM meds  - Start LDISS  - Hypoglycemia protocol  - Monitor fingersticks   - A1c 7.8

## 2024-12-04 NOTE — PROGRESS NOTE ADULT - PROBLEM SELECTOR PLAN 10
DVT ppx: c/w home eliquis 5 mg bid    PT recs - home w/ continued assistance from wife
DVT ppx: c/w home eliquis 5 mg bid    PT recs - home w/ continued assistance from wife
DVT ppx: c/w home eliquis 5 mg bid

## 2024-12-04 NOTE — PROGRESS NOTE ADULT - ASSESSMENT
SARAY on CKD 3  Hypernatremia  HTN  Dementia    -Baseline Creatinine 1.4  -SARAY multifactorial volume depletion/dehydration + urinary retention  -Urine indices pending  -Fierro was placed in ER by urology  -Hypotonic fluids for hypernatremia; improving  -Renal indices improving  -BP controlled    d/w wife and RN
SARAY on CKD 3  Hypernatremia  HTN  Dementia    -Baseline Creatinine 1.4  -SARAY multifactorial volume depletion/dehydration + urinary retention  -Urine lytes are pending  -Fierro was placed in ER by urology  -Continue IVF  -Hypernatremia resolved  -Renal indices are improving well  -BP controlled    12/3/24-d/w wife
78yo M with a PMH of COPD, HTN, HLD, DM2, CVA (4/2015), dementia, TAMMY on CPAP who presents to the ED with cough and AMS. Admitted for SARAY. 

## 2024-12-04 NOTE — PROGRESS NOTE ADULT - PROBLEM SELECTOR PLAN 9
CT A/P showed incidental finding: Mild infrarenal abdominal aortic aneurysm measuring up to 3.3 x 2.7 does not appear significantly changed compared with 3/25/2014.  - wife informed of above result and to follow up with PCP

## 2024-12-04 NOTE — DISCHARGE NOTE NURSING/CASE MANAGEMENT/SOCIAL WORK - FINANCIAL ASSISTANCE
[facility] provides services at a reduced cost to those who are determined to be eligible through [facility]’s financial assistance program. Information regarding [facility]’s financial assistance program can be found by going to [email or web address] or by calling [phone].

## 2024-12-04 NOTE — PROGRESS NOTE ADULT - PROBLEM SELECTOR PLAN 4
Chronic. at home on amlodipine and losartan  - hold home losartan in setting of SARAY  - /56 on admission   - continue amlodipine 10 mg qd with hold parameters   - Monitor routine hemodynamics.

## 2024-12-04 NOTE — PROGRESS NOTE ADULT - SUBJECTIVE AND OBJECTIVE BOX
Patient is a 79y old  Male who presents with a chief complaint of SARAY (04 Dec 2024 08:44)      INTERVAL HPI/OVERNIGHT EVENTS: Per nursing staff, pt was agitated overnight otherwise no new acute events. Pt was seen and examined at the bedside this AM. Sleeping but arousable, pleasantly confused (oriented x1). States he feels warm otherwise no new acute complaints. Unable to obtain reliable ROS due to dementia    MEDICATIONS  (STANDING):  amLODIPine   Tablet 10 milliGRAM(s) Oral daily  apixaban 5 milliGRAM(s) Oral two times a day  aspirin  chewable 81 milliGRAM(s) Oral daily  busPIRone 5 milliGRAM(s) Oral two times a day  dextrose 5%. 1000 milliLiter(s) (100 mL/Hr) IV Continuous <Continuous>  dextrose 5%. 1000 milliLiter(s) (50 mL/Hr) IV Continuous <Continuous>  dextrose 50% Injectable 25 Gram(s) IV Push once  dextrose 50% Injectable 12.5 Gram(s) IV Push once  dextrose 50% Injectable 25 Gram(s) IV Push once  donepezil 10 milliGRAM(s) Oral at bedtime  escitalopram 20 milliGRAM(s) Oral daily  fluticasone propionate/ salmeterol 100-50 MICROgram(s) Diskus 1 Dose(s) Inhalation two times a day  glucagon  Injectable 1 milliGRAM(s) IntraMuscular once  insulin lispro (ADMELOG) corrective regimen sliding scale   SubCutaneous three times a day before meals  insulin lispro (ADMELOG) corrective regimen sliding scale   SubCutaneous at bedtime  memantine 5 milliGRAM(s) Oral two times a day  polyethylene glycol 3350 17 Gram(s) Oral daily  rosuvastatin 10 milliGRAM(s) Oral at bedtime  senna 2 Tablet(s) Oral at bedtime  sodium chloride 0.45%. 1000 milliLiter(s) (75 mL/Hr) IV Continuous <Continuous>  tiotropium 2.5 MICROgram(s) Inhaler 2 Puff(s) Inhalation daily    MEDICATIONS  (PRN):  acetaminophen     Tablet .. 650 milliGRAM(s) Oral every 6 hours PRN Temp greater or equal to 38C (100.4F), Mild Pain (1 - 3)  aluminum hydroxide/magnesium hydroxide/simethicone Suspension 30 milliLiter(s) Oral every 4 hours PRN Dyspepsia  dextrose Oral Gel 15 Gram(s) Oral once PRN Blood Glucose LESS THAN 70 milliGRAM(s)/deciliter  melatonin 3 milliGRAM(s) Oral at bedtime PRN Insomnia      Allergies    No Known Allergies    Intolerances        REVIEW OF SYSTEMS:  unable to obtain reliable ROS 2/2 dementia    Vital Signs Last 24 Hrs  T(C): 36.3 (04 Dec 2024 05:10), Max: 37.1 (03 Dec 2024 13:09)  T(F): 97.4 (04 Dec 2024 05:10), Max: 98.8 (03 Dec 2024 13:09)  HR: 53 (04 Dec 2024 05:10) (49 - 60)  BP: 156/72 (04 Dec 2024 05:10) (156/72 - 170/71)  BP(mean): --  RR: 18 (04 Dec 2024 05:10) (18 - 18)  SpO2: 92% (04 Dec 2024 05:10) (92% - 96%)    Parameters below as of 04 Dec 2024 05:10  Patient On (Oxygen Delivery Method): nasal cannula  O2 Flow (L/min): 2      PHYSICAL EXAM: exam limited due to mentation and pt declining further examination  GENERAL: NAD, sleeping but arousable  CHEST/LUNG:  b/l wheezing in upper and lower lung fields  HEART:  RRR, S1, S2  ABDOMEN:  BS+, soft, non-distended  NEUROLOGIC: pleasantly confused, oriented x1      LABS:                        11.9   4.54  )-----------( 162      ( 04 Dec 2024 07:05 )             35.7     CBC Full  -  ( 04 Dec 2024 07:05 )  WBC Count : 4.54 K/uL  Hemoglobin : 11.9 g/dL  Hematocrit : 35.7 %  Platelet Count - Automated : 162 K/uL  Mean Cell Volume : 93.0 fl  Mean Cell Hemoglobin : 31.0 pg  Mean Cell Hemoglobin Concentration : 33.3 g/dL  Auto Neutrophil # : x  Auto Lymphocyte # : x  Auto Monocyte # : x  Auto Eosinophil # : x  Auto Basophil # : x  Auto Neutrophil % : x  Auto Lymphocyte % : x  Auto Monocyte % : x  Auto Eosinophil % : x  Auto Basophil % : x    04 Dec 2024 07:05    140    |  108    |  27     ----------------------------<  182    3.9     |  28     |  1.80     Ca    8.8        04 Dec 2024 07:05  Phos  2.8       03 Dec 2024 14:20  Mg     2.0       03 Dec 2024 14:20    TPro  6.6    /  Alb  3.1    /  TBili  0.2    /  DBili  x      /  AST  16     /  ALT  20     /  AlkPhos  85     03 Dec 2024 14:20      Urinalysis Basic - ( 04 Dec 2024 07:05 )    Color: x / Appearance: x / SG: x / pH: x  Gluc: 182 mg/dL / Ketone: x  / Bili: x / Urobili: x   Blood: x / Protein: x / Nitrite: x   Leuk Esterase: x / RBC: x / WBC x   Sq Epi: x / Non Sq Epi: x / Bacteria: x      CAPILLARY BLOOD GLUCOSE      POCT Blood Glucose.: 178 mg/dL (04 Dec 2024 07:48)  POCT Blood Glucose.: 155 mg/dL (03 Dec 2024 21:10)  POCT Blood Glucose.: 254 mg/dL (03 Dec 2024 17:01)  POCT Blood Glucose.: 199 mg/dL (03 Dec 2024 12:11)        Urinalysis with Rflx Culture (collected 12-01-24 @ 18:01)    Culture - Blood (collected 12-01-24 @ 12:20)  Source: .Blood BLOOD  Preliminary Report (12-03-24 @ 17:01):    No growth at 48 Hours    Culture - Blood (collected 12-01-24 @ 12:10)  Source: .Blood BLOOD  Preliminary Report (12-03-24 @ 17:01):    No growth at 48 Hours        RADIOLOGY & ADDITIONAL TESTS:    Personally reviewed.     Consultant(s) Notes Reviewed:  [x] YES  [ ] NO

## 2024-12-04 NOTE — PROGRESS NOTE ADULT - SUBJECTIVE AND OBJECTIVE BOX
[facility]                             Nephrology and Hypertension                             Perry Ellis                                          [phone]     Patient is a 79y old  Male who presents with a chief complaint of SARAY (02 Dec 2024 09:45)       HPI:  78yo M with a PMH of COPD, HTN, HLD, DM2, CVA (2015), dementia, TAMMY on CPAP who presents to the ED with cough and AMS. Patient's wife at bedside and assisted with history given patients history of dementia. Pt has cough for the past week, productive of clear phlegm. Pt lives w/ grandchildren who have had URI symptoms as well. Pt's wife has been giving albuterol nebulizer 2x daily to help with cough. She reports this AM patient was lethargic and she had difficulty waking him up from sleep. She also noted his speech was "garbled" and felt he was off balance when ambulating. Also has decreased PO intake for the past few days. Per wife he is AAOx1-2 at baseline. Has SOB with exertion, which is chronic.   Has not seen nephrologist in the past.  Denies any N/V/SOB, but has dementia.     ROS:   ROS limited 2/2 dementia however per wife Denies fever, chills, chest pain, abdominal pain.    (+): fatigue, lethargy, cough, SOB on exertion (chronic), nasal discharge    ED course  Vitals: T 99.2, HR 54, /56, RR 18, SpO2 96% on RA  Significant labs: Cr 3.8, lactate 1.4   UA grossly normal   +Enterorhino  Imaging:   CT Abdomen and Pelvis shows Distended bladder. No discrete mass is identified. Prostate gland is mildly enlarged. Please correlate clinically. Mild infrarenal abdominal aortic aneurysm measuring up to 3.3 x 2.7 does not appear significantly changed compared with 3/25/2014.  CXR:  No radiographic evidence of active chest disease.  CTHead non con: Stable head CT.  EKG: sinus bradycardia with 1st degree AV block, septal infarct (age undetermined), QTc 447   In ED patient given: 1L NS bolus x1, Ativan 1mg IVP x1   (01 Dec 2024 19:06)    No acute events noted       PAST MEDICAL & SURGICAL HISTORY:  Diabetes mellitus      Hypertension      COPD (chronic obstructive pulmonary disease)      HLD (hyperlipidemia)      Dementia      CVA (cerebral vascular accident)      H/O hand surgery           FAMILY HISTORY:  Family history of CABG (Father)    NC    Social History:Non smoker    MEDICATIONS  (STANDING):  amLODIPine   Tablet 10 milliGRAM(s) Oral daily  apixaban 5 milliGRAM(s) Oral two times a day  aspirin  chewable 81 milliGRAM(s) Oral daily  busPIRone 5 milliGRAM(s) Oral two times a day  dextrose 5%. 1000 milliLiter(s) (100 mL/Hr) IV Continuous <Continuous>  dextrose 5%. 1000 milliLiter(s) (50 mL/Hr) IV Continuous <Continuous>  dextrose 50% Injectable 25 Gram(s) IV Push once  dextrose 50% Injectable 12.5 Gram(s) IV Push once  dextrose 50% Injectable 25 Gram(s) IV Push once  donepezil 10 milliGRAM(s) Oral at bedtime  escitalopram 20 milliGRAM(s) Oral daily  fluticasone propionate/ salmeterol 100-50 MICROgram(s) Diskus 1 Dose(s) Inhalation two times a day  glucagon  Injectable 1 milliGRAM(s) IntraMuscular once  insulin lispro (ADMELOG) corrective regimen sliding scale   SubCutaneous three times a day before meals  insulin lispro (ADMELOG) corrective regimen sliding scale   SubCutaneous at bedtime  memantine 5 milliGRAM(s) Oral two times a day  mineral oil enema 133 milliLiter(s) Rectal once  polyethylene glycol 3350 17 Gram(s) Oral daily  rosuvastatin 10 milliGRAM(s) Oral at bedtime  senna 2 Tablet(s) Oral at bedtime  sodium chloride 0.45%. 1000 milliLiter(s) (75 mL/Hr) IV Continuous <Continuous>  tiotropium 2.5 MICROgram(s) Inhaler 2 Puff(s) Inhalation daily    MEDICATIONS  (PRN):  acetaminophen     Tablet .. 650 milliGRAM(s) Oral every 6 hours PRN Temp greater or equal to 38C (100.4F), Mild Pain (1 - 3)  aluminum hydroxide/magnesium hydroxide/simethicone Suspension 30 milliLiter(s) Oral every 4 hours PRN Dyspepsia  dextrose Oral Gel 15 Gram(s) Oral once PRN Blood Glucose LESS THAN 70 milliGRAM(s)/deciliter  melatonin 3 milliGRAM(s) Oral at bedtime PRN Insomnia   Meds reviewed    Allergies    No Known Allergies    Intolerances         REVIEW OF SYSTEMS:    Review of Systems:   Constitutional: Denies fatigue  HEENT: Denies headaches and dizziness  Respiratory: denies SOB, cough, or wheezing  Cardiovascular: denies CP, palpitations  Gastrointestinal: Denies nausea, denies vomiting, diarrhea, constipation, abdominal pain, or bloody stools  Genitourinary: denies painful urination, increased frequency, urgency, or bloody urine  Skin: denies rashes or itching  Musculoskeletal: denies muscle aches, joint swelling  Neurologic: Denies generalized weakness, denies loss of sensation, numbness, or tingling      ICU Vital Signs Last 24 Hrs  T(C): 36.3 (04 Dec 2024 05:10), Max: 37.1 (03 Dec 2024 13:09)  T(F): 97.4 (04 Dec 2024 05:10), Max: 98.8 (03 Dec 2024 13:09)  HR: 53 (04 Dec 2024 05:10) (49 - 60)  BP: 156/72 (04 Dec 2024 05:10) (156/72 - 170/71)  BP(mean): --  ABP: --  ABP(mean): --  RR: 18 (04 Dec 2024 05:10) (18 - 18)  SpO2: 92% (04 Dec 2024 05:10) (92% - 96%)    O2 Parameters below as of 04 Dec 2024 05:10  Patient On (Oxygen Delivery Method): nasal cannula  O2 Flow (L/min): 2        Daily     Daily Weight in k.6 (02 Dec 2024 13:15)    PHYSICAL EXAM:    GENERAL: NAD  HEAD:  Atraumatic, Normocephalic  EYES: EOMI, conjunctiva and sclera clear  ENMT: No Drainage from nares, No drainage from ears  NERVOUS SYSTEM:  Awake and Alert  CHEST/LUNG: Clear to percussion bilaterally  EXTREMITIES:  No Edema  SKIN: No rashes No obvious ecchymosis      LABS:                                        11.9   4.54  )-----------( 162      ( 04 Dec 2024 07:05 )             35.7     12-04    140  |  108  |  27[H]  ----------------------------<  182[H]  3.9   |  28  |  1.80[H]    Ca    8.8      04 Dec 2024 07:05  Phos  2.8     12-03  Mg     2.0         TPro  6.6  /  Alb  3.1[L]  /  TBili  0.2  /  DBili  x   /  AST  16  /  ALT  20  /  AlkPhos  85        Urinalysis Basic - ( 04 Dec 2024 07:05 )    Color: x / Appearance: x / SG: x / pH: x  Gluc: 182 mg/dL / Ketone: x  / Bili: x / Urobili: x   Blood: x / Protein: x / Nitrite: x   Leuk Esterase: x / RBC: x / WBC x   Sq Epi: x / Non Sq Epi: x / Bacteria: x

## 2024-12-06 LAB
CULTURE RESULTS: SIGNIFICANT CHANGE UP
CULTURE RESULTS: SIGNIFICANT CHANGE UP
SPECIMEN SOURCE: SIGNIFICANT CHANGE UP
SPECIMEN SOURCE: SIGNIFICANT CHANGE UP

## 2025-04-09 ENCOUNTER — NON-APPOINTMENT (OUTPATIENT)
Age: 80
End: 2025-04-09

## 2025-04-09 ENCOUNTER — APPOINTMENT (OUTPATIENT)
Dept: CARDIOLOGY | Facility: CLINIC | Age: 80
End: 2025-04-09
Payer: MEDICARE

## 2025-04-09 VITALS
SYSTOLIC BLOOD PRESSURE: 136 MMHG | BODY MASS INDEX: 29.51 KG/M2 | OXYGEN SATURATION: 96 % | HEIGHT: 67 IN | WEIGHT: 188 LBS | HEART RATE: 63 BPM | DIASTOLIC BLOOD PRESSURE: 73 MMHG

## 2025-04-09 DIAGNOSIS — R06.09 OTHER FORMS OF DYSPNEA: ICD-10-CM

## 2025-04-09 DIAGNOSIS — I48.0 PAROXYSMAL ATRIAL FIBRILLATION: ICD-10-CM

## 2025-04-09 PROCEDURE — G2211 COMPLEX E/M VISIT ADD ON: CPT

## 2025-04-09 PROCEDURE — 93000 ELECTROCARDIOGRAM COMPLETE: CPT

## 2025-04-09 PROCEDURE — 99214 OFFICE O/P EST MOD 30 MIN: CPT

## 2025-04-10 ENCOUNTER — NON-APPOINTMENT (OUTPATIENT)
Age: 80
End: 2025-04-10